# Patient Record
Sex: MALE | Race: BLACK OR AFRICAN AMERICAN | Employment: OTHER | URBAN - METROPOLITAN AREA
[De-identification: names, ages, dates, MRNs, and addresses within clinical notes are randomized per-mention and may not be internally consistent; named-entity substitution may affect disease eponyms.]

---

## 2017-01-03 ENCOUNTER — HOME CARE VISIT (OUTPATIENT)
Dept: HOME HEALTH SERVICES | Facility: HOME HEALTH | Age: 77
End: 2017-01-03
Payer: MEDICARE

## 2017-01-04 ENCOUNTER — HOME CARE VISIT (OUTPATIENT)
Dept: SCHEDULING | Facility: HOME HEALTH | Age: 77
End: 2017-01-04
Payer: MEDICARE

## 2017-01-04 PROCEDURE — G0299 HHS/HOSPICE OF RN EA 15 MIN: HCPCS

## 2017-01-06 ENCOUNTER — HOME CARE VISIT (OUTPATIENT)
Dept: SCHEDULING | Facility: HOME HEALTH | Age: 77
End: 2017-01-06
Payer: MEDICARE

## 2017-01-06 VITALS
SYSTOLIC BLOOD PRESSURE: 119 MMHG | DIASTOLIC BLOOD PRESSURE: 60 MMHG | HEART RATE: 87 BPM | RESPIRATION RATE: 18 BRPM | OXYGEN SATURATION: 98 %

## 2017-01-06 VITALS
TEMPERATURE: 97.6 F | OXYGEN SATURATION: 98 % | HEART RATE: 58 BPM | SYSTOLIC BLOOD PRESSURE: 122 MMHG | DIASTOLIC BLOOD PRESSURE: 80 MMHG | RESPIRATION RATE: 16 BRPM

## 2017-01-06 PROCEDURE — G0157 HHC PT ASSISTANT EA 15: HCPCS

## 2017-01-06 PROCEDURE — G0299 HHS/HOSPICE OF RN EA 15 MIN: HCPCS

## 2017-01-11 ENCOUNTER — HOME CARE VISIT (OUTPATIENT)
Dept: SCHEDULING | Facility: HOME HEALTH | Age: 77
End: 2017-01-11
Payer: MEDICARE

## 2017-01-11 VITALS
DIASTOLIC BLOOD PRESSURE: 74 MMHG | RESPIRATION RATE: 18 BRPM | SYSTOLIC BLOOD PRESSURE: 132 MMHG | TEMPERATURE: 96.5 F | HEART RATE: 74 BPM

## 2017-01-11 PROCEDURE — G0157 HHC PT ASSISTANT EA 15: HCPCS

## 2017-01-11 PROCEDURE — G0299 HHS/HOSPICE OF RN EA 15 MIN: HCPCS

## 2017-01-12 VITALS
HEART RATE: 74 BPM | OXYGEN SATURATION: 98 % | DIASTOLIC BLOOD PRESSURE: 74 MMHG | OXYGEN SATURATION: 98 % | TEMPERATURE: 96.5 F | SYSTOLIC BLOOD PRESSURE: 132 MMHG | TEMPERATURE: 96 F | RESPIRATION RATE: 18 BRPM | HEART RATE: 87 BPM | SYSTOLIC BLOOD PRESSURE: 119 MMHG | DIASTOLIC BLOOD PRESSURE: 60 MMHG

## 2017-01-14 ENCOUNTER — HOME CARE VISIT (OUTPATIENT)
Dept: SCHEDULING | Facility: HOME HEALTH | Age: 77
End: 2017-01-14
Payer: MEDICARE

## 2017-01-14 PROCEDURE — G0157 HHC PT ASSISTANT EA 15: HCPCS

## 2017-01-17 ENCOUNTER — HOME CARE VISIT (OUTPATIENT)
Dept: SCHEDULING | Facility: HOME HEALTH | Age: 77
End: 2017-01-17
Payer: MEDICARE

## 2017-01-17 VITALS
SYSTOLIC BLOOD PRESSURE: 142 MMHG | DIASTOLIC BLOOD PRESSURE: 80 MMHG | RESPIRATION RATE: 18 BRPM | HEART RATE: 70 BPM | TEMPERATURE: 97.6 F

## 2017-01-17 PROCEDURE — G0155 HHCP-SVS OF CSW,EA 15 MIN: HCPCS

## 2017-01-18 ENCOUNTER — HOME CARE VISIT (OUTPATIENT)
Dept: SCHEDULING | Facility: HOME HEALTH | Age: 77
End: 2017-01-18
Payer: MEDICARE

## 2017-01-18 VITALS
DIASTOLIC BLOOD PRESSURE: 70 MMHG | TEMPERATURE: 97.9 F | SYSTOLIC BLOOD PRESSURE: 122 MMHG | RESPIRATION RATE: 18 BRPM | HEART RATE: 70 BPM

## 2017-01-18 PROCEDURE — G0157 HHC PT ASSISTANT EA 15: HCPCS

## 2017-01-20 ENCOUNTER — HOME CARE VISIT (OUTPATIENT)
Dept: SCHEDULING | Facility: HOME HEALTH | Age: 77
End: 2017-01-20
Payer: MEDICARE

## 2017-01-20 VITALS
SYSTOLIC BLOOD PRESSURE: 144 MMHG | RESPIRATION RATE: 17 BRPM | DIASTOLIC BLOOD PRESSURE: 84 MMHG | HEART RATE: 88 BPM | TEMPERATURE: 96.6 F

## 2017-01-20 PROCEDURE — G0151 HHCP-SERV OF PT,EA 15 MIN: HCPCS

## 2017-01-24 ENCOUNTER — HOME CARE VISIT (OUTPATIENT)
Dept: SCHEDULING | Facility: HOME HEALTH | Age: 77
End: 2017-01-24
Payer: MEDICARE

## 2017-01-24 VITALS
TEMPERATURE: 96.6 F | SYSTOLIC BLOOD PRESSURE: 134 MMHG | DIASTOLIC BLOOD PRESSURE: 78 MMHG | RESPIRATION RATE: 16 BRPM | HEART RATE: 72 BPM

## 2017-01-24 PROCEDURE — G0151 HHCP-SERV OF PT,EA 15 MIN: HCPCS

## 2017-02-07 ENCOUNTER — PATIENT OUTREACH (OUTPATIENT)
Dept: CASE MANAGEMENT | Age: 77
End: 2017-02-07

## 2017-02-07 NOTE — PROGRESS NOTES
Attempted to reach pt for CCM services, left vm message with RNCM contact information. Will continue attempts to reach pt. This note will not be viewable in [address]

## 2017-02-08 ENCOUNTER — PATIENT OUTREACH (OUTPATIENT)
Dept: CASE MANAGEMENT | Age: 77
End: 2017-02-08

## 2017-02-08 NOTE — PROGRESS NOTES
Pt returned call to [place]. Pt lives with daughter Mack Enriquez. She works in healthcare [employer] until 6pm.  She assists with managing medications, grocery shopping, and cooking. Pt wishes to have assistance with bathing MWF. He says he is [facility] connected but is unable to provide furhter information on this. Pt reports that he has a \"leakage\" problem. Other daughter Tej Becerra will take pt to PCP appt tomorrow 2/9/17 to discuss problem with leakage. Pt states he has urologist but doesn't recall name. RNCM attempted to review medications with pt however he is unable to review. He says he thinks he's getting alzheimer's. RNCM requested pt have daughter called RNCM tomorrow to review. Pt agrees to do so. Next Steps: [place] will attempt another outreach if I d not hear back from daughter. Will provide her with resources and assess insurance coverage as pt doesn't recall benefits. This note will not be viewable in [address]

## 2017-03-17 ENCOUNTER — PATIENT OUTREACH (OUTPATIENT)
Dept: CASE MANAGEMENT | Age: 77
End: 2017-03-17

## 2017-03-17 NOTE — PROGRESS NOTES
This note will not be viewable in [address] RNCM attempted f/u call for CCM services, left  message with RNCM contact information. RNCM will f/u in 1wk if do not hear back from pt.

## 2017-03-24 ENCOUNTER — PATIENT OUTREACH (OUTPATIENT)
Dept: CASE MANAGEMENT | Age: 77
End: 2017-03-24

## 2017-03-24 NOTE — PROGRESS NOTES
This note will not be viewable in [address] 2nd unsuccessful attempt to reach pt at both numbers listed. Left vm at both numbers with RNCM contact information. Will continue attempts to reach pt.

## 2017-04-03 ENCOUNTER — PATIENT OUTREACH (OUTPATIENT)
Dept: CASE MANAGEMENT | Age: 77
End: 2017-04-03

## 2017-04-03 NOTE — PROGRESS NOTES
This note will not be viewable in [address] Care Needs: RNCM attempted to reach pt for f/u CCM services.  says mailbox is full, unable to leave message at [phone]. RNCM left message for pt at [phone]. RNCM will continue attempts to reach pt.

## 2017-04-11 ENCOUNTER — PATIENT OUTREACH (OUTPATIENT)
Dept: CASE MANAGEMENT | Age: 77
End: 2017-04-11

## 2017-04-11 NOTE — Clinical Note
Good morning Ladies, I've left several messages for Mr. Gallo Moya and his family, but have not received returned calls from him or his daughter since my initial contact. He has an appt on 4/17, if you'd like to give them my number to contact me at their convenience. Thank you,     Helene Gatica RN, [email or web address] c: [phone] / Marcia Villanueva [address] [place] [address]. Juliano  / Judd, [address] [facility] [email or web address]. [facility]

## 2017-04-11 NOTE — PROGRESS NOTES
This note will not be viewable in [address] Care Needs: RNCM attempted to reach pt for f/u CCM services.  says mailbox is full, unable to leave message at [phone]. RNCM left last and final message for pt at [phone]. RNCM will notify PCP unable to reach pt.

## 2017-04-17 PROBLEM — C61 CA OF PROSTATE (HCC): Status: ACTIVE | Noted: 2017-04-17

## 2017-05-16 ENCOUNTER — PATIENT OUTREACH (OUTPATIENT)
Dept: CASE MANAGEMENT | Age: 77
End: 2017-05-16

## 2017-06-03 ENCOUNTER — APPOINTMENT (OUTPATIENT)
Dept: CT IMAGING | Age: 77
End: 2017-06-03
Attending: EMERGENCY MEDICINE
Payer: MEDICARE

## 2017-06-03 ENCOUNTER — HOSPITAL ENCOUNTER (EMERGENCY)
Age: 77
Discharge: HOME | End: 2017-06-03
Attending: EMERGENCY MEDICINE
Payer: MEDICARE

## 2017-06-03 ENCOUNTER — APPOINTMENT (OUTPATIENT)
Dept: GENERAL RADIOLOGY | Age: 77
End: 2017-06-03
Attending: EMERGENCY MEDICINE
Payer: MEDICARE

## 2017-06-03 VITALS
OXYGEN SATURATION: 96 % | WEIGHT: 300 LBS | SYSTOLIC BLOOD PRESSURE: 127 MMHG | RESPIRATION RATE: 18 BRPM | HEART RATE: 72 BPM | DIASTOLIC BLOOD PRESSURE: 61 MMHG | HEIGHT: 74 IN | BODY MASS INDEX: 38.5 KG/M2 | TEMPERATURE: 98.8 F

## 2017-06-03 DIAGNOSIS — R53.1 GENERALIZED WEAKNESS: Primary | ICD-10-CM

## 2017-06-03 LAB
ALBUMIN SERPL BCP-MCNC: 3.5 G/DL (ref 3.2–4.6)
ALBUMIN/GLOB SERPL: 0.9 {RATIO} (ref 1.2–3.5)
ALP SERPL-CCNC: 102 U/L (ref 50–136)
ALT SERPL-CCNC: 20 U/L (ref 12–65)
ANION GAP BLD CALC-SCNC: 10 MMOL/L (ref 7–16)
APPEARANCE UR: ABNORMAL
AST SERPL W P-5'-P-CCNC: 31 U/L (ref 15–37)
BACTERIA URNS QL MICRO: ABNORMAL /HPF
BASOPHILS # BLD AUTO: 0 K/UL (ref 0–0.2)
BASOPHILS # BLD: 0 % (ref 0–2)
BILIRUB SERPL-MCNC: 1.8 MG/DL (ref 0.2–1.1)
BILIRUB UR QL: ABNORMAL
BUN SERPL-MCNC: 22 MG/DL (ref 8–23)
CALCIUM SERPL-MCNC: 10 MG/DL (ref 8.3–10.4)
CHLORIDE SERPL-SCNC: 110 MMOL/L (ref 98–107)
CO2 SERPL-SCNC: 24 MMOL/L (ref 21–32)
COLOR UR: ABNORMAL
CREAT SERPL-MCNC: 0.99 MG/DL (ref 0.8–1.5)
CRYSTALS URNS QL MICRO: ABNORMAL /LPF
DIFFERENTIAL METHOD BLD: ABNORMAL
EOSINOPHIL # BLD: 0 K/UL (ref 0–0.8)
EOSINOPHIL NFR BLD: 0 % (ref 0.5–7.8)
EPI CELLS #/AREA URNS HPF: ABNORMAL /HPF
ERYTHROCYTE [DISTWIDTH] IN BLOOD BY AUTOMATED COUNT: 13.7 % (ref 11.9–14.6)
GLOBULIN SER CALC-MCNC: 3.7 G/DL (ref 2.3–3.5)
GLUCOSE SERPL-MCNC: 91 MG/DL (ref 65–100)
GLUCOSE UR STRIP.AUTO-MCNC: NEGATIVE MG/DL
HCT VFR BLD AUTO: 44.8 % (ref 41.1–50.3)
HGB BLD-MCNC: 16.2 G/DL (ref 13.6–17.2)
HGB UR QL STRIP: ABNORMAL
IMM GRANULOCYTES # BLD: 0 K/UL (ref 0–0.5)
IMM GRANULOCYTES NFR BLD AUTO: 0.3 % (ref 0–5)
KETONES UR QL STRIP.AUTO: NEGATIVE MG/DL
LEUKOCYTE ESTERASE UR QL STRIP.AUTO: ABNORMAL
LYMPHOCYTES # BLD AUTO: 17 % (ref 13–44)
LYMPHOCYTES # BLD: 1 K/UL (ref 0.5–4.6)
MCH RBC QN AUTO: 31.7 PG (ref 26.1–32.9)
MCHC RBC AUTO-ENTMCNC: 36.2 G/DL (ref 31.4–35)
MCV RBC AUTO: 87.7 FL (ref 79.6–97.8)
MONOCYTES # BLD: 0.4 K/UL (ref 0.1–1.3)
MONOCYTES NFR BLD AUTO: 6 % (ref 4–12)
NEUTS SEG # BLD: 4.5 K/UL (ref 1.7–8.2)
NEUTS SEG NFR BLD AUTO: 77 % (ref 43–78)
NITRITE UR QL STRIP.AUTO: NEGATIVE
OTHER OBSERVATIONS,UCOM: ABNORMAL
PH UR STRIP: 5.5 [PH] (ref 5–9)
PLATELET # BLD AUTO: 168 K/UL (ref 150–450)
PMV BLD AUTO: 10.4 FL (ref 10.8–14.1)
POTASSIUM SERPL-SCNC: 4 MMOL/L (ref 3.5–5.1)
PROT SERPL-MCNC: 7.2 G/DL (ref 6.3–8.2)
PROT UR STRIP-MCNC: 30 MG/DL
RBC # BLD AUTO: 5.11 M/UL (ref 4.23–5.67)
RBC #/AREA URNS HPF: ABNORMAL /HPF
SODIUM SERPL-SCNC: 144 MMOL/L (ref 136–145)
SP GR UR REFRACTOMETRY: 1.03 (ref 1–1.02)
UROBILINOGEN UR QL STRIP.AUTO: 1 EU/DL (ref 0.2–1)
WBC # BLD AUTO: 5.9 K/UL (ref 4.3–11.1)
WBC URNS QL MICRO: ABNORMAL /HPF

## 2017-06-03 PROCEDURE — 83605 ASSAY OF LACTIC ACID: CPT

## 2017-06-03 PROCEDURE — 71010 XR CHEST PORT: CPT

## 2017-06-03 PROCEDURE — 99284 EMERGENCY DEPT VISIT MOD MDM: CPT | Performed by: EMERGENCY MEDICINE

## 2017-06-03 PROCEDURE — 74011250636 HC RX REV CODE- 250/636: Performed by: EMERGENCY MEDICINE

## 2017-06-03 PROCEDURE — 70450 CT HEAD/BRAIN W/O DYE: CPT

## 2017-06-03 PROCEDURE — 87040 BLOOD CULTURE FOR BACTERIA: CPT | Performed by: EMERGENCY MEDICINE

## 2017-06-03 PROCEDURE — 93005 ELECTROCARDIOGRAM TRACING: CPT | Performed by: EMERGENCY MEDICINE

## 2017-06-03 PROCEDURE — 96360 HYDRATION IV INFUSION INIT: CPT | Performed by: EMERGENCY MEDICINE

## 2017-06-03 PROCEDURE — 81001 URINALYSIS AUTO W/SCOPE: CPT | Performed by: EMERGENCY MEDICINE

## 2017-06-03 PROCEDURE — 85025 COMPLETE CBC W/AUTO DIFF WBC: CPT | Performed by: EMERGENCY MEDICINE

## 2017-06-03 PROCEDURE — 80053 COMPREHEN METABOLIC PANEL: CPT | Performed by: EMERGENCY MEDICINE

## 2017-06-03 RX ORDER — SODIUM CHLORIDE 9 MG/ML
1000 INJECTION, SOLUTION INTRAVENOUS ONCE
Status: COMPLETED | OUTPATIENT
Start: 2017-06-03 | End: 2017-06-03

## 2017-06-03 RX ADMIN — SODIUM CHLORIDE 1000 ML: 900 INJECTION, SOLUTION INTRAVENOUS at 21:04

## 2017-06-03 NOTE — ED TRIAGE NOTES
Patient arrives to the ER via [facility]. [facility] was called out to private residence for increased weakness over the last week. Patient has been unable to ambulate due to weakness. Patient had dark foul smelling urine. EMS initiated a sepsis protocol. EMS administered 4.5g of Zosyn and 400 mL of NS. Patient was tachycardic and increased breathing.

## 2017-06-03 NOTE — ED NOTES
Patient requesting water. MD approved. Water and ice chips provided. Pt resting high-fowlers in no acute distress at this moment in time. Family member at bedside. Will continue to closely monitor and assess.

## 2017-06-04 ENCOUNTER — PATIENT OUTREACH (OUTPATIENT)
Dept: CASE MANAGEMENT | Age: 77
End: 2017-06-04

## 2017-06-04 LAB
ATRIAL RATE: 80 BPM
CALCULATED P AXIS, ECG09: 0 DEGREES
CALCULATED R AXIS, ECG10: -53 DEGREES
CALCULATED T AXIS, ECG11: 43 DEGREES
DIAGNOSIS, 93000: NORMAL
Q-T INTERVAL, ECG07: 344 MS
QRS DURATION, ECG06: 124 MS
QTC CALCULATION (BEZET), ECG08: 401 MS
VENTRICULAR RATE, ECG03: 82 BPM

## 2017-06-04 NOTE — ED NOTES
I have reviewed discharge instructions with the patient. The patient verbalized understanding. Pt transferred to vehicle via wheelchair w/o incident.

## 2017-06-04 NOTE — DISCHARGE INSTRUCTIONS
Weakness: Care Instructions  Your Care Instructions  Weakness is a lack of physical or muscle strength. You may feel that you need to make extra effort to move your arms, legs, or other muscles. Generalized weakness means that you feel weak in most areas of your body. Another type of weakness may affect just one muscle or group of muscles. You may feel weak and tired after you have done too much activity, such as taking an extra-long hike. This is not a serious problem. It often goes away on its own. Feeling weak can also be caused by medical conditions like thyroid problems, depression, or a virus. Sometimes the cause can be serious. Your doctor may want to do more tests to try to find the cause of the weakness. The doctor has checked you carefully, but problems can develop later. If you notice any problems or new symptoms, get medical treatment right away. Follow-up care is a key part of your treatment and safety. Be sure to make and go to all appointments, and call your doctor if you are having problems. It's also a good idea to know your test results and keep a list of the medicines you take. How can you care for yourself at home? · Rest when you feel tired. · Be safe with medicines. If your doctor prescribed medicine, take it exactly as prescribed. Call your doctor if you think you are having a problem with your medicine. You will get more details on the specific medicines your doctor prescribes. · Do not skip meals. Eating a balanced diet may increase your energy level. · Get some physical activity every day, but do not get too tired. When should you call for help? Call your doctor now or seek immediate medical care if:  · You have new or worse weakness. · You are dizzy or lightheaded, or you feel like you may faint. Watch closely for changes in your health, and be sure to contact your doctor if:  · You do not get better as expected. Where can you learn more?   Go to http://pacheco.info/. Enter [id number] in the search box to learn more about \"Weakness: Care Instructions. \"  Current as of: May 27, 2016  Content Version: 11.2  © 7213-1666 [employer]. Care instructions adapted under license by [employer] (which disclaims liability or warranty for this information). If you have questions about a medical condition or this instruction, always ask your healthcare professional. [address] any warranty or liability for your use of this information.

## 2017-06-04 NOTE — ED PROVIDER NOTES
Patient is a 64 y.o. male presenting with lethargy. The history is provided by the patient and the EMS personnel. Lethargy   This is a chronic problem. The current episode started more than 1 week ago. The problem occurs constantly. The problem has been gradually worsening. Nothing aggravates the symptoms. Nothing relieves the symptoms. He has tried nothing for the symptoms. The treatment provided no relief. Past Medical History:   Diagnosis Date    Arrhythmia     pt takes pradaxa    Arthritis     Atrial fibrillation ([address] 1/27/2012    CAD (coronary artery disease)     Cancer (HCC)     prostate    GERD (gastroesophageal reflux disease)     controlled with medication    Heart failure ([address]     pt takes lasix as needed, reports SOB with exertion    Hypertension     controlled with medication    Morbid obesity ([address]        Past Surgical History:   Procedure Laterality Date    HX ORTHOPAEDIC  2010    left TKA    HX PACEMAKER  8/30/2012    [employer] pacemaker    HX PROSTATECTOMY           Family History:   Problem Relation Age of Onset    Cancer Father        Social History     Social History    Marital status:      Spouse name: N/A    Number of children: N/A    Years of education: N/A     Occupational History    Not on file. Social History Main Topics    Smoking status: Never Smoker    Smokeless tobacco: Never Used    Alcohol use No    Drug use: No    Sexual activity: No     Other Topics Concern    Not on file     Social History Narrative         ALLERGIES: Review of patient's allergies indicates no known allergies. Review of Systems   Constitutional: Negative. HENT: Negative. Respiratory: Negative. Cardiovascular: Negative. Gastrointestinal: Negative. Endocrine: Negative. Genitourinary: Negative. Musculoskeletal: Negative. Skin: Negative. Neurological: Negative. Hematological: Negative.         Vitals:    06/03/17 1805 06/03/17 1830   BP: 127/59 133/60   Pulse: 73 77   Resp: 24 20   Temp: 98.7 °F (37.1 °C)    SpO2: 95% 94%   Weight: 136.1 kg (300 lb)    Height: 6' 2\" (1.88 m)             Physical Exam   Constitutional: Vital signs are normal. He appears well-developed and well-nourished. Non-toxic appearance. He does not have a sickly appearance. He does not appear ill. HENT:   Head: Normocephalic and atraumatic. Cardiovascular: Intact distal pulses. Pulmonary/Chest: Effort normal.   Abdominal: Soft. He exhibits no distension. There is no tenderness. There is no rebound. Neurological: He is alert. Moves all extremities. Standing favors left leg and states hard to walk on right. No pain. Symmetric ROM supine on stretcher. Skin: Skin is warm and dry. Psychiatric: He has a normal mood and affect. His behavior is normal.   Nursing note and vitals reviewed. MDM  Number of Diagnoses or Management Options  Diagnosis management comments: Patient reports having symptoms for up to one week similar to this. His primary care mentions in a note on 519 that he has had general decline and difficulty with activities of daily living. Appears that there may not been any progress made towards assisted living or other options at this point and that he came to a \"breaking point\" and when she ended up in the ER today due to him having difficulty getting around his leg. Discussed with patient that we can start with some studies his hemoglobin identified a source but unclear at this point whether or not this is due to his declining overall health, body habitus, or generalized weakness. Patient does have bilateral lower extremity skin changes consistent with chronic vascular disease. Since symptoms at least 7 days, felt CT would be reasonable screen for central cause. If negative, may need to see PCP Monday for follow up and explore higher level of care options.         Amount and/or Complexity of Data Reviewed  Clinical lab tests: ordered and reviewed  Tests in the radiology section of CPT®: ordered and reviewed      ED Course       Procedures

## 2017-06-04 NOTE — PROGRESS NOTES
Initial outreach attempt to patient was unsuccessful. Second outreach attempt will be made within 24 hours. This note will not be viewable in [address]

## 2017-06-05 LAB — LACTATE BLD-SCNC: 1.7 MMOL/L (ref 0.5–1.9)

## 2017-06-08 LAB
BACTERIA SPEC CULT: NORMAL
BACTERIA SPEC CULT: NORMAL
SERVICE CMNT-IMP: NORMAL
SERVICE CMNT-IMP: NORMAL

## 2017-06-09 ENCOUNTER — PATIENT OUTREACH (OUTPATIENT)
Dept: CASE MANAGEMENT | Age: 77
End: 2017-06-09

## 2017-07-22 ENCOUNTER — HOSPITAL ENCOUNTER (INPATIENT)
Age: 77
LOS: 4 days | Discharge: NURSING, REHAB OR LONG-TERM CARE | DRG: 193 | End: 2017-07-26
Attending: EMERGENCY MEDICINE | Admitting: INTERNAL MEDICINE
Payer: MEDICARE

## 2017-07-22 ENCOUNTER — APPOINTMENT (OUTPATIENT)
Dept: GENERAL RADIOLOGY | Age: 77
DRG: 193 | End: 2017-07-22
Attending: EMERGENCY MEDICINE
Payer: MEDICARE

## 2017-07-22 ENCOUNTER — APPOINTMENT (OUTPATIENT)
Dept: CT IMAGING | Age: 77
DRG: 193 | End: 2017-07-22
Attending: EMERGENCY MEDICINE
Payer: MEDICARE

## 2017-07-22 DIAGNOSIS — M17.11 PRIMARY OSTEOARTHRITIS OF RIGHT KNEE: ICD-10-CM

## 2017-07-22 DIAGNOSIS — R91.8 LUNG INFILTRATE: ICD-10-CM

## 2017-07-22 DIAGNOSIS — F05 DELIRIUM DUE TO ANOTHER MEDICAL CONDITION: ICD-10-CM

## 2017-07-22 DIAGNOSIS — R50.9 FEBRILE ILLNESS: Primary | ICD-10-CM

## 2017-07-22 DIAGNOSIS — F51.01 PRIMARY INSOMNIA: ICD-10-CM
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
## 2017-07-22 PROBLEM — G93.41 ACUTE METABOLIC ENCEPHALOPATHY: Status: ACTIVE | Noted: 2017-07-22

## 2017-07-22 PROBLEM — N39.0 UTI (URINARY TRACT INFECTION): Status: ACTIVE | Noted: 2017-07-22

## 2017-07-22 LAB
ALBUMIN SERPL BCP-MCNC: 3.5 G/DL (ref 3.2–4.6)
ALBUMIN/GLOB SERPL: 1 {RATIO} (ref 1.2–3.5)
ALP SERPL-CCNC: 110 U/L (ref 50–136)
ALT SERPL-CCNC: 21 U/L (ref 12–65)
ANION GAP BLD CALC-SCNC: 9 MMOL/L (ref 7–16)
AST SERPL W P-5'-P-CCNC: 25 U/L (ref 15–37)
BASOPHILS # BLD AUTO: 0 K/UL (ref 0–0.2)
BASOPHILS # BLD: 0 % (ref 0–2)
BILIRUB SERPL-MCNC: 1.5 MG/DL (ref 0.2–1.1)
BNP SERPL-MCNC: 80 PG/ML
BUN SERPL-MCNC: 18 MG/DL (ref 8–23)
CALCIUM SERPL-MCNC: 9.4 MG/DL (ref 8.3–10.4)
CHLORIDE SERPL-SCNC: 111 MMOL/L (ref 98–107)
CO2 SERPL-SCNC: 23 MMOL/L (ref 21–32)
CREAT SERPL-MCNC: 0.99 MG/DL (ref 0.8–1.5)
CRP SERPL-MCNC: 1.2 MG/DL (ref 0–0.9)
DIFFERENTIAL METHOD BLD: ABNORMAL
EOSINOPHIL # BLD: 0 K/UL (ref 0–0.8)
EOSINOPHIL NFR BLD: 0 % (ref 0.5–7.8)
ERYTHROCYTE [DISTWIDTH] IN BLOOD BY AUTOMATED COUNT: 13.2 % (ref 11.9–14.6)
GLOBULIN SER CALC-MCNC: 3.5 G/DL (ref 2.3–3.5)
GLUCOSE SERPL-MCNC: 99 MG/DL (ref 65–100)
HCT VFR BLD AUTO: 42.1 % (ref 41.1–50.3)
HGB BLD-MCNC: 15.1 G/DL (ref 13.6–17.2)
IMM GRANULOCYTES # BLD: 0 K/UL (ref 0–0.5)
IMM GRANULOCYTES NFR BLD AUTO: 0.2 % (ref 0–5)
LACTATE BLD-SCNC: 1.2 MMOL/L (ref 0.5–1.9)
LACTATE SERPL-SCNC: 1.6 MMOL/L (ref 0.4–2)
LYMPHOCYTES # BLD AUTO: 14 % (ref 13–44)
LYMPHOCYTES # BLD: 0.9 K/UL (ref 0.5–4.6)
MAGNESIUM SERPL-MCNC: 2.3 MG/DL (ref 1.8–2.4)
MCH RBC QN AUTO: 32.2 PG (ref 26.1–32.9)
MCHC RBC AUTO-ENTMCNC: 35.9 G/DL (ref 31.4–35)
MCV RBC AUTO: 89.8 FL (ref 79.6–97.8)
MONOCYTES # BLD: 0.5 K/UL (ref 0.1–1.3)
MONOCYTES NFR BLD AUTO: 8 % (ref 4–12)
NEUTS SEG # BLD: 4.8 K/UL (ref 1.7–8.2)
NEUTS SEG NFR BLD AUTO: 78 % (ref 43–78)
PLATELET # BLD AUTO: 163 K/UL (ref 150–450)
PMV BLD AUTO: 10.2 FL (ref 10.8–14.1)
POTASSIUM SERPL-SCNC: 3.6 MMOL/L (ref 3.5–5.1)
PROT SERPL-MCNC: 7 G/DL (ref 6.3–8.2)
RBC # BLD AUTO: 4.69 M/UL (ref 4.23–5.67)
SODIUM SERPL-SCNC: 143 MMOL/L (ref 136–145)
TROPONIN I BLD-MCNC: 0.1 NG/ML (ref 0–0.08)
TROPONIN I SERPL-MCNC: 0.67 NG/ML (ref 0.02–0.05)
TSH SERPL DL<=0.005 MIU/L-ACNC: 0.59 UIU/ML (ref 0.36–3.74)
VIT B12 SERPL-MCNC: 283 PG/ML (ref 193–986)
WBC # BLD AUTO: 6.2 K/UL (ref 4.3–11.1)

## 2017-07-22 PROCEDURE — 93005 ELECTROCARDIOGRAM TRACING: CPT | Performed by: EMERGENCY MEDICINE

## 2017-07-22 PROCEDURE — 84484 ASSAY OF TROPONIN QUANT: CPT

## 2017-07-22 PROCEDURE — 85025 COMPLETE CBC W/AUTO DIFF WBC: CPT | Performed by: EMERGENCY MEDICINE

## 2017-07-22 PROCEDURE — 99285 EMERGENCY DEPT VISIT HI MDM: CPT | Performed by: EMERGENCY MEDICINE

## 2017-07-22 PROCEDURE — 80053 COMPREHEN METABOLIC PANEL: CPT | Performed by: EMERGENCY MEDICINE

## 2017-07-22 PROCEDURE — 36415 COLL VENOUS BLD VENIPUNCTURE: CPT | Performed by: HOSPITALIST

## 2017-07-22 PROCEDURE — 71010 XR CHEST SNGL V: CPT

## 2017-07-22 PROCEDURE — 77030011943

## 2017-07-22 PROCEDURE — 86140 C-REACTIVE PROTEIN: CPT | Performed by: HOSPITALIST

## 2017-07-22 PROCEDURE — 83880 ASSAY OF NATRIURETIC PEPTIDE: CPT | Performed by: HOSPITALIST

## 2017-07-22 PROCEDURE — 82607 VITAMIN B-12: CPT | Performed by: HOSPITALIST

## 2017-07-22 PROCEDURE — 51701 INSERT BLADDER CATHETER: CPT | Performed by: EMERGENCY MEDICINE

## 2017-07-22 PROCEDURE — 83735 ASSAY OF MAGNESIUM: CPT | Performed by: HOSPITALIST

## 2017-07-22 PROCEDURE — 87040 BLOOD CULTURE FOR BACTERIA: CPT | Performed by: EMERGENCY MEDICINE

## 2017-07-22 PROCEDURE — 99218 HC RM OBSERVATION: CPT

## 2017-07-22 PROCEDURE — 70450 CT HEAD/BRAIN W/O DYE: CPT

## 2017-07-22 PROCEDURE — 83605 ASSAY OF LACTIC ACID: CPT

## 2017-07-22 PROCEDURE — 84443 ASSAY THYROID STIM HORMONE: CPT | Performed by: HOSPITALIST

## 2017-07-22 PROCEDURE — 83605 ASSAY OF LACTIC ACID: CPT | Performed by: HOSPITALIST

## 2017-07-22 PROCEDURE — 86592 SYPHILIS TEST NON-TREP QUAL: CPT | Performed by: HOSPITALIST

## 2017-07-22 RX ORDER — SODIUM CHLORIDE 0.9 % (FLUSH) 0.9 %
5-10 SYRINGE (ML) INJECTION AS NEEDED
Status: DISCONTINUED | OUTPATIENT
Start: 2017-07-22 | End: 2017-07-26 | Stop reason: HOSPADM

## 2017-07-22 RX ORDER — TRAMADOL HYDROCHLORIDE 50 MG/1
50 TABLET ORAL
Status: DISCONTINUED | OUTPATIENT
Start: 2017-07-22 | End: 2017-07-26 | Stop reason: HOSPADM

## 2017-07-22 RX ORDER — ACETAMINOPHEN 325 MG/1
650 TABLET ORAL
Status: DISCONTINUED | OUTPATIENT
Start: 2017-07-22 | End: 2017-07-26 | Stop reason: HOSPADM

## 2017-07-22 RX ORDER — ARIPIPRAZOLE 10 MG/1
10 TABLET ORAL DAILY
Status: DISCONTINUED | OUTPATIENT
Start: 2017-07-23 | End: 2017-07-26 | Stop reason: HOSPADM

## 2017-07-22 RX ORDER — FAMOTIDINE 20 MG/1
20 TABLET, FILM COATED ORAL 2 TIMES DAILY
Status: DISCONTINUED | OUTPATIENT
Start: 2017-07-23 | End: 2017-07-26 | Stop reason: HOSPADM

## 2017-07-22 RX ORDER — HEPARIN SODIUM 5000 [USP'U]/ML
5000 INJECTION, SOLUTION INTRAVENOUS; SUBCUTANEOUS EVERY 8 HOURS
Status: DISCONTINUED | OUTPATIENT
Start: 2017-07-23 | End: 2017-07-24

## 2017-07-22 RX ORDER — DONEPEZIL HYDROCHLORIDE 5 MG/1
5 TABLET, FILM COATED ORAL
Status: DISCONTINUED | OUTPATIENT
Start: 2017-07-22 | End: 2017-07-26 | Stop reason: HOSPADM

## 2017-07-22 RX ORDER — DABIGATRAN ETEXILATE 150 MG/1
150 CAPSULE ORAL EVERY 12 HOURS
Status: DISCONTINUED | OUTPATIENT
Start: 2017-07-22 | End: 2017-07-22

## 2017-07-22 RX ORDER — LANOLIN ALCOHOL/MO/W.PET/CERES
1000 CREAM (GRAM) TOPICAL DAILY
Status: DISCONTINUED | OUTPATIENT
Start: 2017-07-23 | End: 2017-07-26 | Stop reason: HOSPADM

## 2017-07-22 RX ORDER — TAMSULOSIN HYDROCHLORIDE 0.4 MG/1
0.4 CAPSULE ORAL DAILY
Status: DISCONTINUED | OUTPATIENT
Start: 2017-07-23 | End: 2017-07-26 | Stop reason: HOSPADM

## 2017-07-22 RX ORDER — SODIUM CHLORIDE 0.9 % (FLUSH) 0.9 %
5-10 SYRINGE (ML) INJECTION EVERY 8 HOURS
Status: DISCONTINUED | OUTPATIENT
Start: 2017-07-22 | End: 2017-07-26 | Stop reason: HOSPADM

## 2017-07-22 NOTE — IP AVS SNAPSHOT
[address] 
[phone] Patient: Rick Guzman MRN: [id number] :1940 Current Discharge Medication List  
  
START taking these medications Dose & Instructions Dispensing Information Comments Morning Noon Evening Bedtime  
 amoxicillin-clavulanate 875-125 mg per tablet Commonly known as:  AUGMENTIN Your last dose was: Your next dose is:    
   
   
 Dose:  1 Tab Take 1 Tab by mouth two (2) times daily (with meals) for 3 days. Indications: BACTERIAL PNEUMONIA Quantity:  6 Tab Refills:  0  
     
   
   
   
  
 aspirin 81 mg chewable tablet Replaces:  aspirin 325 mg tablet Your last dose was: Your next dose is:    
   
   
 Dose:  81 mg Take 1 Tab by mouth daily. Indications: prevention of cerebrovascular accident Quantity:  30 Tab Refills:  0 CONTINUE these medications which have NOT CHANGED Dose & Instructions Dispensing Information Comments Morning Noon Evening Bedtime ARIPiprazole 10 mg tablet Commonly known as:  ABILIFY Your last dose was: Your next dose is:    
   
   
 Dose:  10 mg Take 1 Tab by mouth daily. Quantity:  90 Tab Refills:  3  
     
   
   
   
  
 bicalutamide 50 mg tablet Commonly known as:  CASODEX Your last dose was: Your next dose is:    
   
   
 Dose:  50 mg Take 1 Tab by mouth daily. Quantity:  90 Tab Refills:  6  
     
   
   
   
  
 dabigatran etexilate 150 mg capsule Commonly known as:  PRADAXA Your last dose was: Your next dose is:    
   
   
 Dose:  150 mg Take 1 Cap by mouth every twelve (12) hours. Quantity:  180 Cap Refills:  3  
     
   
   
   
  
 diclofenac 1 % Gel Commonly known as:  VOLTAREN Your last dose was: Your next dose is:    
   
   
 APPLY 4 GRAMS TO AFFECTED AREA FOUR TIMES DAILY Refills:  2  
     
   
   
   
  
 donepezil 5 mg tablet Commonly known as:  ARICEPT Your last dose was: Your next dose is:    
   
   
 Dose:  5 mg Take 1 Tab by mouth nightly. Quantity:  90 Tab Refills:  2  
     
   
   
   
  
 famotidine 20 mg tablet Commonly known as:  PEPCID Your last dose was: Your next dose is:    
   
   
 Dose:  20 mg Take 1 Tab by mouth two (2) times a day. Quantity:  180 Tab Refills:  3 FETZIMA 40 mg ER capsule Generic drug:  levomilnacipran Your last dose was: Your next dose is: TAKE 1 CAP BY MOUTH DAILY. Quantity:  30 Cap Refills:  3  
     
   
   
   
  
 tamsulosin 0.4 mg capsule Commonly known as:  FLOMAX Your last dose was: Your next dose is:    
   
   
 Dose:  0.4 mg Take 1 Cap by mouth daily. Quantity:  90 Cap Refills:  2  
     
   
   
   
  
 traMADol 50 mg tablet Commonly known as:  ULTRAM  
   
Your last dose was: Your next dose is:    
   
   
 Dose:  50 mg Take 1 Tab by mouth two (2) times a day. Max Daily Amount: 100 mg. Indications: Pain Quantity:  12 Tab Refills:  0  
     
   
   
   
  
 TYLENOL ARTHRITIS PAIN 650 mg CR tablet Generic drug:  acetaminophen Your last dose was: Your next dose is:    
   
   
 Dose:  650 mg Take 650 mg by mouth every six (6) hours as needed for Pain. Refills:  0  
     
   
   
   
  
 zolpidem 5 mg tablet Commonly known as:  AMBIEN Your last dose was: Your next dose is:    
   
   
 Dose:  5 mg Take 1 Tab by mouth nightly as needed for Sleep. Max Daily Amount: 5 mg. Indications: SLEEP-ONSET INSOMNIA Quantity:  3 Tab Refills:  0 STOP taking these medications   
 aspirin 325 mg tablet Generic drug:  aspirin Replaced by:  aspirin 81 mg chewable tablet  
   
  
 cefdinir 300 mg capsule Commonly known as:  OMNICEF  
   
 celecoxib 200 mg capsule Commonly known as:  CELEBREX Where to Get Your Medications Information on where to get these meds will be given to you by the nurse or doctor. ! Ask your nurse or doctor about these medications  
  amoxicillin-clavulanate 875-125 mg per tablet  
 aspirin 81 mg chewable tablet  
 traMADol 50 mg tablet  
 zolpidem 5 mg tablet

## 2017-07-22 NOTE — IP AVS SNAPSHOT
[address] 
[phone] Patient: Reggie Mccormick MRN: [id number] :1940 You are allergic to the following No active allergies Immunizations Administered for This Admission Name Date  
 TB Skin Test (PPD) Intradermal 2017 Recent Documentation Height Weight BMI Smoking Status 1.88 m 136.1 kg 38.52 kg/m2 Never Smoker Unresulted Labs Order Current Status CULTURE, BLOOD Preliminary result CULTURE, BLOOD Preliminary result PLEASE READ & DOCUMENT PPD TEST IN 72 HRS Preliminary result Emergency Contacts Name Discharge Info Relation Home Work Mobile Leidy Avila  Daughter [21] [phone] About your hospitalization You were admitted on:  2017 You last received care in the:  [facility] 6 MED SURG You were discharged on:  2017 Unit phone number:  [phone] Why you were hospitalized Your primary diagnosis was:  Cap (Community Acquired Pneumonia) Your diagnoses also included:  Febrile Illness, Acute, Acute Metabolic Encephalopathy, Fall, Htn (Hypertension), Atrial Fibrillation (Hcc), Generalized Weakness, Vitamin B12 Deficiency, Elevated Troponin I Level, Acute Encephalopathy Providers Seen During Your Hospitalizations Provider Role Specialty Primary office phone Viviana Saenz DO Attending Provider Emergency Medicine [phone] Raman Pierce MD Attending Provider [facility] [phone] Anatoliy Bolaños MD Attending Provider Internal Medicine [phone] Your Primary Care Physician (PCP) Primary Care Physician Office Phone Office Fax 611  Dougie Leongjackie 224 [phone] Follow-up Information Follow up With Details Comments Contact Info Josh Tilley MD  after d/c from [facility] [address] Internal Medicine [address] [place] [id number] 
[phone] Your Appointments Wednesday August 16, 2017  1:30 PM EDT  
OFFICE DEVICE CHECKS with [facility] 39 [facility] Card[facility] ([address]) [address]  
[phone] This upcoming device check will take place IN OUR OFFICE. Please arrive 15 minutes early to review any necessary paperwork requirements. If you have any further questions or need to change this appointment, we are happy to help and can be reached at [phone]. Current Discharge Medication List  
  
START taking these medications Dose & Instructions Dispensing Information Comments Morning Noon Evening Bedtime  
 amoxicillin-clavulanate 875-125 mg per tablet Commonly known as:  AUGMENTIN Your last dose was: Your next dose is:    
   
   
 Dose:  1 Tab Take 1 Tab by mouth two (2) times daily (with meals) for 3 days. Indications: BACTERIAL PNEUMONIA Quantity:  6 Tab Refills:  0  
     
   
   
   
  
 aspirin 81 mg chewable tablet Replaces:  aspirin 325 mg tablet Your last dose was: Your next dose is:    
   
   
 Dose:  81 mg Take 1 Tab by mouth daily. Indications: prevention of cerebrovascular accident Quantity:  30 Tab Refills:  0 CONTINUE these medications which have NOT CHANGED Dose & Instructions Dispensing Information Comments Morning Noon Evening Bedtime ARIPiprazole 10 mg tablet Commonly known as:  ABILIFY Your last dose was: Your next dose is:    
   
   
 Dose:  10 mg Take 1 Tab by mouth daily. Quantity:  90 Tab Refills:  3  
     
   
   
   
  
 bicalutamide 50 mg tablet Commonly known as:  CASODEX Your last dose was: Your next dose is:    
   
   
 Dose:  50 mg Take 1 Tab by mouth daily. Quantity:  90 Tab Refills:  6 dabigatran etexilate 150 mg capsule Commonly known as:  PRADAXA Your last dose was: Your next dose is:    
   
   
 Dose:  150 mg Take 1 Cap by mouth every twelve (12) hours. Quantity:  180 Cap Refills:  3  
     
   
   
   
  
 diclofenac 1 % Gel Commonly known as:  VOLTAREN Your last dose was: Your next dose is:    
   
   
 APPLY 4 GRAMS TO AFFECTED AREA FOUR TIMES DAILY Refills:  2  
     
   
   
   
  
 donepezil 5 mg tablet Commonly known as:  ARICEPT Your last dose was: Your next dose is:    
   
   
 Dose:  5 mg Take 1 Tab by mouth nightly. Quantity:  90 Tab Refills:  2  
     
   
   
   
  
 famotidine 20 mg tablet Commonly known as:  PEPCID Your last dose was: Your next dose is:    
   
   
 Dose:  20 mg Take 1 Tab by mouth two (2) times a day. Quantity:  180 Tab Refills:  3 FETZIMA 40 mg ER capsule Generic drug:  levomilnacipran Your last dose was: Your next dose is: TAKE 1 CAP BY MOUTH DAILY. Quantity:  30 Cap Refills:  3  
     
   
   
   
  
 tamsulosin 0.4 mg capsule Commonly known as:  FLOMAX Your last dose was: Your next dose is:    
   
   
 Dose:  0.4 mg Take 1 Cap by mouth daily. Quantity:  90 Cap Refills:  2  
     
   
   
   
  
 traMADol 50 mg tablet Commonly known as:  ULTRAM  
   
Your last dose was: Your next dose is:    
   
   
 Dose:  50 mg Take 1 Tab by mouth two (2) times a day. Max Daily Amount: 100 mg. Indications: Pain Quantity:  12 Tab Refills:  0  
     
   
   
   
  
 TYLENOL ARTHRITIS PAIN 650 mg CR tablet Generic drug:  acetaminophen Your last dose was: Your next dose is:    
   
   
 Dose:  650 mg Take 650 mg by mouth every six (6) hours as needed for Pain. Refills:  0  
     
   
   
   
  
 zolpidem 5 mg tablet Commonly known as:  AMBIEN  
   
 Your last dose was: Your next dose is:    
   
   
 Dose:  5 mg Take 1 Tab by mouth nightly as needed for Sleep. Max Daily Amount: 5 mg. Indications: SLEEP-ONSET INSOMNIA Quantity:  3 Tab Refills:  0 STOP taking these medications   
 aspirin 325 mg tablet Generic drug:  aspirin Replaced by:  aspirin 81 mg chewable tablet  
   
  
 cefdinir 300 mg capsule Commonly known as:  OMNICEF  
   
  
 celecoxib 200 mg capsule Commonly known as:  CELEBREX Where to Get Your Medications Information on where to get these meds will be given to you by the nurse or doctor. ! Ask your nurse or doctor about these medications  
  amoxicillin-clavulanate 875-125 mg per tablet  
 aspirin 81 mg chewable tablet  
 traMADol 50 mg tablet  
 zolpidem 5 mg tablet Discharge Instructions None Discharge Orders None ACO Transitions of Care Introducing [place] [address] [address] Chavez offers a voluntary care coordination program to provide high quality service and care to [facility] fee-for-service beneficiaries. Ann Gregg was designed to help you enhance your health and well-being through the following services: ? Transitions of Care  support for individuals who are transitioning from one care setting to another (example: Hospital to home). ? Chronic and Complex Care Coordination  support for individuals and caregivers of those with serious or chronic illnesses or with more than one chronic (ongoing) condition and those who take a number of different medications. If you meet specific medical criteria, a [address] [place] [address] Rd may call you directly to coordinate your care with your primary care physician and your other care providers. For questions about the [facility] programs, please, contact your physicians office. For general questions or additional information about Accountable Care Organizations: 
Please visit www.medicare.gov/acos. html or call 1-800-MEDICARE (4-604.625.1510) TTY users should call 4-160.788.9983. [employer] Announcement We are excited to announce that we are making your provider's discharge notes available to you in [employer]. You will see these notes when they are completed and signed by the physician that discharged you from your recent hospital stay. If you have any questions or concerns about any information you see in [employer], please call the Health Information Department where you were seen or reach out to your Primary Care Provider for more information about your plan of care. Introducing [facility]! Ashli Maier introduces [employer] patient portal. Now you can access parts of your medical record, email your doctor's office, and request medication refills online. 1. In your internet browser, go to [email or web address] 2. Click on the First Time User? Click Here link in the Sign In box. You will see the New Member Sign Up page. 3. Enter your [employer] Access Code exactly as it appears below. You will not need to use this code after youve completed the sign-up process. If you do not sign up before the expiration date, you must request a new code. · [employer] Access Code: [email or web address] Expires: 8/17/2017 10:56 AM 
 
4. Enter the last four digits of your Social Security Number (xxxx) and Date of Birth (mm/dd/yyyy) as indicated and click Submit. You will be taken to the next sign-up page. 5. Create a [employer] ID. This will be your [employer] login ID and cannot be changed, so think of one that is secure and easy to remember. 6. Create a [employer] password. You can change your password at any time. 7. Enter your Password Reset Question and Answer. This can be used at a later time if you forget your password. 8. Enter your e-mail address. You will receive e-mail notification when new information is available in [address] 9. Click Sign Up. You can now view and download portions of your medical record. 10. Click the Download Summary menu link to download a portable copy of your medical information. If you have questions, please visit the Frequently Asked Questions section of the [employer] website. Remember, [employer] is NOT to be used for urgent needs. For medical emergencies, dial 911. Now available from your iPhone and Android! General Information Please provide this summary of care documentation to your next provider. Patient Signature:  ____________________________________________________________ Date:  ____________________________________________________________  
  
[facility] Provider Signature:  ____________________________________________________________ Date:  ____________________________________________________________

## 2017-07-23 PROBLEM — E53.8 VITAMIN B12 DEFICIENCY: Status: ACTIVE | Noted: 2017-07-23

## 2017-07-23 PROBLEM — G93.40 ACUTE ENCEPHALOPATHY: Status: ACTIVE | Noted: 2017-07-23

## 2017-07-23 LAB
ALBUMIN SERPL BCP-MCNC: 3.2 G/DL (ref 3.2–4.6)
ALBUMIN/GLOB SERPL: 0.9 {RATIO} (ref 1.2–3.5)
ALP SERPL-CCNC: 106 U/L (ref 50–136)
ALT SERPL-CCNC: 20 U/L (ref 12–65)
ANION GAP BLD CALC-SCNC: 9 MMOL/L (ref 7–16)
APPEARANCE UR: CLEAR
AST SERPL W P-5'-P-CCNC: 24 U/L (ref 15–37)
BILIRUB SERPL-MCNC: 2.4 MG/DL (ref 0.2–1.1)
BILIRUB UR QL: NEGATIVE
BUN SERPL-MCNC: 17 MG/DL (ref 8–23)
CALCIUM SERPL-MCNC: 9.3 MG/DL (ref 8.3–10.4)
CHLORIDE SERPL-SCNC: 112 MMOL/L (ref 98–107)
CO2 SERPL-SCNC: 23 MMOL/L (ref 21–32)
COLOR UR: ABNORMAL
CREAT SERPL-MCNC: 0.85 MG/DL (ref 0.8–1.5)
ERYTHROCYTE [DISTWIDTH] IN BLOOD BY AUTOMATED COUNT: 13.1 % (ref 11.9–14.6)
GLOBULIN SER CALC-MCNC: 3.4 G/DL (ref 2.3–3.5)
GLUCOSE SERPL-MCNC: 76 MG/DL (ref 65–100)
GLUCOSE UR STRIP.AUTO-MCNC: NEGATIVE MG/DL
HCT VFR BLD AUTO: 40.7 % (ref 41.1–50.3)
HGB BLD-MCNC: 14.6 G/DL (ref 13.6–17.2)
HGB UR QL STRIP: NEGATIVE
KETONES UR QL STRIP.AUTO: ABNORMAL MG/DL
LEUKOCYTE ESTERASE UR QL STRIP.AUTO: NEGATIVE
MCH RBC QN AUTO: 32.1 PG (ref 26.1–32.9)
MCHC RBC AUTO-ENTMCNC: 35.9 G/DL (ref 31.4–35)
MCV RBC AUTO: 89.5 FL (ref 79.6–97.8)
NITRITE UR QL STRIP.AUTO: NEGATIVE
PH UR STRIP: 6 [PH] (ref 5–9)
PLATELET # BLD AUTO: 147 K/UL (ref 150–450)
PMV BLD AUTO: 10.2 FL (ref 10.8–14.1)
POTASSIUM SERPL-SCNC: 3.5 MMOL/L (ref 3.5–5.1)
PROT SERPL-MCNC: 6.6 G/DL (ref 6.3–8.2)
PROT UR STRIP-MCNC: NEGATIVE MG/DL
RBC # BLD AUTO: 4.55 M/UL (ref 4.23–5.67)
RPR SER QL: NONREACTIVE
SODIUM SERPL-SCNC: 144 MMOL/L (ref 136–145)
SP GR UR REFRACTOMETRY: 1.02 (ref 1–1.02)
TROPONIN I SERPL-MCNC: 0.68 NG/ML (ref 0.02–0.05)
TROPONIN I SERPL-MCNC: 0.68 NG/ML (ref 0.02–0.05)
UROBILINOGEN UR QL STRIP.AUTO: 1 EU/DL (ref 0.2–1)
WBC # BLD AUTO: 5.6 K/UL (ref 4.3–11.1)

## 2017-07-23 PROCEDURE — G8987 SELF CARE CURRENT STATUS: HCPCS

## 2017-07-23 PROCEDURE — 80053 COMPREHEN METABOLIC PANEL: CPT | Performed by: HOSPITALIST

## 2017-07-23 PROCEDURE — 74011000302 HC RX REV CODE- 302: Performed by: HOSPITALIST

## 2017-07-23 PROCEDURE — 84484 ASSAY OF TROPONIN QUANT: CPT | Performed by: HOSPITALIST

## 2017-07-23 PROCEDURE — 85027 COMPLETE CBC AUTOMATED: CPT | Performed by: HOSPITALIST

## 2017-07-23 PROCEDURE — G8997 SWALLOW GOAL STATUS: HCPCS

## 2017-07-23 PROCEDURE — G8979 MOBILITY GOAL STATUS: HCPCS

## 2017-07-23 PROCEDURE — G8978 MOBILITY CURRENT STATUS: HCPCS

## 2017-07-23 PROCEDURE — 74011250637 HC RX REV CODE- 250/637: Performed by: HOSPITALIST

## 2017-07-23 PROCEDURE — 92610 EVALUATE SWALLOWING FUNCTION: CPT

## 2017-07-23 PROCEDURE — 97530 THERAPEUTIC ACTIVITIES: CPT

## 2017-07-23 PROCEDURE — 81003 URINALYSIS AUTO W/O SCOPE: CPT | Performed by: HOSPITALIST

## 2017-07-23 PROCEDURE — 99218 HC RM OBSERVATION: CPT

## 2017-07-23 PROCEDURE — 74011000258 HC RX REV CODE- 258: Performed by: HOSPITALIST

## 2017-07-23 PROCEDURE — 86580 TB INTRADERMAL TEST: CPT | Performed by: HOSPITALIST

## 2017-07-23 PROCEDURE — 97162 PT EVAL MOD COMPLEX 30 MIN: CPT

## 2017-07-23 PROCEDURE — 65660000000 HC RM CCU STEPDOWN

## 2017-07-23 PROCEDURE — 97166 OT EVAL MOD COMPLEX 45 MIN: CPT

## 2017-07-23 PROCEDURE — 87086 URINE CULTURE/COLONY COUNT: CPT | Performed by: HOSPITALIST

## 2017-07-23 PROCEDURE — 74011250636 HC RX REV CODE- 250/636: Performed by: HOSPITALIST

## 2017-07-23 PROCEDURE — G8998 SWALLOW D/C STATUS: HCPCS

## 2017-07-23 PROCEDURE — G8996 SWALLOW CURRENT STATUS: HCPCS

## 2017-07-23 PROCEDURE — G8988 SELF CARE GOAL STATUS: HCPCS

## 2017-07-23 PROCEDURE — 93005 ELECTROCARDIOGRAM TRACING: CPT | Performed by: HOSPITALIST

## 2017-07-23 PROCEDURE — 36415 COLL VENOUS BLD VENIPUNCTURE: CPT | Performed by: HOSPITALIST

## 2017-07-23 RX ORDER — ASPIRIN 325 MG
325 TABLET ORAL DAILY
COMMUNITY
End: 2017-07-26

## 2017-07-23 RX ORDER — ZOLPIDEM TARTRATE 5 MG/1
5 TABLET ORAL
Status: DISCONTINUED | OUTPATIENT
Start: 2017-07-23 | End: 2017-07-26 | Stop reason: HOSPADM

## 2017-07-23 RX ORDER — AMOXICILLIN 250 MG
1 CAPSULE ORAL DAILY
Status: DISCONTINUED | OUTPATIENT
Start: 2017-07-23 | End: 2017-07-26 | Stop reason: HOSPADM

## 2017-07-23 RX ADMIN — PIPERACILLIN SODIUM,TAZOBACTAM SODIUM 3.38 G: 3; .375 INJECTION, POWDER, FOR SOLUTION INTRAVENOUS at 23:45

## 2017-07-23 RX ADMIN — HEPARIN SODIUM 5000 UNITS: 5000 INJECTION, SOLUTION INTRAVENOUS; SUBCUTANEOUS at 08:32

## 2017-07-23 RX ADMIN — HEPARIN SODIUM 5000 UNITS: 5000 INJECTION, SOLUTION INTRAVENOUS; SUBCUTANEOUS at 16:36

## 2017-07-23 RX ADMIN — Medication 10 ML: at 21:00

## 2017-07-23 RX ADMIN — FAMOTIDINE 20 MG: 20 TABLET ORAL at 16:37

## 2017-07-23 RX ADMIN — PIPERACILLIN SODIUM,TAZOBACTAM SODIUM 3.38 G: 3; .375 INJECTION, POWDER, FOR SOLUTION INTRAVENOUS at 00:53

## 2017-07-23 RX ADMIN — PIPERACILLIN SODIUM,TAZOBACTAM SODIUM 3.38 G: 3; .375 INJECTION, POWDER, FOR SOLUTION INTRAVENOUS at 16:36

## 2017-07-23 RX ADMIN — TAMSULOSIN HYDROCHLORIDE 0.4 MG: 0.4 CAPSULE ORAL at 08:31

## 2017-07-23 RX ADMIN — FAMOTIDINE 20 MG: 20 TABLET ORAL at 08:31

## 2017-07-23 RX ADMIN — PIPERACILLIN SODIUM,TAZOBACTAM SODIUM 3.38 G: 3; .375 INJECTION, POWDER, FOR SOLUTION INTRAVENOUS at 08:32

## 2017-07-23 RX ADMIN — CYANOCOBALAMIN TAB 1000 MCG 1000 MCG: 1000 TAB at 00:53

## 2017-07-23 RX ADMIN — Medication 5 ML: at 06:00

## 2017-07-23 RX ADMIN — TUBERCULIN PURIFIED PROTEIN DERIVATIVE 5 UNITS: 5 INJECTION, SOLUTION INTRADERMAL at 00:54

## 2017-07-23 RX ADMIN — DONEPEZIL HYDROCHLORIDE 5 MG: 5 TABLET, FILM COATED ORAL at 21:00

## 2017-07-23 RX ADMIN — DONEPEZIL HYDROCHLORIDE 5 MG: 5 TABLET, FILM COATED ORAL at 00:53

## 2017-07-23 RX ADMIN — HEPARIN SODIUM 5000 UNITS: 5000 INJECTION, SOLUTION INTRAVENOUS; SUBCUTANEOUS at 21:00

## 2017-07-23 RX ADMIN — ARIPIPRAZOLE 10 MG: 10 TABLET ORAL at 08:31

## 2017-07-23 RX ADMIN — Medication 10 ML: at 16:36

## 2017-07-23 RX ADMIN — ZOLPIDEM TARTRATE 5 MG: 5 TABLET ORAL at 23:44

## 2017-07-23 RX ADMIN — STANDARDIZED SENNA CONCENTRATE AND DOCUSATE SODIUM 1 TABLET: 8.6; 5 TABLET, FILM COATED ORAL at 20:58

## 2017-07-23 NOTE — ED TRIAGE NOTES
Pt arrives from home via EMS, called by family. EMS states pt fell this morning and altered mental status followed and became unable to ambulate later this afternoon. EMS states new onset of dark, foul smelling urine. Pt's temp en route was 101 axillary. EMS states sepsis alert was called. 4.5 mg zosyn hung and NS bolus IVF. Pt very lethargic on arrival but able to answer questions. HR 120s en route, BP 130s/70s. EKG sent en route for poss STEMI.

## 2017-07-23 NOTE — PROGRESS NOTES
Dr. Jayde Brooke inquiring about pt's MRI order, no one answering in MRI department. Looked up previous notes, per cardiology notes from previous admissions, pt has a \"St. Peng VVI pacemaker\". Called CT dept, per CT, the MRI department only does stat orders today. Will notify MD.  Dr. Jayde Brooke aware, okay to do MRI tomorrow and will pass off in report the type of pacer pt has so that MRI can decide if it is compatible.

## 2017-07-23 NOTE — ED PROVIDER NOTES
Patient is a 64 y.o. male presenting with altered mental status. The history is provided by the patient, the EMS personnel and medical records. Altered mental status    This is a new problem. The current episode started 12 to 24 hours ago. The problem has been gradually improving. Associated symptoms include confusion, unresponsiveness and weakness. Mental status baseline is moderate dementia. Risk factors include dementia and trauma. His past medical history is significant for dementia and head trauma. Past Medical History:   Diagnosis Date    Arrhythmia     pt takes pradaxa    Arthritis     Atrial fibrillation ([address] 1/27/2012    CAD (coronary artery disease)     Cancer (HCC)     prostate    GERD (gastroesophageal reflux disease)     controlled with medication    Heart failure ([address]     pt takes lasix as needed, reports SOB with exertion    Hypertension     controlled with medication    Morbid obesity ([address]        Past Surgical History:   Procedure Laterality Date    HX ORTHOPAEDIC  2010    left TKA    HX PACEMAKER  8/30/2012    St. Peng pacemaker    HX PROSTATECTOMY           Family History:   Problem Relation Age of Onset    Cancer Father        Social History     Social History    Marital status:      Spouse name: N/A    Number of children: N/A    Years of education: N/A     Occupational History    Not on file. Social History Main Topics    Smoking status: Never Smoker    Smokeless tobacco: Never Used    Alcohol use No    Drug use: No    Sexual activity: No     Other Topics Concern    Not on file     Social History Narrative         ALLERGIES: Review of patient's allergies indicates no known allergies. Review of Systems   Unable to perform ROS: Dementia   Neurological: Positive for weakness. Psychiatric/Behavioral: Positive for confusion.        Vitals:    07/22/17 2035 07/22/17 2054   BP: 152/63    Pulse: 92    Resp: 16    Temp:  99.7 °F (37.6 °C)   SpO2: 97% Weight:  136.1 kg (300 lb)   Height:  6' 2\" (1.88 m)            Physical Exam   Constitutional: He is oriented to person, place, and time. He appears well-developed and well-nourished. Non-toxic appearance. He does not have a sickly appearance. He does not appear ill. No distress. HENT:   Head: Normocephalic and atraumatic. Cardiovascular: Intact distal pulses. Pulmonary/Chest: Effort normal.   Abdominal: Soft. Neurological: He is alert and oriented to person, place, and time. Skin: Skin is warm and dry. Psychiatric: He has a normal mood and affect. His behavior is normal.   Nursing note and vitals reviewed. MDM  Number of Diagnoses or Management Options  Delirium due to another medical condition: new and requires workup  Febrile illness: new and requires workup  Diagnosis management comments: Patient has some mild confusion consistent with his history of dementia. He is otherwise pleasant and cooperative. Does not remember exactly how he ended up in the ER today but by report by EMS patient had fallen earlier today and was having difficulty ambulating. Patient was allegedly having fevers today and EMS started sepsis protocol giving antibiotics prehospital.  Patient's medications were reviewed and appears he has an antibiotic Madalynelson Duljuve) disease that he is in the middle of therapy according to the pills remaining but unclear why he is taking this and he is not sure either. It appears was prescribed by primary care.        Amount and/or Complexity of Data Reviewed  Clinical lab tests: ordered  Tests in the radiology section of CPT®: ordered      ED Course       Procedures

## 2017-07-23 NOTE — PROGRESS NOTES
Problem: Dysphagia (Adult)  Goal: Interventions  SPEECH LANGUAGE PATHOLOGY: BEDSIDE SWALLOW NOTE: INITIAL ASSESSMENT AND DISCHARGE   OBSERVATION     NAME/AGE/GENDER: Lauri Stephens is a 64 y.o. male  DATE: 7/23/2017  PRIMARY DIAGNOSIS: UTI (urinary tract infection)  Acute metabolic encephalopathy       ICD-10: Treatment Diagnosis: R13.10 dysphagia unspecified  INTERDISCIPLINARY COLLABORATION: Registered Nurse  PRECAUTIONS/ALLERGIES: Review of patient's allergies indicates no known allergies. ASSESSMENT:   Based on the objective data described below, Mr. Ramses Heller presents with an essentially normal swallow function characterized by no overt signs/symptoms of laryngeal penetration/aspiration with thin liquids, pureed solids, or soft solids as evidenced by timely swallows, no cough response, and no change in clear/dry vocal quality. Pt stated his bottom dentures were at home and that he would take longer to chew regular solids. Pt was in agreement to begin mech soft solids at this time due to no bottom dentures. No further skilled SLP intervention warranted at this time. Recommend mech soft diet with thin liquids, small bites/sips, slow rate. Thank you for this referral.  ?????? ? ? This section established at most recent assessment??????????  PROBLEM LIST (Impairments causing functional limitations): 1. none  REHABILITATION POTENTIAL FOR STATED GOALS: EXCELLENT      PLAN OF CARE:   Patient will benefit from skilled intervention to address the following impairments. RECOMMENDATIONS AND PLANNED INTERVENTIONS (Benefits and precautions of therapy have been discussed with the patient.):  · PO:  Mechanical soft  · Liquids:  regular thin  MEDICATIONS:  · With liquid  COMPENSATORY STRATEGIES/MODIFICATIONS INCLUDING:  · Small sips and bites  · Slow rate   RECOMMENDED REHABILITATION/EQUIPMENT: (at time of discharge pending progress):   None. SUBJECTIVE:   Pt calm and cooperative, in good spirits.   History of Present Injury/Illness: Mr. Cezar Cohen  has a past medical history of Arrhythmia; Arthritis; Atrial fibrillation ([facility] (1/27/2012); CAD (coronary artery disease); Cancer [facility]); GERD (gastroesophageal reflux disease); Heart failure ([facility] Hypertension; and Morbid obesity ([facility] He also  has a past surgical history that includes orthopaedic (2010); prostatectomy; and pacemaker (8/30/2012). Present Symptoms:    Pain Intensity 1: 0  Current Medications:   No current facility-administered medications on file prior to encounter. Current Outpatient Prescriptions on File Prior to Encounter   Medication Sig Dispense Refill    zolpidem (AMBIEN) 5 mg tablet Take 1 Tab by mouth nightly as needed for Sleep. Max Daily Amount: 5 mg. 30 Tab 2    cefdinir (OMNICEF) 300 mg capsule Take 1 Cap by mouth two (2) times a day. 14 Cap 0    ARIPiprazole (ABILIFY) 10 mg tablet Take 1 Tab by mouth daily. 90 Tab 3    celecoxib (CELEBREX) 200 mg capsule Take 1 Cap by mouth two (2) times a day. 180 Cap 3    famotidine (PEPCID) 20 mg tablet Take 1 Tab by mouth two (2) times a day. 180 Tab 3    bicalutamide (CASODEX) 50 mg tablet Take 1 Tab by mouth daily. 90 Tab 6    dabigatran etexilate (PRADAXA) 150 mg capsule Take 1 Cap by mouth every twelve (12) hours. 180 Cap 3    tamsulosin (FLOMAX) 0.4 mg capsule Take 1 Cap by mouth daily. 90 Cap 2    FETZIMA 40 mg ER capsule TAKE 1 CAP BY MOUTH DAILY. 30 Cap 3    donepezil (ARICEPT) 5 mg tablet Take 1 Tab by mouth nightly. 90 Tab 2    traMADol (ULTRAM) 50 mg tablet Take 1 Tab by mouth two (2) times a day. Max Daily Amount: 100 mg. 120 Tab 2    diclofenac (VOLTAREN) 1 % gel APPLY 4 GRAMS TO AFFECTED AREA FOUR TIMES DAILY   2    acetaminophen (TYLENOL ARTHRITIS PAIN) 650 mg CR tablet Take 650 mg by mouth every six (6) hours as needed for Pain.          Current Dietary Status:  NPO x meds           History of reflux:  YES   · Reflux medication:pepcid  Social History/Home Situation:    Home Environment: Private residence  # Steps to Enter: 6  One/Two Story Residence: One story  Living Alone: No  Support Systems: Family member(s), Child(billy)  Patient Expects to be Discharged to[de-identified] Unknown  Current DME Used/Available at Home: Grab bars, Walker      OBJECTIVE:   Respiratory Status:        CXR Results:increased patchy bibasilar atelectasis and/or consolidation  MRI/CT Results:CT negative, MRI pending  Oral Motor Structure/Speech:  Oral-Motor Structure/Motor Speech  Labial: No impairment  Dentition: Upper dentures (pt stated lower dentures are at home)  Oral Hygiene: adequate  Lingual: No impairment     Cognitive and Communication Status:  Neurologic State: Alert  Orientation Level: Oriented X4  Cognition: Follows commands; Appropriate for age attention/concentration  Perception: Appears intact  Perseveration: No perseveration noted        BEDSIDE SWALLOW EVALUATION  Oral Assessment:  Oral Assessment  Labial: No impairment  Dentition: Upper dentures (pt stated lower dentures are at home)  Oral Hygiene: adequate  Lingual: No impairment  P.O. Trials:  Patient Position: upright in bed     The patient was given bite/sip amounts of the following:   Consistency Presented: Thin liquid;Mechanical soft;Mixed consistency;Puree; Solid  How Presented: Self-fed/presented;Cup/sip;Cup/gulp;Straw;Successive swallows     ORAL PHASE:  Bolus Acceptance: No impairment  Bolus Formation/Control: Impaired  Propulsion: No impairment  Type of Impairment:  (slow mastication of cracker due to no bottom dentures)  Oral Residue: None     PHARYNGEAL PHASE:  Initiation of Swallow: No impairment  Laryngeal Elevation: Functional  Aspiration Signs/Symptoms: None  Vocal Quality: No impairment           Pharyngeal Phase Characteristics: No impairment, issues, or problems      OTHER OBSERVATIONS:  Rate/bite size: WNL         Endurance:   WNL               Tool Used: Dysphagia Outcome and Severity Scale (DOMINGO)     Score Comments   Normal Diet  [X] 7 With no strategies or extra time needed   Functional Swallow  [ ] 6 May have mild oral or pharyngeal delay         Mild Dysphagia     [ ] 5 Which may require one diet consistency restricted (those who demonstrate penetration which is entirely cleared on MBS would be included)   Mild-Moderate Dysphagia  [ ] 4 With 1-2 diet consistencies restricted         Moderate Dysphagia  [ ] 3 With 2 or more diet consistencies restricted         Moderately Severe Dysphagia  [ ] 2 With partial PO strategies (trials with ST only)         Severe Dysphagia  [ ] 1 With inability to tolerate any PO safely            Score:  Initial: 7 Most Recent: X (Date: -- )   Interpretation of Tool: The Dysphagia Outcome and Severity Scale (DOMINGO) is a simple, easy-to-use, 7-point scale developed to systematically rate the functional severity of dysphagia based on objective assessment and make recommendations for diet level, independence level, and type of nutrition.        Score 7 6 5 4 3 2 1   Modifier CH CI CJ CK CL CM CN   · Swallowing:               - CURRENT STATUS:           CH - 0% impaired, limited or restricted               - GOAL STATUS:                   CH - 0% impaired, limited or restricted               - D/C STATUS:                       CH - 0% impaired, limited or restricted  [place] / [place] / Product Type: Medicare /       TREATMENT:         (In addition to Assessment/Re-Assessment sessions the following treatments were rendered)  Assessment/Reassessment only, no treatment provided today  MODALITIES:                                                                     ORAL MOTOR  EXERCISES:                                                                                                                                                                       LARYNGEAL / PHARYNGEAL EXERCISES: __________________________________________________________________________________________________  Safety:   After treatment position/precautions:  · RN notified  · Upright in Bed  Progression/Medical Necessity:   · Skilled SLP intervention not warranted at this time. Pt tolerating soft solids and thin liquids without difficulty. SLP will sign-off, however, remains available should needs arise.   Total Treatment Duration:  Time In: 1125  Time Out: [address] Lake[address]chariah Wilson, [facility]

## 2017-07-23 NOTE — PROGRESS NOTES
Problem: Mobility Impaired (Adult and Pediatric)  Goal: *Acute Goals and Plan of Care (Insert Text)  STG:  (1.)Mr. Smita Pierce will move from supine to sit and sit to supine and roll side to side with CONTACT GUARD ASSIST within 3 day(s). (2.)Mr. Smita Pierce will transfer from bed to chair and chair to bed with CONTACT GUARD ASSIST using the least restrictive device within 3 day(s). (3.)Mr. Smita Pierce will ambulate with CONTACT GUARD ASSIST for 15+ feet with the least restrictive device within 3 day(s). LTG:  (1.)Mr. Smita Pierce will move from supine to sit and sit to supine and roll side to side in bed with SUPERVISION within 7 day(s). (2.)Mr. Smita Pierce will transfer from bed to chair and chair to bed with SUPERVISION using the least restrictive device within 7 day(s). (3.)Mr. Smita Pierce will ambulate with SUPERVISION for 75+ feet with the least restrictive device within 7 day(s). ________________________________________________________________________________________________      PHYSICAL THERAPY: INITIAL ASSESSMENT, TREATMENT DAY: DAY OF ASSESSMENT, AM 7/23/2017  OBSERVATION: Hospital Day: 2  [place] / Product Type: Medicare /      NAME/AGE/GENDER: Citlaly Redmond is a 64 y.o. male            PRIMARY DIAGNOSIS: UTI (urinary tract infection)  Acute metabolic encephalopathy Acute metabolic encephalopathy Acute metabolic encephalopathy        ICD-10: Treatment Diagnosis:       · Generalized Muscle Weakness (M62.81)  · Difficulty in walking, Not elsewhere classified (R26.2)   Precaution/Allergies:  Review of patient's allergies indicates no known allergies. ASSESSMENT:      Mr. Smita Pierce presents supine in the bed. Pt is pleasant and agreeable to PT assessment. He reports he has been doing so-so at home with his mobility. He does remember falling out of bed. He thinks he fell because his sheets were slick.  He was unable to get back up, so she called EMS and he was transported here. Pt performed supine to sitting with min A and increased time. He was able to stand with min A and RW. He reports he only uses a cane at home. He sidestepped a few feet with min A and the RW. He then sat down and was assisted into supine with min A. Pt requires max A to scoot in the bed. Pt is debilitated and he will benefit from skilled PT to increase independence with mobility and decrease risk of falls. This section established at most recent assessment   PROBLEM LIST (Impairments causing functional limitations):  1. Decreased Strength  2. Decreased ADL/Functional Activities  3. Decreased Transfer Abilities  4. Decreased Ambulation Ability/Technique  5. Decreased Balance  6. Decreased Activity Tolerance    INTERVENTIONS PLANNED: (Benefits and precautions of physical therapy have been discussed with the patient.)  1. Balance Exercise  2. Bed Mobility  3. Gait Training  4. Therapeutic Activites  5. Therapeutic Exercise/Strengthening  6. Transfer Training      TREATMENT PLAN: Frequency/Duration: 3 times a week for 2 weeks  Rehabilitation Potential For Stated Goals: GOOD      RECOMMENDED REHABILITATION/EQUIPMENT: (at time of discharge pending progress): Continue Skilled Therapy. HISTORY:   History of Present Injury/Illness (Reason for Referral):  Pt has been admitted for acute metabolic encephalopathy. He also had a fall at home  Past Medical History/Comorbidities:   Mr. Kam Wynn  has a past medical history of Arrhythmia; Arthritis; Atrial fibrillation ([facility] [address] (1/27/2012); CAD (coronary artery disease); Cancer [facility]); GERD (gastroesophageal reflux disease); Heart failure ([address] Hypertension; and Morbid obesity ([address] Mr. Kam Wynn  has a past surgical history that includes orthopaedic (2010); prostatectomy; and pacemaker (8/30/2012).   Social History/Living Environment:   Home Environment: Private residence  # Steps to Enter: 6  One/Two Story Residence: One story  Living Alone: No  Support Systems: Child(billy)  Patient Expects to be Discharged to[de-identified] Unknown  Current DME Used/Available at Home: Cane, straight  Prior Level of Function/Work/Activity:  Pt lives at home with his daughter. She assists him with bathing and dressing. He does not drive. He uses a cane for ambulation      Number of Personal Factors/Comorbidities that affect the Plan of Care  Cancer  Metabolic encephalopathy: 1-2: MODERATE COMPLEXITY   EXAMINATION:   Most Recent Physical Functioning:   Gross Assessment:  AROM: Generally decreased, functional  Strength: Generally decreased, functional               Posture:  Posture (WDL): Exceptions to WDL  Posture Assessment: Rounded shoulders  Balance:  Sitting: Intact  Standing: Impaired  Standing - Static: Fair  Standing - Dynamic : Fair (-) Bed Mobility:  Supine to Sit: Minimum assistance (increased time)  Sit to Supine: Minimum assistance  Scooting: Minimum assistance  Wheelchair Mobility:     Transfers:  Sit to Stand: Minimum assistance  Stand to Sit: Minimum assistance  Gait:     Base of Support: Widened  Speed/Ángela: Pace decreased (<100 feet/min); Shuffled  Step Length: Right shortened;Left shortened  Gait Abnormalities: Decreased step clearance;Shuffling gait; Step to gait  Distance (ft): 3 Feet (ft)  Assistive Device: Walker, rolling  Ambulation - Level of Assistance: Contact guard assistance  Interventions: Verbal cues; Safety awareness training       Body Structures Involved:  1. Metabolic  2. Muscles Body Functions Affected:  1. Immune  2. Movement Related Activities and Participation Affected:  1. Mobility   Number of elements that affect the Plan of Care: 4+: HIGH COMPLEXITY   CLINICAL PRESENTATION:   Presentation: Evolving clinical presentation with changing clinical characteristics: MODERATE COMPLEXITY   CLINICAL DECISION MAKIN [facility] Mobility Inpatient Short Form  How much difficulty does the patient currently have. ..  Unable A Lot A Little None   1. Turning over in bed (including adjusting bedclothes, sheets and blankets)? [ ] 1   [ ] 2   [X] 3   [ ] 4   2. Sitting down on and standing up from a chair with arms ( e.g., wheelchair, bedside commode, etc.)   [ ] 1   [ ] 2   [X] 3   [ ] 4   3. Moving from lying on back to sitting on the side of the bed? [ ] 1   [ ] 2   [X] 3   [ ] 4   How much help from another person does the patient currently need. .. Total A Lot A Little None   4. Moving to and from a bed to a chair (including a wheelchair)? [ ] 1   [ ] 2   [X] 3   [ ] 4   5. Need to walk in hospital room? [ ] 1   [ ] 2   [X] 3   [ ] 4   6. Climbing 3-5 steps with a railing? [ ] 1   [X] 2   [ ] 3   [ ] 4   © 2007, Trustees of [address] [place] [id number], under license to [employer]. All rights reserved    Score:  Initial: 17 Most Recent: 7/23/17     Interpretation of Tool:  Represents activities that are increasingly more difficult (i.e. Bed mobility, Transfers, Gait). Score 24 23 22-20 19-15 14-10 9-7 6       Modifier CH CI CJ CK CL CM CN         · Mobility - Walking and Moving Around:               - CURRENT STATUS:    CK - 40%-59% impaired, limited or restricted               - GOAL STATUS:           CJ - 20%-39% impaired, limited or restricted               - D/C STATUS:                       ---------------To be determined---------------  [place] / [place] / Product Type: Medicare /       Medical Necessity:     · Patient demonstrates good rehab potential due to higher previous functional level. Reason for Services/Other Comments:  · Patient continues to require skilled intervention due to patient unable to attend/participate in therapy as expected.    Use of outcome tool(s) and clinical judgement create a POC that gives a: Questionable prediction of patient's progress: MODERATE COMPLEXITY                 TREATMENT:   (In addition to Assessment/Re-Assessment sessions the following treatments were rendered)   Pre-treatment Symptoms/Complaints:  none  Pain: Initial:   Pain Intensity 1: 0  Post Session:  0      Therapeutic Activity: (    10): Therapeutic activities including Bed transfers and Ambulation on level ground to improve mobility, strength, balance and coordination. Required minimal Verbal cues; Safety awareness training to promote dynamic balance in standing. Braces/Orthotics/Lines/Etc:   · IV  · O2 Device: Room air  Treatment/Session Assessment:    · Response to Treatment:  good  · Interdisciplinary Collaboration:  · Registered Nurse  · After treatment position/precautions:  · Supine in bed  · Bed/Chair-wheels locked  · Call light within reach  · RN notified  · Compliance with Program/Exercises: compliant most of the time. · Recommendations/Intent for next treatment session: \"Next visit will focus on advancements to more challenging activities and reduction in assistance provided\".   Total Treatment Duration:  PT Patient Time In/Time Out  Time In: 1020  Time Out: [address], DPT

## 2017-07-23 NOTE — PROGRESS NOTES
Visit with patient and family  to build rapport with . Patient is calm and was engaging in conversation. 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 Encouraged and assured our prayers and support. Prayer. Patient thankful.   Signed by chaplain Thomas

## 2017-07-23 NOTE — PROGRESS NOTES
TRANSFER - IN REPORT:    Verbal report received from MIREYA Morales on [place] Manual  being received from ED for routine progression of care      Report consisted of patients Situation, Background, Assessment and   Recommendations(SBAR). Information from the following report(s) SBAR was reviewed with the receiving nurse. Opportunity for questions and clarification was provided. Assessment completed upon patients arrival to unit and care assumed.

## 2017-07-23 NOTE — H&P
HOSPITALIST H&P  NAME:  Ines Blair   Age:  64 y.o.  :   1940   DOS:               17  MRN:   [id number]  PCP: Zaria Mensah MD  Consulting MD:  Treatment Team: Attending Provider: Carlita Roberts MD    HPI:   Mr. Maco Asif is a 63 yo M with PMHx of chronic AFib with slow rate s/p PPM 2011 on chronic pradaxa, CAD, GERD, HTN, hx of dementia on Aricept, Hx of prostate neoplasm s/p cryo , urinary incontinence from from home by EMS after he had a fall this AM from his bed and he became more confused after that. Per EMS he hd a a fever of 101F and received IV Zosyn prior to arrival in ED. His was was unwitnessed, although he states that he didn't had LOC. [place] acid:1.2; Tbil:1.5. CT head w/o acute pathology. CXR with increased pachy bibasilar atelectasis and/or consolidation. Trop I: 0.67, BNP:80. Vitamin [id number]. Daughter at bedside states that his weakness  And confusion got worse during the day and he was unable to get out of bed. He is not very ambulatory and in the last couple of months she noticed a significant slow decline. Denies any chest pain, SOB or abdominal pain. 10+ point ROS done and is negative except as noted in HPI. Past Medical History:   Diagnosis Date    Arrhythmia     pt takes pradaxa    Arthritis     Atrial fibrillation ([facility] [address] 2012    CAD (coronary artery disease)     Cancer (HCC)     prostate    GERD (gastroesophageal reflux disease)     controlled with medication    Heart failure ([address]     pt takes lasix as needed, reports SOB with exertion    Hypertension     controlled with medication    Morbid obesity ([address]       Past Surgical History:   Procedure Laterality Date    HX ORTHOPAEDIC      left TKA    HX PACEMAKER  2012    [employer] pacemaker    HX PROSTATECTOMY        Prior to Admission Medications   Prescriptions Last Dose Informant Patient Reported? Taking?    ARIPiprazole (ABILIFY) 10 mg tablet   No No   Sig: Take 1 Tab by mouth daily. FETZIMA 40 mg ER capsule   No No   Sig: TAKE 1 CAP BY MOUTH DAILY. acetaminophen (TYLENOL ARTHRITIS PAIN) 650 mg CR tablet   Yes No   Sig: Take 650 mg by mouth every six (6) hours as needed for Pain. bicalutamide (CASODEX) 50 mg tablet   No No   Sig: Take 1 Tab by mouth daily. cefdinir (OMNICEF) 300 mg capsule   No No   Sig: Take 1 Cap by mouth two (2) times a day. celecoxib (CELEBREX) 200 mg capsule   No No   Sig: Take 1 Cap by mouth two (2) times a day. dabigatran etexilate (PRADAXA) 150 mg capsule   No No   Sig: Take 1 Cap by mouth every twelve (12) hours. diclofenac (VOLTAREN) 1 % gel   Yes No   Sig: APPLY 4 GRAMS TO AFFECTED AREA FOUR TIMES DAILY   donepezil (ARICEPT) 5 mg tablet   No No   Sig: Take 1 Tab by mouth nightly. famotidine (PEPCID) 20 mg tablet   No No   Sig: Take 1 Tab by mouth two (2) times a day. tamsulosin (FLOMAX) 0.4 mg capsule   No No   Sig: Take 1 Cap by mouth daily. traMADol (ULTRAM) 50 mg tablet   No No   Sig: Take 1 Tab by mouth two (2) times a day. Max Daily Amount: 100 mg.   zolpidem (AMBIEN) 5 mg tablet   No No   Sig: Take 1 Tab by mouth nightly as needed for Sleep. Max Daily Amount: 5 mg. Facility-Administered Medications: None     Home meds reconciled. No Known Allergies   Social History   Substance Use Topics    Smoking status: Never Smoker    Smokeless tobacco: Never Used    Alcohol use No      Family History   Problem Relation Age of Onset    Cancer Father       I personally reviewed home medications, social and family history.      Immunization History   Administered Date(s) Administered    Influenza Vaccine 09/26/2012    Influenza Vaccine (Quad) PF 10/24/2016    Pneumococcal Vaccine (Unspecified Type) 06/25/2013    Tdap 05/19/2016     Objective:     Patient Vitals for the past 24 hrs:   Temp Pulse Resp BP SpO2   07/22/17 2142 99.7 °F (37.6 °C) 84 16 148/65 98 %   07/22/17 2134 - 85 16 150/68 98 %   07/22/17 2054 99.7 °F (37.6 °C) - - - -   17 - 92 16 152/63 97 %     Temp (24hrs), Av.7 °F (37.6 °C), Min:99.7 °F (37.6 °C), Max:99.7 °F (37.6 °C)    Oxygen Therapy  O2 Sat (%): 98 % (17)  Pulse via Oximetry: 87 beats per minute (17)  O2 Device: Room air (17)  Oxygen Therapy  O2 Sat (%): 98 % (17)  Pulse via Oximetry: 87 beats per minute (17)  O2 Device: Room air (17)    Physical Exam:  General:         Alert, cooperative, no distress. Generalized weakness. HEENT:               NCAT. No obvious deformity. Nares normal. No drainage  Lungs:                      CTABL. No wheezing/rhonchi/rales  Cardiovascular:   RRR. No m/r/g. No pedal edema b/l. +2 PT/DT pulses b/l. Abdomen:       S/nt/nd. Bowel sounds normal. .   Skin:         No rashes or lesions. Not Jaundiced  Neurologic:    AAOx2. CN II- XII grossly WNL. No gross focal deficit. Moves all extremities. Psychiatric:         On /off confusiob      Data Review:   Recent Results (from the past 24 hour(s))   CBC WITH AUTOMATED DIFF    Collection Time: 17  8:52 PM   Result Value Ref Range    WBC 6.2 4.3 - 11.1 K/uL    RBC 4.69 4.23 - 5.67 M/uL    HGB 15.1 13.6 - 17.2 g/dL    HCT 42.1 41.1 - 50.3 %    MCV 89.8 79.6 - 97.8 FL    MCH 32.2 26.1 - 32.9 PG    MCHC 35.9 (H) 31.4 - 35.0 g/dL    RDW 13.2 11.9 - 14.6 %    PLATELET 622 623 - 869 K/uL    MPV 10.2 (L) 10.8 - 14.1 FL    DF AUTOMATED      NEUTROPHILS 78 43 - 78 %    LYMPHOCYTES 14 13 - 44 %    MONOCYTES 8 4.0 - 12.0 %    EOSINOPHILS 0 (L) 0.5 - 7.8 %    BASOPHILS 0 0.0 - 2.0 %    IMMATURE GRANULOCYTES 0.2 0.0 - 5.0 %    ABS. NEUTROPHILS 4.8 1.7 - 8.2 K/UL    ABS. LYMPHOCYTES 0.9 0.5 - 4.6 K/UL    ABS. MONOCYTES 0.5 0.1 - 1.3 K/UL    ABS. EOSINOPHILS 0.0 0.0 - 0.8 K/UL    ABS. BASOPHILS 0.0 0.0 - 0.2 K/UL    ABS. IMM.  GRANS. 0.0 0.0 - 0.5 K/UL   METABOLIC PANEL, COMPREHENSIVE    Collection Time: 17  8:52 PM   Result Value Ref Range Sodium 143 136 - 145 mmol/L    Potassium 3.6 3.5 - 5.1 mmol/L    Chloride 111 (H) 98 - 107 mmol/L    CO2 23 21 - 32 mmol/L    Anion gap 9 7 - 16 mmol/L    Glucose 99 65 - 100 mg/dL    BUN 18 8 - 23 MG/DL    Creatinine 0.99 0.8 - 1.5 MG/DL    GFR est AA >60 >60 ml/min/1.73m2    GFR est non-AA >60 >60 ml/min/1.73m2    Calcium 9.4 8.3 - 10.4 MG/DL    Bilirubin, total 1.5 (H) 0.2 - 1.1 MG/DL    ALT (SGPT) 21 12 - 65 U/L    AST (SGOT) 25 15 - 37 U/L    Alk. phosphatase 110 50 - 136 U/L    Protein, total 7.0 6.3 - 8.2 g/dL    Albumin 3.5 3.2 - 4.6 g/dL    Globulin 3.5 2.3 - 3.5 g/dL    A-G Ratio 1.0 (L) 1.2 - 3.5     POC TROPONIN-I    Collection Time: 07/22/17  8:53 PM   Result Value Ref Range    Troponin-I (POC) 0.1 (H) 0.0 - 0.08 ng/ml   POC LACTIC ACID    Collection Time: 07/22/17  9:01 PM   Result Value Ref Range    Lactic Acid (POC) 1.2 0.5 - 1.9 mmol/L   BNP    Collection Time: 07/22/17 10:35 PM   Result Value Ref Range    BNP 80 pg/mL   LACTIC ACID    Collection Time: 07/22/17 10:35 PM   Result Value Ref Range    Lactic acid 1.6 0.4 - 2.0 MMOL/L     All Micro Results     Procedure Component Value Units Date/Time    CULTURE, URINE [[id number]]     Order Status:  Sent Specimen:  Urine from Cath Urine     CULTURE, BLOOD [[id number]] Collected:  07/22/17 1955    Order Status:  Completed Specimen:  Blood from Blood Updated:  07/22/17 2113    CULTURE, BLOOD [[id number]] Collected:  07/22/17 2052    Order Status:  Completed Specimen:  Blood from Blood Updated:  07/22/17 2113          Imaging Tomasz Ball /Studies:  I personally reviewed all labs, imaging, and other studies this admission:  CXR Results  (Last 48 hours)               07/22/17 2116  XR CHEST SNGL V Final result    Impression:  Impression:  Increased patchy bibasilar atelectasis and or consolidation.            Narrative:  AP chest radiograph       History: fever, 64 years Male fall this morning, confusion       Comparison: Chest radiograph June 03, 2017 Findings:  Cardiac pacing device obscures part of the left hemithorax, single   lead appears to remain in anatomic position. Persistent mild apparent   cardiomegaly. Increased patchy bibasilar airspace opacities, which may   represent atelectasis and or consolidation. Mild diffuse interstitial   prominence otherwise unchanged and nonspecific. No evidence of pneumothorax,   pleural effusion. Visualized soft tissue and osseous structures otherwise   unremarkable. CT Results  (Last 48 hours)               07/22/17 2108  CT HEAD WO CONT Final result    Impression:  IMPRESSION:  No acute intracranial abnormality. Narrative:  Examination: CT scan of the brain without contrast.       History: AMS, 64 years Male pt fell this morning and altered mental status   followed and became unable to ambulate later this afternoon. EMS states new   onset of dark, foul smelling urine       Technique: 5 mm axial imaging of the brain from the posterior fossa to the   vertex. Radiation dose reduction techniques were used for this study:  Our CT   scanners use one or all of the following: Automated exposure control, adjustment   of the mA and/or kVp according to patient's size, iterative reconstruction. Comparison:  CT brain June 03, 2017       Findings: Probable mild microvascular disease unchanged. The ventricles, sulci   are age-appropriate. No intracranial hemorrhage or extra-axial collection is   identified. No evidence of acute infarct. No mass effect or midline shift is   present. Basal cisterns are intact. The visualized paranasal sinuses and   mastoid air cells are clear. The orbits, bones, and soft tissues are normal in   appearance. Assessment and Plan:      Active Hospital Problems    Diagnosis Date Noted    Febrile illness, acute 07/22/2017    Acute metabolic encephalopathy 15/89/5723    Fall 07/22/2017    Generalized weakness 12/23/2016    HTN (hypertension) 01/27/2012    Atrial fibrillation ([facility] [address] 01/27/2012       PLAN  - continue IV Zosyn - follow blood/urine cultures. Acute febrile illness due to PNA? - will consult SP  - hx of fall while on Pradaxa with increased lethargy - CT head w/o acute pathology. Will get MRIA Brain - if unable to deu PPM will repeat CT head in 24h. Hold pradaxa  Until than. - serial Trop I Q6h. () Remote telemetry. Check EKG and Echocardiogram . Consider Cardiology input. - start Vitamin B12 supplementation  - check TSH/RPR  -PPD/PT/OT/CM - will need long term placement?  - resume home meds  - fall/seizure/aspirationprecautions        DVT ppx: Heparin SQ while off Pradaxa.  SCDs  Code status:  Full CODD per his daughter  Estimated LOS:  1-2 days  Risk assessment:  high  Plan of care discussed with: patient, daughter Care team.        Carlita Roberts MD  07/22/17

## 2017-07-23 NOTE — PROGRESS NOTES
Primary Nurse Maida Roblero and MIREYA Randolph performed a dual skin assessment on this patient. Pt has scars on bilateral lower legs from \"previous surgeries\". Small blister on RLE. Feet are dry and flaky. No other redness or breakdown noted. Pt oriented to room and call light, instructed to call with any further needs.

## 2017-07-23 NOTE — PROGRESS NOTES
Pt given pills this AM with applesauce, pt tolerated well. Pt also drank a cup of water with a straw and had no difficulties.

## 2017-07-23 NOTE — PROGRESS NOTES
Problem: Self Care Deficits Care Plan (Adult)  Goal: *Acute Goals and Plan of Care (Insert Text)  1. Cadimus Othella Link will complete toileting with supervision. 2. Cadimus Othella Link will complete toilet transfers with supervision. 3. Cadimus Othella Link will complete total body bathing and dressing with adaptive equipment as needed with minimal assistance. 4. Cadimus Othella Link will complete 25 minutes ADL/therapeutic activities/therapeutic exercises with 3 rest breaks or less. Time frame: 4 - 7 visits      OCCUPATIONAL THERAPY: Initial Assessment 7/23/2017  OBSERVATION: Hospital Day: 2  [place] / Product Type: Medicare /      NAME/AGE/GENDER: Kiah Díaz is a 64 y.o. male            PRIMARY DIAGNOSIS:  UTI (urinary tract infection)  Acute metabolic encephalopathy Acute metabolic encephalopathy Acute metabolic encephalopathy        ICD-10: Treatment Diagnosis:        · Generalized Muscle Weakness (M62.81)  · History of falling (Z91.81)   Precautions/Allergies:         Review of patient's allergies indicates no known allergies. ASSESSMENT:      Mr. Sherri Jama presents s/p fall with UTI and history of falling. Kiah Díaz presents with the following problems and would benefit from occupational therapy to maximize independence with self-care,instrumental activities of daily living, and functional transfers/mobility for activities of daily living/instrumental activities of daily living via the stated goals. This section established at most recent assessment   PROBLEM LIST (Impairments causing functional limitations):  1. Decreased Strength  2. Decreased ADL/Functional Activities  3. Decreased Transfer Abilities  4. Decreased Balance  5. Decreased Activity Tolerance  6. Decreased Work Simplification/Energy Conservation Techniques  7. Decreased Flexibility/Joint Mobility  8. Edema/Girth  9.  Decreased Cognition INTERVENTIONS PLANNED: (Benefits and precautions of occupational therapy have been discussed with the patient.)  1. Activities of daily living training  2. Cognitive training  3. Neuromuscular re-eduation  4. Therapeutic activity  5. Therapeutic exercise      TREATMENT PLAN: Frequency/Duration: Follow patient 3 times/week to address above goals. Rehabilitation Potential For Stated Goals: GOOD      RECOMMENDED REHABILITATION/EQUIPMENT: (at time of discharge pending progress): Continue Skilled Therapy. OCCUPATIONAL PROFILE AND HISTORY:   History of Present Injury/Illness (Reason for Referral):  Per MD H & P: Mr. Reji Rodriguez is a 65 yo M with PMHx of chronic AFib with slow rate s/p PPM 8/30/2011 on chronic pradaxa, CAD, GERD, HTN, hx of dementia on Aricept, Hx of prostate neoplasm s/p cryo 2006, urinary incontinence from from home by EMS after he had a fall this AM from his bed and he became more confused after that. Per EMS he hd a a fever of 101F and received IV Zosyn prior to arrival in ED. His was was unwitnessed, although he states that he didn't had LOC. Tammy Carota acid:1.2; Tbil:1.5. CT head w/o acute pathology. CXR with increased pachy bibasilar atelectasis and/or consolidation. Trop I: 0.67, BNP:80. Vitamin [id number]. Daughter at bedside states that his weakness  And confusion got worse during the day and he was unable to get out of bed. He is not very ambulatory and in the last couple of months she noticed a significant slow decline. Denies any chest pain, SOB or abdominal pain. Past Medical History/Comorbidities:   Mr. Reji Rodriguez  has a past medical history of Arrhythmia; Arthritis; Atrial fibrillation ([address] (1/27/2012); CAD (coronary artery disease); Cancer [facility]); GERD (gastroesophageal reflux disease); Heart failure ([address] Hypertension; and Morbid obesity ([facility] [address] Mr. Reji Rodriguez  has a past surgical history that includes orthopaedic (2010); prostatectomy; and pacemaker (8/30/2012).   Social History/Living Environment: Lives with daughter. Home Environment: Private residence  # Steps to Enter: 6  One/Two Story Residence: One story  Living Alone: No  Support Systems: Child(billy)  Patient Expects to be Discharged to[de-identified] Unknown  Current DME Used/Available at Home: Cane, straight  Prior Level of Function/Work/Activity:  Toilets self using commode. Typically sponge bathes with assistance from daughter. Some assist for dressing? Out of bed about 5 hours/day per patient. Cognitive, Physical, and/or Psychosocial History:        History of dementia per chart. Number of Personal Factors/Comorbidities that affect the Plan of Care: Expanded review of therapy/medical records (1-2):  MODERATE COMPLEXITY   ASSESSMENT OF OCCUPATIONAL PERFORMANCE[de-identified]   Activities of Daily Living:           Basic ADLs (From Assessment) Complex ADLs (From Assessment)   Basic ADL  Feeding: Setup  Oral Facial Hygiene/Grooming: Setup  Bathing: Moderate assistance  Upper Body Dressing: Stand-by assistance  Lower Body Dressing: Moderate assistance  Toileting: Moderate assistance Instrumental ADL  Meal Preparation: Total assistance  Homemaking: Total assistance   Grooming/Bathing/Dressing Activities of Daily Living     Cognitive Retraining  Safety/Judgement: Fall prevention           Toileting  Bladder Hygiene: Minimum assistance (standing with urinal)  Clothing Management: Maximum assistance (hospital gown and depends)  Cues: Verbal cues provided         Lower Body Dressing Assistance  Socks: Moderate assistance; Compensatory technique training (RLE; standby assistance LLE)  Leg Crossed Method Used: Yes  Position Performed: Seated edge of bed  Cues: Don;Doff  Adaptive Equipment Used: Stool (trash can) Bed/Mat Mobility  Supine to Sit: Stand-by asssistance  Sit to Supine: Moderate assistance  Sit to Stand: Minimum assistance  Scooting: Stand-by asssistance  Cues: Verbal cues provided; Tactile cues provided          Most Recent Physical Functioning:   Gross Assessment:  AROM: Generally decreased, functional  Strength: Generally decreased, functional                    Balance:  Sitting: Intact  Standing: Impaired  Standing - Static: Fair  Standing - Dynamic : Fair  Bed Mobility:  Supine to Sit: Stand-by asssistance  Sit to Supine: Moderate assistance  Scooting: Stand-by asssistance  Transfers:  Sit to Stand: Minimum assistance  Stand to Sit: Contact guard assistance                 Patient Vitals for the past 6 hrs:       BP BP Patient Position SpO2 Pulse   17 1118 121/58 At rest 97 % 69   17 1457 138/67 At rest 95 % 85        Mental Status  Neurologic State: Alert  Orientation Level: Oriented to person, Oriented to time  Cognition: Follows commands  Perception: Appears intact  Perseveration: No perseveration noted  Safety/Judgement: Fall prevention                    Occupations Impacted (addressed by Plan of Care):  1. Bathing/Showering  2. Toileting/Toilet Hygiene  3. Dressing  4. Functional Mobility       Physical Skills Involved:  1. Range of Motion  2. Balance  3. Strength  4. Activity Tolerance  5. Gross Motor Control  6. Edema  7. Skin Integrity Cognitive Skills Affected (resulting in the inability to perform in a timely and safe manner):  1. Executive Function Psychosocial Skills Affected:  1. Habits/Routines  2. Environmental Adaptation  3. Social Interaction   Number of elements that affect the Plan of Care: 3-5:  MODERATE COMPLEXITY   CLINICAL DECISION MAKIN [facility] [address] [address] AM-PAC 6 Clicks   Basic Mobility Inpatient Short Form  How much help from another person does the patient currently need. .. Total A Lot A Little None   1. Putting on and taking off regular lower body clothing?   [ ] 1   [X] 2   [ ] 3   [ ] 4   2. Bathing (including washing, rinsing, drying)? [ ] 1   [X] 2   [ ] 3   [ ] 4   3. Toileting, which includes using toilet, bedpan or urinal?   [ ] 1   [X] 2   [ ] 3   [ ] 4   4.   Putting on and taking off regular upper body clothing?   [ ] 1   [ ] 2   [X] 3   [ ] 4   5. Taking care of personal grooming such as brushing teeth? [ ] 1   [ ] 2   [X] 3   [ ] 4   6. Eating meals? [ ] 1   [ ] 2   [X] 3   [ ] 4   © 2007, Trustees of [address] [place] [address] [address] [address], under license to [employer]. All rights reserved    Score:  Initial: 15 Most Recent: X (Date: -- )     Interpretation of Tool:  Represents activities that are increasingly more difficult (i.e. Bed mobility, Transfers, Gait). Score 24 23 22-20 19-15 14-10 9-7 6       Modifier CH CI CJ CK CL CM CN         · Self Care:               - CURRENT STATUS:    CK - 40%-59% impaired, limited or restricted               - GOAL STATUS:           CI - 1%-19% impaired, limited or restricted               - D/C STATUS:                       ---------------To be determined---------------  [place] / [place] / Product Type: Medicare /       Medical Necessity:     · Patient demonstrates fair rehab potential due to higher previous functional level. Reason for Services/Other Comments:  · Patient continues to require skilled intervention due to decreased independence with ADL and functional mobility for ADL. Assessment Modification and Need for Assistance:  1. Minimal to moderate modification or assistance (Moderate)     Assessment Modifications Needed:  · Cueing (Verbal, Tactile. Niranjan Saunas Niranjan Saunas Niranjan Saunas )  · Physical Assistance  · Environment/task modification      Assessment process, impact of co-morbidities, assessment modification\need for assistance, and selection of interventions: MODERATE COMPLEXITY          TREATMENT:   (In addition to Assessment/Re-Assessment sessions the following treatments were rendered)      Pre-treatment Symptoms/Complaints:    Pain: Initial:   Pain Intensity 1:  (no complaints of pain)  Post Session:  same      Assessment/Reassessment only, no treatment provided today     Braces/Orthotics/Lines/Etc:   · IV  · O2 Device: Room air  Treatment/Session Assessment:    · Response to Treatment:  No treatment rendered. Assessment only. · Interdisciplinary Collaboration:  · Occupational Therapist  · Registered Nurse  · After treatment position/precautions:  · Supine in bed  · Bed/Chair-wheels locked  · Bed in low position  · Call light within reach  · RN notified  · Side rails x 2  · Compliance with Program/Exercises: Will assess as treatment progresses. · Recommendations/Intent for next treatment session: \"Next visit will focus on advancements to more challenging activities\".   Total Treatment Duration:  OT Patient Time In/Time Out  Time In: 9724  Time Out: HARJEET Cramer, OTR/L

## 2017-07-23 NOTE — PROGRESS NOTES
Hospitalist Progress Note    2017  Admit Date: 2017  8:32 PM   NAME: La Nena Gramajo   :  1940   MRN:  [id number]   Attending: Gunjan Rodriguez MD  PCP:  Heron Mckeon MD    SUBJECTIVE:   Patient is a 63 y/o admitted with fall and possible LOC. Found to have a fever of 101 F and CXR with possible pneumonia. Now on Zosyn. : Pt says he is doing well. Speech eval just recommended mechanical soft diet. MRI is pending due to pacemaker. Fever has not recurred. Review of Systems negative with exception of pertinent positives noted above  PHYSICAL EXAM     Visit Vitals    /58 (BP 1 Location: Right arm, BP Patient Position: At rest)    Pulse 69    Temp 98.5 °F (36.9 °C)    Resp 22    Ht 6' 2\" (1.88 m)    Wt 136.1 kg (300 lb)    SpO2 97%    BMI 38.52 kg/m2      Temp (24hrs), Av.8 °F (37.1 °C), Min:98 °F (36.7 °C), Max:99.7 °F (37.6 °C)    Oxygen Therapy  O2 Sat (%): 97 % (17 1118)  Pulse via Oximetry: 87 beats per minute (17 2142)  O2 Device: Room air (17)    Intake/Output Summary (Last 24 hours) at 17 1223  Last data filed at 17 0915   Gross per 24 hour   Intake                0 ml   Output              150 ml   Net             -150 ml      General: No acute distress    Lungs:  CTA Bilaterally.    Heart:  Regular rate and rhythm,  No murmur, rub, or gallop  Abdomen: Soft, Non distended, Non tender, Positive bowel sounds  Extremities: No cyanosis, clubbing or edema  Neurologic:  No focal deficits    ASSESSMENT      Active Hospital Problems    Diagnosis Date Noted    Vitamin B12 deficiency 2017    Febrile illness, acute 2017    Acute metabolic encephalopathy     Fall 2017    Generalized weakness 2016    Elevated troponin I level 2016    HTN (hypertension) 2012    Atrial fibrillation [address] [address] 2012     Plan:  · Recent Fall / ? LOC: Awaiting MRI of brain  · Fever: Ct Zosyn  · MELITA. Fib: Hold Pradaxa until MRI rules out intracranial bleed. Ct other meds.  Possible discharge in 24 hrs if MRI negative    DVT Prophylaxis:     Signed By: Gloria Johnston MD     July 23, 2017

## 2017-07-24 ENCOUNTER — APPOINTMENT (OUTPATIENT)
Dept: CT IMAGING | Age: 77
DRG: 193 | End: 2017-07-24
Attending: INTERNAL MEDICINE
Payer: MEDICARE

## 2017-07-24 LAB
ATRIAL RATE: 202 BPM
ATRIAL RATE: 340 BPM
CALCULATED R AXIS, ECG10: -38 DEGREES
CALCULATED R AXIS, ECG10: -45 DEGREES
CALCULATED T AXIS, ECG11: -27 DEGREES
CALCULATED T AXIS, ECG11: 26 DEGREES
DIAGNOSIS, 93000: NORMAL
DIAGNOSIS, 93000: NORMAL
MM INDURATION POC: 0 MM (ref 0–5)
PPD POC: NORMAL NEGATIVE
Q-T INTERVAL, ECG07: 386 MS
Q-T INTERVAL, ECG07: 420 MS
QRS DURATION, ECG06: 126 MS
QRS DURATION, ECG06: 138 MS
QTC CALCULATION (BEZET), ECG08: 446 MS
QTC CALCULATION (BEZET), ECG08: 450 MS
VENTRICULAR RATE, ECG03: 68 BPM
VENTRICULAR RATE, ECG03: 82 BPM

## 2017-07-24 PROCEDURE — 74011250637 HC RX REV CODE- 250/637: Performed by: HOSPITALIST

## 2017-07-24 PROCEDURE — 74011000258 HC RX REV CODE- 258: Performed by: HOSPITALIST

## 2017-07-24 PROCEDURE — 97530 THERAPEUTIC ACTIVITIES: CPT

## 2017-07-24 PROCEDURE — 74011250637 HC RX REV CODE- 250/637: Performed by: INTERNAL MEDICINE

## 2017-07-24 PROCEDURE — 94760 N-INVAS EAR/PLS OXIMETRY 1: CPT

## 2017-07-24 PROCEDURE — 97535 SELF CARE MNGMENT TRAINING: CPT

## 2017-07-24 PROCEDURE — 74011250636 HC RX REV CODE- 250/636: Performed by: HOSPITALIST

## 2017-07-24 PROCEDURE — 70450 CT HEAD/BRAIN W/O DYE: CPT

## 2017-07-24 PROCEDURE — 65660000000 HC RM CCU STEPDOWN

## 2017-07-24 RX ORDER — DABIGATRAN ETEXILATE 150 MG/1
150 CAPSULE ORAL EVERY 12 HOURS
Status: DISCONTINUED | OUTPATIENT
Start: 2017-07-24 | End: 2017-07-26 | Stop reason: HOSPADM

## 2017-07-24 RX ADMIN — PIPERACILLIN SODIUM,TAZOBACTAM SODIUM 3.38 G: 3; .375 INJECTION, POWDER, FOR SOLUTION INTRAVENOUS at 15:37

## 2017-07-24 RX ADMIN — Medication 10 ML: at 05:37

## 2017-07-24 RX ADMIN — FAMOTIDINE 20 MG: 20 TABLET ORAL at 08:19

## 2017-07-24 RX ADMIN — Medication 10 ML: at 14:00

## 2017-07-24 RX ADMIN — CYANOCOBALAMIN TAB 1000 MCG 1000 MCG: 1000 TAB at 08:19

## 2017-07-24 RX ADMIN — DONEPEZIL HYDROCHLORIDE 5 MG: 5 TABLET, FILM COATED ORAL at 21:06

## 2017-07-24 RX ADMIN — ARIPIPRAZOLE 10 MG: 10 TABLET ORAL at 08:19

## 2017-07-24 RX ADMIN — PIPERACILLIN SODIUM,TAZOBACTAM SODIUM 3.38 G: 3; .375 INJECTION, POWDER, FOR SOLUTION INTRAVENOUS at 08:19

## 2017-07-24 RX ADMIN — TAMSULOSIN HYDROCHLORIDE 0.4 MG: 0.4 CAPSULE ORAL at 08:19

## 2017-07-24 RX ADMIN — DABIGATRAN ETEXILATE MESYLATE 150 MG: 150 CAPSULE ORAL at 21:59

## 2017-07-24 RX ADMIN — HEPARIN SODIUM 5000 UNITS: 5000 INJECTION, SOLUTION INTRAVENOUS; SUBCUTANEOUS at 05:37

## 2017-07-24 RX ADMIN — HEPARIN SODIUM 5000 UNITS: 5000 INJECTION, SOLUTION INTRAVENOUS; SUBCUTANEOUS at 13:31

## 2017-07-24 RX ADMIN — Medication 10 ML: at 22:00

## 2017-07-24 RX ADMIN — FAMOTIDINE 20 MG: 20 TABLET ORAL at 18:37

## 2017-07-24 NOTE — PROGRESS NOTES
Hospitalist Progress Note    2017  Admit Date: 2017  8:32 PM   NAME: Franco Ram   :  1940   MRN:  [id number]   Attending: Renee Perera MD  PCP:  Kayce Pierre MD    SUBJECTIVE:   Patient is a 65 yo M admitted 17 with confusion, fever at home, and CXR consistent with possible pneumonia. He reports he is doing okay now. He reports he is weaker than usual and according to PT required minimal assist to ambulate very short distance and maximal assist to scoot in bed. Blood cx negative to date. Review of Systems negative with exception of pertinent positives noted above  PHYSICAL EXAM     Visit Vitals    /64 (BP 1 Location: Right arm, BP Patient Position: At rest)    Pulse 88    Temp 97.8 °F (36.6 °C)    Resp 18    Ht 6' 2\" (1.88 m)    Wt 136.1 kg (300 lb)    SpO2 100%    BMI 38.52 kg/m2      Temp (24hrs), Av.4 °F (36.9 °C), Min:97.8 °F (36.6 °C), Max:98.6 °F (37 °C)    Oxygen Therapy  O2 Sat (%): 100 % (17 0719)  Pulse via Oximetry: 87 beats per minute (17 2142)  O2 Device: Room air (17)    Intake/Output Summary (Last 24 hours) at 17 0924  Last data filed at 17 0400   Gross per 24 hour   Intake              106 ml   Output              200 ml   Net              -94 ml      General: No acute distress, elderly appearing, obese  Lungs:  CTA Bilaterally.    Heart:  Regular rate and rhythm,  No murmur, rub, or gallop  Abdomen: Soft, Non distended, Non tender, Positive bowel sounds  Extremities: No cyanosis, clubbing or edema  Neurologic:  No focal deficits    ASSESSMENT      Active Hospital Problems    Diagnosis Date Noted    Vitamin B12 deficiency 2017    Acute encephalopathy 2017    Febrile illness, acute 2017    Acute metabolic encephalopathy     Fall 2017    Generalized weakness 2016    Elevated troponin I level 2016    HTN (hypertension) 2012    Atrial fibrillation ([facility] [address] 01/27/2012     Plan:  · CAP- Continue zosyn for now. Likely transition to oral abx tomorrow. · Fall- repeat head CT to ensure no intracranial bleed evident before restarting pradaxa. · Generalized weakness- continue PT. Likely needs STR vs HH/PT. · A fib- pradaxa on hold. Restart pending head CT. Dispo- pending. DVT Prophylaxis: Heparin    Signed By: Thomas Odell.  Zuleyma Dooley MD     July 24, 2017

## 2017-07-24 NOTE — PROGRESS NOTES
Rounded on pt hourly throughout shift. Needs met at this time. Pt sleeping. Will continue to monitor and report to day shift RN.

## 2017-07-24 NOTE — INTERDISCIPLINARY ROUNDS
Interdisciplinary team rounds were held 7/24/2017 with the following team members:Care Management, Nursing, Pharmacy and Physician. Plan of care discussed. See clinical pathway and/or care plan for interventions and desired outcomes.

## 2017-07-24 NOTE — PROGRESS NOTES
Reports increased pain when inhaling r/t fall pta- reports inhaling makes pain worse to 'about a 7' on a pain scale of 0-10. He believes it is related to his fall pta. No apparent bruising/edema. Will f/u with provider.

## 2017-07-24 NOTE — PROGRESS NOTES
Problem: Self Care Deficits Care Plan (Adult)  Goal: *Acute Goals and Plan of Care (Insert Text)  1. Tylor Burnette will complete toileting with supervision. 2. Tylor Burnette will complete toilet transfers with supervision. 3. Tylor Burnette will complete total body bathing and dressing with adaptive equipment as needed with minimal assistance. 4. Tylor Burnette will complete 25 minutes ADL/therapeutic activities/therapeutic exercises with 3 rest breaks or less. Time frame: 4 - 7 visits      OCCUPATIONAL THERAPY: Daily Note and Treatment Day: 2nd 7/24/2017  INPATIENT: Hospital Day: 3  [place] / [place] / Product Type: Medicare /      NAME/AGE/GENDER: Cyn Hinson is a 64 y.o. male            PRIMARY DIAGNOSIS:  UTI (urinary tract infection)  Acute metabolic encephalopathy  Acute encephalopathy Acute metabolic encephalopathy Acute metabolic encephalopathy        ICD-10: Treatment Diagnosis:        · Generalized Muscle Weakness (M62.81)  · History of falling (Z91.81)   Precautions/Allergies:         Review of patient's allergies indicates no known allergies. ASSESSMENT:      Mr. Armenta Bound presents supine in bed agreeable to therapy. Pt requires moderate assistance for brief change, dressing, and bed mobility though once standing, pt only required minimal assistance for bed to chair transfer with verbal cueing for technique and postioning. Pt appears to be slowly progressing and will continue to benefit from occupational therapy to maximize independence with self-care,instrumental activities of daily living, and functional transfers/mobility for activities of daily living/instrumental activities of daily living via the stated goals. This section established at most recent assessment   PROBLEM LIST (Impairments causing functional limitations):  1. Decreased Strength  2. Decreased ADL/Functional Activities  3.  Decreased Transfer Abilities  4. Decreased Balance  5. Decreased Activity Tolerance  6. Decreased Work Simplification/Energy Conservation Techniques  7. Decreased Flexibility/Joint Mobility  8. Edema/Girth  9. Decreased Cognition    INTERVENTIONS PLANNED: (Benefits and precautions of occupational therapy have been discussed with the patient.)  1. Activities of daily living training  2. Cognitive training  3. Neuromuscular re-eduation  4. Therapeutic activity  5. Therapeutic exercise      TREATMENT PLAN: Frequency/Duration: Follow patient 3 times/week to address above goals. Rehabilitation Potential For Stated Goals: GOOD      RECOMMENDED REHABILITATION/EQUIPMENT: (at time of discharge pending progress): Continue Skilled Therapy. OCCUPATIONAL PROFILE AND HISTORY:   History of Present Injury/Illness (Reason for Referral):  Per MD H & P: Mr. Devin Vargas is a 65 yo M with PMHx of chronic AFib with slow rate s/p PPM 8/30/2011 on chronic pradaxa, CAD, GERD, HTN, hx of dementia on Aricept, Hx of prostate neoplasm s/p cryo 2006, urinary incontinence from from home by EMS after he had a fall this AM from his bed and he became more confused after that. Per EMS he hd a a fever of 101F and received IV Zosyn prior to arrival in ED. His was was unwitnessed, although he states that he didn't had LOC. Palmer Hesselbach acid:1.2; Tbil:1.5. CT head w/o acute pathology. CXR with increased pachy bibasilar atelectasis and/or consolidation. Trop I: 0.67, BNP:80. Vitamin [id number]. Daughter at bedside states that his weakness  And confusion got worse during the day and he was unable to get out of bed. He is not very ambulatory and in the last couple of months she noticed a significant slow decline. Denies any chest pain, SOB or abdominal pain. Past Medical History/Comorbidities:   Mr. Devin Vargas  has a past medical history of Arrhythmia; Arthritis; Atrial fibrillation ([address] (1/27/2012); CAD (coronary artery disease);  Cancer [facility]); GERD (gastroesophageal reflux disease); Heart failure ([facility] [address] Hypertension; and Morbid obesity ([facility] [address] Mr. Nikhil Gutiérrez  has a past surgical history that includes orthopaedic (2010); prostatectomy; and pacemaker (8/30/2012). Social History/Living Environment: Lives with daughter. Home Environment: Private residence  # Steps to Enter: 6  One/Two Story Residence: One story  Living Alone: No  Support Systems: Child(billy)  Patient Expects to be Discharged to[de-identified] Unknown  Current DME Used/Available at Home: Cane, straight  Prior Level of Function/Work/Activity:  Toilets self using commode. Typically sponge bathes with assistance from daughter. Some assist for dressing? Out of bed about 5 hours/day per patient. Cognitive, Physical, and/or Psychosocial History:        History of dementia per chart. Number of Personal Factors/Comorbidities that affect the Plan of Care: Expanded review of therapy/medical records (1-2):  MODERATE COMPLEXITY   ASSESSMENT OF OCCUPATIONAL PERFORMANCE[de-identified]   Activities of Daily Living:           Basic ADLs (From Assessment) Complex ADLs (From Assessment)   Basic ADL  Feeding: Setup  Oral Facial Hygiene/Grooming: Setup  Bathing: Moderate assistance  Upper Body Dressing: Stand-by assistance  Lower Body Dressing: Moderate assistance  Toileting: Moderate assistance Instrumental ADL  Meal Preparation: Total assistance  Homemaking: Total assistance   Grooming/Bathing/Dressing Activities of Daily Living     Cognitive Retraining  Orientation Retraining: Situation  Problem Solving: Identifying the task; Identifying the problem;General alternative solution; Awareness of environment  Executive Functions: Regulating behavior; Executing cognitive plans  Organizing/Sequencing: Breaking task down  Attention to Task: Single task  Cues: Tactile cues provided;Verbal cues provided;Visual cues provided           Toileting  Toileting Assistance:  Moderate assistance;Maximum assistance  Bladder Hygiene: Minimum assistance  Bowel Hygiene: Maximum assistance  Clothing Management: Moderate assistance  Cues: Verbal cues provided; Tactile cues provided           Bed/Mat Mobility  Rolling: Minimum assistance  Supine to Sit: Contact guard assistance  Sit to Stand: Maximum assistance  Bed to Chair: Minimum assistance;Contact guard assistance  Scooting: Maximum assistance  Cues: Visual cues provided;Verbal cues provided; Tactile cues provided          Most Recent Physical Functioning:   Gross Assessment:                       Balance:  Sitting: Impaired  Sitting - Static: Good (unsupported)  Sitting - Dynamic: Fair (occasional)  Standing: Impaired  Standing - Static: Poor  Standing - Dynamic : Poor  Bed Mobility:  Rolling: Minimum assistance  Supine to Sit: Contact guard assistance  Scooting: Maximum assistance  Transfers:  Sit to Stand: Maximum assistance  Bed to Chair: Minimum assistance;Contact guard assistance                 Patient Vitals for the past 6 hrs:   BP BP Patient Position SpO2 Pulse   07/24/17 1114 129/70 At rest 99 % 80   07/24/17 1356 140/76 At rest 99 % 80   07/24/17 1443 - - 98 % -        Mental Status  Neurologic State: Alert, Appropriate for age, Eyes open spontaneously  Orientation Level: Oriented to person, Oriented to place, Oriented to situation  Cognition: Appropriate decision making, Appropriate for age attention/concentration, Appropriate safety awareness, Follows commands  Perception: Appears intact  Perseveration: No perseveration noted  Safety/Judgement: Fall prevention                    Occupations Impacted (addressed by Plan of Care):  1. Bathing/Showering  2. Toileting/Toilet Hygiene  3. Dressing  4. Functional Mobility       Physical Skills Involved:  1. Range of Motion  2. Balance  3. Strength  4. Activity Tolerance  5. Gross Motor Control  6. Edema  7. Skin Integrity Cognitive Skills Affected (resulting in the inability to perform in a timely and safe manner):  1.  Executive Function Psychosocial Skills Affected:  1. Habits/Routines  2. Environmental Adaptation  3. Social Interaction   Number of elements that affect the Plan of Care: 3-5:  MODERATE COMPLEXITY   CLINICAL DECISION MAKIN[address] [place] [address] AM-PAC 6 Clicks   Basic Mobility Inpatient Short Form  How much help from another person does the patient currently need. .. Total A Lot A Little None   1. Putting on and taking off regular lower body clothing?   [ ] 1   [X] 2   [ ] 3   [ ] 4   2. Bathing (including washing, rinsing, drying)? [ ] 1   [X] 2   [ ] 3   [ ] 4   3. Toileting, which includes using toilet, bedpan or urinal?   [ ] 1   [X] 2   [ ] 3   [ ] 4   4. Putting on and taking off regular upper body clothing?   [ ] 1   [ ] 2   [X] 3   [ ] 4   5. Taking care of personal grooming such as brushing teeth? [ ] 1   [ ] 2   [X] 3   [ ] 4   6. Eating meals? [ ] 1   [ ] 2   [X] 3   [ ] 4   © , Trustees of [address] [place] [address], under license to [employer]. All rights reserved    Score:  Initial: 15 Most Recent: X (Date: -- )     Interpretation of Tool:  Represents activities that are increasingly more difficult (i.e. Bed mobility, Transfers, Gait). Score 24 23 22-20 19-15 14-10 9-7 6       Modifier CH CI CJ CK CL CM CN         · Self Care:               - CURRENT STATUS:    CK - 40%-59% impaired, limited or restricted               - GOAL STATUS:           CI - 1%-19% impaired, limited or restricted               - D/C STATUS:                       ---------------To be determined---------------  [place] / [place] / Product Type: Medicare /       Medical Necessity:     · Patient demonstrates fair rehab potential due to higher previous functional level. Reason for Services/Other Comments:  · Patient continues to require skilled intervention due to decreased independence with ADL and functional mobility for ADL. Assessment Modification and Need for Assistance:  1.  Minimal to moderate modification or assistance (Moderate)     Assessment Modifications Needed:  · Cueing (Verbal, Tactile. [place]itaslado Valeriy Macesmichelle Crooks )  · Physical Assistance  · Environment/task modification      Assessment process, impact of co-morbidities, assessment modification\need for assistance, and selection of interventions: MODERATE COMPLEXITY          TREATMENT:   (In addition to Assessment/Re-Assessment sessions the following treatments were rendered)      Pre-treatment Symptoms/Complaints:    Pain: Initial:   Pain Intensity 1: 0  Post Session:  same      Self Care: (10 minutes): Procedure(s) utilized to improve and/or restore self-care/home management as related to dressing and toileting. Required moderate visual, verbal and tactile cueing to facilitate activities of daily living skills and compensatory activities. Therapeutic Activity: (    17 minutes): Therapeutic activities including Bed transfers and Chair transfers to improve mobility, strength, balance and coordination. Required moderate   to promote dynamic balance in standing. Braces/Orthotics/Lines/Etc:   · IV  · O2 Device: Room air  Treatment/Session Assessment:    · Response to Treatment:  No treatment rendered. Assessment only. · Interdisciplinary Collaboration:  · Occupational Therapist  · Registered Nurse  · After treatment position/precautions:Up in chair, Bed/Chair-wheels locked, Call light within reach and RN notified  · Compliance with Program/Exercises: Will assess as treatment progresses. · Recommendations/Intent for next treatment session: \"Next visit will focus on advancements to more challenging activities\".   Total Treatment Duration: 27 minutes  OT Patient Time In/Time Out  Time In: 1038  Time Out: [address] JACQUELINE Lara/L

## 2017-07-24 NOTE — PROGRESS NOTES
Care Management Interventions  PCP Verified by CM: Yes  Transition of Care Consult (CM Consult): SNF  Partner SNF: Yes  Physical Therapy Consult: Yes  Occupational Therapy Consult: Yes  Current Support Network: Relative's Home (Lives with working daughter Dennis Dan )  Confirm Follow Up Transport: Family  Plan discussed with Pt/Family/Caregiver: Yes  Freedom of Choice Offered: Yes  Discharge Location  Discharge Placement: Home     Met with patient at the bedside. He is alert and oriented times four. His speech is slow and thoughtful. Lives with his working daughter and was at the [facility] a few years ago post knee. Medical team recommending rehab once again and patient and daughter agreeable. They would like [facility] again if possible. Plan: PT and OT and PPD  Referred to [facility]. Three night stay required   Traditional Medicare with a secondary insurance. Ishan Joseph

## 2017-07-24 NOTE — PROGRESS NOTES
Tomas Lot [phone]. She is the pts adult daughter and HCPOA per her reports. If she is not in the room tomorrow she please asks for provider to call with update/plan. Thank you very much. She reports she has spoken with Eugene Padgett (EPI).

## 2017-07-25 LAB
BACTERIA SPEC CULT: NORMAL
MM INDURATION POC: NORMAL 0 (ref 0–5)
PPD POC: NORMAL NEGATIVE
SERVICE CMNT-IMP: NORMAL

## 2017-07-25 PROCEDURE — 74011250637 HC RX REV CODE- 250/637: Performed by: INTERNAL MEDICINE

## 2017-07-25 PROCEDURE — 74011250636 HC RX REV CODE- 250/636: Performed by: HOSPITALIST

## 2017-07-25 PROCEDURE — 74011250637 HC RX REV CODE- 250/637: Performed by: HOSPITALIST

## 2017-07-25 PROCEDURE — 97530 THERAPEUTIC ACTIVITIES: CPT

## 2017-07-25 PROCEDURE — 97110 THERAPEUTIC EXERCISES: CPT

## 2017-07-25 PROCEDURE — 65660000000 HC RM CCU STEPDOWN

## 2017-07-25 PROCEDURE — 74011000258 HC RX REV CODE- 258: Performed by: HOSPITALIST

## 2017-07-25 RX ORDER — AMOXICILLIN AND CLAVULANATE POTASSIUM 875; 125 MG/1; MG/1
1 TABLET, FILM COATED ORAL 2 TIMES DAILY WITH MEALS
Status: DISCONTINUED | OUTPATIENT
Start: 2017-07-25 | End: 2017-07-26 | Stop reason: HOSPADM

## 2017-07-25 RX ADMIN — FAMOTIDINE 20 MG: 20 TABLET ORAL at 18:16

## 2017-07-25 RX ADMIN — FAMOTIDINE 20 MG: 20 TABLET ORAL at 09:54

## 2017-07-25 RX ADMIN — TAMSULOSIN HYDROCHLORIDE 0.4 MG: 0.4 CAPSULE ORAL at 09:54

## 2017-07-25 RX ADMIN — Medication 10 ML: at 06:11

## 2017-07-25 RX ADMIN — PIPERACILLIN SODIUM,TAZOBACTAM SODIUM 3.38 G: 3; .375 INJECTION, POWDER, FOR SOLUTION INTRAVENOUS at 00:37

## 2017-07-25 RX ADMIN — AMOXICILLIN AND CLAVULANATE POTASSIUM 1 TABLET: 875; 125 TABLET, FILM COATED ORAL at 18:16

## 2017-07-25 RX ADMIN — DABIGATRAN ETEXILATE MESYLATE 150 MG: 150 CAPSULE ORAL at 22:33

## 2017-07-25 RX ADMIN — PIPERACILLIN SODIUM,TAZOBACTAM SODIUM 3.38 G: 3; .375 INJECTION, POWDER, FOR SOLUTION INTRAVENOUS at 09:54

## 2017-07-25 RX ADMIN — CYANOCOBALAMIN TAB 1000 MCG 1000 MCG: 1000 TAB at 09:54

## 2017-07-25 RX ADMIN — ARIPIPRAZOLE 10 MG: 10 TABLET ORAL at 09:54

## 2017-07-25 RX ADMIN — Medication 10 ML: at 21:40

## 2017-07-25 RX ADMIN — STANDARDIZED SENNA CONCENTRATE AND DOCUSATE SODIUM 1 TABLET: 8.6; 5 TABLET, FILM COATED ORAL at 09:54

## 2017-07-25 RX ADMIN — ZOLPIDEM TARTRATE 5 MG: 5 TABLET ORAL at 21:40

## 2017-07-25 RX ADMIN — DONEPEZIL HYDROCHLORIDE 5 MG: 5 TABLET, FILM COATED ORAL at 21:37

## 2017-07-25 RX ADMIN — DABIGATRAN ETEXILATE MESYLATE 150 MG: 150 CAPSULE ORAL at 13:00

## 2017-07-25 NOTE — PROGRESS NOTES
Hospitalist Progress Note    2017  Admit Date: 2017  8:32 PM   NAME: Blair Current   :  1940   MRN:  [id number]   Attending: Earnestine Molina. Moni Rebollar MD  PCP:  Koko Oliva MD    SUBJECTIVE:   Patient is a 65 yo M admitted 17 with confusion, fever at home, and CXR consistent with possible pneumonia. He reports he is doing okay now. He reports he is weaker than usual and according to PT required minimal assist to ambulate very short distance and maximal assist to scoot in bed. Blood cx negative to date. 17- he reports he is doing well. He denies cough or shortness of breath. Mentation has normalized. He is awaiting transfer to [facility] tomorrow. Review of Systems negative with exception of pertinent positives noted above  PHYSICAL EXAM     Visit Vitals    /73 (BP 1 Location: Right arm, BP Patient Position: At rest)    Pulse 67    Temp 97.8 °F (36.6 °C)    Resp 18    Ht 6' 2\" (1.88 m)    Wt 136.1 kg (300 lb)    SpO2 99%    BMI 38.52 kg/m2      Temp (24hrs), Av.9 °F (36.6 °C), Min:97.5 °F (36.4 °C), Max:98.4 °F (36.9 °C)    Oxygen Therapy  O2 Sat (%): 99 % (17 1042)  Pulse via Oximetry: 74 beats per minute (17 1443)  O2 Device: Room air (17 1443)    Intake/Output Summary (Last 24 hours) at 17 1401  Last data filed at 17 0602   Gross per 24 hour   Intake              204 ml   Output              200 ml   Net                4 ml      General: No acute distress, elderly appearing, obese  Lungs:  CTA Bilaterally.    Heart:  Regular rate and rhythm,  No murmur, rub, or gallop  Abdomen: Soft, Non distended, Non tender, Positive bowel sounds  Extremities: No cyanosis, clubbing or edema  Neurologic:  No focal deficits    Recent Results (from the past 24 hour(s))   PLEASE READ & DOCUMENT PPD TEST IN 48 HRS    Collection Time: 17  1:00 AM   Result Value Ref Range    PPD  Negative    mm Induration  0     Imaging:    CT HEAD WO CONT   Final Result   IMPRESSION:     1. No acute intracranial process evident by noncontrast CT study of the head. Only stable chronic appearing changes are seen as described above. XR CHEST SNGL V   Final Result   Impression:  Increased patchy bibasilar atelectasis and or consolidation. CT HEAD WO CONT   Final Result   IMPRESSION:  No acute intracranial abnormality. ASSESSMENT      Active Hospital Problems    Diagnosis Date Noted    Vitamin B12 deficiency 07/23/2017    Acute encephalopathy 07/23/2017    Febrile illness, acute 07/22/2017    Acute metabolic encephalopathy 10/20/9900    Fall 07/22/2017    Generalized weakness 12/23/2016    Elevated troponin I level 12/23/2016    HTN (hypertension) 01/27/2012    Atrial fibrillation ([facility] [address] 01/27/2012     Plan:  · CAP- Discontinue zosyn and transition to augmentin. · Encephalopathy- has resolved and mentation is normalized. · Fall- repeat head CT to ensure no intracranial bleed evident before restarting pradaxa. · Generalized weakness- continue PT. Needs STR. · A fib- pradaxa restarted. Dispo- STR 7/26/17. DVT Prophylaxis: Pradaxa    Signed By: Aime Shook.  Kaykay Felix MD     July 25, 2017

## 2017-07-25 NOTE — PROGRESS NOTES
Problem: Mobility Impaired (Adult and Pediatric)  Goal: *Acute Goals and Plan of Care (Insert Text)  STG:  (1.)Mr. Alyssia Kessler will move from supine to sit and sit to supine and roll side to side with CONTACT GUARD ASSIST within 3 day(s). (2.)Mr. Alyssia Kessler will transfer from bed to chair and chair to bed with CONTACT GUARD ASSIST using the least restrictive device within 3 day(s). (3.)Mr. Alyssia Kessler will ambulate with CONTACT GUARD ASSIST for 15+ feet with the least restrictive device within 3 day(s). LTG:  (1.)Mr. Alyssia Kessler will move from supine to sit and sit to supine and roll side to side in bed with SUPERVISION within 7 day(s). (2.)Mr. Alyssia Kessler will transfer from bed to chair and chair to bed with SUPERVISION using the least restrictive device within 7 day(s). (3.)Mr. Alyssia Kessler will ambulate with SUPERVISION for 75+ feet with the least restrictive device within 7 day(s). ________________________________________________________________________________________________      PHYSICAL THERAPY: Daily Note, Treatment Day: 1st and PM 7/25/2017  INPATIENT: Hospital Day: 4  [place] / Product Type: Medicare /      NAME/AGE/GENDER: Katie Cabezas is a 64 y.o. male            PRIMARY DIAGNOSIS: UTI (urinary tract infection)  Acute metabolic encephalopathy  Acute encephalopathy Acute metabolic encephalopathy Acute metabolic encephalopathy        ICD-10: Treatment Diagnosis:       · Generalized Muscle Weakness (M62.81)  · Difficulty in walking, Not elsewhere classified (R26.2)   Precaution/Allergies:  Review of patient's allergies indicates no known allergies. ASSESSMENT:      Mr. Alyssia Kessler presents supine in the bed and is agreeable to therapy. Pt performed supine to sit with CGA and scooting with min assist.  Pt stood twice and then ambulated about 5' with rolling walker and CGA to chair. Pt then performed below LE exercises. Pt reports mild fatigue after therapy. Pt is moving better today than last treatment. Pt is making progress with overall mobility. Will continue with POC. This section established at most recent assessment   PROBLEM LIST (Impairments causing functional limitations):  1. Decreased Strength  2. Decreased ADL/Functional Activities  3. Decreased Transfer Abilities  4. Decreased Ambulation Ability/Technique  5. Decreased Balance  6. Decreased Activity Tolerance    INTERVENTIONS PLANNED: (Benefits and precautions of physical therapy have been discussed with the patient.)  1. Balance Exercise  2. Bed Mobility  3. Gait Training  4. Therapeutic Activites  5. Therapeutic Exercise/Strengthening  6. Transfer Training      TREATMENT PLAN: Frequency/Duration: 3 times a week for 2 weeks  Rehabilitation Potential For Stated Goals: GOOD      RECOMMENDED REHABILITATION/EQUIPMENT: (at time of discharge pending progress): Continue Skilled Therapy. HISTORY:   History of Present Injury/Illness (Reason for Referral):  Pt has been admitted for acute metabolic encephalopathy. He also had a fall at home  Past Medical History/Comorbidities:   Mr. Reji Rodriguez  has a past medical history of Arrhythmia; Arthritis; Atrial fibrillation ([facility] [address] (1/27/2012); CAD (coronary artery disease); Cancer [facility]); GERD (gastroesophageal reflux disease); Heart failure ([facility] [address] Hypertension; and Morbid obesity ([facility] [address] Mr. Reji Rodriguez  has a past surgical history that includes orthopaedic (2010); prostatectomy; and pacemaker (8/30/2012). Social History/Living Environment:   Home Environment: Private residence  # Steps to Enter: 6  One/Two Story Residence: One story  Living Alone: No  Support Systems: Child(billy)  Patient Expects to be Discharged to[de-identified] Unknown  Current DME Used/Available at Home: Cane, straight  Prior Level of Function/Work/Activity:  Pt lives at home with his daughter. She assists him with bathing and dressing. He does not drive.  He uses a cane for ambulation      Number of Personal Factors/Comorbidities that affect the Plan of Care  Cancer  Metabolic encephalopathy: 1-2: MODERATE COMPLEXITY   EXAMINATION:   Most Recent Physical Functioning:   Gross Assessment:                  Posture:     Balance:  Sitting: Impaired  Sitting - Static: Good (unsupported)  Sitting - Dynamic: Fair (occasional)  Standing - Static: Fair  Standing - Dynamic : Fair Bed Mobility:  Supine to Sit: Contact guard assistance  Scooting: Minimum assistance  Wheelchair Mobility:     Transfers:  Sit to Stand: Minimum assistance  Gait:     Base of Support: Widened  Speed/Ángela: Pace decreased (<100 feet/min)  Step Length: Left shortened;Right shortened  Gait Abnormalities: Decreased step clearance;Shuffling gait  Distance (ft): 5 Feet (ft)  Assistive Device: Walker, rolling  Ambulation - Level of Assistance: Contact guard assistance  Interventions: Safety awareness training;Verbal cues       Body Structures Involved:  1. Metabolic  2. Muscles Body Functions Affected:  1. Immune  2. Movement Related Activities and Participation Affected:  1. Mobility   Number of elements that affect the Plan of Care: 4+: HIGH COMPLEXITY   CLINICAL PRESENTATION:   Presentation: Evolving clinical presentation with changing clinical characteristics: MODERATE COMPLEXITY   CLINICAL DECISION MAKIN [facility] Inpatient Short Form  How much difficulty does the patient currently have. .. Unable A Lot A Little None   1. Turning over in bed (including adjusting bedclothes, sheets and blankets)? [ ] 1   [ ] 2   [X] 3   [ ] 4   2. Sitting down on and standing up from a chair with arms ( e.g., wheelchair, bedside commode, etc.)   [ ] 1   [ ] 2   [X] 3   [ ] 4   3. Moving from lying on back to sitting on the side of the bed? [ ] 1   [ ] 2   [X] 3   [ ] 4   How much help from another person does the patient currently need. .. Total A Lot A Little None   4.   Moving to and from a bed to a chair (including a wheelchair)? [ ] 1   [ ] 2   [X] 3   [ ] 4   5. Need to walk in hospital room? [ ] 1   [ ] 2   [X] 3   [ ] 4   6. Climbing 3-5 steps with a railing? [ ] 1   [X] 2   [ ] 3   [ ] 4   © 2007, Trustees of [address] [place] [address] [address] [address], under license to [employer]. All rights reserved    Score:  Initial: 17 Most Recent: 7/23/17     Interpretation of Tool:  Represents activities that are increasingly more difficult (i.e. Bed mobility, Transfers, Gait). Score 24 23 22-20 19-15 14-10 9-7 6       Modifier CH CI CJ CK CL CM CN         · Mobility - Walking and Moving Around:               - CURRENT STATUS:    CK - 40%-59% impaired, limited or restricted               - GOAL STATUS:           CJ - 20%-39% impaired, limited or restricted               - D/C STATUS:                       ---------------To be determined---------------  [place] / [place] / Product Type: Medicare /       Medical Necessity:     · Patient demonstrates good rehab potential due to higher previous functional level. Reason for Services/Other Comments:  · Patient continues to require skilled intervention due to patient unable to attend/participate in therapy as expected. Use of outcome tool(s) and clinical judgement create a POC that gives a: Questionable prediction of patient's progress: MODERATE COMPLEXITY                 TREATMENT:      Pre-treatment Symptoms/Complaints:  none  Pain: Initial:      Post Session:  0      Therapeutic Activity: (    13): Therapeutic activities including Bed transfers and Ambulation on level ground to improve mobility, strength, balance and coordination. Required minimal Safety awareness training;Verbal cues to promote dynamic balance in standing. Therapeutic Exercise: (  10 minutes):  Exercises per grid below to improve mobility and strength.   Required minimal visual and verbal cues to promote proper body alignment, promote proper body posture and promote proper body mechanics. Progressed range and repetitions as indicated. Date:  7/25/17 Date:   Date:     ACTIVITY/EXERCISE AM PM AM PM AM PM   Ambulation:           Distance  Device  Duration         Seated Heel Raises  X 20 B       Seated Toe Raises  X 20 B       Seated Long Arc Quads  X 20 B       Seated Marching  X 20 B       Seated Hip Abduction  X 20 B                B = bilateral; AA = active assistive; A = active; P = passive           Braces/Orthotics/Lines/Etc:   · IV  · O2 Device: Room air  Treatment/Session Assessment:    · Response to Treatment:  good  · Interdisciplinary Collaboration:  · Physical Therapy Assistant and Registered Nurse  · After treatment position/precautions:  · Up in chair, Bed/Chair-wheels locked, Call light within reach and RN notified  · Compliance with Program/Exercises: compliant most of the time. · Recommendations/Intent for next treatment session: \"Next visit will focus on advancements to more challenging activities and reduction in assistance provided\".   Total Treatment Duration:PT Patient Time In/Time Out  Time In: 8638  Time Out: [address], [facility]

## 2017-07-26 VITALS
HEIGHT: 74 IN | HEART RATE: 90 BPM | SYSTOLIC BLOOD PRESSURE: 148 MMHG | RESPIRATION RATE: 18 BRPM | BODY MASS INDEX: 38.5 KG/M2 | TEMPERATURE: 97.8 F | DIASTOLIC BLOOD PRESSURE: 71 MMHG | OXYGEN SATURATION: 97 % | WEIGHT: 300 LBS

## 2017-07-26 PROBLEM — J18.9 CAP (COMMUNITY ACQUIRED PNEUMONIA): Status: ACTIVE | Noted: 2017-07-26

## 2017-07-26 LAB
MM INDURATION POC: NORMAL MM (ref 0–5)
PPD POC: NORMAL NEGATIVE

## 2017-07-26 PROCEDURE — 74011250637 HC RX REV CODE- 250/637: Performed by: HOSPITALIST

## 2017-07-26 PROCEDURE — 74011250637 HC RX REV CODE- 250/637: Performed by: INTERNAL MEDICINE

## 2017-07-26 RX ORDER — GUAIFENESIN 100 MG/5ML
81 LIQUID (ML) ORAL DAILY
Qty: 30 TAB | Refills: 0 | Status: SHIPPED
Start: 2017-07-26

## 2017-07-26 RX ORDER — ZOLPIDEM TARTRATE 5 MG/1
5 TABLET ORAL
Qty: 3 TAB | Refills: 0 | Status: SHIPPED | OUTPATIENT
Start: 2017-07-26 | End: 2018-07-26 | Stop reason: SDUPTHER

## 2017-07-26 RX ORDER — AMOXICILLIN AND CLAVULANATE POTASSIUM 875; 125 MG/1; MG/1
1 TABLET, FILM COATED ORAL 2 TIMES DAILY WITH MEALS
Qty: 6 TAB | Refills: 0 | Status: SHIPPED
Start: 2017-07-26 | End: 2017-07-29

## 2017-07-26 RX ORDER — TRAMADOL HYDROCHLORIDE 50 MG/1
50 TABLET ORAL 2 TIMES DAILY
Qty: 12 TAB | Refills: 0 | Status: SHIPPED | OUTPATIENT
Start: 2017-07-26 | End: 2018-07-26

## 2017-07-26 RX ADMIN — ARIPIPRAZOLE 10 MG: 10 TABLET ORAL at 09:31

## 2017-07-26 RX ADMIN — TAMSULOSIN HYDROCHLORIDE 0.4 MG: 0.4 CAPSULE ORAL at 09:32

## 2017-07-26 RX ADMIN — STANDARDIZED SENNA CONCENTRATE AND DOCUSATE SODIUM 1 TABLET: 8.6; 5 TABLET, FILM COATED ORAL at 09:32

## 2017-07-26 RX ADMIN — Medication 10 ML: at 05:42

## 2017-07-26 RX ADMIN — FAMOTIDINE 20 MG: 20 TABLET ORAL at 09:32

## 2017-07-26 RX ADMIN — CYANOCOBALAMIN TAB 1000 MCG 1000 MCG: 1000 TAB at 09:32

## 2017-07-26 RX ADMIN — DABIGATRAN ETEXILATE MESYLATE 150 MG: 150 CAPSULE ORAL at 09:31

## 2017-07-26 RX ADMIN — AMOXICILLIN AND CLAVULANATE POTASSIUM 1 TABLET: 875; 125 TABLET, FILM COATED ORAL at 09:31

## 2017-07-26 NOTE — DISCHARGE SUMMARY
Hospitalist Discharge Summary     Patient ID:  Vianca Kunz  [id number]  1 y.o.  1940  Admit date: 7/22/2017  8:32 PM  Discharge date and time: 7/26/2017  Attending: Aime Shook. Kaykay Felix MD  PCP:  Murtaza Carpenter MD  Treatment Team: Attending Provider: Aime Shook. Kaykay Felix MD; Utilization Review: Thaddeus Stanley; Care Manager: NGOC [place]    Principal Diagnosis Acute metabolic encephalopathy   Principal Problem:    Acute metabolic encephalopathy (5/85/2994)    Active Problems:    Atrial fibrillation ([facility] [address] (1/27/2012)      HTN (hypertension) (1/27/2012)      Generalized weakness (12/23/2016)      Elevated troponin I level (12/23/2016)      Febrile illness, acute (7/22/2017)      Fall (7/22/2017)      Vitamin B12 deficiency (7/23/2017)      Acute encephalopathy (7/23/2017)           HPI:  'Mr. Kam Wynn is a 65 yo M with PMHx of chronic AFib with slow rate s/p PPM 8/30/2011 on chronic pradaxa, CAD, GERD, HTN, hx of dementia on Aricept, Hx of prostate neoplasm s/p cryo 2006, urinary incontinence from from home by EMS after he had a fall this AM from his bed and he became more confused after that. Per EMS he hd a a fever of 101F and received IV Zosyn prior to arrival in ED. His was was unwitnessed, although he states that he didn't had LOC. Jeane Enriqueta acid:1.2; Tbil:1.5. CT head w/o acute pathology. CXR with increased pachy bibasilar atelectasis and/or consolidation. Trop I: 0.67, BNP:80. Vitamin [id number]. Daughter at bedside states that his weakness  And confusion got worse during the day and he was unable to get out of bed. He is not very ambulatory and in the last couple of months she noticed a significant slow decline. Denies any chest pain, SOB or abdominal pain.'    Hospital Course:  He was admitted and started on zosyn for CAP based on infiltrates on CXR and fever at home.   Zosyn was changed to augmentin on the day prior to discharge and is to complete a total of 7 days of antibiotic therapy. His mentation improved quickly. Repeat CT head performed to ensure no intracranial bleeding due to him being on pradaxa and CT was negative for ICH. His pradaxa was restarted for atrial fibrillation. He worked with PT and found to be weak and debilitated and was recommended for STR. Of note, his celebrex was discontinued due to bleed risk of pradaxa. Recommend he only take acetaminophen and tramadol for pain. On the day of discharge, he is deemed stable to be discharged to [address] [place] [address]. Significant Diagnostic Studies:       Labs: Results:       Chemistry No results for input(s): GLU, NA, K, CL, CO2, BUN, CREA, CA, AGAP, BUCR, TBIL, GPT, AP, TP, ALB, GLOB, AGRAT in the last 72 hours. CBC w/Diff No results for input(s): WBC, RBC, HGB, HCT, PLT, GRANS, LYMPH, EOS, HGBEXT, HCTEXT, PLTEXT in the last 72 hours. Cardiac Enzymes No results for input(s): CPK, CKND1, ALEXANDRA in the last 72 hours. No lab exists for component: CKRMB, TROIP   Coagulation No results for input(s): PTP, INR, APTT in the last 72 hours. No lab exists for component: INREXT    Lipid Panel Lab Results   Component Value Date/Time    Cholesterol, total 142 04/17/2017 11:59 AM    HDL Cholesterol 76 04/17/2017 11:59 AM    LDL, calculated 52 04/17/2017 11:59 AM    VLDL, calculated 14 04/17/2017 11:59 AM    Triglyceride 69 04/17/2017 11:59 AM    CHOL/HDL Ratio 1.9 08/29/2012 01:43 AM      BNP No results for input(s): BNPP in the last 72 hours. Liver Enzymes No results for input(s): TP, ALB, TBIL, AP, SGOT, GPT in the last 72 hours. No lab exists for component: DBIL   Thyroid Studies Lab Results   Component Value Date/Time    T4, Total 7.6 05/12/2016 10:24 AM    TSH 0.588 07/22/2017 10:35 PM          Imaging:    CT HEAD WO CONT   Final Result   IMPRESSION:     1. No acute intracranial process evident by noncontrast CT study of the head. Only stable chronic appearing changes are seen as described above.                XR CHEST SNGL V   Final Result   Impression:  Increased patchy bibasilar atelectasis and or consolidation. CT HEAD WO CONT   Final Result   IMPRESSION:  No acute intracranial abnormality. Discharge Exam:  Visit Vitals    /71 (BP 1 Location: Right arm, BP Patient Position: At rest)    Pulse 90    Temp 97.8 °F (36.6 °C)    Resp 18    Ht 6' 2\" (1.88 m)    Wt 136.1 kg (300 lb)    SpO2 97%    BMI 38.52 kg/m2     General appearance: alert, cooperative, no distress, appears stated age, obese  Lungs: clear to auscultation bilaterally  Heart: irregular rhythm, rate controlled, S1, S2 normal, no murmur, click, rub or gallop  Abdomen: soft, non-tender. Bowel sounds normal. No masses,  no organomegaly  Extremities: chronic lower extremity discoloration, trace pedal edema  Neurologic: Grossly normal    Disposition: STR  Discharge Condition: stable  Patient Instructions:   Current Discharge Medication List      START taking these medications    Details   amoxicillin-clavulanate (AUGMENTIN) 875-125 mg per tablet Take 1 Tab by mouth two (2) times daily (with meals) for 3 days. Indications: BACTERIAL PNEUMONIA  Qty: 6 Tab, Refills: 0      aspirin 81 mg chewable tablet Take 1 Tab by mouth daily. Indications: prevention of cerebrovascular accident  Qty: 30 Tab, Refills: 0         CONTINUE these medications which have CHANGED    Details   traMADol (ULTRAM) 50 mg tablet Take 1 Tab by mouth two (2) times a day. Max Daily Amount: 100 mg. Indications: Pain  Qty: 12 Tab, Refills: 0    Associated Diagnoses: Primary osteoarthritis of right knee      zolpidem (AMBIEN) 5 mg tablet Take 1 Tab by mouth nightly as needed for Sleep. Max Daily Amount: 5 mg. Indications: SLEEP-ONSET INSOMNIA  Qty: 3 Tab, Refills: 0    Associated Diagnoses: Primary insomnia         CONTINUE these medications which have NOT CHANGED    Details   ARIPiprazole (ABILIFY) 10 mg tablet Take 1 Tab by mouth daily.   Qty: 90 Tab, Refills: 3 Associated Diagnoses: Major depressive disorder with single episode, in remission ([facility])      famotidine (PEPCID) 20 mg tablet Take 1 Tab by mouth two (2) times a day. Qty: 180 Tab, Refills: 3    Associated Diagnoses: Gastroesophageal reflux disease without esophagitis      bicalutamide (CASODEX) 50 mg tablet Take 1 Tab by mouth daily. Qty: 90 Tab, Refills: 6    Associated Diagnoses: History of prostate cancer; CA of prostate ([facility])      dabigatran etexilate (PRADAXA) 150 mg capsule Take 1 Cap by mouth every twelve (12) hours. Qty: 180 Cap, Refills: 3    Associated Diagnoses: Chronic a-fib ([facility])      tamsulosin (FLOMAX) 0.4 mg capsule Take 1 Cap by mouth daily. Qty: 90 Cap, Refills: 2      FETZIMA 40 mg ER capsule TAKE 1 CAP BY MOUTH DAILY. Qty: 30 Cap, Refills: 3    Associated Diagnoses: Other depression      donepezil (ARICEPT) 5 mg tablet Take 1 Tab by mouth nightly. Qty: 90 Tab, Refills: 2    Associated Diagnoses: Mild dementia      diclofenac (VOLTAREN) 1 % gel APPLY 4 GRAMS TO AFFECTED AREA FOUR TIMES DAILY  Refills: 2      acetaminophen (TYLENOL ARTHRITIS PAIN) 650 mg CR tablet Take 650 mg by mouth every six (6) hours as needed for Pain. STOP taking these medications       aspirin (ASPIRIN) 325 mg tablet Comments:   Reason for Stopping:         cefdinir (OMNICEF) 300 mg capsule Comments:   Reason for Stopping:         celecoxib (CELEBREX) 200 mg capsule Comments:   Reason for Stopping:               Activity: PT/OT Eval and Treat  Diet: Mechanical soft diet. May take regular diet if he gets his dentures. Follow-up  ·   PCP 1-2 weeks after discharge from [facility]  Time spent to discharge patient 28 minutes  Signed:  Aime Shook.  Kaykay Felix MD  7/26/2017  8:13 AM

## 2017-07-26 NOTE — PROGRESS NOTES
Care Management Interventions  Transition of Care Consult (CM Consult): Discharge Planning, SNF  Current Support Network: [address] [place] [address] Ave  Confirm Follow Up Transport: Other (see comment) Chelsey White)  Plan discussed with Pt/Family/Caregiver: Yes  Freedom of Choice Offered: Yes  Discharge Location  Discharge Placement: Skilled nursing facility      Patient will dc at 11am with Julio Peeling to [facility]. Left a message with Hema Lowry. Updated daughter at cell [phone]. She wanted to make sure everything was packed up for her dad as she cannot be here when he leaves. Passed this on the RN. Ishan Joseph

## 2017-07-26 NOTE — PROGRESS NOTES
Pt was OOB to chair with adult daughters at bedside most of afternoon. Tech reported pt used urinal but also had saturated the bed. While OOB to chair pt participated in conversation and appeared pleasant. Ate from meal trays and took PO meds without difficulty.

## 2017-07-26 NOTE — PROGRESS NOTES
Hourly rounds done on patient, all needs met. Patient requested Ambien at bedtime, it was effective and he slept well on and off. Patient complains of no pain throughout the night.

## 2017-08-28 ENCOUNTER — HOME HEALTH ADMISSION (OUTPATIENT)
Dept: HOME HEALTH SERVICES | Facility: HOME HEALTH | Age: 77
End: 2017-08-28
Payer: MEDICARE

## 2017-08-31 ENCOUNTER — HOME CARE VISIT (OUTPATIENT)
Dept: SCHEDULING | Facility: HOME HEALTH | Age: 77
End: 2017-08-31
Payer: MEDICARE

## 2017-08-31 VITALS
RESPIRATION RATE: 20 BRPM | HEART RATE: 89 BPM | DIASTOLIC BLOOD PRESSURE: 75 MMHG | TEMPERATURE: 97.6 F | SYSTOLIC BLOOD PRESSURE: 131 MMHG | OXYGEN SATURATION: 97 %

## 2017-08-31 PROCEDURE — 3331090002 HH PPS REVENUE DEBIT

## 2017-08-31 PROCEDURE — G0299 HHS/HOSPICE OF RN EA 15 MIN: HCPCS

## 2017-08-31 PROCEDURE — 3331090001 HH PPS REVENUE CREDIT

## 2017-08-31 PROCEDURE — 400013 HH SOC

## 2017-09-01 ENCOUNTER — HOME CARE VISIT (OUTPATIENT)
Dept: SCHEDULING | Facility: HOME HEALTH | Age: 77
End: 2017-09-01
Payer: MEDICARE

## 2017-09-01 ENCOUNTER — PATIENT OUTREACH (OUTPATIENT)
Dept: CASE MANAGEMENT | Age: 77
End: 2017-09-01

## 2017-09-01 PROCEDURE — G0151 HHCP-SERV OF PT,EA 15 MIN: HCPCS

## 2017-09-01 PROCEDURE — 3331090002 HH PPS REVENUE DEBIT

## 2017-09-01 PROCEDURE — 3331090001 HH PPS REVENUE CREDIT

## 2017-09-01 NOTE — PROGRESS NOTES
SNF F/U:    Unsuccessful SNF F/U to patient. CC will attempt second outreach. This note will not be viewable in [address]

## 2017-09-02 ENCOUNTER — PATIENT OUTREACH (OUTPATIENT)
Dept: CASE MANAGEMENT | Age: 77
End: 2017-09-02

## 2017-09-02 VITALS
DIASTOLIC BLOOD PRESSURE: 70 MMHG | OXYGEN SATURATION: 93 % | SYSTOLIC BLOOD PRESSURE: 118 MMHG | HEART RATE: 86 BPM | RESPIRATION RATE: 20 BRPM | TEMPERATURE: 97 F

## 2017-09-02 PROCEDURE — 3331090002 HH PPS REVENUE DEBIT

## 2017-09-02 PROCEDURE — 3331090001 HH PPS REVENUE CREDIT

## 2017-09-02 NOTE — PROGRESS NOTES
SNF F/U #2:    Second outreach attempt to patient was unsuccessful. No answer. Will close case. This note will not be viewable in [address]

## 2017-09-03 PROCEDURE — 3331090002 HH PPS REVENUE DEBIT

## 2017-09-03 PROCEDURE — 3331090001 HH PPS REVENUE CREDIT

## 2017-09-04 ENCOUNTER — HOME CARE VISIT (OUTPATIENT)
Dept: SCHEDULING | Facility: HOME HEALTH | Age: 77
End: 2017-09-04
Payer: MEDICARE

## 2017-09-04 VITALS
DIASTOLIC BLOOD PRESSURE: 82 MMHG | OXYGEN SATURATION: 98 % | TEMPERATURE: 97.5 F | SYSTOLIC BLOOD PRESSURE: 130 MMHG | HEART RATE: 81 BPM

## 2017-09-04 PROCEDURE — 3331090001 HH PPS REVENUE CREDIT

## 2017-09-04 PROCEDURE — 3331090002 HH PPS REVENUE DEBIT

## 2017-09-04 PROCEDURE — G0152 HHCP-SERV OF OT,EA 15 MIN: HCPCS

## 2017-09-05 PROCEDURE — 3331090002 HH PPS REVENUE DEBIT

## 2017-09-05 PROCEDURE — 3331090001 HH PPS REVENUE CREDIT

## 2017-09-06 ENCOUNTER — HOME CARE VISIT (OUTPATIENT)
Dept: SCHEDULING | Facility: HOME HEALTH | Age: 77
End: 2017-09-06
Payer: MEDICARE

## 2017-09-06 VITALS
OXYGEN SATURATION: 98 % | DIASTOLIC BLOOD PRESSURE: 84 MMHG | RESPIRATION RATE: 18 BRPM | HEART RATE: 90 BPM | SYSTOLIC BLOOD PRESSURE: 132 MMHG | TEMPERATURE: 97.3 F

## 2017-09-06 VITALS
DIASTOLIC BLOOD PRESSURE: 84 MMHG | OXYGEN SATURATION: 98 % | HEART RATE: 90 BPM | RESPIRATION RATE: 18 BRPM | SYSTOLIC BLOOD PRESSURE: 132 MMHG | TEMPERATURE: 97.3 F

## 2017-09-06 VITALS
SYSTOLIC BLOOD PRESSURE: 132 MMHG | OXYGEN SATURATION: 98 % | HEART RATE: 90 BPM | DIASTOLIC BLOOD PRESSURE: 84 MMHG | TEMPERATURE: 97.3 F | RESPIRATION RATE: 18 BRPM

## 2017-09-06 PROCEDURE — G0299 HHS/HOSPICE OF RN EA 15 MIN: HCPCS

## 2017-09-06 PROCEDURE — G0157 HHC PT ASSISTANT EA 15: HCPCS

## 2017-09-06 PROCEDURE — 3331090001 HH PPS REVENUE CREDIT

## 2017-09-06 PROCEDURE — 3331090002 HH PPS REVENUE DEBIT

## 2017-09-06 PROCEDURE — G0158 HHC OT ASSISTANT EA 15: HCPCS

## 2017-09-07 ENCOUNTER — HOME CARE VISIT (OUTPATIENT)
Dept: SCHEDULING | Facility: HOME HEALTH | Age: 77
End: 2017-09-07
Payer: MEDICARE

## 2017-09-07 ENCOUNTER — HOME CARE VISIT (OUTPATIENT)
Dept: HOME HEALTH SERVICES | Facility: HOME HEALTH | Age: 77
End: 2017-09-07
Payer: MEDICARE

## 2017-09-07 PROCEDURE — 3331090002 HH PPS REVENUE DEBIT

## 2017-09-07 PROCEDURE — G0156 HHCP-SVS OF AIDE,EA 15 MIN: HCPCS

## 2017-09-07 PROCEDURE — 3331090001 HH PPS REVENUE CREDIT

## 2017-09-08 ENCOUNTER — HOME CARE VISIT (OUTPATIENT)
Dept: HOME HEALTH SERVICES | Facility: HOME HEALTH | Age: 77
End: 2017-09-08
Payer: MEDICARE

## 2017-09-08 ENCOUNTER — HOME CARE VISIT (OUTPATIENT)
Dept: SCHEDULING | Facility: HOME HEALTH | Age: 77
End: 2017-09-08
Payer: MEDICARE

## 2017-09-08 VITALS
SYSTOLIC BLOOD PRESSURE: 110 MMHG | HEART RATE: 66 BPM | DIASTOLIC BLOOD PRESSURE: 60 MMHG | RESPIRATION RATE: 18 BRPM | OXYGEN SATURATION: 98 % | TEMPERATURE: 96 F

## 2017-09-08 PROCEDURE — 3331090002 HH PPS REVENUE DEBIT

## 2017-09-08 PROCEDURE — 3331090001 HH PPS REVENUE CREDIT

## 2017-09-08 PROCEDURE — G0157 HHC PT ASSISTANT EA 15: HCPCS

## 2017-09-09 PROCEDURE — 3331090002 HH PPS REVENUE DEBIT

## 2017-09-09 PROCEDURE — 3331090001 HH PPS REVENUE CREDIT

## 2017-09-10 PROCEDURE — 3331090002 HH PPS REVENUE DEBIT

## 2017-09-10 PROCEDURE — 3331090001 HH PPS REVENUE CREDIT

## 2017-09-11 ENCOUNTER — HOME CARE VISIT (OUTPATIENT)
Dept: SCHEDULING | Facility: HOME HEALTH | Age: 77
End: 2017-09-11
Payer: MEDICARE

## 2017-09-11 VITALS
SYSTOLIC BLOOD PRESSURE: 142 MMHG | DIASTOLIC BLOOD PRESSURE: 94 MMHG | RESPIRATION RATE: 18 BRPM | HEART RATE: 83 BPM | OXYGEN SATURATION: 98 % | TEMPERATURE: 97.4 F

## 2017-09-11 VITALS
RESPIRATION RATE: 20 BRPM | HEART RATE: 84 BPM | TEMPERATURE: 97.9 F | SYSTOLIC BLOOD PRESSURE: 128 MMHG | DIASTOLIC BLOOD PRESSURE: 80 MMHG

## 2017-09-11 PROCEDURE — 3331090001 HH PPS REVENUE CREDIT

## 2017-09-11 PROCEDURE — G0158 HHC OT ASSISTANT EA 15: HCPCS

## 2017-09-11 PROCEDURE — G0156 HHCP-SVS OF AIDE,EA 15 MIN: HCPCS

## 2017-09-11 PROCEDURE — 3331090002 HH PPS REVENUE DEBIT

## 2017-09-12 ENCOUNTER — HOME CARE VISIT (OUTPATIENT)
Dept: SCHEDULING | Facility: HOME HEALTH | Age: 77
End: 2017-09-12
Payer: MEDICARE

## 2017-09-12 VITALS
TEMPERATURE: 96.3 F | SYSTOLIC BLOOD PRESSURE: 130 MMHG | DIASTOLIC BLOOD PRESSURE: 86 MMHG | HEART RATE: 83 BPM | RESPIRATION RATE: 18 BRPM

## 2017-09-12 VITALS
OXYGEN SATURATION: 98 % | TEMPERATURE: 95.7 F | SYSTOLIC BLOOD PRESSURE: 124 MMHG | DIASTOLIC BLOOD PRESSURE: 60 MMHG | HEART RATE: 72 BPM | RESPIRATION RATE: 16 BRPM

## 2017-09-12 VITALS
SYSTOLIC BLOOD PRESSURE: 112 MMHG | TEMPERATURE: 97.4 F | RESPIRATION RATE: 20 BRPM | DIASTOLIC BLOOD PRESSURE: 60 MMHG | HEART RATE: 72 BPM

## 2017-09-12 PROCEDURE — G0299 HHS/HOSPICE OF RN EA 15 MIN: HCPCS

## 2017-09-12 PROCEDURE — G0157 HHC PT ASSISTANT EA 15: HCPCS

## 2017-09-12 PROCEDURE — G0158 HHC OT ASSISTANT EA 15: HCPCS

## 2017-09-12 PROCEDURE — 3331090002 HH PPS REVENUE DEBIT

## 2017-09-12 PROCEDURE — 3331090001 HH PPS REVENUE CREDIT

## 2017-09-13 ENCOUNTER — HOME CARE VISIT (OUTPATIENT)
Dept: SCHEDULING | Facility: HOME HEALTH | Age: 77
End: 2017-09-13
Payer: MEDICARE

## 2017-09-13 PROCEDURE — 3331090001 HH PPS REVENUE CREDIT

## 2017-09-13 PROCEDURE — G0156 HHCP-SVS OF AIDE,EA 15 MIN: HCPCS

## 2017-09-13 PROCEDURE — 3331090002 HH PPS REVENUE DEBIT

## 2017-09-14 ENCOUNTER — HOME CARE VISIT (OUTPATIENT)
Dept: SCHEDULING | Facility: HOME HEALTH | Age: 77
End: 2017-09-14
Payer: MEDICARE

## 2017-09-14 VITALS
SYSTOLIC BLOOD PRESSURE: 126 MMHG | HEART RATE: 82 BPM | RESPIRATION RATE: 20 BRPM | DIASTOLIC BLOOD PRESSURE: 76 MMHG | TEMPERATURE: 97.9 F

## 2017-09-14 PROCEDURE — G0299 HHS/HOSPICE OF RN EA 15 MIN: HCPCS

## 2017-09-14 PROCEDURE — 3331090001 HH PPS REVENUE CREDIT

## 2017-09-14 PROCEDURE — 3331090002 HH PPS REVENUE DEBIT

## 2017-09-15 ENCOUNTER — HOME CARE VISIT (OUTPATIENT)
Dept: SCHEDULING | Facility: HOME HEALTH | Age: 77
End: 2017-09-15
Payer: MEDICARE

## 2017-09-15 VITALS
OXYGEN SATURATION: 98 % | SYSTOLIC BLOOD PRESSURE: 150 MMHG | TEMPERATURE: 96.6 F | RESPIRATION RATE: 20 BRPM | DIASTOLIC BLOOD PRESSURE: 82 MMHG | HEART RATE: 95 BPM

## 2017-09-15 PROCEDURE — 3331090001 HH PPS REVENUE CREDIT

## 2017-09-15 PROCEDURE — 3331090002 HH PPS REVENUE DEBIT

## 2017-09-15 PROCEDURE — G0157 HHC PT ASSISTANT EA 15: HCPCS

## 2017-09-16 PROCEDURE — 3331090001 HH PPS REVENUE CREDIT

## 2017-09-16 PROCEDURE — 3331090002 HH PPS REVENUE DEBIT

## 2017-09-17 PROCEDURE — 3331090001 HH PPS REVENUE CREDIT

## 2017-09-17 PROCEDURE — 3331090002 HH PPS REVENUE DEBIT

## 2017-09-18 ENCOUNTER — HOME CARE VISIT (OUTPATIENT)
Dept: SCHEDULING | Facility: HOME HEALTH | Age: 77
End: 2017-09-18
Payer: MEDICARE

## 2017-09-18 VITALS
DIASTOLIC BLOOD PRESSURE: 74 MMHG | TEMPERATURE: 96.1 F | SYSTOLIC BLOOD PRESSURE: 138 MMHG | RESPIRATION RATE: 19 BRPM | HEART RATE: 95 BPM | OXYGEN SATURATION: 99 %

## 2017-09-18 PROCEDURE — G0156 HHCP-SVS OF AIDE,EA 15 MIN: HCPCS

## 2017-09-18 PROCEDURE — 3331090001 HH PPS REVENUE CREDIT

## 2017-09-18 PROCEDURE — 3331090002 HH PPS REVENUE DEBIT

## 2017-09-18 PROCEDURE — G0157 HHC PT ASSISTANT EA 15: HCPCS

## 2017-09-19 ENCOUNTER — HOME CARE VISIT (OUTPATIENT)
Dept: SCHEDULING | Facility: HOME HEALTH | Age: 77
End: 2017-09-19
Payer: MEDICARE

## 2017-09-19 VITALS
SYSTOLIC BLOOD PRESSURE: 130 MMHG | RESPIRATION RATE: 18 BRPM | DIASTOLIC BLOOD PRESSURE: 86 MMHG | TEMPERATURE: 96.3 F | HEART RATE: 83 BPM | OXYGEN SATURATION: 97 %

## 2017-09-19 VITALS
RESPIRATION RATE: 18 BRPM | HEART RATE: 61 BPM | SYSTOLIC BLOOD PRESSURE: 142 MMHG | TEMPERATURE: 96.4 F | OXYGEN SATURATION: 92 % | DIASTOLIC BLOOD PRESSURE: 88 MMHG

## 2017-09-19 VITALS
TEMPERATURE: 97.7 F | HEART RATE: 78 BPM | DIASTOLIC BLOOD PRESSURE: 78 MMHG | SYSTOLIC BLOOD PRESSURE: 130 MMHG | RESPIRATION RATE: 20 BRPM

## 2017-09-19 VITALS
HEART RATE: 63 BPM | DIASTOLIC BLOOD PRESSURE: 60 MMHG | TEMPERATURE: 96.2 F | RESPIRATION RATE: 20 BRPM | OXYGEN SATURATION: 98 % | SYSTOLIC BLOOD PRESSURE: 118 MMHG

## 2017-09-19 PROCEDURE — G0158 HHC OT ASSISTANT EA 15: HCPCS

## 2017-09-19 PROCEDURE — 3331090001 HH PPS REVENUE CREDIT

## 2017-09-19 PROCEDURE — G0299 HHS/HOSPICE OF RN EA 15 MIN: HCPCS

## 2017-09-19 PROCEDURE — 3331090002 HH PPS REVENUE DEBIT

## 2017-09-20 ENCOUNTER — HOME CARE VISIT (OUTPATIENT)
Dept: SCHEDULING | Facility: HOME HEALTH | Age: 77
End: 2017-09-20
Payer: MEDICARE

## 2017-09-20 VITALS
HEART RATE: 83 BPM | RESPIRATION RATE: 16 BRPM | TEMPERATURE: 98 F | OXYGEN SATURATION: 98 % | SYSTOLIC BLOOD PRESSURE: 104 MMHG | DIASTOLIC BLOOD PRESSURE: 72 MMHG

## 2017-09-20 PROCEDURE — 3331090002 HH PPS REVENUE DEBIT

## 2017-09-20 PROCEDURE — 3331090001 HH PPS REVENUE CREDIT

## 2017-09-20 PROCEDURE — G0156 HHCP-SVS OF AIDE,EA 15 MIN: HCPCS

## 2017-09-21 ENCOUNTER — HOME CARE VISIT (OUTPATIENT)
Dept: SCHEDULING | Facility: HOME HEALTH | Age: 77
End: 2017-09-21
Payer: MEDICARE

## 2017-09-21 VITALS
HEART RATE: 63 BPM | SYSTOLIC BLOOD PRESSURE: 135 MMHG | TEMPERATURE: 95 F | DIASTOLIC BLOOD PRESSURE: 82 MMHG | OXYGEN SATURATION: 99 %

## 2017-09-21 VITALS
SYSTOLIC BLOOD PRESSURE: 140 MMHG | DIASTOLIC BLOOD PRESSURE: 78 MMHG | HEART RATE: 69 BPM | TEMPERATURE: 95 F | OXYGEN SATURATION: 99 % | RESPIRATION RATE: 18 BRPM

## 2017-09-21 PROCEDURE — G0157 HHC PT ASSISTANT EA 15: HCPCS

## 2017-09-21 PROCEDURE — 3331090001 HH PPS REVENUE CREDIT

## 2017-09-21 PROCEDURE — 3331090002 HH PPS REVENUE DEBIT

## 2017-09-21 PROCEDURE — G0152 HHCP-SERV OF OT,EA 15 MIN: HCPCS

## 2017-09-22 PROCEDURE — 3331090002 HH PPS REVENUE DEBIT

## 2017-09-22 PROCEDURE — 3331090001 HH PPS REVENUE CREDIT

## 2017-09-23 PROCEDURE — 3331090002 HH PPS REVENUE DEBIT

## 2017-09-23 PROCEDURE — 3331090001 HH PPS REVENUE CREDIT

## 2017-09-24 PROCEDURE — 3331090002 HH PPS REVENUE DEBIT

## 2017-09-24 PROCEDURE — 3331090001 HH PPS REVENUE CREDIT

## 2017-09-25 VITALS
RESPIRATION RATE: 19 BRPM | HEART RATE: 84 BPM | SYSTOLIC BLOOD PRESSURE: 130 MMHG | TEMPERATURE: 98 F | DIASTOLIC BLOOD PRESSURE: 78 MMHG

## 2017-09-25 PROCEDURE — 3331090002 HH PPS REVENUE DEBIT

## 2017-09-25 PROCEDURE — 3331090001 HH PPS REVENUE CREDIT

## 2017-09-26 ENCOUNTER — HOME CARE VISIT (OUTPATIENT)
Dept: SCHEDULING | Facility: HOME HEALTH | Age: 77
End: 2017-09-26
Payer: MEDICARE

## 2017-09-26 VITALS
DIASTOLIC BLOOD PRESSURE: 82 MMHG | OXYGEN SATURATION: 98 % | TEMPERATURE: 97.4 F | HEART RATE: 82 BPM | RESPIRATION RATE: 17 BRPM | SYSTOLIC BLOOD PRESSURE: 134 MMHG

## 2017-09-26 PROCEDURE — 3331090001 HH PPS REVENUE CREDIT

## 2017-09-26 PROCEDURE — 3331090002 HH PPS REVENUE DEBIT

## 2017-09-26 PROCEDURE — 3331090003 HH PPS REVENUE ADJ

## 2017-09-26 PROCEDURE — G0151 HHCP-SERV OF PT,EA 15 MIN: HCPCS

## 2017-09-27 PROCEDURE — 3331090001 HH PPS REVENUE CREDIT

## 2017-09-27 PROCEDURE — 3331090002 HH PPS REVENUE DEBIT

## 2017-09-28 PROCEDURE — 3331090002 HH PPS REVENUE DEBIT

## 2017-09-28 PROCEDURE — 3331090001 HH PPS REVENUE CREDIT

## 2017-11-30 PROBLEM — F33.42 RECURRENT MAJOR DEPRESSIVE DISORDER, IN FULL REMISSION (HCC): Status: ACTIVE | Noted: 2017-11-30

## 2017-12-31 ENCOUNTER — APPOINTMENT (OUTPATIENT)
Dept: GENERAL RADIOLOGY | Age: 77
End: 2017-12-31
Attending: EMERGENCY MEDICINE
Payer: MEDICARE

## 2017-12-31 ENCOUNTER — HOSPITAL ENCOUNTER (EMERGENCY)
Age: 77
Discharge: HOME | End: 2017-12-31
Attending: EMERGENCY MEDICINE
Payer: MEDICARE

## 2017-12-31 VITALS
HEIGHT: 74 IN | RESPIRATION RATE: 18 BRPM | DIASTOLIC BLOOD PRESSURE: 60 MMHG | WEIGHT: 248 LBS | SYSTOLIC BLOOD PRESSURE: 128 MMHG | HEART RATE: 69 BPM | TEMPERATURE: 98.3 F | OXYGEN SATURATION: 96 % | BODY MASS INDEX: 31.83 KG/M2

## 2017-12-31 DIAGNOSIS — R53.83 FATIGUE, UNSPECIFIED TYPE: Primary | ICD-10-CM

## 2017-12-31 LAB
ALBUMIN SERPL-MCNC: 3 G/DL (ref 3.2–4.6)
ALBUMIN/GLOB SERPL: 0.7 {RATIO} (ref 1.2–3.5)
ALP SERPL-CCNC: 110 U/L (ref 50–136)
ALT SERPL-CCNC: 36 U/L (ref 12–65)
ANION GAP SERPL CALC-SCNC: 9 MMOL/L (ref 7–16)
AST SERPL-CCNC: 102 U/L (ref 15–37)
BACTERIA URNS QL MICRO: 0 /HPF
BASOPHILS # BLD: 0 K/UL (ref 0–0.2)
BASOPHILS NFR BLD: 0 % (ref 0–2)
BILIRUB SERPL-MCNC: 1.5 MG/DL (ref 0.2–1.1)
BNP SERPL-MCNC: 57 PG/ML
BUN SERPL-MCNC: 14 MG/DL (ref 8–23)
CALCIUM SERPL-MCNC: 9.3 MG/DL (ref 8.3–10.4)
CASTS URNS QL MICRO: NORMAL /LPF
CHLORIDE SERPL-SCNC: 106 MMOL/L (ref 98–107)
CO2 SERPL-SCNC: 25 MMOL/L (ref 21–32)
CREAT SERPL-MCNC: 1.03 MG/DL (ref 0.8–1.5)
DIFFERENTIAL METHOD BLD: ABNORMAL
EOSINOPHIL # BLD: 0 K/UL (ref 0–0.8)
EOSINOPHIL NFR BLD: 1 % (ref 0.5–7.8)
EPI CELLS #/AREA URNS HPF: 0 /HPF
ERYTHROCYTE [DISTWIDTH] IN BLOOD BY AUTOMATED COUNT: 13.6 % (ref 11.9–14.6)
GLOBULIN SER CALC-MCNC: 4.1 G/DL (ref 2.3–3.5)
GLUCOSE SERPL-MCNC: 90 MG/DL (ref 65–100)
HCT VFR BLD AUTO: 47.2 % (ref 41.1–50.3)
HGB BLD-MCNC: 16.4 G/DL (ref 13.6–17.2)
IMM GRANULOCYTES # BLD: 0 K/UL (ref 0–0.5)
IMM GRANULOCYTES NFR BLD AUTO: 0 % (ref 0–5)
LYMPHOCYTES # BLD: 1.1 K/UL (ref 0.5–4.6)
LYMPHOCYTES NFR BLD: 23 % (ref 13–44)
MCH RBC QN AUTO: 31.7 PG (ref 26.1–32.9)
MCHC RBC AUTO-ENTMCNC: 34.7 G/DL (ref 31.4–35)
MCV RBC AUTO: 91.1 FL (ref 79.6–97.8)
MONOCYTES # BLD: 0.5 K/UL (ref 0.1–1.3)
MONOCYTES NFR BLD: 10 % (ref 4–12)
NEUTS SEG # BLD: 3.3 K/UL (ref 1.7–8.2)
NEUTS SEG NFR BLD: 66 % (ref 43–78)
PLATELET # BLD AUTO: 144 K/UL (ref 150–450)
PMV BLD AUTO: 11.2 FL (ref 10.8–14.1)
POTASSIUM SERPL-SCNC: 4.2 MMOL/L (ref 3.5–5.1)
PROT SERPL-MCNC: 7.1 G/DL (ref 6.3–8.2)
RBC # BLD AUTO: 5.18 M/UL (ref 4.23–5.67)
RBC #/AREA URNS HPF: NORMAL /HPF
SODIUM SERPL-SCNC: 140 MMOL/L (ref 136–145)
WBC # BLD AUTO: 5 K/UL (ref 4.3–11.1)
WBC URNS QL MICRO: NORMAL /HPF

## 2017-12-31 PROCEDURE — 71020 XR CHEST PA LAT: CPT

## 2017-12-31 PROCEDURE — 81015 MICROSCOPIC EXAM OF URINE: CPT | Performed by: EMERGENCY MEDICINE

## 2017-12-31 PROCEDURE — 83880 ASSAY OF NATRIURETIC PEPTIDE: CPT | Performed by: EMERGENCY MEDICINE

## 2017-12-31 PROCEDURE — 81003 URINALYSIS AUTO W/O SCOPE: CPT | Performed by: EMERGENCY MEDICINE

## 2017-12-31 PROCEDURE — 85025 COMPLETE CBC W/AUTO DIFF WBC: CPT | Performed by: STUDENT IN AN ORGANIZED HEALTH CARE EDUCATION/TRAINING PROGRAM

## 2017-12-31 PROCEDURE — 80053 COMPREHEN METABOLIC PANEL: CPT | Performed by: STUDENT IN AN ORGANIZED HEALTH CARE EDUCATION/TRAINING PROGRAM

## 2017-12-31 PROCEDURE — 99285 EMERGENCY DEPT VISIT HI MDM: CPT | Performed by: EMERGENCY MEDICINE

## 2017-12-31 RX ORDER — AMOXICILLIN AND CLAVULANATE POTASSIUM 875; 125 MG/1; MG/1
1 TABLET, FILM COATED ORAL 2 TIMES DAILY
Qty: 20 TAB | Refills: 0 | Status: SHIPPED | OUTPATIENT
Start: 2017-12-31 | End: 2018-01-10

## 2017-12-31 NOTE — DISCHARGE INSTRUCTIONS
Fatigue: Care Instructions  Your Care Instructions    Fatigue is a feeling of tiredness, exhaustion, or lack of energy. You may feel fatigue because of too much or not enough activity. It can also come from stress, lack of sleep, boredom, and poor diet. Many medical problems, such as viral infections, can cause fatigue. Emotional problems, especially depression, are often the cause of fatigue. Fatigue is most often a symptom of another problem. Treatment for fatigue depends on the cause. For example, if you have fatigue because you have a certain health problem, treating this problem also treats your fatigue. If depression or anxiety is the cause, treatment may help. Follow-up care is a key part of your treatment and safety. Be sure to make and go to all appointments, and call your doctor if you are having problems. It's also a good idea to know your test results and keep a list of the medicines you take. How can you care for yourself at home? · Get regular exercise. But don't overdo it. Go back and forth between rest and exercise. · Get plenty of rest.  · Eat a healthy diet. Do not skip meals, especially breakfast.  · Reduce your use of caffeine, tobacco, and alcohol. Caffeine is most often found in coffee, tea, cola drinks, and chocolate. · Limit medicines that can cause fatigue. This includes tranquilizers and cold and allergy medicines. When should you call for help? Watch closely for changes in your health, and be sure to contact your doctor if:  ? · You have new symptoms such as fever or a rash. ? · Your fatigue gets worse. ? · You have been feeling down, depressed, or hopeless. Or you may have lost interest in things that you usually enjoy. ? · You are not getting better as expected. Where can you learn more? Go to http://virgil-nancy.info/. Enter F087 in the search box to learn more about \"Fatigue: Care Instructions. \"  Current as of: March 20, 2017  Content Version: 11.4  © 9012-0122 [employer]. Care instructions adapted under license by [employer] (which disclaims liability or warranty for this information). If you have questions about a medical condition or this instruction, always ask your healthcare professional. [address] any warranty or liability for your use of this information. Urinary Tract Infections in Men: Care Instructions  Your Care Instructions    A urinary tract infection, or UTI, is a general term for an infection anywhere between the kidneys and the tip of the penis. UTIs can also be a result of a prostate problem. Most cause pain or burning when you urinate. Most UTIs are caused by bacteria and can be cured with antibiotics. It is important to complete your treatment so that the infection does not get worse. Follow-up care is a key part of your treatment and safety. Be sure to make and go to all appointments, and call your doctor if you are having problems. It's also a good idea to know your test results and keep a list of the medicines you take. How can you care for yourself at home? · Take your antibiotics as prescribed. Do not stop taking them just because you feel better. You need to take the full course of antibiotics. · Take your medicines exactly as prescribed. Your doctor may have prescribed a medicine, such as phenazopyridine (Pyridium), to help relieve pain when you urinate. This turns your urine orange. You may stop taking it when your symptoms get better. But be sure to take all of your antibiotics, which treat the infection. · Drink extra water for the next day or two. This will help make the urine less concentrated and help wash out the bacteria causing the infection. (If you have kidney, heart, or liver disease and have to limit your fluids, talk with your doctor before you increase your fluid intake.)  · Avoid drinks that are carbonated or have caffeine.  They can irritate the bladder. · Urinate often. Try to empty your bladder each time. · To relieve pain, take a hot bath or lay a heating pad (set on low) over your lower belly or genital area. Never go to sleep with a heating pad in place. To help prevent UTIs  · Drink plenty of fluids, enough so that your urine is light yellow or clear like water. If you have kidney, heart, or liver disease and have to limit fluids, talk with your doctor before you increase the amount of fluids you drink. · Urinate when you have the urge. Do not hold your urine for a long time. Urinate before you go to sleep. · Keep your penis clean. Catheter care  If you have a drainage tube (catheter) in place, the following steps will help you care for it. · Always wash your hands before and after touching your catheter. · Check the area around the urethra for inflammation or signs of infection. Signs of infection include irritated, swollen, red, or tender skin, or pus around the catheter. · Clean the area around the catheter with soap and water two times a day. Dry with a clean towel afterward. · Do not apply powder or lotion to the skin around the catheter. To empty the urine collection bag  · Wash your hands with soap and water. · Without touching the drain spout, remove the spout from its sleeve at the bottom of the collection bag. Open the valve on the spout. · Let the urine flow out of the bag and into the toilet or a container. Do not let the tubing or drain spout touch anything. · After you empty the bag, clean the end of the drain spout with tissue and water. Close the valve and put the drain spout back into its sleeve at the bottom of the collection bag. · Wash your hands with soap and water. When should you call for help? Call your doctor now or seek immediate medical care if:  ? · Symptoms such as a fever, chills, nausea, or vomiting get worse or happen for the first time. ? · You have new pain in your back just below your rib cage. This is called flank pain. ? · There is new blood or pus in your urine. ? · You are not able to take or keep down your antibiotics. ? Watch closely for changes in your health, and be sure to contact your doctor if:  ? · You are not getting better after taking an antibiotic for 2 days. ? · Your symptoms go away but then come back. Where can you learn more? Go to http://virgil-nancy.info/. Enter A199 in the search box to learn more about \"Urinary Tract Infections in Men: Care Instructions. \"  Current as of: May 12, 2017  Content Version: 11.4  © 6135-2602 [employer]. Care instructions adapted under license by [employer] (which disclaims liability or warranty for this information). If you have questions about a medical condition or this instruction, always ask your healthcare professional. [address] any warranty or liability for your use of this information. Amoxicillin/Clavulanate Potassium (By mouth)   Amoxicillin (d-srb-x-ROBEL-in), Clavulanate Potassium (JZAD-kn-bj-nasir nsp-KKQ-ht-um)  Treats infections. This medicine is a penicillin antibiotic. Brand Name(s): Augmentin, Augmentin ES-600, Augmentin XR   There may be other brand names for this medicine. When This Medicine Should Not Be Used: This medicine is not right for everyone. Do not use it if you had an allergic reaction to amoxicillin, clavulanate, or a similar antibiotic (penicillin or cephalosporin), or if you had liver problems caused by Augmentin®. How to Use This Medicine:   Liquid, Tablet, Chewable Tablet, Long Acting Tablet  · Your doctor will tell you how much medicine to use. Do not use more than directed. · Take this medicine with a snack or at the beginning of a meal to help prevent nausea. · Chewable tablets: Chew the tablet completely before you swallow it. · Measure the oral liquid medicine with a marked measuring spoon, oral syringe, or medicine cup.  Shake the medicine well just before you measure each dose. Rinse the spoon or dropper after each use. · Swallow the extended-release tablet whole. Do not crush, break, or chew it. · Take all of the medicine in your prescription to clear up your infection, even if you feel better after the first few doses. · Missed dose: Take a dose as soon as you remember. If it is almost time for your next dose, wait until then and take a regular dose. Do not take extra medicine to make up for a missed dose. · Tablet, extended-release tablet, chewable tablet: Store at room temperature, away from heat, moisture, and direct light. · Oral liquid: Store in the refrigerator. Do not freeze. · Throw away any unused oral liquid after 10 days. Drugs and Foods to Avoid:   Ask your doctor or pharmacist before using any other medicine, including over-the-counter medicines, vitamins, and herbal products. · Some medicines can affect how this medicine works. Tell your doctor if you are taking a blood thinner (such as warfarin), allopurinol, or probenecid. Warnings While Using This Medicine:   · Tell your doctor if you are pregnant or breastfeeding, or if you have kidney disease, liver disease, or mononucleosis (mono). · Birth control pills may not work as well while you are taking this medicine. Use another form of birth control to prevent pregnancy. · This medicine can cause diarrhea. Call your doctor if the diarrhea becomes severe, does not stop, or is bloody. Do not take any medicine to stop diarrhea until you have talked to your doctor. Diarrhea can occur 2 months or more after you stop taking this medicine. · Tell any doctor or dentist who treats you that you are using this medicine. This medicine may affect certain medical test results. · Call your doctor if your symptoms do not improve or if they get worse. · The chewable tablet and oral liquid contain phenylalanine.  Talk to your doctor before you use this medicine if you have phenylketonuria (PKU). · Keep all medicine out of the reach of children. Never share your medicine with anyone. Possible Side Effects While Using This Medicine:   Call your doctor right away if you notice any of these side effects:  · Allergic reaction: Itching or hives, swelling in your face or hands, swelling or tingling in your mouth or throat, chest tightness, trouble breathing  · Blistering, peeling, red skin rash  · Change in how much or how often you urinate  · Dark urine or pale stools, nausea, vomiting, loss of appetite, stomach pain, yellow eyes or skin  · Diarrhea that may contain blood, stomach cramps  If you notice these less serious side effects, talk with your doctor:   · Diaper rash  · Mild diarrhea, nausea, vomiting  · Tooth discoloration (in children)  If you notice other side effects that you think are caused by this medicine, tell your doctor. Call your doctor for medical advice about side effects. You may report side effects to FDA at 4-789-FDA-6323  © 2017 [address]  Information is for End User's use only and may not be sold, redistributed or otherwise used for commercial purposes. The above information is an  only. It is not intended as medical advice for individual conditions or treatments. Talk to your doctor, nurse or pharmacist before following any medical regimen to see if it is safe and effective for you. Sinusitis: Care Instructions  Your Care Instructions    Sinusitis is an infection of the lining of the sinus cavities in your head. Sinusitis often follows a cold. It causes pain and pressure in your head and face. In most cases, sinusitis gets better on its own in 1 to 2 weeks. But some mild symptoms may last for several weeks. Sometimes antibiotics are needed. Follow-up care is a key part of your treatment and safety. Be sure to make and go to all appointments, and call your doctor if you are having problems.  It's also a good idea to know your test results and keep a list of the medicines you take. How can you care for yourself at home? · Take an over-the-counter pain medicine, such as acetaminophen (Tylenol), ibuprofen (Advil, Motrin), or naproxen (Aleve). Read and follow all instructions on the label. · If the doctor prescribed antibiotics, take them as directed. Do not stop taking them just because you feel better. You need to take the full course of antibiotics. · Be careful when taking over-the-counter cold or flu medicines and Tylenol at the same time. Many of these medicines have acetaminophen, which is Tylenol. Read the labels to make sure that you are not taking more than the recommended dose. Too much acetaminophen (Tylenol) can be harmful. · Breathe warm, moist air from a steamy shower, a hot bath, or a sink filled with hot water. Avoid cold, dry air. Using a humidifier in your home may help. Follow the directions for cleaning the machine. · Use saline (saltwater) nasal washes to help keep your nasal passages open and wash out mucus and bacteria. You can buy saline nose drops at a grocery store or drugstore. Or you can make your own at home by adding 1 teaspoon of salt and 1 teaspoon of baking soda to 2 cups of distilled water. If you make your own, fill a bulb syringe with the solution, insert the tip into your nostril, and squeeze gently. [employer] your nose. · Put a hot, wet towel or a warm gel pack on your face 3 or 4 times a day for 5 to 10 minutes each time. · Try a decongestant nasal spray like oxymetazoline (Afrin). Do not use it for more than 3 days in a row. Using it for more than 3 days can make your congestion worse. When should you call for help? Call your doctor now or seek immediate medical care if:  ? · You have new or worse swelling or redness in your face or around your eyes. ? · You have a new or higher fever. ? Watch closely for changes in your health, and be sure to contact your doctor if:  ? · You have new or worse facial pain. ? · The mucus from your nose becomes thicker (like pus) or has new blood in it. ? · You are not getting better as expected. Where can you learn more? Go to http://virgil-nancy.info/. Enter T567 in the search box to learn more about \"Sinusitis: Care Instructions. \"  Current as of: May 12, 2017  Content Version: 11.4  © 0359-0555 [employer]. Care instructions adapted under license by [employer] (which disclaims liability or warranty for this information). If you have questions about a medical condition or this instruction, always ask your healthcare professional. [address] any warranty or liability for your use of this information.

## 2017-12-31 NOTE — ED PROVIDER NOTES
HPI Comments: Patient is a 75 yo male with multiple medical problems who presents with fatigue and nasal congestion. He states \"my get up and go done got up and went\". He denies any further complaints, including no chest pain, no SOB, no cough, no nausea or vomiting, no fevers, no abdominal pain. Patient is a 76 y.o. male presenting with nasal congestion. The history is provided by the patient. No  was used. Nasal Congestion   Pertinent negatives include no chest pain, no abdominal pain, no headaches and no shortness of breath. Past Medical History:   Diagnosis Date    Arrhythmia     pt takes pradaxa    Arthritis     Atrial fibrillation ([address] 1/27/2012    CAD (coronary artery disease)     Cancer (HCC)     prostate    GERD (gastroesophageal reflux disease)     controlled with medication    Heart failure ([address]     pt takes lasix as needed, reports SOB with exertion    Hypertension     controlled with medication    Morbid obesity ([address]        Past Surgical History:   Procedure Laterality Date    HX ORTHOPAEDIC  2010    left TKA    HX PACEMAKER  8/30/2012    [employer] pacemaker    HX PROSTATECTOMY           Family History:   Problem Relation Age of Onset    Cancer Father        Social History     Social History    Marital status:      Spouse name: N/A    Number of children: N/A    Years of education: N/A     Occupational History    Not on file. Social History Main Topics    Smoking status: Never Smoker    Smokeless tobacco: Never Used    Alcohol use No    Drug use: No    Sexual activity: No     Other Topics Concern    Not on file     Social History Narrative         ALLERGIES: Review of patient's allergies indicates no known allergies. Review of Systems   Constitutional: Positive for fatigue. Negative for chills and fever. HENT: Positive for congestion, postnasal drip and rhinorrhea. Negative for sore throat.     Eyes: Negative for visual disturbance. Respiratory: Negative for cough and shortness of breath. Cardiovascular: Negative for chest pain and leg swelling. Gastrointestinal: Negative for abdominal pain, diarrhea, nausea and vomiting. Genitourinary: Negative for dysuria. Musculoskeletal: Negative for back pain and neck pain. Skin: Negative for rash. Neurological: Negative for weakness and headaches. Psychiatric/Behavioral: The patient is not nervous/anxious. Vitals:    12/31/17 0756 12/31/17 0801   BP: 112/56    Pulse: 60    Resp: 18    Temp: 98.3 °F (36.8 °C)    SpO2: 93% 93%   Weight: 112.5 kg (248 lb)    Height: 6' 2\" (1.88 m)             Physical Exam   Constitutional: He is oriented to person, place, and time. He appears well-developed and well-nourished. HENT:   Head: Normocephalic. Right Ear: External ear normal.   Left Ear: External ear normal.   Eyes: Conjunctivae and EOM are normal. Pupils are equal, round, and reactive to light. Neck: Normal range of motion. Neck supple. No tracheal deviation present. Cardiovascular: Normal rate, regular rhythm, normal heart sounds and intact distal pulses. No murmur heard. Pulmonary/Chest: Effort normal and breath sounds normal. No respiratory distress. Abdominal: Soft. He exhibits no distension. There is no tenderness. There is no rebound. Non-tender to deep palpation through-out   Musculoskeletal: Normal range of motion. Neurological: He is alert and oriented to person, place, and time. No cranial nerve deficit. Skin: No rash noted. Nursing note and vitals reviewed.        MDM  Number of Diagnoses or Management Options  Fatigue, unspecified type: new and requires workup     Amount and/or Complexity of Data Reviewed  Clinical lab tests: ordered and reviewed  Tests in the radiology section of CPT®: ordered and reviewed  Tests in the medicine section of CPT®: ordered and reviewed  Review and summarize past medical records: yes    Risk of Complications, Morbidity, and/or Mortality  Presenting problems: high  Diagnostic procedures: high  Management options: high    Patient Progress  Patient progress: stable    ED Course       Procedures    Recent Results (from the past 12 hour(s))   CBC WITH AUTOMATED DIFF    Collection Time: 12/31/17  8:07 AM   Result Value Ref Range    WBC 5.0 4.3 - 11.1 K/uL    RBC 5.18 4.23 - 5.67 M/uL    HGB 16.4 13.6 - 17.2 g/dL    HCT 47.2 41.1 - 50.3 %    MCV 91.1 79.6 - 97.8 FL    MCH 31.7 26.1 - 32.9 PG    MCHC 34.7 31.4 - 35.0 g/dL    RDW 13.6 11.9 - 14.6 %    PLATELET 049 (L) 401 - 450 K/uL    MPV 11.2 10.8 - 14.1 FL    DF AUTOMATED      NEUTROPHILS 66 43 - 78 %    LYMPHOCYTES 23 13 - 44 %    MONOCYTES 10 4.0 - 12.0 %    EOSINOPHILS 1 0.5 - 7.8 %    BASOPHILS 0 0.0 - 2.0 %    IMMATURE GRANULOCYTES 0 0.0 - 5.0 %    ABS. NEUTROPHILS 3.3 1.7 - 8.2 K/UL    ABS. LYMPHOCYTES 1.1 0.5 - 4.6 K/UL    ABS. MONOCYTES 0.5 0.1 - 1.3 K/UL    ABS. EOSINOPHILS 0.0 0.0 - 0.8 K/UL    ABS. BASOPHILS 0.0 0.0 - 0.2 K/UL    ABS. IMM. GRANS. 0.0 0.0 - 0.5 K/UL   METABOLIC PANEL, COMPREHENSIVE    Collection Time: 12/31/17  8:07 AM   Result Value Ref Range    Sodium 140 136 - 145 mmol/L    Potassium 4.2 3.5 - 5.1 mmol/L    Chloride 106 98 - 107 mmol/L    CO2 25 21 - 32 mmol/L    Anion gap 9 7 - 16 mmol/L    Glucose 90 65 - 100 mg/dL    BUN 14 8 - 23 MG/DL    Creatinine 1.03 0.8 - 1.5 MG/DL    GFR est AA >60 >60 ml/min/1.73m2    GFR est non-AA >60 >60 ml/min/1.73m2    Calcium 9.3 8.3 - 10.4 MG/DL    Bilirubin, total 1.5 (H) 0.2 - 1.1 MG/DL    ALT (SGPT) 36 12 - 65 U/L    AST (SGOT) 102 (H) 15 - 37 U/L    Alk.  phosphatase 110 50 - 136 U/L    Protein, total 7.1 6.3 - 8.2 g/dL    Albumin 3.0 (L) 3.2 - 4.6 g/dL    Globulin 4.1 (H) 2.3 - 3.5 g/dL    A-G Ratio 0.7 (L) 1.2 - 3.5     BNP    Collection Time: 12/31/17  8:07 AM   Result Value Ref Range    BNP 57 pg/mL   URINE MICROSCOPIC    Collection Time: 12/31/17  9:48 AM   Result Value Ref Range    WBC 20-50 0 /hpf RBC 0-3 0 /hpf    Epithelial cells 0 0 /hpf    Bacteria 0 0 /hpf    Casts 0-3 0 /lpf     Xr Chest Pa Lat    Result Date: 12/31/2017  Portable chest x-ray 12/31/2017 INDICATION: Fatigue COMPARISON: 7/20/2017 Single lead pacemaker is noted. Heart is enlarged. Lung fields are clear and the soft tissues and bony structures are unremarkable. IMPRESSION: Cardiomegaly, pacemaker, clear lung fields      77 yo male with fatigue and congestion:       Will treat with augmentin for UTI as well as sinusitis as patient with significant nasal congestion. Ambulates at baseline per patient and grandson in the ED, eating bojangles, sitting up, resting comfortably and no complaints on final exam.  Patient to follow up with PCP in 2-3 days or return with any worsening congestion, any fevers or chills, nausea or vomiting or any further concerns.

## 2017-12-31 NOTE — ED NOTES
Pt states he \"just can't pull himself \" because he doesn't have the energy. EMS states house had strong urine smell. Family states urine is dark and has foul odor.

## 2017-12-31 NOTE — ED NOTES
I have reviewed discharge instructions with the patient. The patient verbalized understanding. Patient left ED via Discharge Method: ambulatory to Home with family. Opportunity for questions and clarification provided. Patient given 1 scripts. To continue your aftercare when you leave the hospital, you may receive an automated call from our care team to check in on how you are doing. This is a free service and part of our promise to provide the best care and service to meet your aftercare needs.  If you have questions, or wish to unsubscribe from this service please call [phone]. Thank you for Choosing our [facility].

## 2017-12-31 NOTE — ED NOTES
Pt resting in bed, eating Bojangles. Pt respirations even and unlabored. Pt denies any additional needs at this time.

## 2017-12-31 NOTE — ED TRIAGE NOTES
Per EMS pt has had decreased mobility for several. Family states past hx of UTIs with same complaints.

## 2018-05-12 ENCOUNTER — HOSPITAL ENCOUNTER (INPATIENT)
Age: 78
LOS: 3 days | Discharge: HOME HEALTH | DRG: 689 | End: 2018-05-15
Attending: EMERGENCY MEDICINE | Admitting: HOSPITALIST
Payer: MEDICARE

## 2018-05-12 ENCOUNTER — APPOINTMENT (OUTPATIENT)
Dept: GENERAL RADIOLOGY | Age: 78
DRG: 689 | End: 2018-05-12
Attending: EMERGENCY MEDICINE
Payer: MEDICARE

## 2018-05-12 DIAGNOSIS — A41.9 SEPSIS, DUE TO UNSPECIFIED ORGANISM: Primary | ICD-10-CM

## 2018-05-12 DIAGNOSIS — G93.40 ACUTE ENCEPHALOPATHY: ICD-10-CM

## 2018-05-12 DIAGNOSIS — N30.00 ACUTE CYSTITIS WITHOUT HEMATURIA: ICD-10-CM

## 2018-05-12 PROBLEM — N39.0 ACUTE UTI: Status: ACTIVE | Noted: 2018-05-12

## 2018-05-12 PROBLEM — R41.82 ALTERED MENTAL STATUS: Status: ACTIVE | Noted: 2018-05-12

## 2018-05-12 LAB
ALBUMIN SERPL-MCNC: 2.5 G/DL (ref 3.2–4.6)
ALBUMIN/GLOB SERPL: 0.6 {RATIO} (ref 1.2–3.5)
ALP SERPL-CCNC: 102 U/L (ref 50–136)
ALT SERPL-CCNC: 22 U/L (ref 12–65)
ANION GAP SERPL CALC-SCNC: 10 MMOL/L (ref 7–16)
AST SERPL-CCNC: 28 U/L (ref 15–37)
BACTERIA URNS QL MICRO: ABNORMAL /HPF
BASOPHILS # BLD: 0 K/UL (ref 0–0.2)
BASOPHILS NFR BLD: 0 % (ref 0–2)
BILIRUB SERPL-MCNC: 2 MG/DL (ref 0.2–1.1)
BUN SERPL-MCNC: 17 MG/DL (ref 8–23)
CALCIUM SERPL-MCNC: 9 MG/DL (ref 8.3–10.4)
CASTS URNS QL MICRO: 0 /LPF
CHLORIDE SERPL-SCNC: 110 MMOL/L (ref 98–107)
CO2 SERPL-SCNC: 24 MMOL/L (ref 21–32)
CREAT SERPL-MCNC: 0.91 MG/DL (ref 0.8–1.5)
CRYSTALS URNS QL MICRO: 0 /LPF
DIFFERENTIAL METHOD BLD: ABNORMAL
EOSINOPHIL # BLD: 0 K/UL (ref 0–0.8)
EOSINOPHIL NFR BLD: 0 % (ref 0.5–7.8)
EPI CELLS #/AREA URNS HPF: ABNORMAL /HPF
ERYTHROCYTE [DISTWIDTH] IN BLOOD BY AUTOMATED COUNT: 14.1 % (ref 11.9–14.6)
GLOBULIN SER CALC-MCNC: 4.1 G/DL (ref 2.3–3.5)
GLUCOSE SERPL-MCNC: 93 MG/DL (ref 65–100)
HCT VFR BLD AUTO: 40.8 % (ref 41.1–50.3)
HGB BLD-MCNC: 14.2 G/DL (ref 13.6–17.2)
IMM GRANULOCYTES # BLD: 0 K/UL (ref 0–0.5)
IMM GRANULOCYTES NFR BLD AUTO: 0 % (ref 0–5)
LACTATE BLD-SCNC: 2.9 MMOL/L (ref 0.5–1.9)
LYMPHOCYTES # BLD: 0.9 K/UL (ref 0.5–4.6)
LYMPHOCYTES NFR BLD: 9 % (ref 13–44)
MCH RBC QN AUTO: 30.7 PG (ref 26.1–32.9)
MCHC RBC AUTO-ENTMCNC: 34.8 G/DL (ref 31.4–35)
MCV RBC AUTO: 88.1 FL (ref 79.6–97.8)
MONOCYTES # BLD: 1 K/UL (ref 0.1–1.3)
MONOCYTES NFR BLD: 9 % (ref 4–12)
MUCOUS THREADS URNS QL MICRO: 0 /LPF
NEUTS SEG # BLD: 8.5 K/UL (ref 1.7–8.2)
NEUTS SEG NFR BLD: 82 % (ref 43–78)
PLATELET # BLD AUTO: 131 K/UL (ref 150–450)
PMV BLD AUTO: 10.9 FL (ref 10.8–14.1)
POTASSIUM SERPL-SCNC: 3.7 MMOL/L (ref 3.5–5.1)
PROCALCITONIN SERPL-MCNC: 0.2 NG/ML
PROT SERPL-MCNC: 6.6 G/DL (ref 6.3–8.2)
RBC # BLD AUTO: 4.63 M/UL (ref 4.23–5.67)
RBC #/AREA URNS HPF: ABNORMAL /HPF
SODIUM SERPL-SCNC: 144 MMOL/L (ref 136–145)
WBC # BLD AUTO: 10.5 K/UL (ref 4.3–11.1)
WBC URNS QL MICRO: >100 /HPF

## 2018-05-12 PROCEDURE — 84484 ASSAY OF TROPONIN QUANT: CPT

## 2018-05-12 PROCEDURE — 87040 BLOOD CULTURE FOR BACTERIA: CPT | Performed by: EMERGENCY MEDICINE

## 2018-05-12 PROCEDURE — 81015 MICROSCOPIC EXAM OF URINE: CPT | Performed by: EMERGENCY MEDICINE

## 2018-05-12 PROCEDURE — 87088 URINE BACTERIA CULTURE: CPT | Performed by: EMERGENCY MEDICINE

## 2018-05-12 PROCEDURE — 87086 URINE CULTURE/COLONY COUNT: CPT | Performed by: EMERGENCY MEDICINE

## 2018-05-12 PROCEDURE — 87186 SC STD MICRODIL/AGAR DIL: CPT | Performed by: EMERGENCY MEDICINE

## 2018-05-12 PROCEDURE — 99285 EMERGENCY DEPT VISIT HI MDM: CPT | Performed by: EMERGENCY MEDICINE

## 2018-05-12 PROCEDURE — 51701 INSERT BLADDER CATHETER: CPT | Performed by: EMERGENCY MEDICINE

## 2018-05-12 PROCEDURE — 84145 PROCALCITONIN (PCT): CPT | Performed by: EMERGENCY MEDICINE

## 2018-05-12 PROCEDURE — 74011250636 HC RX REV CODE- 250/636: Performed by: EMERGENCY MEDICINE

## 2018-05-12 PROCEDURE — 93005 ELECTROCARDIOGRAM TRACING: CPT | Performed by: EMERGENCY MEDICINE

## 2018-05-12 PROCEDURE — 71045 X-RAY EXAM CHEST 1 VIEW: CPT

## 2018-05-12 PROCEDURE — 77030011943

## 2018-05-12 PROCEDURE — 80053 COMPREHEN METABOLIC PANEL: CPT | Performed by: EMERGENCY MEDICINE

## 2018-05-12 PROCEDURE — 87077 CULTURE AEROBIC IDENTIFY: CPT | Performed by: EMERGENCY MEDICINE

## 2018-05-12 PROCEDURE — 81003 URINALYSIS AUTO W/O SCOPE: CPT | Performed by: EMERGENCY MEDICINE

## 2018-05-12 PROCEDURE — 87205 SMEAR GRAM STAIN: CPT | Performed by: EMERGENCY MEDICINE

## 2018-05-12 PROCEDURE — 85025 COMPLETE CBC W/AUTO DIFF WBC: CPT | Performed by: EMERGENCY MEDICINE

## 2018-05-12 PROCEDURE — 83605 ASSAY OF LACTIC ACID: CPT

## 2018-05-12 PROCEDURE — 65270000029 HC RM PRIVATE

## 2018-05-12 RX ORDER — VANCOMYCIN 2 GRAM/500 ML IN 0.9 % SODIUM CHLORIDE INTRAVENOUS
2000 ONCE
Status: DISCONTINUED | OUTPATIENT
Start: 2018-05-12 | End: 2018-05-13

## 2018-05-12 RX ADMIN — SODIUM CHLORIDE 2466 ML: 900 INJECTION, SOLUTION INTRAVENOUS at 23:31

## 2018-05-12 RX ADMIN — VANCOMYCIN HYDROCHLORIDE 2000 MG: 10 INJECTION, POWDER, LYOPHILIZED, FOR SOLUTION INTRAVENOUS at 23:38

## 2018-05-12 NOTE — IP AVS SNAPSHOT
[address] 
[phone] Patient: Katie Cabezas MRN: [id number] :[id number] About your hospitalization You were admitted on:  May 12, 2018 You last received care in the:  [facility] 6 MED SURG You were discharged on:  May 15, 2018 Why you were hospitalized Your primary diagnosis was:  Acute Uti Your diagnoses also included: Altered Mental Status, Atrial Fibrillation (Hcc), Htn (Hypertension), Depression, Morbid Obesity (Hcc), History Of Prostate Cancer, Age-Related Physical Debility, Lactic Acid Acidosis, Acute Metabolic Encephalopathy Follow-up Information Follow up With Details Comments Contact Info Brien Li MD On 2018 at 1:00 [address] [address] 
[phone] Your Scheduled Appointments Monday May 21, 2018  1:00 PM EDT Office Visit with Brien Li MD  
[place] [facility] ([facility]) [address] [place] [address]  
[phone] Discharge Orders None A check katie indicates which time of day the medication should be taken. My Medications START taking these medications Instructions Each Dose to Equal  
 Morning Noon Evening Bedtime  
 cefpodoxime 200 mg tablet Commonly known as:  Jeanie Parekh Your next dose is: This evening Take 1 Tab by mouth two (2) times a day for 4 days. 200 mg CONTINUE taking these medications Instructions Each Dose to Equal  
 Morning Noon Evening Bedtime ARIPiprazole 10 mg tablet Commonly known as:  ABILIFY Your next dose is:  Tomorrow Morning Take 1 Tab by mouth daily. 10 mg  
    
  
   
   
   
  
 aspirin 81 mg chewable tablet Your next dose is:  Tomorrow Morning Take 1 Tab by mouth daily.  Indications: prevention of cerebrovascular accident 81 mg  
    
  
   
   
   
  
 bicalutamide 50 mg tablet Commonly known as:  CASODEX Your next dose is:  Tomorrow Morning Take 1 Tab by mouth daily. 50 mg  
    
  
   
   
   
  
 CeleBREX 200 mg capsule Generic drug:  celecoxib Your next dose is: This evening Take  by mouth two (2) times a day. dabigatran etexilate 150 mg capsule Commonly known as:  PRADAXA Your next dose is: This evening Take 1 Cap by mouth every twelve (12) hours. 150 mg  
    
  
   
   
  
   
  
 diclofenac 1 % Gel Commonly known as:  VOLTAREN Your next dose is: Take on as needed schedule APPLY 4 GRAMS TO AFFECTED AREA FOUR TIMES DAILY  
     
   
   
   
  
 donepezil 5 mg tablet Commonly known as:  ARICEPT Your next dose is: This evening Take 1 Tab by mouth nightly. 5 mg  
    
   
   
  
   
  
 famotidine 20 mg tablet Commonly known as:  PEPCID Your next dose is: This evening Take 1 Tab by mouth two (2) times a day. 20 mg  
    
  
   
   
  
   
  
 levomilnacipran 40 mg ER capsule Commonly known as:  Lysbeth Saupe Your next dose is: Take on as needed schedule TAKE 1 CAP BY MOUTH DAILY. SENNA LAXATIVE-STOOL SOFTENER 8.6-50 mg per tablet Generic drug:  senna-docusate Your next dose is:  Tomorrow Morning Take 1 Tab by mouth daily. 1 Tab  
    
  
   
   
   
  
 tamsulosin 0.4 mg capsule Commonly known as:  FLOMAX Your next dose is:  Tomorrow Morning Take 1 Cap by mouth daily. 0.4 mg  
    
  
   
   
   
  
 traMADol 50 mg tablet Commonly known as:  ULTRAM  
Your next dose is: This evening Take 1 Tab by mouth two (2) times a day. Max Daily Amount: 100 mg. Indications: Pain 50 mg  
    
  
   
   
  
   
  
 TYLENOL ARTHRITIS PAIN 650 mg Tber Generic drug:  acetaminophen Your next dose is: Take on as needed schedule Take 650 mg by mouth every six (6) hours as needed for Pain. 650 mg  
    
   
   
   
  
 zolpidem 5 mg tablet Commonly known as:  AMBIEN Your next dose is: Take on as needed schedule Take 1 Tab by mouth nightly as needed for Sleep. Max Daily Amount: 5 mg. Indications: SLEEP-ONSET INSOMNIA  
 5 mg STOP taking these medications   
 cephALEXin 500 mg capsule Commonly known as:  KEFLEX  
   
  
 trimethoprim-sulfamethoxazole 160-800 mg per tablet Commonly known as:  BACTRIM DS Where to Get Your Medications Information on where to get these meds will be given to you by the nurse or doctor. ! Ask your nurse or doctor about these medications  
  cefpodoxime 200 mg tablet Opioid Education Prescription Opioids: What You Need to Know: 
 
 
 
F-face looks uneven A-arms unable to move or move unevenly S-speech slurred or non-existent T-time-call 911 as soon as signs and symptoms begin-DO NOT go Back to bed or wait to see if you get better-TIME IS BRAIN. Warning Signs of HEART ATTACK Call 911 if you have these symptoms: 
? Chest discomfort.  Most heart attacks involve discomfort in the center of the chest that lasts more than a few minutes, or that goes away and comes back. It can feel like uncomfortable pressure, squeezing, fullness, or pain. ? Discomfort in other areas of the upper body. Symptoms can include pain or discomfort in one or both arms, the back, neck, jaw, or stomach. ? Shortness of breath with or without chest discomfort. ? Other signs may include breaking out in a cold sweat, nausea, or lightheadedness. Don't wait more than five minutes to call [address]! Fast action can save your life. Calling 911 is almost always the fastest way to get lifesaving treatment. Emergency Medical Services staff can begin treatment when they arrive  up to an hour sooner than if someone gets to the hospital by car. The discharge information has been reviewed with the patient. The patient verbalized understanding. Discharge medications reviewed with the patient  and appropriate educational materials and side effects teaching were provided. ___________________________________________________________________________________________________________________________________ Urinary Tract Infections in Men: Care Instructions Your Care Instructions A urinary tract infection, or UTI, is a general term for an infection anywhere between the kidneys and the tip of the penis. UTIs can also be a result of a prostate problem. Most cause pain or burning when you urinate. Most UTIs are caused by bacteria and can be cured with antibiotics. It is important to complete your treatment so that the infection does not get worse. Follow-up care is a key part of your treatment and safety. Be sure to make and go to all appointments, and call your doctor if you are having problems. It's also a good idea to know your test results and keep a list of the medicines you take. How can you care for yourself at home? · Take your antibiotics as prescribed. Do not stop taking them just because you feel better. You need to take the full course of antibiotics. · Take your medicines exactly as prescribed. Your doctor may have prescribed a medicine, such as phenazopyridine (Pyridium), to help relieve pain when you urinate. This turns your urine orange. You may stop taking it when your symptoms get better. But be sure to take all of your antibiotics, which treat the infection. · Drink extra water for the next day or two. This will help make the urine less concentrated and help wash out the bacteria causing the infection. (If you have kidney, heart, or liver disease and have to limit your fluids, talk with your doctor before you increase your fluid intake.) · Avoid drinks that are carbonated or have caffeine. They can irritate the bladder. · Urinate often. Try to empty your bladder each time. · To relieve pain, take a hot bath or lay a heating pad (set on low) over your lower belly or genital area. Never go to sleep with a heating pad in place. To help prevent UTIs · Drink plenty of fluids, enough so that your urine is light yellow or clear like water. If you have kidney, heart, or liver disease and have to limit fluids, talk with your doctor before you increase the amount of fluids you drink. · Urinate when you have the urge. Do not hold your urine for a long time. Urinate before you go to sleep. · Keep your penis clean. Catheter care If you have a drainage tube (catheter) in place, the following steps will help you care for it. · Always wash your hands before and after touching your catheter. · Check the area around the urethra for inflammation or signs of infection. Signs of infection include irritated, swollen, red, or tender skin, or pus around the catheter. · Clean the area around the catheter with soap and water two times a day. Dry with a clean towel afterward. · Do not apply powder or lotion to the skin around the catheter. To empty the urine collection bag · Wash your hands with soap and water. · Without touching the drain spout, remove the spout from its sleeve at the bottom of the collection bag. Open the valve on the spout. · Let the urine flow out of the bag and into the toilet or a container. Do not let the tubing or drain spout touch anything. · After you empty the bag, clean the end of the drain spout with tissue and water. Close the valve and put the drain spout back into its sleeve at the bottom of the collection bag. · Wash your hands with soap and water. When should you call for help? Call your doctor now or seek immediate medical care if: 
? · Symptoms such as a fever, chills, nausea, or vomiting get worse or happen for the first time. ? · You have new pain in your back just below your rib cage. This is called flank pain. ? · There is new blood or pus in your urine. ? · You are not able to take or keep down your antibiotics. ? Watch closely for changes in your health, and be sure to contact your doctor if: 
? · You are not getting better after taking an antibiotic for 2 days. ? · Your symptoms go away but then come back. Where can you learn more? Go to http://virgil-nancy.info/. Enter V245 in the search box to learn more about \"Urinary Tract Infections in Men: Care Instructions. \" Current as of: May 12, 2017 Content Version: 11.4 © 0821-2855 [employer]. Care instructions adapted under license by [employer] (which disclaims liability or warranty for this information). If you have questions about a medical condition or this instruction, always ask your healthcare professional. [place] [address] any warranty or liability for your use of this information. ACO Transitions of Care Introducing [employer] [address] Chavez offers a voluntary care coordination program to provide high quality service and care to [facility] fee-for-service beneficiaries. Maegan Shaver was designed to help you enhance your health and well-being through the following services: ? Transitions of Care  support for individuals who are transitioning from one care setting to another (example: Hospital to home). ? Chronic and Complex Care Coordination  support for individuals and caregivers of those with serious or chronic illnesses or with more than one chronic (ongoing) condition and those who take a number of different medications. If you meet specific medical criteria, a [address] [place] [address] Rd may call you directly to coordinate your care with your primary care physician and your other care providers. For questions about the [facility] programs, please, contact your physicians office. For general questions or additional information about Accountable Care Organizations: 
Please visit www.medicare.gov/acos. html or call 1-800-MEDICARE (6-967.575.8943) TTY users should call 2-810.621.4702. Related Content Database (RCDb) Announcement We are excited to announce that we are making your provider's discharge notes available to you in Related Content Database (RCDb). You will see these notes when they are completed and signed by the physician that discharged you from your recent hospital stay. If you have any questions or concerns about any information you see in Related Content Database (RCDb), please call the Health Information Department where you were seen or reach out to your Primary Care Provider for more information about your plan of care. Introducing Kendall Razo As a [employer] patient, I wanted to make you aware of our electronic visit tool called Kendall Bernytanya. [employer] 24/7 allows you to connect within minutes with a medical provider 24 hours a day, seven days a week via a mobile device or tablet or logging into a secure website from your computer. You can access Kendall Arrietatanya from anywhere in the [place]. A virtual visit might be right for you when you have a simple condition and feel like you just dont want to get out of bed, or cant get away from work for an appointment, when [facility] is not available (evenings, weekends or holidays), or when youre out of town and need minor care. Electronic visits cost only $49 and if the [employer] 24/7 provider determines a prescription is needed to treat your condition, one can be electronically transmitted to a nearby pharmacy*. Please take a moment to enroll today if you have not already done so. The enrollment process is free and takes just a few minutes. To enroll, please download the [employer] 24/[employer] sonya to your tablet or phone, or visit [email or web address] to enroll on your computer. And, as an [address] [place] [address] patient with a [employer] account, the results of your visits will be scanned into your electronic medical record and your primary care provider will be able to view the scanned results. We urge you to continue to see your regular [facility] provider for your ongoing medical care. And while your primary care provider may not be the one available when you seek a [employer] virtual visit, the peace of mind you get from getting a real diagnosis real time can be priceless. For more information on [employer], view our Frequently Asked Questions (FAQs) at [email or web address]. Sincerely, 
 
Tereza Wilson MD 
Chief Medical Officer Dary Chavez *:  certain medications cannot be prescribed via [employer] Unresulted Labs-Please follow up with your PCP about these lab tests Order Current Status CULTURE, BLOOD Preliminary result CULTURE, URINE Preliminary result Providers Seen During Your Hospitalization Provider Specialty Primary office phone Antonio Carvajal MD Emergency Medicine [phone] Maged Garcia MD Internal Medicine [phone] Immunizations Administered for This Admission Name Date  
 TB Skin Test (PPD) Intradermal  Deferred (), 5/13/2018 Your Primary Care Physician (PCP) Primary Care Physician Office Phone Office Fax [address] [phone] You are allergic to the following No active allergies Recent Documentation Height Weight BMI Smoking Status 1.88 m 112.5 kg 31.84 kg/m2 Never Smoker Emergency Contacts Name Discharge Info Relation Home Work Mobile Aria Fernandez  Daughter [21] [phone] Milton Cheema  Daughter [21] [phone] Patient Belongings The following personal items are in your possession at time of discharge: 
  Dental Appliances: None         Home Medications: None   Jewelry: None  Clothing: None    Other Valuables: None Please provide this summary of care documentation to your next provider. Signatures-by signing, you are acknowledging that this After Visit Summary has been reviewed with you and you have received a copy. Patient Signature:  ____________________________________________________________ Date:  ____________________________________________________________  
  
Larri Hazard Provider Signature:  ____________________________________________________________ Date:  ____________________________________________________________

## 2018-05-13 LAB
ALBUMIN SERPL-MCNC: 2.5 G/DL (ref 3.2–4.6)
ALBUMIN/GLOB SERPL: 0.6 {RATIO} (ref 1.2–3.5)
ALP SERPL-CCNC: 113 U/L (ref 50–136)
ALT SERPL-CCNC: 24 U/L (ref 12–65)
ANION GAP SERPL CALC-SCNC: 10 MMOL/L (ref 7–16)
AST SERPL-CCNC: 33 U/L (ref 15–37)
ATRIAL RATE: 208 BPM
BASOPHILS # BLD: 0 K/UL (ref 0–0.2)
BASOPHILS NFR BLD: 0 % (ref 0–2)
BILIRUB SERPL-MCNC: 2.8 MG/DL (ref 0.2–1.1)
BUN SERPL-MCNC: 14 MG/DL (ref 8–23)
CALCIUM SERPL-MCNC: 9.2 MG/DL (ref 8.3–10.4)
CALCULATED R AXIS, ECG10: -56 DEGREES
CALCULATED T AXIS, ECG11: 45 DEGREES
CHLORIDE SERPL-SCNC: 112 MMOL/L (ref 98–107)
CO2 SERPL-SCNC: 23 MMOL/L (ref 21–32)
CREAT SERPL-MCNC: 0.75 MG/DL (ref 0.8–1.5)
CRP SERPL-MCNC: 17.5 MG/DL (ref 0–0.9)
DIAGNOSIS, 93000: NORMAL
DIFFERENTIAL METHOD BLD: ABNORMAL
EOSINOPHIL # BLD: 0 K/UL (ref 0–0.8)
EOSINOPHIL NFR BLD: 0 % (ref 0.5–7.8)
ERYTHROCYTE [DISTWIDTH] IN BLOOD BY AUTOMATED COUNT: 14 % (ref 11.9–14.6)
FOLATE SERPL-MCNC: 14.6 NG/ML (ref 3.1–17.5)
GLOBULIN SER CALC-MCNC: 4.1 G/DL (ref 2.3–3.5)
GLUCOSE SERPL-MCNC: 85 MG/DL (ref 65–100)
HCT VFR BLD AUTO: 41.7 % (ref 41.1–50.3)
HGB BLD-MCNC: 14.5 G/DL (ref 13.6–17.2)
IMM GRANULOCYTES # BLD: 0 K/UL (ref 0–0.5)
IMM GRANULOCYTES NFR BLD AUTO: 0 % (ref 0–5)
LACTATE SERPL-SCNC: 2 MMOL/L (ref 0.4–2)
LYMPHOCYTES # BLD: 1.2 K/UL (ref 0.5–4.6)
LYMPHOCYTES NFR BLD: 13 % (ref 13–44)
MAGNESIUM SERPL-MCNC: 1.9 MG/DL (ref 1.8–2.4)
MCH RBC QN AUTO: 30.8 PG (ref 26.1–32.9)
MCHC RBC AUTO-ENTMCNC: 34.8 G/DL (ref 31.4–35)
MCV RBC AUTO: 88.5 FL (ref 79.6–97.8)
MONOCYTES # BLD: 0.8 K/UL (ref 0.1–1.3)
MONOCYTES NFR BLD: 9 % (ref 4–12)
NEUTS SEG # BLD: 6.7 K/UL (ref 1.7–8.2)
NEUTS SEG NFR BLD: 78 % (ref 43–78)
PHOSPHATE SERPL-MCNC: 1.8 MG/DL (ref 2.3–3.7)
PLATELET # BLD AUTO: 131 K/UL (ref 150–450)
PMV BLD AUTO: 11.2 FL (ref 10.8–14.1)
POTASSIUM SERPL-SCNC: 3.7 MMOL/L (ref 3.5–5.1)
PROT SERPL-MCNC: 6.6 G/DL (ref 6.3–8.2)
PSA SERPL-MCNC: 14.4 NG/ML
Q-T INTERVAL, ECG07: 378 MS
QRS DURATION, ECG06: 112 MS
QTC CALCULATION (BEZET), ECG08: 439 MS
RBC # BLD AUTO: 4.71 M/UL (ref 4.23–5.67)
SODIUM SERPL-SCNC: 145 MMOL/L (ref 136–145)
T4 FREE SERPL-MCNC: 1.4 NG/DL (ref 0.78–1.46)
TROPONIN I BLD-MCNC: 0.07 NG/ML (ref 0.02–0.05)
TSH SERPL DL<=0.005 MIU/L-ACNC: 0.59 UIU/ML (ref 0.36–3.74)
VENTRICULAR RATE, ECG03: 81 BPM
VIT B12 SERPL-MCNC: 368 PG/ML (ref 193–986)
WBC # BLD AUTO: 8.7 K/UL (ref 4.3–11.1)

## 2018-05-13 PROCEDURE — 85025 COMPLETE CBC W/AUTO DIFF WBC: CPT | Performed by: HOSPITALIST

## 2018-05-13 PROCEDURE — 82607 VITAMIN B-12: CPT | Performed by: HOSPITALIST

## 2018-05-13 PROCEDURE — 86140 C-REACTIVE PROTEIN: CPT | Performed by: HOSPITALIST

## 2018-05-13 PROCEDURE — 83735 ASSAY OF MAGNESIUM: CPT | Performed by: HOSPITALIST

## 2018-05-13 PROCEDURE — 74011250637 HC RX REV CODE- 250/637: Performed by: HOSPITALIST

## 2018-05-13 PROCEDURE — 82746 ASSAY OF FOLIC ACID SERUM: CPT | Performed by: HOSPITALIST

## 2018-05-13 PROCEDURE — 84439 ASSAY OF FREE THYROXINE: CPT | Performed by: HOSPITALIST

## 2018-05-13 PROCEDURE — 84100 ASSAY OF PHOSPHORUS: CPT | Performed by: HOSPITALIST

## 2018-05-13 PROCEDURE — 74011250636 HC RX REV CODE- 250/636: Performed by: HOSPITALIST

## 2018-05-13 PROCEDURE — 80053 COMPREHEN METABOLIC PANEL: CPT | Performed by: HOSPITALIST

## 2018-05-13 PROCEDURE — 83605 ASSAY OF LACTIC ACID: CPT | Performed by: HOSPITALIST

## 2018-05-13 PROCEDURE — 74011000302 HC RX REV CODE- 302: Performed by: INTERNAL MEDICINE

## 2018-05-13 PROCEDURE — 84443 ASSAY THYROID STIM HORMONE: CPT | Performed by: HOSPITALIST

## 2018-05-13 PROCEDURE — 84153 ASSAY OF PSA TOTAL: CPT | Performed by: HOSPITALIST

## 2018-05-13 PROCEDURE — 86580 TB INTRADERMAL TEST: CPT | Performed by: INTERNAL MEDICINE

## 2018-05-13 PROCEDURE — 36415 COLL VENOUS BLD VENIPUNCTURE: CPT | Performed by: HOSPITALIST

## 2018-05-13 PROCEDURE — 65270000029 HC RM PRIVATE

## 2018-05-13 PROCEDURE — 74011000258 HC RX REV CODE- 258: Performed by: HOSPITALIST

## 2018-05-13 RX ORDER — GUAIFENESIN 100 MG/5ML
81 LIQUID (ML) ORAL DAILY
Status: DISCONTINUED | OUTPATIENT
Start: 2018-05-13 | End: 2018-05-15 | Stop reason: HOSPADM

## 2018-05-13 RX ORDER — TRAMADOL HYDROCHLORIDE 50 MG/1
50 TABLET ORAL 2 TIMES DAILY
Status: DISCONTINUED | OUTPATIENT
Start: 2018-05-13 | End: 2018-05-15 | Stop reason: HOSPADM

## 2018-05-13 RX ORDER — ARIPIPRAZOLE 10 MG/1
10 TABLET ORAL DAILY
Status: DISCONTINUED | OUTPATIENT
Start: 2018-05-13 | End: 2018-05-15 | Stop reason: HOSPADM

## 2018-05-13 RX ORDER — AMOXICILLIN 250 MG
1 CAPSULE ORAL DAILY
Status: DISCONTINUED | OUTPATIENT
Start: 2018-05-13 | End: 2018-05-15 | Stop reason: HOSPADM

## 2018-05-13 RX ORDER — SODIUM CHLORIDE 0.9 % (FLUSH) 0.9 %
5-10 SYRINGE (ML) INJECTION EVERY 8 HOURS
Status: DISCONTINUED | OUTPATIENT
Start: 2018-05-13 | End: 2018-05-15 | Stop reason: HOSPADM

## 2018-05-13 RX ORDER — ZOLPIDEM TARTRATE 5 MG/1
5 TABLET ORAL
Status: DISCONTINUED | OUTPATIENT
Start: 2018-05-13 | End: 2018-05-15 | Stop reason: HOSPADM

## 2018-05-13 RX ORDER — DONEPEZIL HYDROCHLORIDE 5 MG/1
5 TABLET, FILM COATED ORAL
Status: DISCONTINUED | OUTPATIENT
Start: 2018-05-13 | End: 2018-05-15 | Stop reason: HOSPADM

## 2018-05-13 RX ORDER — SODIUM CHLORIDE 0.9 % (FLUSH) 0.9 %
5-10 SYRINGE (ML) INJECTION AS NEEDED
Status: DISCONTINUED | OUTPATIENT
Start: 2018-05-13 | End: 2018-05-15 | Stop reason: HOSPADM

## 2018-05-13 RX ORDER — FAMOTIDINE 20 MG/1
20 TABLET, FILM COATED ORAL 2 TIMES DAILY
Status: DISCONTINUED | OUTPATIENT
Start: 2018-05-13 | End: 2018-05-15 | Stop reason: HOSPADM

## 2018-05-13 RX ORDER — TAMSULOSIN HYDROCHLORIDE 0.4 MG/1
0.4 CAPSULE ORAL DAILY
Status: DISCONTINUED | OUTPATIENT
Start: 2018-05-13 | End: 2018-05-15 | Stop reason: HOSPADM

## 2018-05-13 RX ORDER — CELECOXIB 200 MG/1
CAPSULE ORAL 2 TIMES DAILY
COMMUNITY
End: 2019-08-01

## 2018-05-13 RX ORDER — DABIGATRAN ETEXILATE 150 MG/1
150 CAPSULE ORAL EVERY 12 HOURS
Status: DISCONTINUED | OUTPATIENT
Start: 2018-05-13 | End: 2018-05-15 | Stop reason: HOSPADM

## 2018-05-13 RX ORDER — ONDANSETRON 2 MG/ML
4 INJECTION INTRAMUSCULAR; INTRAVENOUS
Status: DISCONTINUED | OUTPATIENT
Start: 2018-05-13 | End: 2018-05-15 | Stop reason: HOSPADM

## 2018-05-13 RX ORDER — BICALUTAMIDE 50 MG/1
50 TABLET, FILM COATED ORAL DAILY
Status: DISCONTINUED | OUTPATIENT
Start: 2018-05-13 | End: 2018-05-15 | Stop reason: HOSPADM

## 2018-05-13 RX ORDER — SODIUM CHLORIDE 9 MG/ML
100 INJECTION, SOLUTION INTRAVENOUS CONTINUOUS
Status: DISCONTINUED | OUTPATIENT
Start: 2018-05-13 | End: 2018-05-13

## 2018-05-13 RX ORDER — BISACODYL 5 MG
5 TABLET, DELAYED RELEASE (ENTERIC COATED) ORAL DAILY PRN
Status: DISCONTINUED | OUTPATIENT
Start: 2018-05-13 | End: 2018-05-15 | Stop reason: HOSPADM

## 2018-05-13 RX ORDER — ACETAMINOPHEN 325 MG/1
650 TABLET ORAL
Status: DISCONTINUED | OUTPATIENT
Start: 2018-05-13 | End: 2018-05-15 | Stop reason: HOSPADM

## 2018-05-13 RX ADMIN — Medication 1 AMPULE: at 01:34

## 2018-05-13 RX ADMIN — DABIGATRAN ETEXILATE MESYLATE 150 MG: 150 CAPSULE ORAL at 22:10

## 2018-05-13 RX ADMIN — Medication 1 AMPULE: at 22:10

## 2018-05-13 RX ADMIN — ASPIRIN 81 MG 81 MG: 81 TABLET ORAL at 08:24

## 2018-05-13 RX ADMIN — TAMSULOSIN HYDROCHLORIDE 0.4 MG: 0.4 CAPSULE ORAL at 08:40

## 2018-05-13 RX ADMIN — TRAMADOL HYDROCHLORIDE 50 MG: 50 TABLET, FILM COATED ORAL at 17:03

## 2018-05-13 RX ADMIN — CEFTRIAXONE SODIUM 1 G: 1 INJECTION, POWDER, FOR SOLUTION INTRAMUSCULAR; INTRAVENOUS at 03:54

## 2018-05-13 RX ADMIN — TUBERCULIN PURIFIED PROTEIN DERIVATIVE 5 UNITS: 5 INJECTION, SOLUTION INTRADERMAL at 14:48

## 2018-05-13 RX ADMIN — Medication 10 ML: at 13:10

## 2018-05-13 RX ADMIN — DONEPEZIL HYDROCHLORIDE 5 MG: 5 TABLET, FILM COATED ORAL at 22:10

## 2018-05-13 RX ADMIN — DABIGATRAN ETEXILATE MESYLATE 150 MG: 150 CAPSULE ORAL at 08:24

## 2018-05-13 RX ADMIN — FAMOTIDINE 20 MG: 20 TABLET, FILM COATED ORAL at 08:24

## 2018-05-13 RX ADMIN — DABIGATRAN ETEXILATE MESYLATE 150 MG: 150 CAPSULE ORAL at 01:30

## 2018-05-13 RX ADMIN — TRAMADOL HYDROCHLORIDE 50 MG: 50 TABLET, FILM COATED ORAL at 08:24

## 2018-05-13 RX ADMIN — Medication 10 ML: at 22:11

## 2018-05-13 RX ADMIN — SODIUM CHLORIDE 100 ML/HR: 900 INJECTION, SOLUTION INTRAVENOUS at 01:30

## 2018-05-13 RX ADMIN — Medication 10 ML: at 05:38

## 2018-05-13 RX ADMIN — Medication 10 ML: at 01:36

## 2018-05-13 RX ADMIN — Medication 1 AMPULE: at 08:24

## 2018-05-13 RX ADMIN — FAMOTIDINE 20 MG: 20 TABLET, FILM COATED ORAL at 17:03

## 2018-05-13 RX ADMIN — BICALUTAMIDE 50 MG: 50 TABLET, FILM COATED ORAL at 08:34

## 2018-05-13 RX ADMIN — ARIPIPRAZOLE 10 MG: 10 TABLET ORAL at 08:24

## 2018-05-13 RX ADMIN — DONEPEZIL HYDROCHLORIDE 5 MG: 5 TABLET, FILM COATED ORAL at 01:31

## 2018-05-13 RX ADMIN — STANDARDIZED SENNA CONCENTRATE AND DOCUSATE SODIUM 1 TABLET: 8.6; 5 TABLET, FILM COATED ORAL at 08:24

## 2018-05-13 NOTE — PROGRESS NOTES
Primary Nurse Funmilayo Hill RN and Marian Batista RN performed a dual skin assessment on this patient Impairment noted- patient has bilateral lower extremities edema, discoloration and dry,flaky skin. 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 Sacrum is intact. Patient oriented to room, Call light within reach and bed low and locked.   Maxwell score is 19

## 2018-05-13 NOTE — PROGRESS NOTES
TRANSFER - IN REPORT:    Verbal report received from [address] [place] [address] RN(name) on Tylor Juárez  being received from ED for routine progression of care      Report consisted of patients Situation, Background, Assessment and   Recommendations(SBAR). Information from the following report(s) SBAR, Kardex and ED Summary was reviewed with the receiving nurse. Opportunity for questions and clarification was provided. Assessment completed upon patients arrival to unit and care assumed.

## 2018-05-13 NOTE — ED NOTES
TRANSFER - OUT REPORT:    Verbal report given to MIREYA Gay(name) on Tylor Rees  being transferred to [facility](unit) for routine progression of care       Report consisted of patients Situation, Background, Assessment and   Recommendations(SBAR). Information from the following report(s) SBAR, ED Summary, Intake/Output, MAR and Recent Results was reviewed with the receiving nurse. Lines:   Peripheral IV 05/12/18 Left Wrist (Active)   Site Assessment Clean, dry, & intact 5/12/2018  9:03 PM   Phlebitis Assessment 0 5/12/2018  9:03 PM   Infiltration Assessment 0 5/12/2018  9:03 PM   Dressing Status Clean, dry, & intact 5/12/2018  9:03 PM   Dressing Type 4 X 4 5/12/2018  9:03 PM   Alcohol Cap Used Yes 5/12/2018  9:03 PM       Peripheral IV 05/12/18 Right Hand (Active)   Site Assessment Clean, dry, & intact 5/12/2018 11:41 PM   Phlebitis Assessment 0 5/12/2018 11:41 PM   Infiltration Assessment 0 5/12/2018 11:41 PM   Dressing Status Clean, dry, & intact 5/12/2018 11:41 PM   Hub Color/Line Status Pink 5/12/2018 11:41 PM   Alcohol Cap Used Yes 5/12/2018 11:41 PM        Opportunity for questions and clarification was provided.       Patient transported with:  transport

## 2018-05-13 NOTE — ED TRIAGE NOTES
Family states over the past 6-7 hours that patient had coordination issues. Patient has had slow movement and confusion. Urine has been dark with foul odor. Temp 102.7. Heart rate 94-96. RR 28. O2 sat 99% on 3L. 200ml of fluids and 3.375g zosyn. BGL 99. Last /60.

## 2018-05-13 NOTE — H&P
Hospitalist H&P/Consult Note     Admit Date:  2018  8:50 PM   Name:  Reggie Mean   Age:  76 y.o.  :  1940   MRN:  [id number]   PCP:  Josh Tilley MD  Treatment Team: Attending Provider: Glenroy Bah MD    HPI:   77 y/o male with hx prostate cancer, hx recurrent UTIs, HTN, atrial fibrillation presents to ED accompanied by family for reported fever to 102.7, lethargy, confusion and foul odor to urine. His initial lactic acid was 2.9 and he was given IVF bolus, zosyn and vancomycin. Reportedly his prostate cancer is in remission. He denies any abdominal or suprapubic pain. Is confused so a poor historian but does admit difficulties getting his urine out. UA is positive for UTI. Will admit for further treatment with IV antibiotics. 10 systems reviewed and negative except as noted in HPI.no chest pain, shortness of breath or bone pain. Past Medical History:   Diagnosis Date    Arrhythmia     pt takes pradaxa    Arthritis     Atrial fibrillation ([address] 2012    CAD (coronary artery disease)     Cancer (HCC)     prostate    GERD (gastroesophageal reflux disease)     controlled with medication    Heart failure ([address]     pt takes lasix as needed, reports SOB with exertion    Hypertension     controlled with medication    Morbid obesity ([address]       Past Surgical History:   Procedure Laterality Date    HX ORTHOPAEDIC      left TKA    HX PACEMAKER  2012    [employer] pacemaker    HX PROSTATECTOMY        Prior to Admission Medications   Prescriptions Last Dose Informant Patient Reported? Taking? ARIPiprazole (ABILIFY) 10 mg tablet   No No   Sig: Take 1 Tab by mouth daily. acetaminophen (TYLENOL ARTHRITIS PAIN) 650 mg CR tablet   Yes No   Sig: Take 650 mg by mouth every six (6) hours as needed for Pain. aspirin 81 mg chewable tablet   No No   Sig: Take 1 Tab by mouth daily.  Indications: prevention of cerebrovascular accident   bicalutamide (CASODEX) 50 mg tablet   No No   Sig: Take 1 Tab by mouth daily. cephALEXin (KEFLEX) 500 mg capsule   No No   Sig: Take 1 Cap by mouth three (3) times daily. dabigatran etexilate (PRADAXA) 150 mg capsule   No No   Sig: Take 1 Cap by mouth every twelve (12) hours. diclofenac (VOLTAREN) 1 % gel   Yes No   Sig: APPLY 4 GRAMS TO AFFECTED AREA FOUR TIMES DAILY   donepezil (ARICEPT) 5 mg tablet   No No   Sig: Take 1 Tab by mouth nightly. famotidine (PEPCID) 20 mg tablet   No No   Sig: Take 1 Tab by mouth two (2) times a day. levomilnacipran (FETZIMA) 40 mg ER capsule   No No   Sig: TAKE 1 CAP BY MOUTH DAILY. senna-docusate (SENNA LAXATIVE-STOOL SOFTENER) 8.6-50 mg per tablet   Yes No   Sig: Take 1 Tab by mouth daily. tamsulosin (FLOMAX) 0.4 mg capsule   No No   Sig: Take 1 Cap by mouth daily. traMADol (ULTRAM) 50 mg tablet   No No   Sig: Take 1 Tab by mouth two (2) times a day. Max Daily Amount: 100 mg. Indications: Pain   trimethoprim-sulfamethoxazole (BACTRIM DS) 160-800 mg per tablet   No No   Sig: Take 1 Tab by mouth two (2) times a day. zolpidem (AMBIEN) 5 mg tablet   No No   Sig: Take 1 Tab by mouth nightly as needed for Sleep. Max Daily Amount: 5 mg.  Indications: SLEEP-ONSET INSOMNIA      Facility-Administered Medications: None     No Known Allergies   Social History   Substance Use Topics    Smoking status: Never Smoker    Smokeless tobacco: Never Used    Alcohol use No      Family History   Problem Relation Age of Onset    Cancer Father       Immunization History   Administered Date(s) Administered    Influenza Vaccine 09/26/2012    Influenza Vaccine (Quad) Mdck Pf 11/30/2017    Influenza Vaccine (Quad) PF 10/24/2016    Pneumococcal Vaccine (Unspecified Type) 06/25/2013    TB Skin Test (PPD) Intradermal 07/23/2017    Tdap 05/19/2016       Objective:   Patient Vitals for the past 24 hrs:   Temp Pulse BP SpO2   05/12/18 2100 99.6 °F (37.6 °C) 73 138/62 98 %     Oxygen Therapy  O2 Sat (%): 98 % (05/12/18 2100)  O2 Device: Room air (05/12/18 2100)  No intake or output data in the 24 hours ending 05/12/18 8506    Physical Exam:  General:    Well nourished. Confused, disoriented   Eyes:   Normal sclera. Extraocular movements intact. ENT:  Normocephalic, atraumatic. Moist mucous membranes  CV:   irregular. No murmur, rub, or gallop. Lungs:  CTAB. No wheezing, rhonchi, or rales. Abdomen: Soft, nontender, nondistended. Bowel sounds normal.   Extremities: Warm and dry. No cyanosis or edema. Neurologic: CN II-XII grossly intact. Sensation intact. Skin:     No rashes or jaundice. No wounds. Psych:  Normal mood flat affect    I reviewed the labs, imaging, EKGs, telemetry, and other studies done this admission. Data Review:   Recent Results (from the past 24 hour(s))   POC LACTIC ACID    Collection Time: 05/12/18  8:56 PM   Result Value Ref Range    Lactic Acid (POC) 2.9 (H) 0.5 - 1.9 mmol/L   METABOLIC PANEL, COMPREHENSIVE    Collection Time: 05/12/18  9:11 PM   Result Value Ref Range    Sodium 144 136 - 145 mmol/L    Potassium 3.7 3.5 - 5.1 mmol/L    Chloride 110 (H) 98 - 107 mmol/L    CO2 24 21 - 32 mmol/L    Anion gap 10 7 - 16 mmol/L    Glucose 93 65 - 100 mg/dL    BUN 17 8 - 23 MG/DL    Creatinine 0.91 0.8 - 1.5 MG/DL    GFR est AA >60 >60 ml/min/1.73m2    GFR est non-AA >60 >60 ml/min/1.73m2    Calcium 9.0 8.3 - 10.4 MG/DL    Bilirubin, total 2.0 (H) 0.2 - 1.1 MG/DL    ALT (SGPT) 22 12 - 65 U/L    AST (SGOT) 28 15 - 37 U/L    Alk.  phosphatase 102 50 - 136 U/L    Protein, total 6.6 6.3 - 8.2 g/dL    Albumin 2.5 (L) 3.2 - 4.6 g/dL    Globulin 4.1 (H) 2.3 - 3.5 g/dL    A-G Ratio 0.6 (L) 1.2 - 3.5     PROCALCITONIN    Collection Time: 05/12/18  9:11 PM   Result Value Ref Range    Procalcitonin 0.2 ng/mL   CBC WITH AUTOMATED DIFF    Collection Time: 05/12/18  9:11 PM   Result Value Ref Range    WBC 10.5 4.3 - 11.1 K/uL    RBC 4.63 4.23 - 5.67 M/uL    HGB 14.2 13.6 - 17.2 g/dL    HCT 40.8 (L) 41.1 - 50.3 %    MCV 88.1 79.6 - 97.8 FL    MCH 30.7 26.1 - 32.9 PG    MCHC 34.8 31.4 - 35.0 g/dL    RDW 14.1 11.9 - 14.6 %    PLATELET 036 (L) 985 - 450 K/uL    MPV 10.9 10.8 - 14.1 FL    DF AUTOMATED      NEUTROPHILS 82 (H) 43 - 78 %    LYMPHOCYTES 9 (L) 13 - 44 %    MONOCYTES 9 4.0 - 12.0 %    EOSINOPHILS 0 (L) 0.5 - 7.8 %    BASOPHILS 0 0.0 - 2.0 %    IMMATURE GRANULOCYTES 0 0.0 - 5.0 %    ABS. NEUTROPHILS 8.5 (H) 1.7 - 8.2 K/UL    ABS. LYMPHOCYTES 0.9 0.5 - 4.6 K/UL    ABS. MONOCYTES 1.0 0.1 - 1.3 K/UL    ABS. EOSINOPHILS 0.0 0.0 - 0.8 K/UL    ABS. BASOPHILS 0.0 0.0 - 0.2 K/UL    ABS. IMM. GRANS. 0.0 0.0 - 0.5 K/UL   URINE MICROSCOPIC    Collection Time: 05/12/18 10:45 PM   Result Value Ref Range    WBC >100 0 /hpf    RBC 5-10 0 /hpf    Epithelial cells 0-3 0 /hpf    Bacteria 2+ (H) 0 /hpf    Casts 0 0 /lpf    Crystals, urine 0 0 /LPF    Mucus 0 0 /lpf       Imaging Studies:  CXR Results  (Last 48 hours)               05/12/18 2140  XR CHEST PORT Final result    Impression:  IMPRESSION:       Underinflated lungs. No evidence of acute pulmonary disease. Narrative:  Portable chest xray         COMPARISON: December 31, 2017       INDICATION: Possible sepsis       FINDINGS:        There is a stable left-sided cardiac pacer. Lungs are underinflated. No definite   pulmonary consolidation. No pneumothorax or pulmonary edema. No large pleural   effusion. Cardiac silhouette is mildly prominent. Mediastinal contour is within   normal limits. Surrounding bones are stable.                CT Results  (Last 48 hours)    None          Assessment and Plan:     Hospital Problems as of 5/12/2018  Date Reviewed: 5/19/2017          Codes Class Noted - Resolved POA    * (Principal)Acute UTI ICD-10-CM: N39.0  ICD-9-CM: 599.0  5/12/2018 - Present Yes        Altered mental status ICD-10-CM: R41.82  ICD-9-CM: 780.97  5/12/2018 - Present Yes        Lactic acid acidosis ICD-10-CM: E87.2  ICD-9-CM: 276.2  12/23/2016 - Present Yes        History of prostate cancer ICD-10-CM: Z85.46  ICD-9-CM: V10.46  8/4/2014 - Present Yes        Age-related physical debility ICD-10-CM: R54  ICD-9-CM: 724  2/6/2015 - Present Yes        Depression (Chronic) ICD-10-CM: F32.9  ICD-9-CM: 984  8/28/2012 - Present Yes        Morbid obesity ([facility] (Chronic) ICD-10-CM: E66.01  ICD-9-CM: 278.01  8/28/2012 - Present Yes        Atrial fibrillation ([facility] (Chronic) ICD-10-CM: I48.91  ICD-9-CM: 427.31  1/27/2012 - Present Yes        HTN (hypertension) (Chronic) ICD-10-CM: I10  ICD-9-CM: 401.9  1/27/2012 - Present Yes              PLAN:  · Admit inpatient to medical bed  · Continue IV fluids, trend lactic acid  · IV rocephin.  F/u blood and urine cultures  · Hopefully AMS will resolve when UTI clears  · Check PSA to assess for recurrence of prostate cancer  · Continue flomax  · Anticoagulation with Pradaxa      Estimated LOS:  2 midnights    Signed:  Nicolette Castrejon MD

## 2018-05-13 NOTE — PROGRESS NOTES
Hospitalist Progress Note    2018  Admit Date: 2018  8:50 PM   NAME: Nataliia Mccormick   :  1940   MRN:  [id number]   Attending: Ej Qureshi MD  PCP:  Jo Ann Mackey MD    SUBJECTIVE:   Patient 74D with hx of prostate cancer, hx of recurrent UTIs, HTN, afib presents to ED with report of 102.7 fever, lethargy, confusion and foul smelling urine. Lactic acid initially 2.9 prior to IVF bolus. Hx of prostate CA which he reports is in remission. UA suggestive of UTI and hospitalist asked to admit.  - sitting up in bedside chair. Still smells strongly of foul urine. Denies nausea/vomiting. Is oriented x 2. Review of Systems negative with exception of pertinent positives noted above  PHYSICAL EXAM     Visit Vitals    /67 (BP 1 Location: Left arm, BP Patient Position: At rest)    Pulse 70    Temp 98.5 °F (36.9 °C)    Resp 20    Ht 6' 2\" (1.88 m)    Wt 112.5 kg (248 lb)    SpO2 99%    BMI 31.84 kg/m2      Temp (24hrs), Av °F (37.2 °C), Min:98.2 °F (36.8 °C), Max:99.9 °F (37.7 °C)    Oxygen Therapy  O2 Sat (%): 99 % (18 1135)  Pulse via Oximetry: 74 beats per minute (18 0002)  O2 Device: Room air (18 2100)    Intake/Output Summary (Last 24 hours) at 18 1343  Last data filed at 18 7743   Gross per 24 hour   Intake              120 ml   Output                0 ml   Net              120 ml      General: No acute distress    Lungs:  CTA Bilaterally.    Heart:  Regular rate and rhythm,  No murmur, rub, or gallop  Abdomen: Soft, Non distended, Non tender, Positive bowel sounds  Extremities: No cyanosis, clubbing or edema  Neurologic:  No focal deficits    ASSESSMENT      Active Hospital Problems    Diagnosis Date Noted    Altered mental status 2018    Acute UTI 2018    Lactic acid acidosis 2016    Age-related physical debility 2015    History of prostate cancer 2014    Depression 2012    Morbid obesity ([facility] 08/28/2012    Atrial fibrillation ([facility] 01/27/2012    HTN (hypertension) 01/27/2012     A/p  - AMS - secondary to UTI. Improving.  - UTI/ GNR bacteremia 1of2 blood cx's - final culture pending. Cont rocephin.  - Hx of prostate CA - PSA now elevated at 14 (previously 4.3 6/28/17). Needs to follow with outpatient urology. Cont casodex, flomax  - Afib - rate controlled. Cont pradaxa    DVT Prophylaxis: pradaxa  dispo - likely to home at discharge.  Will order pt/ot/ppd in case    Signed By: Sarah De La Vega,      May 13, 2018

## 2018-05-13 NOTE — PROGRESS NOTES
attempted to visit with patient  Resting in chair  Will follow when available    Evangelist Ramirez, staff [address], [address]  /   [email or web address]

## 2018-05-14 LAB
ANION GAP SERPL CALC-SCNC: 8 MMOL/L (ref 7–16)
BUN SERPL-MCNC: 12 MG/DL (ref 8–23)
CALCIUM SERPL-MCNC: 9 MG/DL (ref 8.3–10.4)
CHLORIDE SERPL-SCNC: 111 MMOL/L (ref 98–107)
CO2 SERPL-SCNC: 25 MMOL/L (ref 21–32)
CREAT SERPL-MCNC: 0.74 MG/DL (ref 0.8–1.5)
ERYTHROCYTE [DISTWIDTH] IN BLOOD BY AUTOMATED COUNT: 14 % (ref 11.9–14.6)
GLUCOSE SERPL-MCNC: 94 MG/DL (ref 65–100)
HCT VFR BLD AUTO: 40.9 % (ref 41.1–50.3)
HGB BLD-MCNC: 14.1 G/DL (ref 13.6–17.2)
MCH RBC QN AUTO: 30.3 PG (ref 26.1–32.9)
MCHC RBC AUTO-ENTMCNC: 34.5 G/DL (ref 31.4–35)
MCV RBC AUTO: 88 FL (ref 79.6–97.8)
MM INDURATION POC: NORMAL MM (ref 0–5)
PLATELET # BLD AUTO: 133 K/UL (ref 150–450)
PMV BLD AUTO: 10.8 FL (ref 10.8–14.1)
POTASSIUM SERPL-SCNC: 3.7 MMOL/L (ref 3.5–5.1)
PPD POC: NORMAL NEGATIVE
RBC # BLD AUTO: 4.65 M/UL (ref 4.23–5.67)
SODIUM SERPL-SCNC: 144 MMOL/L (ref 136–145)
WBC # BLD AUTO: 5.8 K/UL (ref 4.3–11.1)

## 2018-05-14 PROCEDURE — 85027 COMPLETE CBC AUTOMATED: CPT | Performed by: INTERNAL MEDICINE

## 2018-05-14 PROCEDURE — 74011000258 HC RX REV CODE- 258: Performed by: HOSPITALIST

## 2018-05-14 PROCEDURE — 97165 OT EVAL LOW COMPLEX 30 MIN: CPT

## 2018-05-14 PROCEDURE — 74011250636 HC RX REV CODE- 250/636: Performed by: HOSPITALIST

## 2018-05-14 PROCEDURE — 36415 COLL VENOUS BLD VENIPUNCTURE: CPT | Performed by: INTERNAL MEDICINE

## 2018-05-14 PROCEDURE — 97161 PT EVAL LOW COMPLEX 20 MIN: CPT

## 2018-05-14 PROCEDURE — 74011250637 HC RX REV CODE- 250/637: Performed by: HOSPITALIST

## 2018-05-14 PROCEDURE — 80048 BASIC METABOLIC PNL TOTAL CA: CPT | Performed by: INTERNAL MEDICINE

## 2018-05-14 PROCEDURE — 65270000029 HC RM PRIVATE

## 2018-05-14 RX ADMIN — FAMOTIDINE 20 MG: 20 TABLET, FILM COATED ORAL at 09:38

## 2018-05-14 RX ADMIN — TRAMADOL HYDROCHLORIDE 50 MG: 50 TABLET, FILM COATED ORAL at 09:39

## 2018-05-14 RX ADMIN — DONEPEZIL HYDROCHLORIDE 5 MG: 5 TABLET, FILM COATED ORAL at 22:02

## 2018-05-14 RX ADMIN — FAMOTIDINE 20 MG: 20 TABLET, FILM COATED ORAL at 17:22

## 2018-05-14 RX ADMIN — Medication 10 ML: at 13:34

## 2018-05-14 RX ADMIN — DABIGATRAN ETEXILATE MESYLATE 150 MG: 150 CAPSULE ORAL at 09:39

## 2018-05-14 RX ADMIN — STANDARDIZED SENNA CONCENTRATE AND DOCUSATE SODIUM 1 TABLET: 8.6; 5 TABLET, FILM COATED ORAL at 09:38

## 2018-05-14 RX ADMIN — TRAMADOL HYDROCHLORIDE 50 MG: 50 TABLET, FILM COATED ORAL at 17:22

## 2018-05-14 RX ADMIN — Medication 1 AMPULE: at 22:02

## 2018-05-14 RX ADMIN — Medication 1 AMPULE: at 09:42

## 2018-05-14 RX ADMIN — ASPIRIN 81 MG 81 MG: 81 TABLET ORAL at 09:40

## 2018-05-14 RX ADMIN — CEFTRIAXONE SODIUM 1 G: 1 INJECTION, POWDER, FOR SOLUTION INTRAMUSCULAR; INTRAVENOUS at 03:55

## 2018-05-14 RX ADMIN — ARIPIPRAZOLE 10 MG: 10 TABLET ORAL at 09:38

## 2018-05-14 RX ADMIN — TAMSULOSIN HYDROCHLORIDE 0.4 MG: 0.4 CAPSULE ORAL at 09:38

## 2018-05-14 RX ADMIN — BICALUTAMIDE 50 MG: 50 TABLET, FILM COATED ORAL at 09:43

## 2018-05-14 RX ADMIN — Medication 10 ML: at 05:34

## 2018-05-14 RX ADMIN — Medication 10 ML: at 22:02

## 2018-05-14 RX ADMIN — DABIGATRAN ETEXILATE MESYLATE 150 MG: 150 CAPSULE ORAL at 22:01

## 2018-05-14 NOTE — CDMP QUERY
Please clarify if this patient is being treated/managed for:    Acute metabolic encephalopathy in elderly patient with confusion, lethargy, UTI, treated with lab monitoring, and  antibiotics for UTI. =>Other Explanation of clinical findings  =>Unable to Determine (no explanation of clinical findings)    The medical record reflects the following:    Risk Factors: 75 yo with prostate cancer in remission, recurrent UTIs, Afib    Clinical Indicators: lethargy , confusion    Treatment: lab monitoring, fall precautions, antibiotics for UTI. UTI documented as cause of confusion. Please clarify and document your clinical opinion in the progress notes and discharge summary including the definitive and/or presumptive diagnosis, (suspected or probable), related to the above clinical findings. Please include clinical findings supporting your diagnosis.     Thanks,  Srikanth Shirley RN, CDS  Compliant Documentation Management Program  [phone]

## 2018-05-14 NOTE — PROGRESS NOTES
Problem: Mobility Impaired (Adult and Pediatric)  Goal: *Therapy Goal (Edit Goal, Insert Text)  LTG:  (1.)Mr. Merry Ng will move from supine to sit and sit to supine , scoot up and down and roll side to side in bed with INDEPENDENT within 5 treatment day(s). (2.)Mr. Merry Ng will transfer from bed to chair and chair to bed with MODIFIED INDEPENDENCE using the least restrictive device within 5 treatment day(s). (3.)Mr. Merry Ng will ambulate with MODIFIED INDEPENDENCE for 150 feet with the least restrictive device within 5 treatment day(s). ________________________________________________________________________________________________       PHYSICAL THERAPY: Initial Assessment, PM 5/14/2018  INPATIENT: Hospital Day: 3  [place] / Product Type: Medicare /      NAME/AGE/GENDER: Mery Ramirez is a 76 y.o. male   PRIMARY DIAGNOSIS: Acute UTI  Altered mental status Acute UTI Acute UTI        ICD-10: Treatment Diagnosis:    · Other abnormalities of gait and mobility (R26.89)   Precaution/Allergies:  Review of patient's allergies indicates no known allergies. ASSESSMENT:     Mr. Merry Ng presents sitting in recliner and agreeable for PT assessment. Per patient he is modified independent at home. Patient stood with CGA and additional time. He ambulated 28' with RW and CGA with decreased step length Ramsay. Patient has declined slightly in functional mobility. He would benefit from 1 or 2 more skilled physical therapy treatments (medically necessary) to address his deficits and maximize his function. This section established at most recent assessment   PROBLEM LIST (Impairments causing functional limitations):  1. Decreased ADL/Functional Activities  2. Decreased Transfer Abilities  3. Decreased Ambulation Ability/Technique  4.  Decreased Activity Tolerance   INTERVENTIONS PLANNED: (Benefits and precautions of physical therapy have been discussed with the patient.)  1. Balance Exercise  2. Bed Mobility  3. Gait Training  4. Therapeutic Activites  5. Transfer Training  6. education  7. Group Therapy     TREATMENT PLAN: Frequency/Duration: 3 times a week for 1 week  Rehabilitation Potential For Stated Goals: Excellent     RECOMMENDED REHABILITATION/EQUIPMENT: (at time of discharge pending progress): Due to the probability of continued deficits (see above) this patient will likely need continued skilled physical therapy after discharge. Equipment:    None at this time              HISTORY:   History of Present Injury/Illness (Reason for Referral):  Per MD note, \"66 y/o male with hx prostate cancer, hx recurrent UTIs, HTN, atrial fibrillation presents to ED accompanied by family for reported fever to 102.7, lethargy, confusion and foul odor to urine. His initial lactic acid was 2.9 and he was given IVF bolus, zosyn and vancomycin. Reportedly his prostate cancer is in remission. He denies any abdominal or suprapubic pain. Is confused so a poor historian but does admit difficulties getting his urine out. UA is positive for UTI. Will admit for further treatment with IV antibiotics.    10 systems reviewed and negative except as noted in HPI.no chest pain, shortness of breath or bone pain. \"  Past Medical History/Comorbidities:   Mr. Vahe Che  has a past medical history of Arrhythmia; Arthritis; Atrial fibrillation ([address] (1/27/2012); CAD (coronary artery disease); Cancer [facility]); GERD (gastroesophageal reflux disease); Heart failure ([address] Hypertension; and Morbid obesity ([address] Mr. Vahe Che  has a past surgical history that includes hx orthopaedic (2010); hx prostatectomy; and hx pacemaker (8/30/2012).   Social History/Living Environment:   Home Environment: Private residence  One/Two Story Residence: Two story  Living Alone: No  Support Systems: Child(billy)  Patient Expects to be Discharged to[de-identified] Private residence  Current DME Used/Available at Home: Walker, rolling  Tub or Shower Type: Shower  Prior Level of Function/Work/Activity:  Lives at home with daughter who is at work during the day. Ambulates with RW independently in home. Number of Personal Factors/Comorbidities that affect the Plan of Care: 1-2: MODERATE COMPLEXITY   EXAMINATION:   Most Recent Physical Functioning:   Gross Assessment:  AROM: Generally decreased, functional  Strength: Generally decreased, functional  Sensation: Intact               Posture:     Balance:  Sitting: Intact  Standing: Impaired  Standing - Static: Fair  Standing - Dynamic : Fair Bed Mobility:     Wheelchair Mobility:     Transfers:  Sit to Stand: Contact guard assistance; Additional time  Stand to Sit: Contact guard assistance  Gait:     Base of Support: Widened  Speed/Ángela: Slow  Step Length: Right shortened;Left shortened  Gait Abnormalities: Decreased step clearance; Step to gait  Distance (ft): 35 Feet (ft)  Assistive Device: Gait belt;Walker, rolling  Ambulation - Level of Assistance: Contact guard assistance  Interventions: Safety awareness training;Verbal cues      Body Structures Involved:  1. Metabolic Body Functions Affected:  1. Genitourinary  2. Movement Related Activities and Participation Affected:  1. Mobility  2. Self Care   Number of elements that affect the Plan of Care: 3: MODERATE COMPLEXITY   CLINICAL PRESENTATION:   Presentation: Stable and uncomplicated: LOW COMPLEXITY   CLINICAL DECISION MAKIN [facility] [address] [address] AM-PAC 6 Clicks   Basic Mobility Inpatient Short Form  How much difficulty does the patient currently have. .. Unable A Lot A Little None   1. Turning over in bed (including adjusting bedclothes, sheets and blankets)? [] 1   [] 2   [x] 3   [] 4   2. Sitting down on and standing up from a chair with arms ( e.g., wheelchair, bedside commode, etc.)   [] 1   [] 2   [x] 3   [] 4   3. Moving from lying on back to sitting on the side of the bed?    [] 1   [] 2   [x] 3   [] 4   How much help from another person does the patient currently need. .. Total A Lot A Little None   4. Moving to and from a bed to a chair (including a wheelchair)? [] 1   [] 2   [x] 3   [] 4   5. Need to walk in hospital room? [] 1   [] 2   [x] 3   [] 4   6. Climbing 3-5 steps with a railing? [] 1   [] 2   [x] 3   [] 4   © 2007, Trustees of [address] [place] [address] [address] [address], under license to [employer]. All rights reserved      Score:  Initial: 18 Most Recent: X (Date: -- )    Interpretation of Tool:  Represents activities that are increasingly more difficult (i.e. Bed mobility, Transfers, Gait). Score 24 23 22-20 19-15 14-10 9-7 6     Modifier CH CI CJ CK CL CM CN      ? Mobility - Walking and Moving Around:     - CURRENT STATUS: CK - 40%-59% impaired, limited or restricted    - GOAL STATUS: CJ - 20%-39% impaired, limited or restricted    - D/C STATUS:  ---------------To be determined---------------  [place] / [place] / Product Type: Medicare /      Medical Necessity:     · Patient is expected to demonstrate progress in strength, range of motion, balance, coordination and functional technique to increase independence with   and improve safety during all functional mobility. Reason for Services/Other Comments:  · Patient continues to require skilled intervention due to slight decline in functional mobility. Use of outcome tool(s) and clinical judgement create a POC that gives a: Clear prediction of patient's progress: LOW COMPLEXITY            TREATMENT:   (In addition to Assessment/Re-Assessment sessions the following treatments were rendered)   Pre-treatment Symptoms/Complaints:  none  Pain: Initial:   Pain Intensity 1: 0  Post Session:  0     Assessment/Reassessment only, no treatment provided today    Braces/Orthotics/Lines/Etc:   · O2 Device: Room air  Treatment/Session Assessment:    · Response to Treatment:  Pleasant and cooperative.   · Interdisciplinary Collaboration:   o Physical Therapist  o Registered Nurse  · After treatment position/precautions:   o Up in chair  o Bed/Chair-wheels locked  o Call light within reach  o RN notified   · Compliance with Program/Exercises: compliant all of the time. · Recommendations/Intent for next treatment session: \"Next visit will focus on advancements to more challenging activities and reduction in assistance provided\".   Total Treatment Duration:  PT Patient Time In/Time Out  Time In: 1350  Time Out: Cedrick Boston PT, DPT

## 2018-05-14 NOTE — PROGRESS NOTES
Patient is a long time [facility] . Was in good spirits. Spent time sharing our lety and fellowship. Prayer.     Yuni Navarro, staff [address], [address] [facility]  /   [email or web address]

## 2018-05-14 NOTE — PROGRESS NOTES
Care Management Interventions  PCP Verified by CM: Yes  Transition of Care Consult (CM Consult): Discharge Planning  Physical Therapy Consult: Yes  Current Support Network: [place] (lives with his daughter)  Confirm Follow Up Transport: Family  Plan discussed with Pt/Family/Caregiver: Yes  Freedom of Choice Offered: Yes  Discharge Location  Discharge Placement: Home with home health     Patient is here for a TI and IV antibx. He is doing better today. We await final culture to ensure the antibiotic ais correct/effective. .  Patient did well with PT and it appears he is at his baseline. Tried to call daughter Francie Perez listed on the face sheet. Her phone was not accepting messages. Tried number on the white board in room. [phone]. Same thing. No answer . Wanted to warn daughter patient would most likely be ready for dc tomorrow and offer [facility] services. Ishan Joseph

## 2018-05-14 NOTE — PROGRESS NOTES
Interdisciplinary Rounds completed 05/14/18. Nursing, Case Management, Physician and PT present. Plan of care reviewed and updated.

## 2018-05-14 NOTE — PROGRESS NOTES
OOB to chair most of day. Ate from meal trays. Denied n/v. Appeared alert and oriented. Ate from meal trays, no apparent n/v. Adult daughter in room during the later afternoon. She was updated on MD note and tentative discharge. Denied pain.

## 2018-05-14 NOTE — PROGRESS NOTES
Hospitalist Progress Note    2018  Admit Date: 2018  8:50 PM   NAME: Lauri Stephens   :  1940   MRN:  [id number]   Attending: Cj Kinsey MD  PCP:  Lex Johnston MD    SUBJECTIVE:   Patient 20A with hx of prostate cancer, hx of recurrent UTIs, HTN, afib presents to ED with report of 102.7 fever, lethargy, confusion and foul smelling urine. Lactic acid initially 2.9 prior to IVF bolus. Hx of prostate CA which he reports is in remission. UA suggestive of UTI and hospitalist asked to admit.  - much more alert today. Says he \"feels well for an old man\". Review of Systems negative with exception of pertinent positives noted above  PHYSICAL EXAM     Visit Vitals    /64 (BP 1 Location: Left arm, BP Patient Position: At rest)    Pulse 73    Temp 98.4 °F (36.9 °C)    Resp 18    Ht 6' 2\" (1.88 m)    Wt 112.5 kg (248 lb)    SpO2 98%    BMI 31.84 kg/m2      Temp (24hrs), Av.3 °F (36.8 °C), Min:98 °F (36.7 °C), Max:98.6 °F (37 °C)    Oxygen Therapy  O2 Sat (%): 98 % (18 1456)  Pulse via Oximetry: 74 beats per minute (18 0002)  O2 Device: Room air (18 0715)    Intake/Output Summary (Last 24 hours) at 18 1538  Last data filed at 18 1311   Gross per 24 hour   Intake              720 ml   Output                0 ml   Net              720 ml      General: No acute distress    Lungs:  CTA Bilaterally.    Heart:  Regular rate and rhythm,  No murmur, rub, or gallop  Abdomen: Soft, Non distended, Non tender, Positive bowel sounds  Extremities: No cyanosis, clubbing or edema  Neurologic:  No focal deficits    ASSESSMENT      Active Hospital Problems    Diagnosis Date Noted    Altered mental status 2018    Acute UTI 2018    Lactic acid acidosis 2016    Age-related physical debility 2015    History of prostate cancer 2014    Depression 2012    Morbid obesity ([address] 2012    Atrial fibrillation ([facility] 01/27/2012    HTN (hypertension) 01/27/2012     A/p  - AMS - secondary to UTI. resolving  - UTI/ GNR bacteremia 1of2 blood cx's - final culture pending. Cont rocephin.  - Hx of prostate CA - PSA now elevated at 14 (previously 4.3 6/28/17). Needs to follow with outpatient urology. Cont casodex, flomax  - Afib - rate controlled. Cont pradaxa    DVT Prophylaxis: pradaxa  dispo - likely to home at discharge.  ?in AM    Signed By: Trevor Mccormick, DO     May 14, 2018

## 2018-05-15 ENCOUNTER — HOME HEALTH ADMISSION (OUTPATIENT)
Dept: HOME HEALTH SERVICES | Facility: HOME HEALTH | Age: 78
End: 2018-05-15
Payer: MEDICARE

## 2018-05-15 VITALS
WEIGHT: 248 LBS | BODY MASS INDEX: 31.83 KG/M2 | HEIGHT: 74 IN | HEART RATE: 67 BPM | TEMPERATURE: 98.2 F | OXYGEN SATURATION: 99 % | RESPIRATION RATE: 18 BRPM | DIASTOLIC BLOOD PRESSURE: 67 MMHG | SYSTOLIC BLOOD PRESSURE: 123 MMHG

## 2018-05-15 LAB
ANION GAP SERPL CALC-SCNC: 8 MMOL/L (ref 7–16)
BACTERIA SPEC CULT: ABNORMAL
BUN SERPL-MCNC: 9 MG/DL (ref 8–23)
CALCIUM SERPL-MCNC: 9 MG/DL (ref 8.3–10.4)
CHLORIDE SERPL-SCNC: 111 MMOL/L (ref 98–107)
CO2 SERPL-SCNC: 25 MMOL/L (ref 21–32)
CREAT SERPL-MCNC: 0.67 MG/DL (ref 0.8–1.5)
ERYTHROCYTE [DISTWIDTH] IN BLOOD BY AUTOMATED COUNT: 13.8 % (ref 11.9–14.6)
GLUCOSE SERPL-MCNC: 101 MG/DL (ref 65–100)
GRAM STN SPEC: ABNORMAL
HCT VFR BLD AUTO: 38.8 % (ref 41.1–50.3)
HGB BLD-MCNC: 13.5 G/DL (ref 13.6–17.2)
MCH RBC QN AUTO: 30.5 PG (ref 26.1–32.9)
MCHC RBC AUTO-ENTMCNC: 34.8 G/DL (ref 31.4–35)
MCV RBC AUTO: 87.8 FL (ref 79.6–97.8)
PLATELET # BLD AUTO: 147 K/UL (ref 150–450)
PMV BLD AUTO: 11 FL (ref 10.8–14.1)
POTASSIUM SERPL-SCNC: 3.5 MMOL/L (ref 3.5–5.1)
RBC # BLD AUTO: 4.42 M/UL (ref 4.23–5.67)
SERVICE CMNT-IMP: ABNORMAL
SODIUM SERPL-SCNC: 144 MMOL/L (ref 136–145)
WBC # BLD AUTO: 5.1 K/UL (ref 4.3–11.1)

## 2018-05-15 PROCEDURE — 80048 BASIC METABOLIC PNL TOTAL CA: CPT | Performed by: INTERNAL MEDICINE

## 2018-05-15 PROCEDURE — 74011250637 HC RX REV CODE- 250/637: Performed by: HOSPITALIST

## 2018-05-15 PROCEDURE — 74011000258 HC RX REV CODE- 258: Performed by: HOSPITALIST

## 2018-05-15 PROCEDURE — 74011250636 HC RX REV CODE- 250/636: Performed by: HOSPITALIST

## 2018-05-15 PROCEDURE — 85027 COMPLETE CBC AUTOMATED: CPT | Performed by: INTERNAL MEDICINE

## 2018-05-15 PROCEDURE — 36415 COLL VENOUS BLD VENIPUNCTURE: CPT | Performed by: INTERNAL MEDICINE

## 2018-05-15 RX ORDER — CEFPODOXIME PROXETIL 200 MG/1
200 TABLET, FILM COATED ORAL 2 TIMES DAILY
Qty: 22 TAB | Refills: 0 | Status: SHIPPED | OUTPATIENT
Start: 2018-05-15 | End: 2018-05-26

## 2018-05-15 RX ORDER — CEFPODOXIME PROXETIL 200 MG/1
200 TABLET, FILM COATED ORAL 2 TIMES DAILY
Qty: 8 TAB | Refills: 0 | Status: SHIPPED | OUTPATIENT
Start: 2018-05-15 | End: 2018-05-15

## 2018-05-15 RX ADMIN — TAMSULOSIN HYDROCHLORIDE 0.4 MG: 0.4 CAPSULE ORAL at 09:01

## 2018-05-15 RX ADMIN — ARIPIPRAZOLE 10 MG: 10 TABLET ORAL at 09:01

## 2018-05-15 RX ADMIN — TRAMADOL HYDROCHLORIDE 50 MG: 50 TABLET, FILM COATED ORAL at 09:01

## 2018-05-15 RX ADMIN — DABIGATRAN ETEXILATE MESYLATE 150 MG: 150 CAPSULE ORAL at 09:00

## 2018-05-15 RX ADMIN — CEFTRIAXONE SODIUM 1 G: 1 INJECTION, POWDER, FOR SOLUTION INTRAMUSCULAR; INTRAVENOUS at 04:19

## 2018-05-15 RX ADMIN — Medication 1 AMPULE: at 09:01

## 2018-05-15 RX ADMIN — FAMOTIDINE 20 MG: 20 TABLET, FILM COATED ORAL at 09:01

## 2018-05-15 RX ADMIN — Medication 10 ML: at 05:12

## 2018-05-15 RX ADMIN — BICALUTAMIDE 50 MG: 50 TABLET, FILM COATED ORAL at 09:07

## 2018-05-15 RX ADMIN — ASPIRIN 81 MG 81 MG: 81 TABLET ORAL at 09:01

## 2018-05-15 RX ADMIN — STANDARDIZED SENNA CONCENTRATE AND DOCUSATE SODIUM 1 TABLET: 8.6; 5 TABLET, FILM COATED ORAL at 09:01

## 2018-05-15 NOTE — PROGRESS NOTES
Care Management Interventions  PCP Verified by CM: Yes  Transition of Care Consult (CM Consult): Discharge Planning  Physical Therapy Consult: Yes  Current Support Network: Relative's Home (lives with his daughter)  Confirm Follow Up Transport: Family  Plan discussed with Pt/Family/Caregiver: Yes  Freedom of Choice Offered: Yes  Discharge Location  Discharge Placement: Home with home health     Spoke with daughter yesterday evening. They are aware patient will dc to home. They are agreeable to [facility]. Patient doing well. Nelia Mensah

## 2018-05-15 NOTE — PROGRESS NOTES
Discharge instructions and prescriptions provided and explained to patient, patient voiced understanding. Medication side effect sheet reviewed with pt. No home meds or valuables to return. Opportunity for questions provided. Pt to be discharged when ride arrives around lunch time. Instructed to call once ready to leave the floor.

## 2018-05-15 NOTE — PROGRESS NOTES
[address]  Face to Face Encounter    Patients Name: Vida Greer    YOB: 1940    Ordering Physician: Ayaka Franklin    Primary Diagnosis: Acute UTI  Altered mental status    Date of Face to Face:   5/15/2018                                  Face to Face Encounter findings are related to primary reason for home care:   yes. 1. I certify that the patient needs intermittent care as follows: skilled nursing care:  skilled observation/assessment, patient education  physical therapy: strengthening, gait/stair training and balance training    2. I certify that this patient is homebound, that is: 1) patient requires the use of a walker device, special transportation, or assistance of another to leave the home; or 2) patient's condition makes leaving the home medically contraindicated; and 3) patient has a normal inability to leave the home and leaving the home requires considerable and taxing effort. Patient may leave the home for infrequent and short duration for medical reasons, and occasional absences for non-medical reasons. Homebound status is due to the following functional limitations: Patient with strength deficits limiting the performance of all ADL's without caregiver assistance or the use of an assistive device. Patient with poor safety awareness and is at risk for falls without assistance of another person and the use of an assistive device. Patient with poor ambulation endurance limiting their safe ability to ascend/descend the required number of steps to leave the home. 3. I certify that this patient is under my care and that I, or a nurse practitioner or [facility], or clinical nurse specialist, or certified nurse midwife, working with me, had a Face-to-Face Encounter that meets the physician Face-to-Face Encounter requirements.   The following are the clinical findings from the [address] [place] [address] encounter that support the need for skilled services and is a summary of the encounter:     See   Hospital chart      [facility] Hillman  5/15/2018      THE FOLLOWING TO BE COMPLETED BY THE COMMUNITY PHYSICIAN:    I concur with the findings described above from the F2F encounter that this patient is homebound and in need of a skilled service.     Certifying Physician: _____________________________________      Printed Certifying Physician Name: _____________________________________    Date: _________________

## 2018-05-15 NOTE — DISCHARGE SUMMARY
Hospitalist Discharge Summary     Patient ID:  Ena Alfaro  [id number]  53 y.o.  1940  Admit date: 5/12/2018  8:50 PM  Discharge date and time: 5/15/2018  Attending: Cornelio Barbosa MD  PCP:  Hayden Oliveira MD  Treatment Team: Attending Provider: Cornelio Barbosa MD; Utilization Review: Mita Addison RN; Care Manager: [facility]OC Pollard    Principal Diagnosis Acute UTI   Principal Problem:    Acute UTI (5/12/2018)    Active Problems:    Atrial fibrillation ([facility] [address] (1/27/2012)      HTN (hypertension) (1/27/2012)      Depression (8/28/2012)      Morbid obesity ([address] (8/28/2012)      History of prostate cancer (8/4/2014)      Age-related physical debility (2/6/2015)      Lactic acid acidosis (12/23/2016)      Acute metabolic encephalopathy (6/04/5374)      Altered mental status (5/12/2018)             Hospital Course:  Please refer to the admission H&P for details of presentation. In summary, the patient w/  hx of prostate cancer, hx of recurrent UTIs, HTN, afib presents to ED with report of 102.7 fever, lethargy, confusion and foul smelling urine. Lactic acid initially 2.9 prior to IVF bolus. Hx of prostate CA which he reports is in remission. UA suggestive of UTI and hospitalist asked to admit. Patient started on IV rocephin. Blood cx w/ 1of 2 Ecoli with sensitivity to 3rd gen Cephalosporin. Urine cx pending but most likely same organism. Pt has clinically improved. Will continue vantin at discharge for total of 14 day course atbx.        Significant Diagnostic Studies:   Final result (Exam End: 5/12/2018  9:40 PM) Open        Study Result      Portable chest xray       COMPARISON: December 31, 2017     INDICATION: Possible sepsis     FINDINGS:      There is a stable left-sided cardiac pacer. Lungs are underinflated. No definite  pulmonary consolidation. No pneumothorax or pulmonary edema. No large pleural  effusion. Cardiac silhouette is mildly prominent.  Mediastinal contour is within  normal limits. Surrounding bones are stable.     IMPRESSION  IMPRESSION:     Underinflated lungs. No evidence of acute pulmonary disease. Labs: Results:       Chemistry Recent Labs      05/15/18   0530 05/14/18   0540  05/13/18 0526 05/12/18   2111   GLU  101*  94  85  93   NA  144  144  145  144   K  3.5  3.7  3.7  3.7   CL  111*  111*  112*  110*   CO2  25  25  23  24   BUN  9  12  14  17   CREA  0.67*  0.74*  0.75*  0.91   CA  9.0  9.0  9.2  9.0   AGAP  8  8  10  10   AP   --    --   113  102   TP   --    --   6.6  6.6   ALB   --    --   2.5*  2.5*   GLOB   --    --   4.1*  4.1*   AGRAT   --    --   0.6*  0.6*      CBC w/Diff Recent Labs      05/15/18   0530  05/14/18   0540  05/13/18   0526  05/12/18   2111   WBC  5.1  5.8  8.7  10.5   RBC  4.42  4.65  4.71  4.63   HGB  13.5*  14.1  14.5  14.2   HCT  38.8*  40.9*  41.7  40.8*   PLT  147*  133*  131*  131*   GRANS   --    --   78  82*   LYMPH   --    --   13  9*   EOS   --    --   0*  0*      Cardiac Enzymes No results for input(s): CPK, CKND1, ALEXANDRA in the last 72 hours. No lab exists for component: CKRMB, TROIP   Coagulation No results for input(s): PTP, INR, APTT in the last 72 hours. No lab exists for component: INREXT    Lipid Panel Lab Results   Component Value Date/Time    Cholesterol, total 142 04/17/2017 11:59 AM    HDL Cholesterol 76 04/17/2017 11:59 AM    LDL, calculated 52 04/17/2017 11:59 AM    VLDL, calculated 14 04/17/2017 11:59 AM    Triglyceride 69 04/17/2017 11:59 AM    CHOL/HDL Ratio 1.9 08/29/2012 01:43 AM      BNP No results for input(s): BNPP in the last 72 hours.    Liver Enzymes Recent Labs      05/13/18   0526   TP  6.6   ALB  2.5*   AP  113   SGOT  33      Thyroid Studies Lab Results   Component Value Date/Time    T4, Total 7.6 05/12/2016 10:24 AM    TSH 0.587 05/13/2018 01:23 AM            Discharge Exam:  Visit Vitals    /67    Pulse 67    Temp 98.2 °F (36.8 °C)    Resp 18    Ht 6' 2\" (1.88 m)    Wt 112.5 kg (248 lb)    SpO2 99%    BMI 31.84 kg/m2     General appearance: alert, cooperative, no distress, appears stated age  Lungs: clear to auscultation bilaterally  Heart: regular rate and rhythm, S1, S2 normal, no murmur, click, rub or gallop  Abdomen: soft, non-tender. Bowel sounds normal. No masses,  no organomegaly  Extremities: no cyanosis or edema  Neurologic: Grossly normal    Disposition: home  Discharge Condition: stable  Patient Instructions:   Current Discharge Medication List      START taking these medications    Details   cefpodoxime (VANTIN) 200 mg tablet Take 1 Tab by mouth two (2) times a day for 4 days. Qty: 8 Tab, Refills: 0         CONTINUE these medications which have NOT CHANGED    Details   celecoxib (CELEBREX) 200 mg capsule Take  by mouth two (2) times a day. donepezil (ARICEPT) 5 mg tablet Take 1 Tab by mouth nightly. Qty: 90 Tab, Refills: 3    Associated Diagnoses: Mild dementia      levomilnacipran (FETZIMA) 40 mg ER capsule TAKE 1 CAP BY MOUTH DAILY. Qty: 90 Cap, Refills: 3    Associated Diagnoses: Other depression      tamsulosin (FLOMAX) 0.4 mg capsule Take 1 Cap by mouth daily. Qty: 90 Cap, Refills: 3      bicalutamide (CASODEX) 50 mg tablet Take 1 Tab by mouth daily. Qty: 90 Tab, Refills: 3    Associated Diagnoses: CA of prostate ([facility])      famotidine (PEPCID) 20 mg tablet Take 1 Tab by mouth two (2) times a day. Qty: 180 Tab, Refills: 3    Associated Diagnoses: Gastroesophageal reflux disease without esophagitis      ARIPiprazole (ABILIFY) 10 mg tablet Take 1 Tab by mouth daily. Qty: 90 Tab, Refills: 3    Associated Diagnoses: Major depressive disorder with single episode, in remission [facility]      dabigatran etexilate (PRADAXA) 150 mg capsule Take 1 Cap by mouth every twelve (12) hours. Qty: 180 Cap, Refills: 3    Associated Diagnoses: Chronic a-fib (HCC)      senna-docusate (SENNA LAXATIVE-STOOL SOFTENER) 8.6-50 mg per tablet Take 1 Tab by mouth daily. traMADol (ULTRAM) 50 mg tablet Take 1 Tab by mouth two (2) times a day. Max Daily Amount: 100 mg. Indications: Pain  Qty: 12 Tab, Refills: 0    Associated Diagnoses: Primary osteoarthritis of right knee      zolpidem (AMBIEN) 5 mg tablet Take 1 Tab by mouth nightly as needed for Sleep. Max Daily Amount: 5 mg. Indications: SLEEP-ONSET INSOMNIA  Qty: 3 Tab, Refills: 0    Associated Diagnoses: Primary insomnia      aspirin 81 mg chewable tablet Take 1 Tab by mouth daily. Indications: prevention of cerebrovascular accident  Qty: 30 Tab, Refills: 0      diclofenac (VOLTAREN) 1 % gel APPLY 4 GRAMS TO AFFECTED AREA FOUR TIMES DAILY  Refills: 2      acetaminophen (TYLENOL ARTHRITIS PAIN) 650 mg CR tablet Take 650 mg by mouth every six (6) hours as needed for Pain.          STOP taking these medications       cephALEXin (KEFLEX) 500 mg capsule Comments:   Reason for Stopping:         trimethoprim-sulfamethoxazole (BACTRIM DS) 160-800 mg per tablet Comments:   Reason for Stopping:               Activity: as tolerated  Diet:cardiac      Follow-up  · PCp in 1-2 weeks    Time spent to discharge patient 35 minutes  Signed:  Ervin Nino DO  5/15/2018  9:19 AM

## 2018-05-15 NOTE — DISCHARGE INSTRUCTIONS
DISCHARGE SUMMARY from Nurse    PATIENT INSTRUCTIONS:    After general anesthesia or intravenous sedation, for 24 hours or while taking prescription Narcotics:  · Limit your activities  · Do not drive and operate hazardous machinery  · Do not make important personal or business decisions  · Do  not drink alcoholic beverages  · If you have not urinated within 8 hours after discharge, please contact your surgeon on call. Report the following to your surgeon:  · Excessive pain, swelling, redness or odor of or around the surgical area  · Temperature over 100.5  · Nausea and vomiting lasting longer than 4 hours or if unable to take medications  · Any signs of decreased circulation or nerve impairment to extremity: change in color, persistent  numbness, tingling, coldness or increase pain  · Any questions    What to do at Home:  Recommended activity: Activity as tolerated, complete all of your antibiotics,. If you experience any of the following symptoms fever, chills, nausea or vomiting, new or unrelieved pain, or any other worrisome symptoms, please follow up with PCP. *  Please give a list of your current medications to your Primary Care Provider. *  Please update this list whenever your medications are discontinued, doses are      changed, or new medications (including over-the-counter products) are added. *  Please carry medication information at all times in case of emergency situations. These are general instructions for a healthy lifestyle:    No smoking/ No tobacco products/ Avoid exposure to second hand smoke  Surgeon General's Warning:  Quitting smoking now greatly reduces serious risk to your health.     Obesity, smoking, and sedentary lifestyle greatly increases your risk for illness    A healthy diet, regular physical exercise & weight monitoring are important for maintaining a healthy lifestyle    You may be retaining fluid if you have a history of heart failure or if you experience any of the following symptoms:  Weight gain of 3 pounds or more overnight or 5 pounds in a week, increased swelling in our hands or feet or shortness of breath while lying flat in bed. Please call your doctor as soon as you notice any of these symptoms; do not wait until your next office visit. Recognize signs and symptoms of STROKE:    F-face looks uneven    A-arms unable to move or move unevenly    S-speech slurred or non-existent    T-time-call 911 as soon as signs and symptoms begin-DO NOT go       Back to bed or wait to see if you get better-TIME IS BRAIN. Warning Signs of HEART ATTACK     Call 911 if you have these symptoms:   Chest discomfort. Most heart attacks involve discomfort in the center of the chest that lasts more than a few minutes, or that goes away and comes back. It can feel like uncomfortable pressure, squeezing, fullness, or pain.  Discomfort in other areas of the upper body. Symptoms can include pain or discomfort in one or both arms, the back, neck, jaw, or stomach.  Shortness of breath with or without chest discomfort.  Other signs may include breaking out in a cold sweat, nausea, or lightheadedness. Don't wait more than five minutes to call 911 - MINUTES MATTER! Fast action can save your life. Calling 911 is almost always the fastest way to get lifesaving treatment. Emergency Medical Services staff can begin treatment when they arrive -- up to an hour sooner than if someone gets to the hospital by car. The discharge information has been reviewed with the patient. The patient verbalized understanding. Discharge medications reviewed with the patient  and appropriate educational materials and side effects teaching were provided.   ___________________________________________________________________________________________________________________________________     Urinary Tract Infections in Men: Care Instructions  Your Care Instructions    A urinary tract infection, or UTI, is a general term for an infection anywhere between the kidneys and the tip of the penis. UTIs can also be a result of a prostate problem. Most cause pain or burning when you urinate. Most UTIs are caused by bacteria and can be cured with antibiotics. It is important to complete your treatment so that the infection does not get worse. Follow-up care is a key part of your treatment and safety. Be sure to make and go to all appointments, and call your doctor if you are having problems. It's also a good idea to know your test results and keep a list of the medicines you take. How can you care for yourself at home? · Take your antibiotics as prescribed. Do not stop taking them just because you feel better. You need to take the full course of antibiotics. · Take your medicines exactly as prescribed. Your doctor may have prescribed a medicine, such as phenazopyridine (Pyridium), to help relieve pain when you urinate. This turns your urine orange. You may stop taking it when your symptoms get better. But be sure to take all of your antibiotics, which treat the infection. · Drink extra water for the next day or two. This will help make the urine less concentrated and help wash out the bacteria causing the infection. (If you have kidney, heart, or liver disease and have to limit your fluids, talk with your doctor before you increase your fluid intake.)  · Avoid drinks that are carbonated or have caffeine. They can irritate the bladder. · Urinate often. Try to empty your bladder each time. · To relieve pain, take a hot bath or lay a heating pad (set on low) over your lower belly or genital area. Never go to sleep with a heating pad in place. To help prevent UTIs  · Drink plenty of fluids, enough so that your urine is light yellow or clear like water. If you have kidney, heart, or liver disease and have to limit fluids, talk with your doctor before you increase the amount of fluids you drink.   · Urinate when you have the urge. Do not hold your urine for a long time. Urinate before you go to sleep. · Keep your penis clean. Catheter care  If you have a drainage tube (catheter) in place, the following steps will help you care for it. · Always wash your hands before and after touching your catheter. · Check the area around the urethra for inflammation or signs of infection. Signs of infection include irritated, swollen, red, or tender skin, or pus around the catheter. · Clean the area around the catheter with soap and water two times a day. Dry with a clean towel afterward. · Do not apply powder or lotion to the skin around the catheter. To empty the urine collection bag  · Wash your hands with soap and water. · Without touching the drain spout, remove the spout from its sleeve at the bottom of the collection bag. Open the valve on the spout. · Let the urine flow out of the bag and into the toilet or a container. Do not let the tubing or drain spout touch anything. · After you empty the bag, clean the end of the drain spout with tissue and water. Close the valve and put the drain spout back into its sleeve at the bottom of the collection bag. · Wash your hands with soap and water. When should you call for help? Call your doctor now or seek immediate medical care if:  ? · Symptoms such as a fever, chills, nausea, or vomiting get worse or happen for the first time. ? · You have new pain in your back just below your rib cage. This is called flank pain. ? · There is new blood or pus in your urine. ? · You are not able to take or keep down your antibiotics. ? Watch closely for changes in your health, and be sure to contact your doctor if:  ? · You are not getting better after taking an antibiotic for 2 days. ? · Your symptoms go away but then come back. Where can you learn more? Go to http://virgil-nancy.info/.   Enter N004 in the search box to learn more about \"Urinary Tract Infections in Men: Care Instructions. \"  Current as of: May 12, 2017  Content Version: 11.4  © 1373-6479 [employer], [employer]. Care instructions adapted under license by [employer] (which disclaims liability or warranty for this information). If you have questions about a medical condition or this instruction, always ask your healthcare professional. [place] [address] any warranty or liability for your use of this information.

## 2018-05-15 NOTE — PROGRESS NOTES
Pt was calm and slept all night. Hourly rounds were done and pt needs were met. Report will be given to day shift and will continue to monitor.

## 2018-05-16 ENCOUNTER — HOME CARE VISIT (OUTPATIENT)
Dept: SCHEDULING | Facility: HOME HEALTH | Age: 78
End: 2018-05-16
Payer: MEDICARE

## 2018-05-16 ENCOUNTER — PATIENT OUTREACH (OUTPATIENT)
Dept: CASE MANAGEMENT | Age: 78
End: 2018-05-16

## 2018-05-16 VITALS
WEIGHT: 261 LBS | RESPIRATION RATE: 18 BRPM | BODY MASS INDEX: 33.5 KG/M2 | OXYGEN SATURATION: 98 % | DIASTOLIC BLOOD PRESSURE: 68 MMHG | HEART RATE: 61 BPM | SYSTOLIC BLOOD PRESSURE: 130 MMHG | HEIGHT: 74 IN | TEMPERATURE: 98.1 F

## 2018-05-16 LAB
BACTERIA SPEC CULT: ABNORMAL
SERVICE CMNT-IMP: ABNORMAL

## 2018-05-16 PROCEDURE — 400013 HH SOC

## 2018-05-16 PROCEDURE — 3331090002 HH PPS REVENUE DEBIT

## 2018-05-16 PROCEDURE — G0299 HHS/HOSPICE OF RN EA 15 MIN: HCPCS

## 2018-05-16 PROCEDURE — 3331090001 HH PPS REVENUE CREDIT

## 2018-05-16 NOTE — PROGRESS NOTES
This note will not be viewable in [address] Information received via [address] [place] [address] for [facility] program S/P discharge 5/15/18. Outreach call #1 made to home number and cell number. No answer, left message on  requesting return call. Will plan follow up call within 24 hours if no return call received.

## 2018-05-17 ENCOUNTER — PATIENT OUTREACH (OUTPATIENT)
Dept: CASE MANAGEMENT | Age: 78
End: 2018-05-17

## 2018-05-17 PROCEDURE — 3331090002 HH PPS REVENUE DEBIT

## 2018-05-17 PROCEDURE — 3331090001 HH PPS REVENUE CREDIT

## 2018-05-17 NOTE — PROGRESS NOTES
This note will not be viewable in [address] Date/Time of Call:   05/17/2018 12:32pm   What was the patient hospitalized for? UTI/Sepsis    Hx of prostate Cancer/HTN/AFIb   Does the patient understand his/her diagnosis and/or treatment and what happened during the hospitalization? Spoke with patient, who verbalized understanding of Dx and Tx plan. Reports he is doing well, eating and resting well. No new complaints. Did the patient receive discharge instructions? yes   CM Assessed Risk for Readmission:       Patient stated Risk for Readmission:       Low risk for readmission due to complications from UTI    Per patient, another infection. Review any discharge instructions (see discharge instructions/AVS in [facility]). Ask patient if they understand these. Do they have any questions? Discharge instructions reviewed with patient, who verbalized understanding. Focused on education, follow up appointments and med compliance. Were home services ordered (nursing, PT, OT, ST, etc.)? Yes   If so, has the first visit occurred? If not, why? (Assist with coordination of services if necessary.)   Yes   Was any DME ordered? No   If so, has it been received? If not, why?  (Assist patient in obtaining DME orders &/or equipment if necessary.) N/A   Complete a review of all medications (new, continued and discontinued meds per the D/C instructions and medication tab in [facility]). Medications reviewed with patient, who verbalized understanding, but states his daughter usually is in charge of his medicines       Were all new prescriptions filled? If not, why?  (Assist patient in obtaining medications if necessary  escalate for CCM &/or SW if ongoing issues are verbalized by pt or anticipated)   Yes, new prescriptions were filled and in the home. Patient denies any problems with obtaining medications. Does the patient understand the purpose and dosing instructions for all medications?   (If patient has questions, provide explanation and education.)   Patient was able to verbalize purpose for some of his medications and states his daughter put meds in pill planner and he takes them from that. Does the patient have any problems in performing ADLs? (If patient is unable to perform ADLs  what is the limiting factor(s)? Do they have a support system that can assist? If no support system is present, discuss possible assistance that they may be able to obtain. Escalate for CCM/SW if ongoing issues are verbalized by pt or anticipated)   Patient is normally independent with ADLs. Requires some assistance with dressing. Lives with his daughter who is assists as needed. Does the patient have all follow-up appointments scheduled? 7 day f/up with PCP?   (f/up with PCP may be w/in 14 days if patient has a f/up with their specialist w/in 7 days)    7-14 day f/up with specialist?   (or per discharge instructions)    If f/up has not been made  what actions has the care coordinator made to accomplish this? Has transportation been arranged? Patient has follow up appointment with Dr. Angeles Thornton, PCP on 5/21/18. Patient denies any problems with transportation to visits. Any other questions or concerns expressed by the patient? No other questions or concerns verbalized. Schedule next appointment with TOBI GUEVARA Coordinator or refer to RN Case Manager/ per the workflow guidelines. When is care coordinators next follow-up call scheduled? If referred for CCM  what RN care manager was the referral assigned? Patient will be followed by Care Coordinator. Patient has Care Coordinator contact information and was advised to call for any new concerns or needs.    PRASANNA Call Completed By: Kathi Riggins LPN, [facility] Coordinator

## 2018-05-18 LAB
BACTERIA SPEC CULT: NORMAL
SERVICE CMNT-IMP: NORMAL

## 2018-05-18 PROCEDURE — 3331090001 HH PPS REVENUE CREDIT

## 2018-05-18 PROCEDURE — 3331090002 HH PPS REVENUE DEBIT

## 2018-05-19 PROCEDURE — 3331090002 HH PPS REVENUE DEBIT

## 2018-05-19 PROCEDURE — 3331090001 HH PPS REVENUE CREDIT

## 2018-05-20 PROCEDURE — 3331090001 HH PPS REVENUE CREDIT

## 2018-05-20 PROCEDURE — 3331090002 HH PPS REVENUE DEBIT

## 2018-05-21 ENCOUNTER — HOME CARE VISIT (OUTPATIENT)
Dept: SCHEDULING | Facility: HOME HEALTH | Age: 78
End: 2018-05-21
Payer: MEDICARE

## 2018-05-21 VITALS
DIASTOLIC BLOOD PRESSURE: 64 MMHG | TEMPERATURE: 98.2 F | RESPIRATION RATE: 18 BRPM | SYSTOLIC BLOOD PRESSURE: 122 MMHG | HEART RATE: 78 BPM

## 2018-05-21 PROCEDURE — 3331090001 HH PPS REVENUE CREDIT

## 2018-05-21 PROCEDURE — 3331090002 HH PPS REVENUE DEBIT

## 2018-05-21 PROCEDURE — G0151 HHCP-SERV OF PT,EA 15 MIN: HCPCS

## 2018-05-22 ENCOUNTER — HOME CARE VISIT (OUTPATIENT)
Dept: SCHEDULING | Facility: HOME HEALTH | Age: 78
End: 2018-05-22
Payer: MEDICARE

## 2018-05-22 VITALS
TEMPERATURE: 97.8 F | OXYGEN SATURATION: 98 % | DIASTOLIC BLOOD PRESSURE: 62 MMHG | SYSTOLIC BLOOD PRESSURE: 131 MMHG | HEART RATE: 60 BPM | RESPIRATION RATE: 16 BRPM

## 2018-05-22 PROCEDURE — G0299 HHS/HOSPICE OF RN EA 15 MIN: HCPCS

## 2018-05-22 PROCEDURE — 3331090002 HH PPS REVENUE DEBIT

## 2018-05-22 PROCEDURE — 3331090001 HH PPS REVENUE CREDIT

## 2018-05-23 ENCOUNTER — HOME CARE VISIT (OUTPATIENT)
Dept: SCHEDULING | Facility: HOME HEALTH | Age: 78
End: 2018-05-23
Payer: MEDICARE

## 2018-05-23 VITALS
RESPIRATION RATE: 18 BRPM | HEART RATE: 78 BPM | TEMPERATURE: 97.7 F | SYSTOLIC BLOOD PRESSURE: 134 MMHG | DIASTOLIC BLOOD PRESSURE: 74 MMHG

## 2018-05-23 PROCEDURE — G0157 HHC PT ASSISTANT EA 15: HCPCS

## 2018-05-23 PROCEDURE — 3331090001 HH PPS REVENUE CREDIT

## 2018-05-23 PROCEDURE — 3331090002 HH PPS REVENUE DEBIT

## 2018-05-24 ENCOUNTER — HOME CARE VISIT (OUTPATIENT)
Dept: SCHEDULING | Facility: HOME HEALTH | Age: 78
End: 2018-05-24
Payer: MEDICARE

## 2018-05-24 VITALS
RESPIRATION RATE: 20 BRPM | OXYGEN SATURATION: 99 % | TEMPERATURE: 98.2 F | SYSTOLIC BLOOD PRESSURE: 140 MMHG | HEART RATE: 83 BPM | DIASTOLIC BLOOD PRESSURE: 60 MMHG

## 2018-05-24 PROCEDURE — 3331090001 HH PPS REVENUE CREDIT

## 2018-05-24 PROCEDURE — 3331090002 HH PPS REVENUE DEBIT

## 2018-05-24 PROCEDURE — G0299 HHS/HOSPICE OF RN EA 15 MIN: HCPCS

## 2018-05-25 PROCEDURE — 3331090001 HH PPS REVENUE CREDIT

## 2018-05-25 PROCEDURE — 3331090002 HH PPS REVENUE DEBIT

## 2018-05-26 PROCEDURE — 3331090002 HH PPS REVENUE DEBIT

## 2018-05-26 PROCEDURE — 3331090001 HH PPS REVENUE CREDIT

## 2018-05-27 PROCEDURE — 3331090001 HH PPS REVENUE CREDIT

## 2018-05-27 PROCEDURE — 3331090002 HH PPS REVENUE DEBIT

## 2018-05-28 PROCEDURE — 3331090001 HH PPS REVENUE CREDIT

## 2018-05-28 PROCEDURE — 3331090002 HH PPS REVENUE DEBIT

## 2018-05-29 ENCOUNTER — HOME CARE VISIT (OUTPATIENT)
Dept: SCHEDULING | Facility: HOME HEALTH | Age: 78
End: 2018-05-29
Payer: MEDICARE

## 2018-05-29 PROCEDURE — 3331090001 HH PPS REVENUE CREDIT

## 2018-05-29 PROCEDURE — 3331090002 HH PPS REVENUE DEBIT

## 2018-05-30 ENCOUNTER — PATIENT OUTREACH (OUTPATIENT)
Dept: CASE MANAGEMENT | Age: 78
End: 2018-05-30

## 2018-05-30 ENCOUNTER — HOME CARE VISIT (OUTPATIENT)
Dept: SCHEDULING | Facility: HOME HEALTH | Age: 78
End: 2018-05-30
Payer: MEDICARE

## 2018-05-30 VITALS
HEART RATE: 68 BPM | TEMPERATURE: 97.5 F | DIASTOLIC BLOOD PRESSURE: 82 MMHG | SYSTOLIC BLOOD PRESSURE: 122 MMHG | RESPIRATION RATE: 17 BRPM

## 2018-05-30 PROCEDURE — G0157 HHC PT ASSISTANT EA 15: HCPCS

## 2018-05-30 PROCEDURE — 3331090001 HH PPS REVENUE CREDIT

## 2018-05-30 PROCEDURE — 3331090002 HH PPS REVENUE DEBIT

## 2018-05-30 NOTE — PROGRESS NOTES
This note will not be viewable in [address] PRASANNA follow up call. No answer and no VM on home number, No answer and left VM on Cell number. Per CC notes patient missed PCP appointment on 5/29/18 and [facility] unable to reach for last visit on 5/29/18. Will plan next follow up call in 1 to 2 weeks.

## 2018-05-31 PROCEDURE — 3331090001 HH PPS REVENUE CREDIT

## 2018-05-31 PROCEDURE — 3331090002 HH PPS REVENUE DEBIT

## 2018-06-01 ENCOUNTER — HOME CARE VISIT (OUTPATIENT)
Dept: SCHEDULING | Facility: HOME HEALTH | Age: 78
End: 2018-06-01
Payer: MEDICARE

## 2018-06-01 ENCOUNTER — HOME CARE VISIT (OUTPATIENT)
Dept: HOME HEALTH SERVICES | Facility: HOME HEALTH | Age: 78
End: 2018-06-01

## 2018-06-01 VITALS
SYSTOLIC BLOOD PRESSURE: 150 MMHG | DIASTOLIC BLOOD PRESSURE: 84 MMHG | RESPIRATION RATE: 18 BRPM | TEMPERATURE: 97.1 F | OXYGEN SATURATION: 97 % | HEART RATE: 68 BPM

## 2018-06-01 PROCEDURE — 3331090002 HH PPS REVENUE DEBIT

## 2018-06-01 PROCEDURE — G0157 HHC PT ASSISTANT EA 15: HCPCS

## 2018-06-01 PROCEDURE — 3331090001 HH PPS REVENUE CREDIT

## 2018-06-02 PROCEDURE — 3331090002 HH PPS REVENUE DEBIT

## 2018-06-02 PROCEDURE — 3331090001 HH PPS REVENUE CREDIT

## 2018-06-03 PROCEDURE — 3331090001 HH PPS REVENUE CREDIT

## 2018-06-03 PROCEDURE — 3331090002 HH PPS REVENUE DEBIT

## 2018-06-04 PROCEDURE — 3331090001 HH PPS REVENUE CREDIT

## 2018-06-04 PROCEDURE — 3331090002 HH PPS REVENUE DEBIT

## 2018-06-05 ENCOUNTER — HOME CARE VISIT (OUTPATIENT)
Dept: HOME HEALTH SERVICES | Facility: HOME HEALTH | Age: 78
End: 2018-06-05
Payer: MEDICARE

## 2018-06-05 ENCOUNTER — HOME CARE VISIT (OUTPATIENT)
Dept: SCHEDULING | Facility: HOME HEALTH | Age: 78
End: 2018-06-05
Payer: MEDICARE

## 2018-06-05 VITALS
SYSTOLIC BLOOD PRESSURE: 130 MMHG | DIASTOLIC BLOOD PRESSURE: 70 MMHG | RESPIRATION RATE: 17 BRPM | HEART RATE: 72 BPM | TEMPERATURE: 97.3 F

## 2018-06-05 PROCEDURE — 3331090001 HH PPS REVENUE CREDIT

## 2018-06-05 PROCEDURE — 3331090002 HH PPS REVENUE DEBIT

## 2018-06-05 PROCEDURE — G0157 HHC PT ASSISTANT EA 15: HCPCS

## 2018-06-06 ENCOUNTER — HOME CARE VISIT (OUTPATIENT)
Dept: SCHEDULING | Facility: HOME HEALTH | Age: 78
End: 2018-06-06
Payer: MEDICARE

## 2018-06-06 VITALS
HEART RATE: 61 BPM | TEMPERATURE: 96.1 F | OXYGEN SATURATION: 98 % | DIASTOLIC BLOOD PRESSURE: 70 MMHG | RESPIRATION RATE: 18 BRPM | SYSTOLIC BLOOD PRESSURE: 129 MMHG

## 2018-06-06 PROCEDURE — 3331090001 HH PPS REVENUE CREDIT

## 2018-06-06 PROCEDURE — 3331090002 HH PPS REVENUE DEBIT

## 2018-06-06 PROCEDURE — G0299 HHS/HOSPICE OF RN EA 15 MIN: HCPCS

## 2018-06-07 PROCEDURE — 3331090002 HH PPS REVENUE DEBIT

## 2018-06-07 PROCEDURE — 3331090001 HH PPS REVENUE CREDIT

## 2018-06-08 ENCOUNTER — HOME CARE VISIT (OUTPATIENT)
Dept: SCHEDULING | Facility: HOME HEALTH | Age: 78
End: 2018-06-08
Payer: MEDICARE

## 2018-06-08 VITALS
SYSTOLIC BLOOD PRESSURE: 130 MMHG | DIASTOLIC BLOOD PRESSURE: 80 MMHG | HEART RATE: 72 BPM | TEMPERATURE: 98.1 F | RESPIRATION RATE: 17 BRPM

## 2018-06-08 PROCEDURE — 3331090002 HH PPS REVENUE DEBIT

## 2018-06-08 PROCEDURE — G0157 HHC PT ASSISTANT EA 15: HCPCS

## 2018-06-08 PROCEDURE — 3331090001 HH PPS REVENUE CREDIT

## 2018-06-09 PROCEDURE — 3331090001 HH PPS REVENUE CREDIT

## 2018-06-09 PROCEDURE — 3331090002 HH PPS REVENUE DEBIT

## 2018-06-10 PROCEDURE — 3331090001 HH PPS REVENUE CREDIT

## 2018-06-10 PROCEDURE — 3331090002 HH PPS REVENUE DEBIT

## 2018-06-11 PROCEDURE — 3331090001 HH PPS REVENUE CREDIT

## 2018-06-11 PROCEDURE — 3331090002 HH PPS REVENUE DEBIT

## 2018-06-12 ENCOUNTER — HOME CARE VISIT (OUTPATIENT)
Dept: SCHEDULING | Facility: HOME HEALTH | Age: 78
End: 2018-06-12
Payer: MEDICARE

## 2018-06-12 VITALS
HEART RATE: 76 BPM | TEMPERATURE: 98.1 F | RESPIRATION RATE: 17 BRPM | DIASTOLIC BLOOD PRESSURE: 78 MMHG | SYSTOLIC BLOOD PRESSURE: 132 MMHG

## 2018-06-12 PROCEDURE — 3331090002 HH PPS REVENUE DEBIT

## 2018-06-12 PROCEDURE — 3331090001 HH PPS REVENUE CREDIT

## 2018-06-12 PROCEDURE — G0157 HHC PT ASSISTANT EA 15: HCPCS

## 2018-06-13 ENCOUNTER — PATIENT OUTREACH (OUTPATIENT)
Dept: CASE MANAGEMENT | Age: 78
End: 2018-06-13

## 2018-06-13 PROCEDURE — 3331090001 HH PPS REVENUE CREDIT

## 2018-06-13 PROCEDURE — 3331090002 HH PPS REVENUE DEBIT

## 2018-06-13 NOTE — PROGRESS NOTES
This note will not be viewable in [address] PRASANNA follow up note. Call placed to home number, no answer and no VM setup. Call placed to cell number, No answer, left VM requesting a return call. Per CC notes patient continues to receive [facility] therapy and has been discharged by [facility] nursing. No PCP follow up notes in chart. Will close case and reopen if return call received.

## 2018-06-14 PROCEDURE — 3331090002 HH PPS REVENUE DEBIT

## 2018-06-14 PROCEDURE — 3331090001 HH PPS REVENUE CREDIT

## 2018-06-15 ENCOUNTER — HOME CARE VISIT (OUTPATIENT)
Dept: SCHEDULING | Facility: HOME HEALTH | Age: 78
End: 2018-06-15
Payer: MEDICARE

## 2018-06-15 VITALS
SYSTOLIC BLOOD PRESSURE: 120 MMHG | HEART RATE: 64 BPM | RESPIRATION RATE: 16 BRPM | OXYGEN SATURATION: 94 % | DIASTOLIC BLOOD PRESSURE: 64 MMHG | TEMPERATURE: 97.4 F

## 2018-06-15 PROCEDURE — 3331090001 HH PPS REVENUE CREDIT

## 2018-06-15 PROCEDURE — G0151 HHCP-SERV OF PT,EA 15 MIN: HCPCS

## 2018-06-15 PROCEDURE — 3331090002 HH PPS REVENUE DEBIT

## 2018-06-16 PROCEDURE — 3331090001 HH PPS REVENUE CREDIT

## 2018-06-16 PROCEDURE — 3331090002 HH PPS REVENUE DEBIT

## 2018-06-17 PROCEDURE — 3331090002 HH PPS REVENUE DEBIT

## 2018-06-17 PROCEDURE — 3331090001 HH PPS REVENUE CREDIT

## 2018-06-18 PROCEDURE — 3331090001 HH PPS REVENUE CREDIT

## 2018-06-18 PROCEDURE — 3331090002 HH PPS REVENUE DEBIT

## 2018-07-26 PROBLEM — F03.B0 MODERATE DEMENTIA WITHOUT BEHAVIORAL DISTURBANCE: Status: ACTIVE | Noted: 2018-07-26

## 2018-09-25 ENCOUNTER — APPOINTMENT (OUTPATIENT)
Dept: GENERAL RADIOLOGY | Age: 78
DRG: 689 | End: 2018-09-25
Attending: EMERGENCY MEDICINE
Payer: MEDICARE

## 2018-09-25 ENCOUNTER — APPOINTMENT (OUTPATIENT)
Dept: CT IMAGING | Age: 78
DRG: 689 | End: 2018-09-25
Attending: EMERGENCY MEDICINE
Payer: MEDICARE

## 2018-09-25 ENCOUNTER — HOSPITAL ENCOUNTER (INPATIENT)
Age: 78
LOS: 3 days | Discharge: HOME HEALTH | DRG: 689 | End: 2018-09-28
Attending: EMERGENCY MEDICINE | Admitting: FAMILY MEDICINE
Payer: MEDICARE

## 2018-09-25 DIAGNOSIS — N39.0 ACUTE UTI: ICD-10-CM

## 2018-09-25 DIAGNOSIS — R41.82 ALTERED MENTAL STATUS, UNSPECIFIED ALTERED MENTAL STATUS TYPE: Primary | ICD-10-CM

## 2018-09-25 PROBLEM — J18.9 CAP (COMMUNITY ACQUIRED PNEUMONIA): Status: RESOLVED | Noted: 2017-07-26 | Resolved: 2018-09-25
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
## 2018-09-25 PROBLEM — E87.5 HYPERKALEMIA: Status: ACTIVE | Noted: 2018-09-25

## 2018-09-25 PROBLEM — G93.40 ACUTE ENCEPHALOPATHY: Status: RESOLVED | Noted: 2017-07-23 | Resolved: 2018-09-25

## 2018-09-25 PROBLEM — R50.9 FEBRILE ILLNESS, ACUTE: Status: RESOLVED | Noted: 2017-07-22 | Resolved: 2018-09-25

## 2018-09-25 PROBLEM — C61 CA OF PROSTATE (HCC): Status: RESOLVED | Noted: 2017-04-17 | Resolved: 2018-09-25

## 2018-09-25 LAB
ALBUMIN SERPL-MCNC: 2.6 G/DL (ref 3.2–4.6)
ALBUMIN/GLOB SERPL: 0.6 {RATIO} (ref 1.2–3.5)
ALP SERPL-CCNC: 101 U/L (ref 50–136)
ALT SERPL-CCNC: 33 U/L (ref 12–65)
ANION GAP SERPL CALC-SCNC: 6 MMOL/L (ref 7–16)
AST SERPL-CCNC: 125 U/L (ref 15–37)
BACTERIA URNS QL MICRO: ABNORMAL /HPF
BASOPHILS # BLD: 0 K/UL (ref 0–0.2)
BASOPHILS NFR BLD: 0 % (ref 0–2)
BILIRUB SERPL-MCNC: 1 MG/DL (ref 0.2–1.1)
BNP SERPL-MCNC: 44 PG/ML
BUN SERPL-MCNC: 12 MG/DL (ref 8–23)
CALCIUM SERPL-MCNC: 9 MG/DL (ref 8.3–10.4)
CASTS URNS QL MICRO: ABNORMAL /LPF
CHLORIDE SERPL-SCNC: 114 MMOL/L (ref 98–107)
CO2 SERPL-SCNC: 23 MMOL/L (ref 21–32)
CREAT SERPL-MCNC: 0.91 MG/DL (ref 0.8–1.5)
DIFFERENTIAL METHOD BLD: NORMAL
EOSINOPHIL # BLD: 0.1 K/UL (ref 0–0.8)
EOSINOPHIL NFR BLD: 2 % (ref 0.5–7.8)
EPI CELLS #/AREA URNS HPF: 0 /HPF
ERYTHROCYTE [DISTWIDTH] IN BLOOD BY AUTOMATED COUNT: 14.2 %
GLOBULIN SER CALC-MCNC: 4.7 G/DL (ref 2.3–3.5)
GLUCOSE SERPL-MCNC: 53 MG/DL (ref 65–100)
HCT VFR BLD AUTO: 42.8 % (ref 41.1–50.3)
HGB BLD-MCNC: 14.3 G/DL (ref 13.6–17.2)
IMM GRANULOCYTES # BLD: 0 K/UL (ref 0–0.5)
IMM GRANULOCYTES NFR BLD AUTO: 0 % (ref 0–5)
LYMPHOCYTES # BLD: 1.6 K/UL (ref 0.5–4.6)
LYMPHOCYTES NFR BLD: 30 % (ref 13–44)
MCH RBC QN AUTO: 30.8 PG (ref 26.1–32.9)
MCHC RBC AUTO-ENTMCNC: 33.4 G/DL (ref 31.4–35)
MCV RBC AUTO: 92.2 FL (ref 79.6–97.8)
MONOCYTES # BLD: 0.4 K/UL (ref 0.1–1.3)
MONOCYTES NFR BLD: 7 % (ref 4–12)
NEUTS SEG # BLD: 3.3 K/UL (ref 1.7–8.2)
NEUTS SEG NFR BLD: 61 % (ref 43–78)
NRBC # BLD: 0 K/UL (ref 0–0.2)
PLATELET # BLD AUTO: 208 K/UL (ref 150–450)
PMV BLD AUTO: 12.2 FL (ref 9.4–12.3)
POTASSIUM SERPL-SCNC: 3.5 MMOL/L (ref 3.5–5.1)
POTASSIUM SERPL-SCNC: 6.8 MMOL/L (ref 3.5–5.1)
PROT SERPL-MCNC: 7.3 G/DL (ref 6.3–8.2)
RBC # BLD AUTO: 4.64 M/UL (ref 4.23–5.6)
RBC #/AREA URNS HPF: ABNORMAL /HPF
SODIUM SERPL-SCNC: 143 MMOL/L (ref 136–145)
TROPONIN I BLD-MCNC: 0.06 NG/ML (ref 0.02–0.05)
WBC # BLD AUTO: 5.5 K/UL (ref 4.3–11.1)
WBC URNS QL MICRO: >100 /HPF

## 2018-09-25 PROCEDURE — 84132 ASSAY OF SERUM POTASSIUM: CPT

## 2018-09-25 PROCEDURE — 87086 URINE CULTURE/COLONY COUNT: CPT

## 2018-09-25 PROCEDURE — 80053 COMPREHEN METABOLIC PANEL: CPT

## 2018-09-25 PROCEDURE — 99285 EMERGENCY DEPT VISIT HI MDM: CPT | Performed by: EMERGENCY MEDICINE

## 2018-09-25 PROCEDURE — 81015 MICROSCOPIC EXAM OF URINE: CPT

## 2018-09-25 PROCEDURE — 74011000302 HC RX REV CODE- 302: Performed by: FAMILY MEDICINE

## 2018-09-25 PROCEDURE — 74011250636 HC RX REV CODE- 250/636: Performed by: FAMILY MEDICINE

## 2018-09-25 PROCEDURE — 87088 URINE BACTERIA CULTURE: CPT

## 2018-09-25 PROCEDURE — 86580 TB INTRADERMAL TEST: CPT | Performed by: FAMILY MEDICINE

## 2018-09-25 PROCEDURE — 74011000258 HC RX REV CODE- 258: Performed by: FAMILY MEDICINE

## 2018-09-25 PROCEDURE — 83880 ASSAY OF NATRIURETIC PEPTIDE: CPT

## 2018-09-25 PROCEDURE — 87186 SC STD MICRODIL/AGAR DIL: CPT

## 2018-09-25 PROCEDURE — 70450 CT HEAD/BRAIN W/O DYE: CPT

## 2018-09-25 PROCEDURE — 81003 URINALYSIS AUTO W/O SCOPE: CPT | Performed by: EMERGENCY MEDICINE

## 2018-09-25 PROCEDURE — 71045 X-RAY EXAM CHEST 1 VIEW: CPT

## 2018-09-25 PROCEDURE — 84484 ASSAY OF TROPONIN QUANT: CPT

## 2018-09-25 PROCEDURE — 65660000000 HC RM CCU STEPDOWN

## 2018-09-25 PROCEDURE — 85025 COMPLETE CBC W/AUTO DIFF WBC: CPT

## 2018-09-25 PROCEDURE — 93005 ELECTROCARDIOGRAM TRACING: CPT | Performed by: EMERGENCY MEDICINE

## 2018-09-25 RX ORDER — ADHESIVE BANDAGE
30 BANDAGE TOPICAL DAILY PRN
Status: DISCONTINUED | OUTPATIENT
Start: 2018-09-25 | End: 2018-09-28 | Stop reason: HOSPADM

## 2018-09-25 RX ORDER — SODIUM CHLORIDE 9 MG/ML
125 INJECTION, SOLUTION INTRAVENOUS CONTINUOUS
Status: DISCONTINUED | OUTPATIENT
Start: 2018-09-25 | End: 2018-09-28

## 2018-09-25 RX ORDER — GUAIFENESIN 100 MG/5ML
81 LIQUID (ML) ORAL DAILY
Status: DISCONTINUED | OUTPATIENT
Start: 2018-09-26 | End: 2018-09-28 | Stop reason: HOSPADM

## 2018-09-25 RX ORDER — ZOLPIDEM TARTRATE 5 MG/1
5 TABLET ORAL
Status: DISCONTINUED | OUTPATIENT
Start: 2018-09-25 | End: 2018-09-28 | Stop reason: HOSPADM

## 2018-09-25 RX ORDER — ACETAMINOPHEN 325 MG/1
650 TABLET ORAL
Status: DISCONTINUED | OUTPATIENT
Start: 2018-09-25 | End: 2018-09-28 | Stop reason: HOSPADM

## 2018-09-25 RX ORDER — BICALUTAMIDE 50 MG/1
50 TABLET, FILM COATED ORAL DAILY
Status: DISCONTINUED | OUTPATIENT
Start: 2018-09-26 | End: 2018-09-28 | Stop reason: HOSPADM

## 2018-09-25 RX ORDER — DONEPEZIL HYDROCHLORIDE 5 MG/1
5 TABLET, FILM COATED ORAL
Status: DISCONTINUED | OUTPATIENT
Start: 2018-09-25 | End: 2018-09-28 | Stop reason: HOSPADM

## 2018-09-25 RX ORDER — ARIPIPRAZOLE 10 MG/1
10 TABLET ORAL DAILY
Status: DISCONTINUED | OUTPATIENT
Start: 2018-09-26 | End: 2018-09-28 | Stop reason: HOSPADM

## 2018-09-25 RX ORDER — DABIGATRAN ETEXILATE 150 MG/1
150 CAPSULE ORAL EVERY 12 HOURS
Status: DISCONTINUED | OUTPATIENT
Start: 2018-09-25 | End: 2018-09-28 | Stop reason: HOSPADM

## 2018-09-25 RX ORDER — ONDANSETRON 2 MG/ML
4 INJECTION INTRAMUSCULAR; INTRAVENOUS
Status: DISCONTINUED | OUTPATIENT
Start: 2018-09-25 | End: 2018-09-28 | Stop reason: HOSPADM

## 2018-09-25 RX ORDER — FAMOTIDINE 20 MG/1
20 TABLET, FILM COATED ORAL 2 TIMES DAILY
Status: DISCONTINUED | OUTPATIENT
Start: 2018-09-26 | End: 2018-09-28 | Stop reason: HOSPADM

## 2018-09-25 RX ORDER — CELECOXIB 200 MG/1
200 CAPSULE ORAL 2 TIMES DAILY
Status: DISCONTINUED | OUTPATIENT
Start: 2018-09-26 | End: 2018-09-28 | Stop reason: HOSPADM

## 2018-09-25 RX ORDER — SODIUM CHLORIDE 0.9 % (FLUSH) 0.9 %
5-10 SYRINGE (ML) INJECTION EVERY 8 HOURS
Status: DISCONTINUED | OUTPATIENT
Start: 2018-09-25 | End: 2018-09-28 | Stop reason: HOSPADM

## 2018-09-25 RX ORDER — TAMSULOSIN HYDROCHLORIDE 0.4 MG/1
0.4 CAPSULE ORAL DAILY
Status: DISCONTINUED | OUTPATIENT
Start: 2018-09-26 | End: 2018-09-28 | Stop reason: HOSPADM

## 2018-09-25 RX ORDER — SODIUM CHLORIDE 0.9 % (FLUSH) 0.9 %
5-10 SYRINGE (ML) INJECTION AS NEEDED
Status: DISCONTINUED | OUTPATIENT
Start: 2018-09-25 | End: 2018-09-28 | Stop reason: HOSPADM

## 2018-09-25 RX ADMIN — Medication 10 ML: at 23:54

## 2018-09-25 RX ADMIN — SODIUM CHLORIDE 125 ML/HR: 900 INJECTION, SOLUTION INTRAVENOUS at 23:32

## 2018-09-25 RX ADMIN — TUBERCULIN PURIFIED PROTEIN DERIVATIVE 5 UNITS: 5 INJECTION, SOLUTION INTRADERMAL at 22:41

## 2018-09-25 RX ADMIN — CEFTRIAXONE SODIUM 1 G: 1 INJECTION, POWDER, FOR SOLUTION INTRAMUSCULAR; INTRAVENOUS at 21:11

## 2018-09-25 NOTE — ED TRIAGE NOTES
Pt states weakness since yesterday. Hx of UTI. Family is suspect for recurring infection. SIRS criteria not met with EMS. /86, HR paced . States he has been urinating without difficulty. Ems states strong urine smell. Pt oriented to person and president and that is his normal. BGL 96.

## 2018-09-25 NOTE — ED PROVIDER NOTES
HPI Comments: 17-year-old male with history of Dementia, CAD, CHF, HTN, recurrent UTI ppresents with complaint of generalized weakness and fatigue that began yesterday. State the patient has slightly more confused than normal. Patient was admitted with sepsis and UTI on 5/12/2018. EMS state the patient has strong/malodorous urine. Patient denies any recent trauma or injury. Patient denies focal weakness, numbness, tingling, chest pain, shortness of breath, fever, chills, abdominal pain, nausea, vomiting, productive cough. Patient is a 76 y.o. male presenting with fatigue. The history is provided by the patient. No  was used. Fatigue This is a new problem. The current episode started 12 to 24 hours ago. The problem has not changed since onset. There was no focality noted. Pertinent negatives include no focal weakness, no loss of balance, no slurred speech, no speech difficulty, no memory loss, no movement disorder, no agitation, no auditory change, no mental status change, no unresponsiveness and no disorientation. There has been no fever. Pertinent negatives include no shortness of breath, no chest pain, no vomiting, no confusion, no headaches and no nausea. Associated medical issues do not include trauma. Past Medical History:  
Diagnosis Date  Arrhythmia   
 pt takes pradaxa  Arthritis  Atrial fibrillation ([address] 1/27/2012  CAD (coronary artery disease)  Cancer [facility])   
 prostate  GERD (gastroesophageal reflux disease)   
 controlled with medication  Heart failure ([address]   
 pt takes lasix as needed, reports SOB with exertion  Hypertension   
 controlled with medication  Morbid obesity ([address] Past Surgical History:  
Procedure Laterality Date  HX ORTHOPAEDIC  2010  
 left TKA  HX PACEMAKER  8/30/2012 [facility] pacemaker  HX PROSTATECTOMY Family History:  
Problem Relation Age of Onset  Cancer Father Social History Social History  Marital status:  Spouse name: N/A  
 Number of children: N/A  
 Years of education: N/A Occupational History  Not on file. Social History Main Topics  Smoking status: Never Smoker  Smokeless tobacco: Never Used  Alcohol use No  
 Drug use: No  
 Sexual activity: No  
 
Other Topics Concern  Not on file Social History Narrative ALLERGIES: Review of patient's allergies indicates no known allergies. Review of Systems Constitutional: Positive for fatigue. Negative for chills and fever. HENT: Negative for congestion, facial swelling and sore throat. Respiratory: Negative for cough and shortness of breath. Cardiovascular: Negative for chest pain and palpitations. Gastrointestinal: Negative for abdominal pain, constipation, nausea and vomiting. Genitourinary: Negative for dysuria and hematuria. Musculoskeletal: Negative for back pain, joint swelling, myalgias and neck pain. Skin: Negative for pallor and wound. Neurological: Positive for weakness. Negative for dizziness, focal weakness, speech difficulty, numbness, headaches and loss of balance. Psychiatric/Behavioral: Negative for agitation, confusion and memory loss. Vitals:  
 09/25/18 1626 09/25/18 1635 BP:  130/60 Pulse: 63 Resp: 18 Temp: 98.3 °F (36.8 °C) SpO2: 95% Weight: 115.2 kg (254 lb) Height: 6' 2\" (1.88 m) Physical Exam  
Constitutional: He is oriented to person, place, and time. Patient well-appearing in no acute distress. HENT:  
Head: Normocephalic. MMM. Eyes: Conjunctivae are normal. Pupils are equal, round, and reactive to light. Neck: Normal range of motion. No JVD present. No tracheal deviation present. Cardiovascular: Normal rate, regular rhythm, normal heart sounds and intact distal pulses. Radial pulses 2+ and equal bilaterally.   
Pulmonary/Chest: Effort normal and breath sounds normal.  
 CTAB. No chest wall TTP. Abdominal: Soft. There is no tenderness. There is no rebound and no guarding. Soft, NTND. No rebound or guarding. No CVAT. Musculoskeletal: Normal range of motion. He exhibits no tenderness. No LE edema. Neurological: He is alert and oriented to person, place, and time. No cranial nerve deficit. Coordination normal.  
Patient alert and oriented to person and place. Patient believes it is 18. Strength 5 out of 5 throughout. No facial droop. No dysarthria. Normal sensory exam.  
Skin: Skin is warm and dry. No rash. Nursing note and vitals reviewed. MDM Number of Diagnoses or Management Options Acute UTI: new and requires workup Altered mental status, unspecified altered mental status type: new and requires workup Diagnosis management comments: CT head with no acute findings. Chest x-ray negative for infiltrate or consolidation. UA w/ evidence of UTI. Urine culture sent. Pt given Rocephin 1 g IV. Potassium hemolyzed (6.8); repeat pending. Hospitalist consulted for admission. Amount and/or Complexity of Data Reviewed Clinical lab tests: ordered and reviewed Tests in the radiology section of CPT®: reviewed and ordered Tests in the medicine section of CPT®: ordered and reviewed Review and summarize past medical records: yes Independent visualization of images, tracings, or specimens: yes Risk of Complications, Morbidity, and/or Mortality Presenting problems: moderate Diagnostic procedures: moderate Management options: moderate Patient Progress Patient progress: stable ED Course Comment By Time CXR Impression: Stable portable chest. Anthony Peter MD 09/25 7180 CT head Impression: 1. Findings most compatible with mild chronic small vessel ischemic changes. 2. Mild volume loss. Candi William MD 09/25 1976 Procedures Candi William MD; 9/25/2018 @5:06 PM Voice dictation software was used during the making of this note. This software is not perfect and grammatical and other typographical errors may be present.   This note has not been proofread for errors. 
===================================================================

## 2018-09-26 LAB
ANION GAP SERPL CALC-SCNC: 8 MMOL/L (ref 7–16)
ANION GAP SERPL CALC-SCNC: 8 MMOL/L (ref 7–16)
BASOPHILS # BLD: 0 K/UL (ref 0–0.2)
BASOPHILS # BLD: 0 K/UL (ref 0–0.2)
BASOPHILS NFR BLD: 0 % (ref 0–2)
BASOPHILS NFR BLD: 1 % (ref 0–2)
BUN SERPL-MCNC: 11 MG/DL (ref 8–23)
BUN SERPL-MCNC: 12 MG/DL (ref 8–23)
CALCIUM SERPL-MCNC: 8.9 MG/DL (ref 8.3–10.4)
CALCIUM SERPL-MCNC: 8.9 MG/DL (ref 8.3–10.4)
CHLORIDE SERPL-SCNC: 114 MMOL/L (ref 98–107)
CHLORIDE SERPL-SCNC: 115 MMOL/L (ref 98–107)
CO2 SERPL-SCNC: 25 MMOL/L (ref 21–32)
CO2 SERPL-SCNC: 25 MMOL/L (ref 21–32)
CREAT SERPL-MCNC: 0.92 MG/DL (ref 0.8–1.5)
CREAT SERPL-MCNC: 0.92 MG/DL (ref 0.8–1.5)
DIFFERENTIAL METHOD BLD: ABNORMAL
DIFFERENTIAL METHOD BLD: ABNORMAL
EOSINOPHIL # BLD: 0.1 K/UL (ref 0–0.8)
EOSINOPHIL # BLD: 0.2 K/UL (ref 0–0.8)
EOSINOPHIL NFR BLD: 2 % (ref 0.5–7.8)
EOSINOPHIL NFR BLD: 3 % (ref 0.5–7.8)
ERYTHROCYTE [DISTWIDTH] IN BLOOD BY AUTOMATED COUNT: 13.6 %
ERYTHROCYTE [DISTWIDTH] IN BLOOD BY AUTOMATED COUNT: 13.8 %
GLUCOSE SERPL-MCNC: 75 MG/DL (ref 65–100)
GLUCOSE SERPL-MCNC: 88 MG/DL (ref 65–100)
HCT VFR BLD AUTO: 41.5 % (ref 41.1–50.3)
HCT VFR BLD AUTO: 42.9 % (ref 41.1–50.3)
HGB BLD-MCNC: 14 G/DL (ref 13.6–17.2)
HGB BLD-MCNC: 14 G/DL (ref 13.6–17.2)
IMM GRANULOCYTES # BLD: 0 K/UL (ref 0–0.5)
IMM GRANULOCYTES # BLD: 0 K/UL (ref 0–0.5)
IMM GRANULOCYTES NFR BLD AUTO: 0 % (ref 0–5)
IMM GRANULOCYTES NFR BLD AUTO: 0 % (ref 0–5)
LYMPHOCYTES # BLD: 1.6 K/UL (ref 0.5–4.6)
LYMPHOCYTES # BLD: 1.9 K/UL (ref 0.5–4.6)
LYMPHOCYTES NFR BLD: 28 % (ref 13–44)
LYMPHOCYTES NFR BLD: 34 % (ref 13–44)
MCH RBC QN AUTO: 29.9 PG (ref 26.1–32.9)
MCH RBC QN AUTO: 30.3 PG (ref 26.1–32.9)
MCHC RBC AUTO-ENTMCNC: 32.6 G/DL (ref 31.4–35)
MCHC RBC AUTO-ENTMCNC: 33.7 G/DL (ref 31.4–35)
MCV RBC AUTO: 89.8 FL (ref 79.6–97.8)
MCV RBC AUTO: 91.5 FL (ref 79.6–97.8)
MM INDURATION POC: 0 MM (ref 0–5)
MONOCYTES # BLD: 0.4 K/UL (ref 0.1–1.3)
MONOCYTES # BLD: 0.5 K/UL (ref 0.1–1.3)
MONOCYTES NFR BLD: 8 % (ref 4–12)
MONOCYTES NFR BLD: 9 % (ref 4–12)
NEUTS SEG # BLD: 3.1 K/UL (ref 1.7–8.2)
NEUTS SEG # BLD: 3.3 K/UL (ref 1.7–8.2)
NEUTS SEG NFR BLD: 54 % (ref 43–78)
NEUTS SEG NFR BLD: 61 % (ref 43–78)
NRBC # BLD: 0 K/UL (ref 0–0.2)
NRBC # BLD: 0 K/UL (ref 0–0.2)
PLATELET # BLD AUTO: 145 K/UL (ref 150–450)
PLATELET # BLD AUTO: 149 K/UL (ref 150–450)
PMV BLD AUTO: 10.2 FL (ref 9.4–12.3)
PMV BLD AUTO: 10.6 FL (ref 9.4–12.3)
POTASSIUM SERPL-SCNC: 3.1 MMOL/L (ref 3.5–5.1)
POTASSIUM SERPL-SCNC: 3.6 MMOL/L (ref 3.5–5.1)
PPD POC: NEGATIVE NEGATIVE
RBC # BLD AUTO: 4.62 M/UL (ref 4.23–5.6)
RBC # BLD AUTO: 4.69 M/UL (ref 4.23–5.6)
SODIUM SERPL-SCNC: 147 MMOL/L (ref 136–145)
SODIUM SERPL-SCNC: 148 MMOL/L (ref 136–145)
WBC # BLD AUTO: 5.5 K/UL (ref 4.3–11.1)
WBC # BLD AUTO: 5.6 K/UL (ref 4.3–11.1)

## 2018-09-26 PROCEDURE — 80048 BASIC METABOLIC PNL TOTAL CA: CPT

## 2018-09-26 PROCEDURE — 97530 THERAPEUTIC ACTIVITIES: CPT

## 2018-09-26 PROCEDURE — 74011250636 HC RX REV CODE- 250/636: Performed by: FAMILY MEDICINE

## 2018-09-26 PROCEDURE — 65660000000 HC RM CCU STEPDOWN

## 2018-09-26 PROCEDURE — 74011000258 HC RX REV CODE- 258: Performed by: FAMILY MEDICINE

## 2018-09-26 PROCEDURE — 74011250637 HC RX REV CODE- 250/637: Performed by: FAMILY MEDICINE

## 2018-09-26 PROCEDURE — 97165 OT EVAL LOW COMPLEX 30 MIN: CPT

## 2018-09-26 PROCEDURE — 77030027138 HC INCENT SPIROMETER -A

## 2018-09-26 PROCEDURE — 85025 COMPLETE CBC W/AUTO DIFF WBC: CPT

## 2018-09-26 PROCEDURE — 77030020263 HC SOL INJ SOD CL0.9% LFCR 1000ML

## 2018-09-26 PROCEDURE — 97161 PT EVAL LOW COMPLEX 20 MIN: CPT

## 2018-09-26 RX ADMIN — BICALUTAMIDE 50 MG: 50 TABLET, FILM COATED ORAL at 09:00

## 2018-09-26 RX ADMIN — FAMOTIDINE 20 MG: 20 TABLET, FILM COATED ORAL at 19:55

## 2018-09-26 RX ADMIN — CELECOXIB 200 MG: 200 CAPSULE ORAL at 19:55

## 2018-09-26 RX ADMIN — CELECOXIB 200 MG: 200 CAPSULE ORAL at 10:23

## 2018-09-26 RX ADMIN — DABIGATRAN ETEXILATE MESYLATE 150 MG: 150 CAPSULE ORAL at 00:03

## 2018-09-26 RX ADMIN — FAMOTIDINE 20 MG: 20 TABLET, FILM COATED ORAL at 10:23

## 2018-09-26 RX ADMIN — DONEPEZIL HYDROCHLORIDE 5 MG: 5 TABLET, FILM COATED ORAL at 00:03

## 2018-09-26 RX ADMIN — ARIPIPRAZOLE 10 MG: 10 TABLET ORAL at 10:31

## 2018-09-26 RX ADMIN — DONEPEZIL HYDROCHLORIDE 5 MG: 5 TABLET, FILM COATED ORAL at 21:40

## 2018-09-26 RX ADMIN — TAMSULOSIN HYDROCHLORIDE 0.4 MG: 0.4 CAPSULE ORAL at 10:23

## 2018-09-26 RX ADMIN — ASPIRIN 81 MG: 81 TABLET, CHEWABLE ORAL at 10:23

## 2018-09-26 RX ADMIN — CEFTRIAXONE SODIUM 1 G: 1 INJECTION, POWDER, FOR SOLUTION INTRAMUSCULAR; INTRAVENOUS at 21:40

## 2018-09-26 RX ADMIN — DABIGATRAN ETEXILATE MESYLATE 150 MG: 150 CAPSULE ORAL at 10:23

## 2018-09-26 RX ADMIN — DABIGATRAN ETEXILATE MESYLATE 150 MG: 150 CAPSULE ORAL at 21:40

## 2018-09-26 NOTE — PROGRESS NOTES
Met with pt at bedside, pt is alert and oriented x3, states lives with daughter Bradford Pereyra [phone], states is independent at home uses a cane to ambulate around house, states house is 1 level with approx 7 steps to enter. Insurance confirmed with pt, [facility] is primary and then [facility]. Plan is for pt to return home at DC, asked about STR states NO will be going home. CM will continue to follow for DC needs. Care Management Interventions PCP Verified by CM: Yes Transition of Care Consult (CM Consult): Discharge Planning Current Support Network:  (lives with daughter) Confirm Follow Up Transport: Family Plan discussed with Pt/Family/Caregiver: Yes Freedom of Choice Offered: Yes Discharge Location Discharge Placement: Unable to determine at this time

## 2018-09-26 NOTE — PROGRESS NOTES
09/26/18 2350 Dual Skin Pressure Injury Assessment Dual Skin Pressure Injury Assessment WDL Second Care Provider (Based on [address] [place] [address]) Priscila Harris RN  
Primary Nurse Abdifatah Shepard and Priscila Harris, RN performed a dual skin assessment on this patient No impairment noted Maxwell score is 18. Patient has some excorition around groin area.

## 2018-09-26 NOTE — ED NOTES
Patient's daughter Alphonse Bennett requests a call at [phone] when patient receives his room. Patient's other daughter Leslie Pabon is also available at [phone].

## 2018-09-26 NOTE — PROGRESS NOTES
Problem: Self Care Deficits Care Plan (Adult) Goal: *Acute Goals and Plan of Care (Insert Text) 1. Patient will complete lower body bathing and dressing with SBA and adaptive equipment as needed. 2. Patient will complete toileting with CGA. 3. Patient will tolerate 30 minutes of OT treatment with 2-3 rest breaks to increase activity tolerance for ADLs. 4. Patient will complete functional transfers with supervision and adaptive equipment as needed. 5. Patient will complete functional mobility for household distances with supervision and appropriate safety awareness. Timeframe: 7 visits OCCUPATIONAL THERAPY: Initial Assessment, Treatment Day: 1st and AM 9/26/2018 INPATIENT: Hospital Day: 2 Payor: [employer] [facility] / Plan: [facility] BLUE CROSS STATE / Product Type: PPO /  
  
NAME/AGE/GENDER: Willy Hernandez is a 76 y.o. male PRIMARY DIAGNOSIS:  Acute metabolic encephalopathy Acute metabolic encephalopathy Acute metabolic encephalopathy ICD-10: Treatment Diagnosis:  
 · Generalized Muscle Weakness (M62.81) · Other lack of cordination (R27.8) Precautions/Allergies: 
   Review of patient's allergies indicates no known allergies. ASSESSMENT:  
 
Mr. Terry Chambers presents to the hospital with acute metabolic encephalopathy. Pt has had increased weakness and difficulty with functional mobility at home. Pt reports he lives with his daughter but is home alone while she is at work. Pt is typically modified independent with ADL/functional mobility but does get assistance with LE dressing (socks/shoes). Pt denies any recent falls. Pt is alert this am and agreeable to OT evaluation and treatment. Pt is oriented to person/place but disoriented to time. Pt completed bed mobility with some additional time and CGA. Pt demonstrates functional but decreased upper body strength. Pt limited with sit to stand due to LE weakness with pt needing moderate assistance to stand to walker. Pt  walker and is somewhat flexed forward. Pt worked on functional mobility in the room with additional time to maneuver around furniture and some cueing for walker management. Pt reports he used to have a walker but it was lost. Pt would likely benefit from getting a new walker. Pt returned to chair at end of session and needed some assistance for safety with transfer needing cues for hand placement and tactile cues for controlled    stand to sit. Pt was left up in the chair with alarm pad in place. Pt is currently functioning below baseline and will benefit from OT services to address stated goals and plan of care. This section established at most recent assessment PROBLEM LIST (Impairments causing functional limitations): 1. Decreased Strength 2. Decreased ADL/Functional Activities 3. Decreased Transfer Abilities 4. Decreased Ambulation Ability/Technique 5. Decreased Balance 6. Decreased Activity Tolerance 7. Decreased Flexibility/Joint Mobility 8. Decreased [place] with Home Exercise Program 
 INTERVENTIONS PLANNED: (Benefits and precautions of occupational therapy have been discussed with the patient.) 1. Activities of daily living training 2. Adaptive equipment training 3. Balance training 4. Clothing management 5. Donning&doffing training 6. Neuromuscular re-eduation 7. Therapeutic activity 8. Therapeutic exercise TREATMENT PLAN: Frequency/Duration: Follow patient 3 times per week to address above goals. Rehabilitation Potential For Stated Goals: Good RECOMMENDED REHABILITATION/EQUIPMENT: (at time of discharge pending progress): Due to the probability of continued deficits (see above) this patient will likely need continued skilled occupational therapy after discharge. Equipment:  
? None at this time OCCUPATIONAL PROFILE AND HISTORY:  
History of Present Injury/Illness (Reason for Referral): 
See H&P Past Medical History/Comorbidities: Mr. Ajay Nunez  has a past medical history of Arrhythmia; Arthritis; Atrial fibrillation ([facility] (1/27/2012); CAD (coronary artery disease); Cancer ([facility] Dementia; GERD (gastroesophageal reflux disease); Heart failure ([facility] Hypertension; and Morbid obesity ([facility] Mr. Ajay Nunez  has a past surgical history that includes hx orthopaedic (2010); hx prostatectomy; and hx pacemaker (8/30/2012). Social History/Living Environment:  
Home Environment: Private residence # Steps to Enter: 7 Rails to Enter: Yes Hand Rails : Bilateral 
Wheelchair Ramp: No 
One/Two Story Residence: One story Living Alone: No 
Support Systems: Child(billy) Patient Expects to be Discharged to[de-identified] Private residence Current DME Used/Available at Home: Kelly Caroline, straight, Commode, bedside, Shower chair, Wheelchair Tub or Shower Type: Shower Prior Level of Function/Work/Activity: 
Pt lives with his daughter and reports he uses a cane for functional mobility. Pt completes most ADL with modified independence but does need assistance with LB dressing at times. No reports of any recent falls. Personal Factors:   
      Social Background:  Pt is home alone while daughter is at work Other factors that influence how disability is experienced by the patient:  Multiple co-morbidities Number of Personal Factors/Comorbidities that affect the Plan of Care: Expanded review of therapy/medical records (1-2):  MODERATE COMPLEXITY ASSESSMENT OF OCCUPATIONAL PERFORMANCE[de-identified]  
Activities of Daily Living:  
Basic ADLs (From Assessment) Complex ADLs (From Assessment) Feeding: Supervision Oral Facial Hygiene/Grooming: Stand-by assistance Bathing: Moderate assistance Upper Body Dressing: Minimum assistance Lower Body Dressing: Moderate assistance Toileting: Moderate assistance Instrumental ADL Meal Preparation: Maximum assistance Homemaking: Maximum assistance Grooming/Bathing/Dressing Activities of Daily Living Cognitive Retraining Orientation Retraining: Time Safety/Judgement: Fall prevention Bed/Mat Mobility Rolling: Contact guard assistance Supine to Sit: Contact guard assistance Sit to Supine: Contact guard assistance Sit to Stand: Moderate assistance Bed to Chair: Contact guard assistance Scooting: Contact guard assistance Most Recent Physical Functioning:  
Gross Assessment: 
AROM: Generally decreased, functional 
Strength: Generally decreased, functional 
Coordination: Generally decreased, functional 
         
  
Posture: 
Posture (WDL): Exceptions to [facility] Posture Assessment: Forward head, Rounded shoulders Balance: 
Sitting: Intact Standing: Impaired Standing - Static: Fair (with rolling walker) Standing - Dynamic : Fair (with rolling walker) Bed Mobility: 
Rolling: Contact guard assistance Supine to Sit: Contact guard assistance Sit to Supine: Contact guard assistance Scooting: Contact guard assistance Wheelchair Mobility: 
  
Transfers: 
Sit to Stand: Moderate assistance Stand to Sit: Minimum assistance Bed to Chair: Contact guard assistance Interventions: Verbal cues; Safety awareness training; Tactile cues Duration: 10 Minutes Patient Vitals for the past 6 hrs: 
 BP BP Patient Position SpO2 Pulse  
09/26/18 0556 138/65 - 98 % 65  
09/26/18 0625 149/66 - 99 % 63  
09/26/18 0814 144/78 At rest 97 % 69  
09/26/18 0900 - - 97 % 76  
09/26/18 1120 122/70 Sitting 98 % 61 Mental Status Neurologic State: Alert Orientation Level: Disoriented to time, Oriented to person, Oriented to place Cognition: Decreased attention/concentration, Follows commands Perception: Appears intact Perseveration: No perseveration noted Safety/Judgement: Fall prevention Physical Skills Involved: 
1. Balance 2. Strength 3. Activity Tolerance Cognitive Skills Affected (resulting in the inability to perform in a timely and safe manner): 1. Executive Function 2. Short Term Recall Psychosocial Skills Affected: 1. Habits/Routines 2. Self-Awareness Number of elements that affect the Plan of Care: 5+:  HIGH COMPLEXITY CLINICAL DECISION MAKING:  
[employer] AM-PAC 6 Clicks Daily Activity Inpatient Short Form How much help from another person does the patient currently need. .. Total A Lot A Little None 1. Putting on and taking off regular lower body clothing? [] 1   [x] 2   [] 3   [] 4  
2. Bathing (including washing, rinsing, drying)? [] 1   [x] 2   [] 3   [] 4  
3. Toileting, which includes using toilet, bedpan or urinal?   [] 1   [x] 2   [] 3   [] 4  
4. Putting on and taking off regular upper body clothing? [] 1   [] 2   [x] 3   [] 4  
5. Taking care of personal grooming such as brushing teeth? [] 1   [] 2   [x] 3   [] 4  
6. Eating meals? [] 1   [] 2   [x] 3   [] 4  
© 2007, Trustees of [facility] [employer], under license to [employer]. All rights reserved Score:  Initial:15 Most Recent: X (Date: -- ) Interpretation of Tool:  Represents activities that are increasingly more difficult (i.e. Bed mobility, Transfers, Gait). Score 24 23 22-20 19-15 14-10 9-7 6 Modifier CH CI CJ CK CL CM CN   
 
? Self Care:  
  - CURRENT STATUS: CK - 40%-59% impaired, limited or restricted  - GOAL STATUS: CJ - 20%-39% impaired, limited or restricted  - D/C STATUS:  ---------------To be determined--------------- Payor: [address] [place] [address] / Plan: [place] BLUE CROSS STATE / Product Type: PPO /   
 
Medical Necessity:    
· Patient demonstrates good rehab potential due to higher previous functional level. Reason for Services/Other Comments: 
· Patient continues to require skilled intervention due to decreased independence with ADL/functional transfers.   
Use of outcome tool(s) and clinical judgement create a POC that gives a: LOW COMPLEXITY  
 
 
 
TREATMENT:  
 (In addition to Assessment/Re-Assessment sessions the following treatments were rendered) Pre-treatment Symptoms/Complaints:   
Pain: Initial:  
Pain Intensity 1: 0  Post Session:  0/10 Therapeutic Activity: ( 8 minutes): Therapeutic activities including Bed transfers, Chair transfers and Ambulation on level ground to improve mobility, strength, balance and coordination. Required minimal Verbal cues; Safety awareness training to promote static and dynamic balance in standing.  
 
n/a Braces/Orthotics/Lines/Etc:  
· O2 Device: Room air Treatment/Session Assessment:   
· Response to Treatment:  Pt tolerated session with additional time. · Interdisciplinary Collaboration:  
o Occupational Therapist 
o Registered Nurse · After treatment position/precautions:  
o Up in chair 
o Bed alarm/tab alert on 
o Bed/Chair-wheels locked 
o Call light within reach 
o RN notified · Compliance with Program/Exercises: Will assess as treatment progresses. · Recommendations/Intent for next treatment session: \"Next visit will focus on advancements to more challenging activities and reduction in assistance provided\". Total Treatment Duration: OT Patient Time In/Time Out Time In: 0935 Time Out: 0094 Liss Huang OT

## 2018-09-26 NOTE — H&P
HOSPITALIST INITIAL HISTORY AND PHYSICAL 
 
NAME:  Tylor Ramirez Age:  76 y.o. 
:   1940 MRN:   [id number] PCP: Abdullahi Ang MD 
Consulting MD: Treatment Team: Attending Provider: Carol Philip MD; Primary Nurse: Alejandro Loredo RN 
 
CHIEF COMPLAINT: confusion, weakness HISTORY OF PRESENT ILLNESS:  
Tylor Ramirez is a 76 y.o. male with a past medical history of HTN, atrial fibrillation on Pradaxa, CAD, moderate dementia who presents to the ER with complaint of weakness in his lower extremities and difficulty walking starting this morning. Admits to strong smelling urine, occasional chills. Pt denies any pain, fevers, nausea, vomiting, abdominal pain, chest pain, SOB. REVIEW OF SYSTEMS: Comprehensive ROS performed and negative except as stated in HPI. Past Medical History:  
Diagnosis Date  Arrhythmia   
 pt takes pradaxa  Arthritis  Atrial fibrillation ([facility] 2012  CAD (coronary artery disease)  Cancer [facility])   
 prostate  GERD (gastroesophageal reflux disease)   
 controlled with medication  Heart failure ([facility]   
 pt takes lasix as needed, reports SOB with exertion  Hypertension   
 controlled with medication  Morbid obesity ([facility] Past Surgical History:  
Procedure Laterality Date  HX ORTHOPAEDIC    
 left TKA  HX PACEMAKER  2012 [facility] pacemaker  HX PROSTATECTOMY Prior to Admission Medications Prescriptions Last Dose Informant Patient Reported? Taking? ARIPiprazole (ABILIFY) 10 mg tablet   No No  
Sig: Take 1 Tab by mouth daily. acetaminophen (TYLENOL ARTHRITIS PAIN) 650 mg CR tablet   Yes No  
Sig: Take 650 mg by mouth every six (6) hours as needed for Pain. aspirin 81 mg chewable tablet   No No  
Sig: Take 1 Tab by mouth daily. Indications: prevention of cerebrovascular accident  
bicalutamide (CASODEX) 50 mg tablet   No No  
Sig: Take 1 Tab by mouth daily. celecoxib (CELEBREX) 200 mg capsule   Yes No  
Sig: Take  by mouth two (2) times a day. dabigatran etexilate (PRADAXA) 150 mg capsule   No No  
Sig: Take 1 Cap by mouth every twelve (12) hours. diclofenac (VOLTAREN) 1 % gel   Yes No  
Sig: APPLY 4 GRAMS TO AFFECTED AREA FOUR TIMES DAILY  
donepezil (ARICEPT) 5 mg tablet   No No  
Sig: Take 1 Tab by mouth nightly. famotidine (PEPCID) 20 mg tablet   No No  
Sig: Take 1 Tab by mouth two (2) times a day. levomilnacipran (FETZIMA) 40 mg ER capsule   No No  
Sig: TAKE 1 CAP BY MOUTH DAILY. senna-docusate (SENNA LAXATIVE-STOOL SOFTENER) 8.6-50 mg per tablet   Yes No  
Sig: Take 1 Tab by mouth daily. tamsulosin (FLOMAX) 0.4 mg capsule   No No  
Sig: Take 1 Cap by mouth daily. zolpidem (AMBIEN) 5 mg tablet   No No  
Sig: Take 1 Tab by mouth nightly as needed for Sleep. Max Daily Amount: 5 mg. Indications: SLEEP-ONSET INSOMNIA Facility-Administered Medications: None No Known Allergies FAMILY HISTORY: Reviewed. Negative except Family History Problem Relation Age of Onset  Cancer Father Social History Substance Use Topics  Smoking status: Never Smoker  Smokeless tobacco: Never Used  Alcohol use No  
 
 
 
Objective:  
 
Visit Vitals  /62  Pulse 65  Temp 98.3 °F (36.8 °C)  Resp 18  Ht 6' 2\" (1.88 m)  Wt 115.2 kg (254 lb)  SpO2 100%  BMI 32.61 kg/m2 Temp (24hrs), Av.3 °F (36.8 °C), Min:98.3 °F (36.8 °C), Max:98.3 °F (36.8 °C) Oxygen Therapy O2 Sat (%): 100 % (18) Pulse via Oximetry: 61 beats per minute (18) O2 Device: Room air (18 162) Physical Exam: 
General:    The patient is a very pleasant elderly male in no acute distress. Head:   Normocephalic/atraumatic. Eyes:  No palpebral pallor or scleral icterus. ENT:  External auricular and nasal exam within normal limits. Mucous membranes are moist. 
Neck:  Supple, non-tender, no JVD. Lungs:   Clear to auscultation bilaterally without wheezes or crackles. No respiratory distress or accessory muscle use. Heart:   Regular rate and rhythm, without murmurs, rubs, or gallops. Abdomen:   Soft, non-tender, non-distended with normoactive bowel sounds. Genitourinary: No tenderness over the bladder or bilateral CVAs. Extremities: Without clubbing, cyanosis, or edema. Skin:     Normal color, texture, and turgor. No rashes, lesions, or jaundice. Pulses: Radial and dorsalis pedis pulses present 2+ bilaterally. Capillary refill <2s. Neurologic: CN II-XII grossly intact and symmetrical.  
  Moving all four extremities well with no focal deficits. Psychiatric: Pleasant demeanor, appropriate affect. Alert and oriented x 3 Data Review:  
Recent Results (from the past 24 hour(s)) CBC WITH AUTOMATED DIFF Collection Time: 09/25/18  5:35 PM  
Result Value Ref Range WBC 5.5 4.3 - 11.1 K/uL  
 RBC 4.64 4.23 - 5.6 M/uL  
 HGB 14.3 13.6 - 17.2 g/dL HCT 42.8 41.1 - 50.3 % MCV 92.2 79.6 - 97.8 FL  
 MCH 30.8 26.1 - 32.9 PG  
 MCHC 33.4 31.4 - 35.0 g/dL  
 RDW 14.2 % PLATELET 162 565 - 469 K/uL MPV 12.2 9.4 - 12.3 FL ABSOLUTE NRBC 0.00 0.0 - 0.2 K/uL  
 DF AUTOMATED NEUTROPHILS 61 43 - 78 % LYMPHOCYTES 30 13 - 44 % MONOCYTES 7 4.0 - 12.0 % EOSINOPHILS 2 0.5 - 7.8 % BASOPHILS 0 0.0 - 2.0 % IMMATURE GRANULOCYTES 0 0.0 - 5.0 %  
 ABS. NEUTROPHILS 3.3 1.7 - 8.2 K/UL  
 ABS. LYMPHOCYTES 1.6 0.5 - 4.6 K/UL  
 ABS. MONOCYTES 0.4 0.1 - 1.3 K/UL  
 ABS. EOSINOPHILS 0.1 0.0 - 0.8 K/UL  
 ABS. BASOPHILS 0.0 0.0 - 0.2 K/UL  
 ABS. IMM. GRANS. 0.0 0.0 - 0.5 K/UL BNP Collection Time: 09/25/18  5:35 PM  
Result Value Ref Range BNP 44 pg/mL POC TROPONIN-I Collection Time: 09/25/18  7:15 PM  
Result Value Ref Range Troponin-I (POC) 0.06 (H) 0.02 - 0.05 ng/ml METABOLIC PANEL, COMPREHENSIVE Collection Time: 09/25/18  7:31 PM  
Result Value Ref Range Sodium 143 136 - 145 mmol/L Potassium 6.8 (HH) 3.5 - 5.1 mmol/L Chloride 114 (H) 98 - 107 mmol/L  
 CO2 23 21 - 32 mmol/L Anion gap 6 (L) 7 - 16 mmol/L Glucose 53 (L) 65 - 100 mg/dL BUN 12 8 - 23 MG/DL Creatinine 0.91 0.8 - 1.5 MG/DL  
 GFR est AA >60 >60 ml/min/1.73m2 GFR est non-AA >60 >60 ml/min/1.73m2 Calcium 9.0 8.3 - 10.4 MG/DL Bilirubin, total 1.0 0.2 - 1.1 MG/DL  
 ALT (SGPT) 33 12 - 65 U/L  
 AST (SGOT) 125 (H) 15 - 37 U/L Alk. phosphatase 101 50 - 136 U/L Protein, total 7.3 6.3 - 8.2 g/dL Albumin 2.6 (L) 3.2 - 4.6 g/dL Globulin 4.7 (H) 2.3 - 3.5 g/dL A-G Ratio 0.6 (L) 1.2 - 3.5 URINE MICROSCOPIC Collection Time: 09/25/18  8:05 PM  
Result Value Ref Range WBC >100 (H) 0 /hpf  
 RBC 0-3 0 /hpf Epithelial cells 0 0 /hpf Bacteria 4+ (H) 0 /hpf Casts 10-20 0 /lpf Imaging /Procedures /Studies: 
Ct Head Wo Cont Result Date: 9/25/2018 Impression: 1. Findings most compatible with mild chronic small vessel ischemic changes. 2. Mild volume loss. Xr Chest [facility] Result Date: 9/25/2018 Impression: Stable portable chest.  
  
 
 
Assessment and Plan:  
 
Principal Problem: 
  Acute metabolic encephalopathy (3/72/8931) Evident on urine dipstick. Check official U/A and culture. Blood cultures pending. CT head unremarkable. Active Problems: 
  UTI. Rocephin, urine and blood cultures pending. Hyperkalemia (9/25/2018) Hemolyzed result, repeat BMP. IVF. Atrial fibrillation ([address] (1/27/2012) Stable, continue home meds/Pradaxa. Moderate dementia without behavioral disturbance (7/26/2018) Per above. HTN (hypertension) (1/27/2012) Stable, continue home meds Depression (8/28/2012) Stable, continue home meds Generalized weakness (12/23/2016) 2/2 UTI. PT/OT consults. DVT Prophylaxis: Pradaxa Code Status: FULL CODE   Disposition: Admit to med/surg for evaluation and treatment as per above. 
 
  Anticipated discharge: 3-4 days Signed By: Aysha Jay MD   
 September 25, 2018

## 2018-09-26 NOTE — ED NOTES
TRANSFER - OUT REPORT: 
 
Verbal report given to Zhane RN on Cadimus Juan Luis Knows  being transferred to [phone] for routine progression of care Report consisted of patients Situation, Background, Assessment and  
Recommendations(SBAR). Information from the following report(s) SBAR was reviewed with the receiving nurse. Lines:  
Peripheral IV 09/25/18 Left Wrist (Active) Site Assessment Clean, dry, & intact 9/25/2018  4:36 PM  
Phlebitis Assessment 0 9/25/2018  4:36 PM  
Infiltration Assessment 0 9/25/2018  4:36 PM  
Dressing Status Clean, dry, & intact 9/25/2018  4:36 PM  
Dressing Type Transparent 9/25/2018  4:36 PM  
  
 
Opportunity for questions and clarification was provided. Patient transported with: 
 [employer]

## 2018-09-26 NOTE — ED NOTES
Full report to [employer] Care assumed by that RN at this time. At current, patient restful in bed with eyes closed and even, non-labored respirations. VS as posted, stable. IVF infusing. No needs apparent at this time.

## 2018-09-26 NOTE — ED NOTES
Full report from RN Gayle Gentile. Patient restful in bed in NAD. Patient denies any needs at current.

## 2018-09-26 NOTE — PROGRESS NOTES
Problem: Mobility Impaired (Adult and Pediatric) Goal: *Acute Goals and Plan of Care (Insert Text) LTG: 
(1.)Mr. Ajay Nunez will move from supine to sit and sit to supine, scoot up and down and roll side to side INDEPENDENTLY within 7 treatment day(s). (2.)Mr. Ajay Nunez will transfer from bed to chair and chair to bed with MODIFIED INDEPENDENCE using the least restrictive device within 7 treatment day(s). (3.)Mr. Ajay Nunez will ambulate with MODIFIED INDEPENDENCE for 200+ feet with the least restrictive device within 7 treatment day(s). (4.)Mr. Ajay Nunez will ascend and descend 7 steps with CONTACT GUARD ASSIST using handrail(s) within 7 days. ________________________________________________________________________________________________ PHYSICAL THERAPY: Initial Assessment, AM 9/26/2018 INPATIENT: Hospital Day: 2 Payor: [address] [place] [address] / Plan: [facility] STATE / Product Type: PPO /  
  
NAME/AGE/GENDER: Pauline Wong is a 76 y.o. male PRIMARY DIAGNOSIS: Acute metabolic encephalopathy Acute metabolic encephalopathy Acute metabolic encephalopathy ICD-10: Treatment Diagnosis:  
 · Generalized Muscle Weakness (M62.81) · Other abnormalities of gait and mobility (R26.89) Precaution/Allergies: 
Review of patient's allergies indicates no known allergies. ASSESSMENT:  
 
Mr. Ajay Nunez is a 76year old male admitted from home for encephalopathy, weakness. He lives with daughter and is mod I at baseline with cane or walker. Presents in supine after recent transfer from ICU. He is agreeable to therapy assessment with some encouragement. Denies pain. Performs all bed mobility, transfers, and ambulation in room slowly and with close CGA for safety. Verbal cues for transfer technique and walker management.  Does need assist with seated functional activities/dressing however demosntrates intact seated balance throughout task involving moving outside base of support. Pt requests to return to supine after session and was left with needs in reach, bed alarm present. Tylor Morse appears to be functioning below baseline with strength and mobility at this time. He will benefit from continued therapy during acute care stay to address deficits/maximize independence with mobility. Discharge needs TBD pending progress; rehab vs [place] PT? This section established at most recent assessment PROBLEM LIST (Impairments causing functional limitations): 1. Decreased Strength 2. Decreased ADL/Functional Activities 3. Decreased Transfer Abilities 4. Decreased Ambulation Ability/Technique 5. Decreased Balance 6. Increased Pain 7. Decreased Activity Tolerance 8. Decreased Cognition INTERVENTIONS PLANNED: (Benefits and precautions of physical therapy have been discussed with the patient.) 1. Balance Exercise 2. Bed Mobility 3. Gait Training 4. Home Exercise Program (HEP) 5. Therapeutic Activites 6. Therapeutic Exercise/Strengthening 7. Transfer Training TREATMENT PLAN: Frequency/Duration: 3 times a week for duration of hospital stay Rehabilitation Potential For Stated Goals: Good RECOMMENDED REHABILITATION/EQUIPMENT: (at time of discharge pending progress): Due to the probability of continued deficits (see above) this patient will likely need continued skilled physical therapy after discharge. Equipment:  
? None at this time HISTORY:  
History of Present Injury/Illness (Reason for Referral): 
Per H&P, \" 
Past Medical History/Comorbidities:  
Mr. Bobo Chandler  has a past medical history of Arrhythmia; Arthritis; Atrial fibrillation ([address] (1/27/2012); CAD (coronary artery disease); Cancer ([address] Dementia; GERD (gastroesophageal reflux disease); Heart failure ([address] Hypertension; and Morbid obesity ([address]   Mr. Bobo Chandler  has a past surgical history that includes hx orthopaedic (2010); hx prostatectomy; and hx pacemaker (8/30/2012). Social History/Living Environment:  
Home Environment: Private residence # Steps to Enter: 7 Rails to Enter: Yes Wheelchair Ramp: No 
One/Two Story Residence: One story Living Alone: No 
Support Systems: Child(billy), Family member(s) Patient Expects to be Discharged to[de-identified] Unknown Current DME Used/Available at Home: Yuvonne Pounds, straight, Walker, rolling, Shower chair Tub or Shower Type: Shower Prior Level of Function/Work/Activity: 
Lives with daughter who works during the day. Mod I with cane or walker. Reports mod I with ADLs. Denies falls. No driving recently. Number of Personal Factors/Comorbidities that affect the Plan of Care: 1-2: MODERATE COMPLEXITY EXAMINATION:  
Most Recent Physical Functioning:  
Gross Assessment: 
AROM: Within functional limits Strength: Generally decreased, functional 
Coordination: Within functional limits Sensation: Intact Posture: 
Posture (WDL): Exceptions to [facility] Posture Assessment: Forward head, Rounded shoulders Balance: 
Sitting: Intact Standing: Impaired Standing - Static: Fair (+) Standing - Dynamic : Fair (+) Bed Mobility: 
Rolling: Contact guard assistance Supine to Sit: Contact guard assistance; Additional time Sit to Supine: Contact guard assistance; Additional time Scooting: Contact guard assistance Wheelchair Mobility: 
  
Transfers: 
Sit to Stand: Contact guard assistance Stand to Sit: Contact guard assistance Bed to Chair: Contact guard assistance Interventions: Verbal cues; Safety awareness training; Tactile cues Duration: 10 Minutes Gait: 
  
Base of Support: Widened;Center of gravity altered Speed/Ángela: Pace decreased (<100 feet/min); Slow;Delayed Step Length: Left shortened;Right shortened Gait Abnormalities: Trunk sway increased;Decreased step clearance; Path deviations Distance (ft): 20 Feet (ft) Assistive Device: Walker, rolling Ambulation - Level of Assistance: Contact guard assistance Interventions: Verbal cues; Safety awareness training Body Structures Involved: 1. Muscles Body Functions Affected: 1. Mental 
2. Movement Related Activities and Participation Affected: 1. General Tasks and Demands 2. Mobility 3. Self Care 4. Domestic Life 5. Community, Social and [place] Number of elements that affect the Plan of Care: 4+: HIGH COMPLEXITY CLINICAL PRESENTATION:  
Presentation: Stable and uncomplicated: LOW COMPLEXITY CLINICAL DECISION MAKIN[address] [place] [address] AM-PAC 6 Clicks Basic Mobility Inpatient Short Form How much difficulty does the patient currently have. .. Unable A Lot A Little None 1. Turning over in bed (including adjusting bedclothes, sheets and blankets)? [] 1   [] 2   [] 3   [x] 4  
2. Sitting down on and standing up from a chair with arms ( e.g., wheelchair, bedside commode, etc.)   [] 1   [] 2   [x] 3   [] 4  
3. Moving from lying on back to sitting on the side of the bed? [] 1   [] 2   [x] 3   [] 4 How much help from another person does the patient currently need. .. Total A Lot A Little None 4. Moving to and from a bed to a chair (including a wheelchair)? [] 1   [] 2   [x] 3   [] 4  
5. Need to walk in hospital room? [] 1   [] 2   [x] 3   [] 4  
6. Climbing 3-5 steps with a railing? [] 1   [x] 2   [] 3   [] 4  
© , Trustees of [address] [place] [address], under license to [email or web address]. All rights reserved Score:  Initial: 18 Most Recent: X (Date: -- ) Interpretation of Tool:  Represents activities that are increasingly more difficult (i.e. Bed mobility, Transfers, Gait). Score 24 23 22-20 19-15 14-10 9-7 6 Modifier CH CI CJ CK CL CM CN   
 
? Mobility - Walking and Moving Around:  
  - CURRENT STATUS: CK - 40%-59% impaired, limited or restricted  - GOAL STATUS: CI - 1%-19% impaired, limited or restricted   - D/C STATUS:  ---------------To be determined--------------- 
 Payor: [facility] / Plan: [facility] / Product Type: PPO /   
 
Medical Necessity:    
· Patient demonstrates good rehab potential due to higher previous functional level. Reason for Services/Other Comments: 
· Patient continues to demonstrate capacity to improve strength, mobility, balance, transfers, activity tolerance which will increase independence, decrease amount of assistance required from caregiver and increase safety. Use of outcome tool(s) and clinical judgement create a POC that gives a: Clear prediction of patient's progress: LOW COMPLEXITY  
  
 
 
 
TREATMENT:  
(In addition to Assessment/Re-Assessment sessions the following treatments were rendered) Pre-treatment Symptoms/Complaints:  \"ok\" Pain: Initial:  
Pain Intensity 1: 0  Post Session:  0/10 Therapeutic Activity: (  10 Minutes ):  Therapeutic activities including Bed transfers, Chair transfers, Ambulation on level ground and seated functional activities at edge of bed to improve mobility, strength, balance, coordination and activity tolerance. Required minimal Verbal cues; Safety awareness training to promote static and dynamic balance in standing and promote motor control of bilateral, lower extremity(s). Braces/Orthotics/Lines/Etc:  
· O2 Device: Room air Treatment/Session Assessment:   
· Response to Treatment:  Pt performs mobility with CGA, additional time, min cueing · Interdisciplinary Collaboration:  
o Physical Therapist 
o Registered Nurse · After treatment position/precautions:  
o Supine in bed 
o Bed alarm/tab alert on 
o Bed/Chair-wheels locked 
o Bed in low position 
o Call light within reach · Compliance with Program/Exercises: compliant all of the time. · Recommendations/Intent for next treatment session: \"Next visit will focus on advancements to more challenging activities and reduction in assistance provided\". Total Treatment Duration: PT Patient Time In/Time Out Time In: 2159 Time Out: 0900 Marya Melgar DPT

## 2018-09-26 NOTE — ROUTINE PROCESS
TRANSFER - IN REPORT: 
 
Verbal report received from [facility]) on Tylor Dasilva John  being received from "[employer] for routine post - op Report consisted of patients Situation, Background, Assessment and  
Recommendations(SBAR). Information from the following report(s) SBAR was reviewed with the receiving nurse. Opportunity for questions and clarification was provided. Assessment completed upon patients arrival to unit and care assumed.

## 2018-09-26 NOTE — PROGRESS NOTES
Spiritual Care Visit, initial visit. Attempted visited with patient. Minimal response from him. Visit by Garrison Angel, Staff .  Marie., Yanet.LANA., B.A.

## 2018-09-26 NOTE — ED NOTES
Per Ana Andersen, pharm consult is on a med that is not carried and will be patient supplied. MIREYA Sanders aware- will see if patient has med with him and if not will contact patient's daughter in AM to bring to Ed.

## 2018-09-27 LAB
ANION GAP SERPL CALC-SCNC: 8 MMOL/L (ref 7–16)
BASOPHILS # BLD: 0 K/UL (ref 0–0.2)
BASOPHILS NFR BLD: 1 % (ref 0–2)
BUN SERPL-MCNC: 11 MG/DL (ref 8–23)
CALCIUM SERPL-MCNC: 8.8 MG/DL (ref 8.3–10.4)
CHLORIDE SERPL-SCNC: 116 MMOL/L (ref 98–107)
CO2 SERPL-SCNC: 22 MMOL/L (ref 21–32)
CREAT SERPL-MCNC: 0.82 MG/DL (ref 0.8–1.5)
DIFFERENTIAL METHOD BLD: ABNORMAL
EOSINOPHIL # BLD: 0.2 K/UL (ref 0–0.8)
EOSINOPHIL NFR BLD: 4 % (ref 0.5–7.8)
ERYTHROCYTE [DISTWIDTH] IN BLOOD BY AUTOMATED COUNT: 13.7 %
GLUCOSE SERPL-MCNC: 81 MG/DL (ref 65–100)
HCT VFR BLD AUTO: 40.4 % (ref 41.1–50.3)
HGB BLD-MCNC: 13.3 G/DL (ref 13.6–17.2)
IMM GRANULOCYTES # BLD: 0 K/UL (ref 0–0.5)
IMM GRANULOCYTES NFR BLD AUTO: 0 % (ref 0–5)
LYMPHOCYTES # BLD: 1.5 K/UL (ref 0.5–4.6)
LYMPHOCYTES NFR BLD: 36 % (ref 13–44)
MCH RBC QN AUTO: 29.8 PG (ref 26.1–32.9)
MCHC RBC AUTO-ENTMCNC: 32.9 G/DL (ref 31.4–35)
MCV RBC AUTO: 90.6 FL (ref 79.6–97.8)
MM INDURATION POC: 0 MM (ref 0–5)
MONOCYTES # BLD: 0.3 K/UL (ref 0.1–1.3)
MONOCYTES NFR BLD: 8 % (ref 4–12)
NEUTS SEG # BLD: 2.2 K/UL (ref 1.7–8.2)
NEUTS SEG NFR BLD: 51 % (ref 43–78)
NRBC # BLD: 0 K/UL (ref 0–0.2)
PLATELET # BLD AUTO: 137 K/UL (ref 150–450)
PMV BLD AUTO: 10.9 FL (ref 9.4–12.3)
POTASSIUM SERPL-SCNC: 3.6 MMOL/L (ref 3.5–5.1)
PPD POC: NEGATIVE NEGATIVE
RBC # BLD AUTO: 4.46 M/UL (ref 4.23–5.6)
SODIUM SERPL-SCNC: 146 MMOL/L (ref 136–145)
WBC # BLD AUTO: 4.3 K/UL (ref 4.3–11.1)

## 2018-09-27 PROCEDURE — 74011250637 HC RX REV CODE- 250/637: Performed by: FAMILY MEDICINE

## 2018-09-27 PROCEDURE — 80048 BASIC METABOLIC PNL TOTAL CA: CPT

## 2018-09-27 PROCEDURE — 74011250636 HC RX REV CODE- 250/636: Performed by: FAMILY MEDICINE

## 2018-09-27 PROCEDURE — 85025 COMPLETE CBC W/AUTO DIFF WBC: CPT

## 2018-09-27 PROCEDURE — 77030020263 HC SOL INJ SOD CL0.9% LFCR 1000ML

## 2018-09-27 PROCEDURE — 36415 COLL VENOUS BLD VENIPUNCTURE: CPT

## 2018-09-27 PROCEDURE — 65660000000 HC RM CCU STEPDOWN

## 2018-09-27 PROCEDURE — 74011000258 HC RX REV CODE- 258: Performed by: FAMILY MEDICINE

## 2018-09-27 RX ADMIN — FAMOTIDINE 20 MG: 20 TABLET, FILM COATED ORAL at 17:17

## 2018-09-27 RX ADMIN — CEFTRIAXONE SODIUM 1 G: 1 INJECTION, POWDER, FOR SOLUTION INTRAMUSCULAR; INTRAVENOUS at 20:39

## 2018-09-27 RX ADMIN — TAMSULOSIN HYDROCHLORIDE 0.4 MG: 0.4 CAPSULE ORAL at 08:11

## 2018-09-27 RX ADMIN — ASPIRIN 81 MG: 81 TABLET, CHEWABLE ORAL at 08:11

## 2018-09-27 RX ADMIN — DABIGATRAN ETEXILATE MESYLATE 150 MG: 150 CAPSULE ORAL at 08:11

## 2018-09-27 RX ADMIN — ARIPIPRAZOLE 10 MG: 10 TABLET ORAL at 09:26

## 2018-09-27 RX ADMIN — CELECOXIB 200 MG: 200 CAPSULE ORAL at 08:11

## 2018-09-27 RX ADMIN — DONEPEZIL HYDROCHLORIDE 5 MG: 5 TABLET, FILM COATED ORAL at 20:39

## 2018-09-27 RX ADMIN — BICALUTAMIDE 50 MG: 50 TABLET, FILM COATED ORAL at 08:11

## 2018-09-27 RX ADMIN — DABIGATRAN ETEXILATE MESYLATE 150 MG: 150 CAPSULE ORAL at 20:39

## 2018-09-27 RX ADMIN — Medication 10 ML: at 15:52

## 2018-09-27 RX ADMIN — FAMOTIDINE 20 MG: 20 TABLET, FILM COATED ORAL at 08:11

## 2018-09-27 RX ADMIN — SODIUM CHLORIDE 125 ML/HR: 900 INJECTION, SOLUTION INTRAVENOUS at 15:52

## 2018-09-27 RX ADMIN — CELECOXIB 200 MG: 200 CAPSULE ORAL at 17:17

## 2018-09-27 RX ADMIN — Medication 10 ML: at 20:40

## 2018-09-27 NOTE — PROGRESS NOTES
Problem: Falls - Risk of 
Goal: *Absence of Falls Document [facility] Fall Risk and appropriate interventions in the flowsheet. Outcome: Progressing Towards Goal 
Fall Risk Interventions: 
Mobility Interventions: Bed/chair exit alarm, Communicate number of staff needed for ambulation/transfer, OT consult for ADLs, Patient to call before getting OOB, PT Consult for mobility concerns, PT Consult for assist device competence Mentation Interventions: Bed/chair exit alarm, Door open when patient unattended, Reorient patient Medication Interventions: Bed/chair exit alarm, Patient to call before getting OOB, Evaluate medications/consider consulting pharmacy Elimination Interventions: Bed/chair exit alarm, Call light in reach, Patient to call for help with toileting needs Problem: Pressure Injury - Risk of 
Goal: *Prevention of pressure injury Document Maxwell Scale and appropriate interventions in the flowsheet. Outcome: Progressing Towards Goal 
Pressure Injury Interventions: 
  
 
Moisture Interventions: Absorbent underpads, Minimize layers, Limit adult briefs Activity Interventions: Increase time out of bed, Pressure redistribution bed/mattress(bed type), PT/OT evaluation Mobility Interventions: HOB 30 degrees or less, Pressure redistribution bed/mattress (bed type), PT/OT evaluation Nutrition Interventions: Document food/fluid/supplement intake Friction and Shear Interventions: HOB 30 degrees or less, Minimize layers

## 2018-09-27 NOTE — CONSULTS
Fernando Puckett 
MR#: [id number] : 1940 ACCOUNT #: [de-identified] DATE OF SERVICE: 2018 SUBJECTIVE:  The patient's mental status is much better. His urine culture growing gram-negative rods, awaiting final identification. He is currently on Rocephin. He is more alert and oriented, and he ate well today. No nausea, vomiting, or fevers. PHYSICAL EXAMINATION: 
VITAL SIGNS:  Temperature is 98.3, blood pressure is 144/78, respiratory rate 16, and O2 saturation is 98. GENERAL:  The patient is lying on the bed, in no apparent distress. HEENT:  Sclerae are nonicteric. Oropharynx appears normal. 
NECK:  Supple. LUNGS:  Clear to auscultation. HEART:  S1, S2. No murmurs. ABDOMEN:  Soft. Bowel sounds are active. Obese. EXTREMITIES:  Trace pitting edema. No calf muscle tenderness or swelling. NEUROLOGIC:  He is alert, oriented x2. No focal neurological deficits. LABORATORY DATA:  White blood cells 5.5. Urine culture growing gram-negative rods, awaiting final identification. ASSESSMENT AND PLAN: 
1. Acute metabolic encephalopathy likely secondary to urinary tract infection, improving. 2.  Urinary tract infection with urine cultures growing gram-negative rods. Continue Rocephin for now, awaiting final identification. 3.  Known history of atrial fibrillation with pacemaker in place. Continue Pradaxa and rate control medications. 4.  Dementia, currently with no behavioral disturbances or delirium. We will monitor. 5.  Debility. Physical Therapy/Occupational Therapy consulted. 6.  Deep venous thrombosis prophylaxis. The patient is on Pradaxa. MD ZORAIDA Burch / SHAUN 
D: 2018 20:12    
T: 2018 20:42 JOB #: [id number]

## 2018-09-27 NOTE — PROGRESS NOTES
Hospitalist Progress Note Subjective:  
Daily Progress Note: 2018 6:10 PM 
 
Overngith, mental status cont to get better. No fever. Still awaiting final urine cx results. Ge. Weakness and balance better. Current Facility-Administered Medications Medication Dose Route Frequency  influenza vaccine - (6 mos+)(PF) (FLUARIX QUAD/FLULAVAL QUAD) injection 0.5 mL  0.5 mL IntraMUSCular PRIOR TO DISCHARGE  cefTRIAXone (ROCEPHIN) 1 g in 0.9% sodium chloride (MBP/ADV) 50 mL  1 g IntraVENous Q24H  
 aspirin chewable tablet 81 mg  81 mg Oral DAILY  ARIPiprazole (ABILIFY) tablet 10 mg  10 mg Oral DAILY  bicalutamide (CASODEX) tablet 50 mg (Patient Supplied)  50 mg Oral DAILY  celecoxib (CELEBREX) capsule 200 mg  200 mg Oral BID  dabigatran etexilate (PRADAXA) capsule 150 mg  150 mg Oral Q12H  
 donepezil (ARICEPT) tablet 5 mg  5 mg Oral QHS  famotidine (PEPCID) tablet 20 mg  20 mg Oral BID  zolpidem (AMBIEN) tablet 5 mg  5 mg Oral QHS PRN  
 tamsulosin (FLOMAX) capsule 0.4 mg  0.4 mg Oral DAILY  levomilnacipran (FETZIMA) ER capsule 40 mg (Patient Supplied)  40 mg Oral DAILY  sodium chloride (NS) flush 5-10 mL  5-10 mL IntraVENous Q8H  
 sodium chloride (NS) flush 5-10 mL  5-10 mL IntraVENous PRN  
 acetaminophen (TYLENOL) tablet 650 mg  650 mg Oral Q4H PRN  
 ondansetron (ZOFRAN) injection 4 mg  4 mg IntraVENous Q4H PRN  
 magnesium hydroxide (MILK OF MAGNESIA) 400 mg/5 mL oral suspension 30 mL  30 mL Oral DAILY PRN  
 0.9% sodium chloride infusion  125 mL/hr IntraVENous CONTINUOUS Objective:  
 
Visit Vitals  /68 (BP 1 Location: Right arm, BP Patient Position: At rest)  Pulse 60  Temp 97.7 °F (36.5 °C)  Resp 20  
 Ht 6' 2\" (1.88 m)  Wt 115.2 kg (254 lb)  SpO2 99%  BMI 32.61 kg/m2 O2 Device: Room air Temp (24hrs), Av.8 °F (36.6 °C), Min:97.3 °F (36.3 °C), Max:98.1 °F (36.7 °C) 
 
 
701 -  8579 In: 480 [P.O.:480] Out: -  
09/25 1901 - 09/27 0700 In: 240 [P.O.:240] Out: -  
 
NAD, obese Lungs CTA BL Abd obese, N T Ext Neuro Alert and OX2 - No FND Data Review Recent Results (from the past 24 hour(s)) PLEASE READ & DOCUMENT PPD TEST IN 24 HRS Collection Time: 09/26/18 10:41 PM  
Result Value Ref Range PPD Negative Negative  
 mm Induration 0 mm METABOLIC PANEL, BASIC Collection Time: 09/27/18  5:05 AM  
Result Value Ref Range Sodium 146 (H) 136 - 145 mmol/L Potassium 3.6 3.5 - 5.1 mmol/L Chloride 116 (H) 98 - 107 mmol/L  
 CO2 22 21 - 32 mmol/L Anion gap 8 7 - 16 mmol/L Glucose 81 65 - 100 mg/dL BUN 11 8 - 23 MG/DL Creatinine 0.82 0.8 - 1.5 MG/DL  
 GFR est AA >60 >60 ml/min/1.73m2 GFR est non-AA >60 >60 ml/min/1.73m2 Calcium 8.8 8.3 - 10.4 MG/DL  
CBC WITH AUTOMATED DIFF Collection Time: 09/27/18  5:05 AM  
Result Value Ref Range WBC 4.3 4.3 - 11.1 K/uL  
 RBC 4.46 4.23 - 5.6 M/uL  
 HGB 13.3 (L) 13.6 - 17.2 g/dL HCT 40.4 (L) 41.1 - 50.3 % MCV 90.6 79.6 - 97.8 FL  
 MCH 29.8 26.1 - 32.9 PG  
 MCHC 32.9 31.4 - 35.0 g/dL  
 RDW 13.7 % PLATELET 239 (L) 722 - 450 K/uL MPV 10.9 9.4 - 12.3 FL ABSOLUTE NRBC 0.00 0.0 - 0.2 K/uL  
 DF AUTOMATED NEUTROPHILS 51 43 - 78 % LYMPHOCYTES 36 13 - 44 % MONOCYTES 8 4.0 - 12.0 % EOSINOPHILS 4 0.5 - 7.8 % BASOPHILS 1 0.0 - 2.0 % IMMATURE GRANULOCYTES 0 0.0 - 5.0 %  
 ABS. NEUTROPHILS 2.2 1.7 - 8.2 K/UL  
 ABS. LYMPHOCYTES 1.5 0.5 - 4.6 K/UL  
 ABS. MONOCYTES 0.3 0.1 - 1.3 K/UL  
 ABS. EOSINOPHILS 0.2 0.0 - 0.8 K/UL  
 ABS. BASOPHILS 0.0 0.0 - 0.2 K/UL  
 ABS. IMM. GRANS. 0.0 0.0 - 0.5 K/UL Assessment/Plan:  
 
Principal Problem: 
  Acute metabolic encephalopathy (7/15/4166) Active Problems: 
  Atrial fibrillation ([facility] [address] (1/27/2012) HTN (hypertension) (1/27/2012) Depression (8/28/2012) Generalized weakness (12/23/2016) Moderate dementia without behavioral disturbance (7/26/2018) Hyperkalemia (9/25/2018) 1. Acute metabolic encephalopathy likely secondary to urinary tract infection 
      -resolved. Cont to tx underlying UTI 2. Urinary tract infection  
      - Urine cultures still growing gram-negative rods (no final results yet). Continue Rocephin for now, awaiting final identification. 3.  Known history of atrial fibrillation with pacemaker in place 
     - Continue Pradaxa and rate control medications. 4.  Dementia, currently with no behavioral disturbances or delirium.   
      - We will monitor. 5.  Debility - Physical Therapy/Occupational Therapy consulted. [address]. 6. Deep venous thrombosis prophylaxis. - The patient is on Pradaxa. Dispo: once we have the final urine cx results, can go home at am with home health. Signed By: Marie Teran MD   
 September 27, 2018

## 2018-09-27 NOTE — PROGRESS NOTES
Problem: Falls - Risk of 
Goal: *Absence of Falls Document Khris Pena Fall Risk and appropriate interventions in the flowsheet. Outcome: Progressing Towards Goal 
Fall Risk Interventions: 
Mobility Interventions: Bed/chair exit alarm, Patient to call before getting OOB, OT consult for ADLs, PT Consult for mobility concerns, PT Consult for assist device competence Mentation Interventions: Adequate sleep, hydration, pain control, Bed/chair exit alarm, Door open when patient unattended, Evaluate medications/consider consulting pharmacy, Eyeglasses and hearing aids, Familiar objects from home, Update white board, Toileting rounds, Self-releasing belt, Room close to nurse's station, Reorient patient, More frequent rounding, Increase mobility, Gait belt with transfers/ambulation, Family/sitter at bedside Medication Interventions: Assess postural VS orthostatic hypotension, Bed/chair exit alarm, Evaluate medications/consider consulting pharmacy, Patient to call before getting OOB, Teach patient to arise slowly, Utilize gait belt for transfers/ambulation Elimination Interventions: Bed/chair exit alarm, Call light in reach, Toileting schedule/hourly rounds Problem: Pressure Injury - Risk of 
Goal: *Prevention of pressure injury Document Maxwell Scale and appropriate interventions in the flowsheet. Outcome: Progressing Towards Goal 
Pressure Injury Interventions: 
  
 
Moisture Interventions: Limit adult briefs, Minimize layers Activity Interventions: Increase time out of bed, Pressure redistribution bed/mattress(bed type), PT/OT evaluation Mobility Interventions: HOB 30 degrees or less, Pressure redistribution bed/mattress (bed type), PT/OT evaluation Nutrition Interventions: Document food/fluid/supplement intake Friction and Shear Interventions: HOB 30 degrees or less, Minimize layers

## 2018-09-28 ENCOUNTER — HOME HEALTH ADMISSION (OUTPATIENT)
Dept: HOME HEALTH SERVICES | Facility: HOME HEALTH | Age: 78
End: 2018-09-28
Payer: MEDICARE

## 2018-09-28 VITALS
SYSTOLIC BLOOD PRESSURE: 121 MMHG | DIASTOLIC BLOOD PRESSURE: 67 MMHG | RESPIRATION RATE: 20 BRPM | HEIGHT: 74 IN | TEMPERATURE: 98.3 F | BODY MASS INDEX: 32.6 KG/M2 | WEIGHT: 254 LBS | OXYGEN SATURATION: 99 % | HEART RATE: 60 BPM

## 2018-09-28 PROBLEM — N39.0 E. COLI UTI (URINARY TRACT INFECTION): Status: ACTIVE | Noted: 2018-09-28

## 2018-09-28 PROBLEM — B96.20 E. COLI UTI (URINARY TRACT INFECTION): Status: ACTIVE | Noted: 2018-09-28

## 2018-09-28 LAB
BACTERIA SPEC CULT: ABNORMAL
SERVICE CMNT-IMP: ABNORMAL

## 2018-09-28 PROCEDURE — 97535 SELF CARE MNGMENT TRAINING: CPT

## 2018-09-28 PROCEDURE — 90686 IIV4 VACC NO PRSV 0.5 ML IM: CPT | Performed by: FAMILY MEDICINE

## 2018-09-28 PROCEDURE — 74011250636 HC RX REV CODE- 250/636: Performed by: FAMILY MEDICINE

## 2018-09-28 PROCEDURE — 77030020263 HC SOL INJ SOD CL0.9% LFCR 1000ML

## 2018-09-28 PROCEDURE — 97110 THERAPEUTIC EXERCISES: CPT

## 2018-09-28 PROCEDURE — 74011250637 HC RX REV CODE- 250/637: Performed by: FAMILY MEDICINE

## 2018-09-28 PROCEDURE — 97530 THERAPEUTIC ACTIVITIES: CPT

## 2018-09-28 PROCEDURE — 90471 IMMUNIZATION ADMIN: CPT

## 2018-09-28 RX ORDER — CEFPODOXIME PROXETIL 200 MG/1
200 TABLET, FILM COATED ORAL 2 TIMES DAILY
Qty: 14 TAB | Refills: 0 | Status: SHIPPED | OUTPATIENT
Start: 2018-09-28 | End: 2018-10-05

## 2018-09-28 RX ADMIN — BICALUTAMIDE 50 MG: 50 TABLET, FILM COATED ORAL at 08:57

## 2018-09-28 RX ADMIN — ARIPIPRAZOLE 10 MG: 10 TABLET ORAL at 10:09

## 2018-09-28 RX ADMIN — ASPIRIN 81 MG: 81 TABLET, CHEWABLE ORAL at 08:55

## 2018-09-28 RX ADMIN — FAMOTIDINE 20 MG: 20 TABLET, FILM COATED ORAL at 08:54

## 2018-09-28 RX ADMIN — DABIGATRAN ETEXILATE MESYLATE 150 MG: 150 CAPSULE ORAL at 08:54

## 2018-09-28 RX ADMIN — TAMSULOSIN HYDROCHLORIDE 0.4 MG: 0.4 CAPSULE ORAL at 08:54

## 2018-09-28 RX ADMIN — CELECOXIB 200 MG: 200 CAPSULE ORAL at 08:54

## 2018-09-28 RX ADMIN — INFLUENZA VIRUS VACCINE 0.5 ML: 15; 15; 15; 15 SUSPENSION INTRAMUSCULAR at 10:22

## 2018-09-28 RX ADMIN — SODIUM CHLORIDE 125 ML/HR: 900 INJECTION, SOLUTION INTRAVENOUS at 08:54

## 2018-09-28 NOTE — PROGRESS NOTES
Patient on heart monitor, has been running afib since admission. Last night and this am patient running aflutter. Notified Hospitalist, no new orders received.

## 2018-09-28 NOTE — PROGRESS NOTES
Discharge instructions and prescriptions given and explained to pt. Pt verbalized understanding. Medication side effects sheet reviewed with pt. Pt to be discharged home by medical transport. AVS will be sent in envelope with pt. Also reviewed discharge instructions with pt's daughter over the phone. Daughter verbalized understanding and confirmed that pt's niece would be home when pt arrives with transport.

## 2018-09-28 NOTE — PROGRESS NOTES
Family and patient requesting ambulance transport home. Patient scheduled for transport to home today at 12 noon via stretcher by [facility] ambulance. Patient notified. Care Management Interventions PCP Verified by CM: Yes Transition of Care Consult (CM Consult): Discharge Planning Current Support Network:  (lives with daughter) Confirm Follow Up Transport: Family Plan discussed with Pt/Family/Caregiver: Yes Freedom of Choice Offered: Yes Discharge Location Discharge Placement: Home with home health

## 2018-09-28 NOTE — PROGRESS NOTES
Problem: Self Care Deficits Care Plan (Adult) Goal: *Acute Goals and Plan of Care (Insert Text) 1. Patient will complete lower body bathing and dressing with SBA and adaptive equipment as needed. 2. Patient will complete toileting with CGA. 3. Patient will tolerate 30 minutes of OT treatment with 2-3 rest breaks to increase activity tolerance for ADLs. 4. Patient will complete functional transfers with supervision and adaptive equipment as needed. 5. Patient will complete functional mobility for household distances with supervision and appropriate safety awareness. Timeframe: 7 visits OCCUPATIONAL THERAPY: Daily Note, Treatment Day: 2nd and AM 9/28/2018 INPATIENT: Hospital Day: 4 [place] / Product Type: Medicare /  
  
NAME/AGE/GENDER: Carley Echevarria is a 76 y.o. male PRIMARY DIAGNOSIS:  Acute metabolic encephalopathy E. coli UTI (urinary tract infection) E. coli UTI (urinary tract infection) ICD-10: Treatment Diagnosis:  
 · Generalized Muscle Weakness (M62.81) · Other lack of cordination (R27.8) Precautions/Allergies: 
   Review of patient's allergies indicates no known allergies. ASSESSMENT:  
 
Mr. Kenia Aguilar presents to the hospital with acute metabolic encephalopathy. Pt completed grooming while sitting in chair at sink side. Pt is progressing towards goals. Continue POC. This section established at most recent assessment PROBLEM LIST (Impairments causing functional limitations): 1. Decreased Strength 2. Decreased ADL/Functional Activities 3. Decreased Transfer Abilities 4. Decreased Ambulation Ability/Technique 5. Decreased Balance 6. Decreased Activity Tolerance 7. Decreased Flexibility/Joint Mobility 8. Decreased [place] with Home Exercise Program 
 INTERVENTIONS PLANNED: (Benefits and precautions of occupational therapy have been discussed with the patient.) 1. Activities of daily living training 2. Adaptive equipment training 3. Balance training 4. Clothing management 5. Donning&doffing training 6. Neuromuscular re-eduation 7. Therapeutic activity 8. Therapeutic exercise TREATMENT PLAN: Frequency/Duration: Follow patient 3 times per week to address above goals. Rehabilitation Potential For Stated Goals: Good RECOMMENDED REHABILITATION/EQUIPMENT: (at time of discharge pending progress): Due to the probability of continued deficits (see above) this patient will likely need continued skilled occupational therapy after discharge. Equipment:  
? None at this time OCCUPATIONAL PROFILE AND HISTORY:  
History of Present Injury/Illness (Reason for Referral): 
See H&P Past Medical History/Comorbidities:  
Mr. Meliton Hand  has a past medical history of Arrhythmia; Arthritis; Atrial fibrillation ([facility] [address] (1/27/2012); CAD (coronary artery disease); Cancer ([facility] [address] Dementia; GERD (gastroesophageal reflux disease); Heart failure ([facility] [address] Hypertension; and Morbid obesity ([facility] [address] Mr. Meliton Hand  has a past surgical history that includes hx orthopaedic (2010); hx prostatectomy; and hx pacemaker (8/30/2012). Social History/Living Environment:  
Home Environment: Private residence # Steps to Enter: 7 Rails to Enter: Yes Hand Rails : Bilateral 
Wheelchair Ramp: No 
One/Two Story Residence: One story Living Alone: No 
Support Systems: Child(billy) Patient Expects to be Discharged to[de-identified] Private residence Current DME Used/Available at Home: Girish Push, straight, Commode, bedside, Shower chair, Wheelchair Tub or Shower Type: Shower Prior Level of Function/Work/Activity: 
Pt lives with his daughter and reports he uses a cane for functional mobility. Pt completes most ADL with modified independence but does need assistance with LB dressing at times. No reports of any recent falls. Personal Factors:   
      Social Background:  Pt is home alone while daughter is at work Other factors that influence how disability is experienced by the patient:  Multiple co-morbidities Number of Personal Factors/Comorbidities that affect the Plan of Care: Expanded review of therapy/medical records (1-2):  MODERATE COMPLEXITY ASSESSMENT OF OCCUPATIONAL PERFORMANCE[de-identified]  
Activities of Daily Living:  
Basic ADLs (From Assessment) Complex ADLs (From Assessment) Feeding: Supervision Oral Facial Hygiene/Grooming: Stand-by assistance Bathing: Moderate assistance Upper Body Dressing: Minimum assistance Lower Body Dressing: Moderate assistance Toileting: Moderate assistance Instrumental ADL Meal Preparation: Maximum assistance Homemaking: Maximum assistance Grooming/Bathing/Dressing Activities of Daily Living Grooming Washing Face: Supervision/set-up Brushing Teeth: Supervision/set-up Cognitive Retraining Safety/Judgement: Fall prevention Bed/Mat Mobility Supine to Sit: Contact guard assistance; Additional time Sit to Stand: Minimum assistance Most Recent Physical Functioning:  
Gross Assessment: 
  
         
  
Posture: 
Posture (WDL): Exceptions to [facility] Posture Assessment: Forward head, Rounded shoulders Balance: 
Sitting: Intact Standing: Impaired; With support Standing - Static: Good;Constant support Standing - Dynamic : Fair Bed Mobility: 
Supine to Sit: Contact guard assistance; Additional time Wheelchair Mobility: 
  
Transfers: 
Sit to Stand: Minimum assistance Stand to Sit: Minimum assistance Patient Vitals for the past 6 hrs: 
 BP BP Patient Position SpO2 Pulse  
09/28/18 0722 117/73 At rest 98 % 65  
09/28/18 1136 121/67 Sitting 99 % 60 Mental Status Neurologic State: Alert Orientation Level: Oriented to person Cognition: Follows commands Perception: Appears intact Perseveration: No perseveration noted Safety/Judgement: Fall prevention Physical Skills Involved: 
1. Balance 2. Strength 3. Activity Tolerance Cognitive Skills Affected (resulting in the inability to perform in a timely and safe manner): 1. Executive Function 2. Short Term Recall Psychosocial Skills Affected: 1. Habits/Routines 2. Self-Awareness Number of elements that affect the Plan of Care: 5+:  HIGH COMPLEXITY CLINICAL DECISION MAKIN[address] [place] [address] AM-PAC 6 Clicks Daily Activity Inpatient Short Form How much help from another person does the patient currently need. .. Total A Lot A Little None 1. Putting on and taking off regular lower body clothing? [] 1   [x] 2   [] 3   [] 4  
2. Bathing (including washing, rinsing, drying)? [] 1   [x] 2   [] 3   [] 4  
3. Toileting, which includes using toilet, bedpan or urinal?   [] 1   [x] 2   [] 3   [] 4  
4. Putting on and taking off regular upper body clothing? [] 1   [] 2   [x] 3   [] 4  
5. Taking care of personal grooming such as brushing teeth? [] 1   [] 2   [x] 3   [] 4  
6. Eating meals? [] 1   [] 2   [x] 3   [] 4  
© 2007, Trustees of [address] [place] [address], under license to [employer]. All rights reserved Score:  Initial:15 Most Recent: X (Date: -- ) Interpretation of Tool:  Represents activities that are increasingly more difficult (i.e. Bed mobility, Transfers, Gait). Score 24 23 22-20 19-15 14-10 9-7 6 Modifier CH CI CJ CK CL CM CN   
 
? Self Care:  
  - CURRENT STATUS: CK - 40%-59% impaired, limited or restricted  - GOAL STATUS: CJ - 20%-39% impaired, limited or restricted  - D/C STATUS:  ---------------To be determined--------------- [place] / [place] / Product Type: Medicare /   
 
Medical Necessity:    
· Patient demonstrates good rehab potential due to higher previous functional level. Reason for Services/Other Comments: 
· Patient continues to require skilled intervention due to decreased independence with ADL/functional transfers. Use of outcome tool(s) and clinical judgement create a POC that gives a: LOW COMPLEXITY  
 
 
 
TREATMENT:  
(In addition to Assessment/Re-Assessment sessions the following treatments were rendered) Pre-treatment Symptoms/Complaints:   
Pain: Initial:  
Pain Intensity 1: 0  Post Session:  0/10 Self Care: (15): Procedure(s) (per grid) utilized to improve and/or restore self-care/home management as related to grooming. Required min verbal cueing to facilitate activities of daily living skills. Braces/Orthotics/Lines/Etc:  
· O2 Device: Room air Treatment/Session Assessment:   
· Response to Treatment:  Pt tolerated session with additional time. · Interdisciplinary Collaboration:  
o Certified Occupational Therapy Assistant 
o Registered Nurse · After treatment position/precautions:  
o Up in chair 
o Bed alarm/tab alert on 
o Bed/Chair-wheels locked 
o Call light within reach 
o RN notified · Compliance with Program/Exercises: Will assess as treatment progresses. · Recommendations/Intent for next treatment session: \"Next visit will focus on advancements to more challenging activities and reduction in assistance provided\". Total Treatment Duration: OT Patient Time In/Time Out Time In: 6769 Time Out: 6016 [address]

## 2018-09-28 NOTE — DISCHARGE SUMMARY
Hospitalist Discharge Summary Patient ID: 
Abe Castro [id number] 
40 y.o. 
1940 Admit date: 9/25/2018  4:22 PM 
Discharge date and time: 9/28/2018 Attending: Aysha Jay MD 
PCP:  Sharlot Dubin, MD 
Treatment Team: Attending Provider: Aysha Jay MD; Care Manager: Tao Graves RN; Utilization Review: Gary Casillas RN; Charge Nurse: Malena New RN 
 
Principal Diagnosis E. coli UTI (urinary tract infection) Hospital Problems as of 9/28/2018  Date Reviewed: 5/19/2017 Codes Class Noted - Resolved POA * (Principal)E. coli UTI (urinary tract infection) ICD-10-CM: N39.0, B96.20 ICD-9-CM: 599.0, 041.49  9/28/2018 - Present Yes Hyperkalemia ICD-10-CM: E87.5 ICD-9-CM: 276.7  9/25/2018 - Present Yes Moderate dementia without behavioral disturbance ICD-10-CM: F03.90 ICD-9-CM: 290.0  7/26/2018 - Present Yes Acute metabolic encephalopathy RRE-11-BI: G93.41 
ICD-9-CM: 348.31  7/22/2017 - Present Yes Generalized weakness ICD-10-CM: R53.1 ICD-9-CM: 780.79  12/23/2016 - Present Yes Depression (Chronic) ICD-10-CM: F32.9 ICD-9-CM: 215  8/28/2012 - Present Yes Atrial fibrillation (HCC) (Chronic) ICD-10-CM: I48.91 
ICD-9-CM: 427.31  1/27/2012 - Present Yes HTN (hypertension) (Chronic) ICD-10-CM: I10 
ICD-9-CM: 401.9  1/27/2012 - Present Yes HPI:  'Tylor Arana is a 76 y.o. male with a past medical history of HTN, atrial fibrillation on Pradaxa, CAD, moderate dementia who presents to the ER with complaint of weakness in his lower extremities and difficulty walking starting this morning. Admits to strong smelling urine, occasional chills. Pt denies any pain, fevers, nausea, vomiting, abdominal pain, chest pain, SOB.' Hospital Course: 1. Acute E coli UTI- he was started on rocephin and received 3 days of rocephin while here. Urine culture grew E coli.   He will be transitioned to [facility] on discharge for an additional 7 days. He had E coli UTI and bacteremia in May, but no blood cultures obtained on admission this hospitalization. It is recommended he follow up with his urologist, Dr. Nohemi Garcia at [facility] due to recurrent UTI. 2. Acute encephalopathy/confusion- mentation improved with treatment of UTI. He was alert and oriented x 3 on day of discharge. 3. Debility- seen by PT. He declined STR and will be discharged with home PT> Significant Diagnostic Studies:  
 
 
Labs: Results:  
   
Chemistry Recent Labs  
   09/27/18 
 0505  09/26/18 
 0409  09/26/18 
 0023   09/25/18 
 1931 GLU  81  75  88   --   53* NA  146*  147*  148*   --   143  
K  3.6  3.6  3.1*   < >  6.8*  
CL  116*  114*  115*   --   114* CO2  22  25  25   --   23 BUN  11  12  11   --   12  
CREA  0.82  0.92  0.92   --   0.91  
CA  8.8  8.9  8.9   --   9.0 AGAP  8  8  8   --   6* AP   --    --    --    --   101 TP   --    --    --    --   7.3 ALB   --    --    --    --   2.6*  
GLOB   --    --    --    --   4.7* AGRAT   --    --    --    --   0.6*  
 < > = values in this interval not displayed. CBC w/Diff Recent Labs  
   09/27/18 
 0505  09/26/18 
 0409  09/26/18 
 0023 WBC  4.3  5.6  5.5  
RBC  4.46  4.69  4.62  
HGB  13.3*  14.0  14.0  
HCT  40.4*  42.9  41.5 PLT  137*  149*  145* GRANS  51  54  61 LYMPH  36  34  28 EOS  4  3  2 Cardiac Enzymes No results for input(s): CPK, CKND1, ALEXANDRA in the last 72 hours. No lab exists for component: Quintella Mink Coagulation No results for input(s): PTP, INR, APTT in the last 72 hours. No lab exists for component: INREXT, INREXT Lipid Panel Lab Results Component Value Date/Time  Cholesterol, total 142 04/17/2017 11:59 AM  
 HDL Cholesterol 76 04/17/2017 11:59 AM  
 LDL, calculated 52 04/17/2017 11:59 AM  
 VLDL, calculated 14 04/17/2017 11:59 AM  
 Triglyceride 69 04/17/2017 11:59 AM  
 CHOL/HDL Ratio 1.9 08/29/2012 01:43 AM  
 BNP No results for input(s): BNPP in the last 72 hours. Liver Enzymes Recent Labs  
   09/25/18 
 1931 TP  7.3 ALB  2.6* AP  101 SGOT  125* Thyroid Studies Lab Results Component Value Date/Time T4, Total 7.6 05/12/2016 10:24 AM  
 TSH 0.587 05/13/2018 01:23 AM  
    
 
 
Discharge Exam: 
Visit Vitals  /73 (BP 1 Location: Right arm, BP Patient Position: At rest)  Pulse 65  Temp 97.8 °F (36.6 °C)  Resp 20  
 Ht 6' 2\" (1.88 m)  Wt 115.2 kg (254 lb)  SpO2 98%  BMI 32.61 kg/m2 General appearance: alert, cooperative, no distress, appears stated age Lungs: clear to auscultation bilaterally Heart: regular rate and rhythm, S1, S2 normal, no murmur, click, rub or gallop Abdomen: soft, non-tender. Bowel sounds normal. No masses,  no organomegaly Extremities: no cyanosis, trace-1+ edema Neurologic: Grossly normal 
 
Disposition: home Discharge Condition: stable Patient Instructions:  
Current Discharge Medication List  
  
START taking these medications Details  
cefpodoxime (VANTIN) 200 mg tablet Take 1 Tab by mouth two (2) times a day for 7 days. Indications: E. COLI URINARY TRACT INFECTION Qty: 14 Tab, Refills: 0 CONTINUE these medications which have NOT CHANGED Details  
zolpidem (AMBIEN) 5 mg tablet Take 1 Tab by mouth nightly as needed for Sleep. Max Daily Amount: 5 mg. Indications: SLEEP-ONSET INSOMNIA Qty: 30 Tab, Refills: 2 Associated Diagnoses: Primary insomnia  
  
donepezil (ARICEPT) 5 mg tablet Take 1 Tab by mouth nightly. Qty: 90 Tab, Refills: 3 Associated Diagnoses: Mild dementia  
  
levomilnacipran (FETZIMA) 40 mg ER capsule TAKE 1 CAP BY MOUTH DAILY. Qty: 90 Cap, Refills: 3 Associated Diagnoses: Other depression  
  
tamsulosin (FLOMAX) 0.4 mg capsule Take 1 Cap by mouth daily. Qty: 90 Cap, Refills: 3  
  
bicalutamide (CASODEX) 50 mg tablet Take 1 Tab by mouth daily. Qty: 90 Tab, Refills: 3 Associated Diagnoses: CA of prostate ([facility] [address] [address] famotidine (PEPCID) 20 mg tablet Take 1 Tab by mouth two (2) times a day. Qty: 180 Tab, Refills: 3 Associated Diagnoses: Gastroesophageal reflux disease without esophagitis ARIPiprazole (ABILIFY) 10 mg tablet Take 1 Tab by mouth daily. Qty: 90 Tab, Refills: 3 Associated Diagnoses: Major depressive disorder with single episode, in remission ([facility] [address]  
  
dabigatran etexilate (PRADAXA) 150 mg capsule Take 1 Cap by mouth every twelve (12) hours. Qty: 180 Cap, Refills: 3 Associated Diagnoses: Chronic a-fib ([facility] [address] celecoxib (CELEBREX) 200 mg capsule Take  by mouth two (2) times a day. senna-docusate (SENNA LAXATIVE-STOOL SOFTENER) 8.6-50 mg per tablet Take 1 Tab by mouth daily. aspirin 81 mg chewable tablet Take 1 Tab by mouth daily. Indications: prevention of cerebrovascular accident Qty: 30 Tab, Refills: 0  
  
diclofenac (VOLTAREN) 1 % gel APPLY 4 GRAMS TO AFFECTED AREA FOUR TIMES DAILY Refills: 2  
  
acetaminophen (TYLENOL ARTHRITIS PAIN) 650 mg CR tablet Take 650 mg by mouth every six (6) hours as needed for Pain. Activity: PT/OT per Home Health Diet: Cardiac Diet Follow-up Follow-up Appointments Procedures  FOLLOW UP VISIT Appointment in: Other (Specify) 1. Dr. Pa Wallace (PCP) in 1 week for hospital follow up. 2. Dr. Viji Hummel [facility]) in 2 weeks for recurrent UTI (he is established patient with Dr. Viji Hummel). 1. Dr. Pa Wallace (PCP) in 1 week for hospital follow up. 2. Dr. Viji Hummel [facility]) in 2 weeks for recurrent UTI (he is established patient with Dr. Viji Hummel). Standing Status:   Standing Number of Occurrences:   1 Order Specific Question:   Appointment in Answer: Other (Specify) ·  
· Time spent to discharge patient 33 minutes Signed: 
Vikki Espinoza.  Candy Lanier MD 
9/28/2018 
9:24 AM

## 2018-09-28 NOTE — PROGRESS NOTES
[facility] to Face En[facility] Patients Name: Adeel Loco    YOB: 1940 Ordering Physician: Dr. Marquis Cotot Primary Diagnosis: Acute metabolic encephalopathy Date of Face to Face:   9/28/2018 Face to Face Encounter findings are related to primary reason for home care:   yes. 1. I certify that the patient needs intermittent care as follows: physical therapy: strengthening, stretching/ROM, transfer training, gait/stair training, balance training and pt/caregiver education 
occupational therapy:  ADL safety (ie. cooking, bathing, dressing), ROM and pt/caregiver education 2. I certify that this patient is homebound, that is: 1) patient requires the use of a walker device, special transportation, or assistance of another to leave the home; or 2) patient's condition makes leaving the home medically contraindicated; and 3) patient has a normal inability to leave the home and leaving the home requires considerable and taxing effort. Patient may leave the home for infrequent and short duration for medical reasons, and occasional absences for non-medical reasons. Homebound status is due to the following functional limitations: Patient with strength deficits limiting the performance of all ADL's without caregiver assistance or the use of an assistive device. Patient with poor safety awareness and is at risk for falls without assistance of another person and the use of an assistive device. Patient with poor ambulation endurance limiting their safe ability to ascend/descend the required number of steps to leave the home. 3. I certify that this patient is under my care and that I, or a nurse practitioner or [facility], or clinical nurse specialist, or certified nurse midwife, working with me, had a Face-to-Face Encounter that meets the physician Face-to-Face Encounter requirements.   The following are the clinical findings from the Face-to-Face encounter that support the need for skilled services and is a summary of the encounter: See hospital chart See hospital chart Hayden Laughlin RN 
9/28/2018 THE FOLLOWING TO BE COMPLETED BY THE COMMUNITY PHYSICIAN: 
 
I concur with the findings described above from the F2F encounter that this patient is homebound and in need of a skilled service. Certifying Physician: _____________________________________ Printed Certifying Physician Name: _____________________________________ Date: _________________

## 2018-09-28 NOTE — PROGRESS NOTES
Pt discharged home with family and [facility]. Transported with  [facility] ambulance service.

## 2018-09-28 NOTE — PROGRESS NOTES
Problem: Mobility Impaired (Adult and Pediatric) Goal: *Acute Goals and Plan of Care (Insert Text) LTG: 
(1.)Mr. Ajay Nunez will move from supine to sit and sit to supine, scoot up and down and roll side to side INDEPENDENTLY within 7 treatment day(s). (2.)Mr. Ajay Nunez will transfer from bed to chair and chair to bed with MODIFIED INDEPENDENCE using the least restrictive device within 7 treatment day(s). (3.)Mr. Ajay Nunez will ambulate with MODIFIED INDEPENDENCE for 200+ feet with the least restrictive device within 7 treatment day(s). (4.)Mr. Ajay Nunez will ascend and descend 7 steps with CONTACT GUARD ASSIST using handrail(s) within 7 days. ________________________________________________________________________________________________ PHYSICAL THERAPY: Daily Note, Treatment Day: 1st, AM 9/28/2018 INPATIENT: Hospital Day: 4 [place] / Product Type: Medicare /  
  
NAME/AGE/GENDER: Pauline Wong is a 76 y.o. male PRIMARY DIAGNOSIS: Acute metabolic encephalopathy E. coli UTI (urinary tract infection) E. coli UTI (urinary tract infection) ICD-10: Treatment Diagnosis:  
 · Generalized Muscle Weakness (M62.81) · Other abnormalities of gait and mobility (R26.89) Precaution/Allergies: 
Review of patient's allergies indicates no known allergies. ASSESSMENT:  
 
Mr. Ajay Nunez is a 76year old male admitted from home for encephalopathy, weakness. Patient was supine upon contact and agreeable to PT. Patient was able to perform supine to sit with CGA, additional time, and cues for improved/proper technique. Transfers to standing with min assist and cues for hand placement/ technique. Once standing patient increases gait distance to 50' with use of rolling walker, CGA and occasional cues for posture/sequencing.  Patient returns to recliner chair where he participates in therapeutic strengthening exercises to improve functional strength for transfers, gait and overall mobility. Patient requires cues to perform exercises correctly. Overall good progress towards physical therapy goals. No goals have been met thus far. Will continue efforts. HHPT at d/c. This section established at most recent assessment PROBLEM LIST (Impairments causing functional limitations): 1. Decreased Strength 2. Decreased ADL/Functional Activities 3. Decreased Transfer Abilities 4. Decreased Ambulation Ability/Technique 5. Decreased Balance 6. Increased Pain 7. Decreased Activity Tolerance 8. Decreased Cognition INTERVENTIONS PLANNED: (Benefits and precautions of physical therapy have been discussed with the patient.) 1. Balance Exercise 2. Bed Mobility 3. Gait Training 4. Home Exercise Program (HEP) 5. Therapeutic Activites 6. Therapeutic Exercise/Strengthening 7. Transfer Training TREATMENT PLAN: Frequency/Duration: 3 times a week for duration of hospital stay Rehabilitation Potential For Stated Goals: Good RECOMMENDED REHABILITATION/EQUIPMENT: (at time of discharge pending progress): Due to the probability of continued deficits (see above) this patient will likely need continued skilled physical therapy after discharge. Equipment:  
? None at this time HISTORY:  
History of Present Injury/Illness (Reason for Referral): 
Per H&P, \" 
Past Medical History/Comorbidities:  
Mr. Ruthie Alvarez  has a past medical history of Arrhythmia; Arthritis; Atrial fibrillation ([address] (1/27/2012); CAD (coronary artery disease); Cancer ([address] Dementia; GERD (gastroesophageal reflux disease); Heart failure ([address] Hypertension; and Morbid obesity ([address] Mr. Ruthie Alvarez  has a past surgical history that includes hx orthopaedic (2010); hx prostatectomy; and hx pacemaker (8/30/2012). Social History/Living Environment:  
Home Environment: Private residence # Steps to Enter: 7 Rails to Enter: Yes Hand Rails : Bilateral 
Wheelchair Ramp: No 
 One/Two Story Residence: One story Living Alone: No 
Support Systems: Child(billy) Patient Expects to be Discharged to[de-identified] Private residence Current DME Used/Available at Home: [employer], straight, Commode, bedside, Shower chair, Wheelchair Tub or Shower Type: Shower Prior Level of Function/Work/Activity: 
Lives with daughter who works during the day. Mod I with cane or walker. Reports mod I with ADLs. Denies falls. No driving recently. Number of Personal Factors/Comorbidities that affect the Plan of Care: 1-2: MODERATE COMPLEXITY EXAMINATION:  
Most Recent Physical Functioning:  
Gross Assessment: 
  
         
  
Posture: 
  
Balance: 
Sitting: Intact Standing: Impaired; With support Standing - Static: Good;Constant support Standing - Dynamic : Fair Bed Mobility: 
Supine to Sit: Contact guard assistance; Additional time Wheelchair Mobility: 
  
Transfers: 
Sit to Stand: Minimum assistance Stand to Sit: Minimum assistance Gait: 
  
Base of Support: Widened;Center of gravity altered Speed/Ángela: Pace decreased (<100 feet/min); Slow Step Length: Left shortened;Right shortened Gait Abnormalities: Decreased step clearance;Trunk sway increased; Path deviations Distance (ft): 50 Feet (ft) Assistive Device: Walker, rolling Ambulation - Level of Assistance: Contact guard assistance Interventions: Safety awareness training; Tactile cues; Verbal cues Body Structures Involved: 1. Muscles Body Functions Affected: 1. Mental 
2. Movement Related Activities and Participation Affected: 1. General Tasks and Demands 2. Mobility 3. Self Care 4. Domestic Life 5. Community, Social and [place] [employer] Number of elements that affect the Plan of Care: 4+: HIGH COMPLEXITY CLINICAL PRESENTATION:  
Presentation: Stable and uncomplicated: LOW COMPLEXITY CLINICAL DECISION MAKING:  
[employer] 6 Clicks Basic Mobility Inpatient Short Form How much difficulty does the patient currently have. .. Unable A Lot A Little None 1. Turning over in bed (including adjusting bedclothes, sheets and blankets)? [] 1   [] 2   [] 3   [x] 4  
2. Sitting down on and standing up from a chair with arms ( e.g., wheelchair, bedside commode, etc.)   [] 1   [] 2   [x] 3   [] 4  
3. Moving from lying on back to sitting on the side of the bed? [] 1   [] 2   [x] 3   [] 4 How much help from another person does the patient currently need. .. Total A Lot A Little None 4. Moving to and from a bed to a chair (including a wheelchair)? [] 1   [] 2   [x] 3   [] 4  
5. Need to walk in hospital room? [] 1   [] 2   [x] 3   [] 4  
6. Climbing 3-5 steps with a railing? [] 1   [x] 2   [] 3   [] 4  
© 2007, Trustees of [address] [place] [address], under license to [employer]. All rights reserved Score:  Initial: 18 Most Recent: X (Date: -- ) Interpretation of Tool:  Represents activities that are increasingly more difficult (i.e. Bed mobility, Transfers, Gait). Score 24 23 22-20 19-15 14-10 9-7 6 Modifier CH CI CJ CK CL CM CN   
 
? Mobility - Walking and Moving Around:  
  - CURRENT STATUS: CK - 40%-59% impaired, limited or restricted  - GOAL STATUS: CI - 1%-19% impaired, limited or restricted  - D/C STATUS:  ---------------To be determined--------------- [place] / Product Type: Medicare /   
 
Medical Necessity:    
· Patient demonstrates good rehab potential due to higher previous functional level. Reason for Services/Other Comments: 
· Patient continues to demonstrate capacity to improve strength, mobility, balance, transfers, activity tolerance which will increase independence, decrease amount of assistance required from caregiver and increase safety. Use of outcome tool(s) and clinical judgement create a POC that gives a: Clear prediction of patient's progress: LOW COMPLEXITY TREATMENT:  
(In addition to Assessment/Re-Assessment sessions the following treatments were rendered) Pre-treatment Symptoms/Complaints:  None Pain: Initial:  
Pain Intensity 1: 0  Post Session:  0/10 Therapeutic Activity: (    14 Minutes): Therapeutic activities including bed mobility training, transfer training, ambulation on level ground, static/dynamic standing balance training, posture training, instruction in sequencing with rolling walker, scooting, and patient education to improve mobility, strength, balance, coordination and activity tolerance. Required minimal Safety awareness training; Tactile cues; Verbal cues to promote static and dynamic balance in standing, promote coordination of bilateral, lower extremity(s) and promote motor control of bilateral, lower extremity(s). Therapeutic Exercise: (  10 Minutes):  Exercises per grid below to improve mobility, strength and balance. Required minimal visual and verbal cues to promote proper body alignment, promote proper body posture and promote proper body mechanics. Progressed range, repetitions and complexity of movement as indicated. Date: 
9/28/18 Date: 
 Date: Activity/Exercise Parameters Parameters Parameters Heel raises 2x15B A Toe raises 2x15B A    
LAQ 2x15B A Hip flexion 2x15B A Hip abduction 2x15B A Braces/Orthotics/Lines/Etc:  
· O2 Device: Room air Treatment/Session Assessment:   
· Response to Treatment:  See above · Interdisciplinary Collaboration:  
o Physical Therapy Assistant 
o Registered Nurse · After treatment position/precautions:  
o Up in chair 
o Bed alarm/tab alert on 
o Bed/Chair-wheels locked 
o Call light within reach 
o RN notified · Compliance with Program/Exercises: compliant all of the time. · Recommendations/Intent for next treatment session: \"Next visit will focus on advancements to more challenging activities and reduction in assistance provided\". Total Treatment Duration: PT Patient Time In/Time Out Time In: 3266 Time Out: 5681 Sagar Arambula, PTA

## 2018-09-28 NOTE — DISCHARGE INSTRUCTIONS
Urinary Tract Infections in Men: Care Instructions  Your Care Instructions    A urinary tract infection, or UTI, is a general term for an infection anywhere between the kidneys and the tip of the penis. UTIs can also be a result of a prostate problem. Most cause pain or burning when you urinate. Most UTIs are caused by bacteria and can be cured with antibiotics. It is important to complete your treatment so that the infection does not get worse. Follow-up care is a key part of your treatment and safety. Be sure to make and go to all appointments, and call your doctor if you are having problems. It's also a good idea to know your test results and keep a list of the medicines you take. How can you care for yourself at home? · Take your antibiotics as prescribed. Do not stop taking them just because you feel better. You need to take the full course of antibiotics. · Take your medicines exactly as prescribed. Your doctor may have prescribed a medicine, such as phenazopyridine (Pyridium), to help relieve pain when you urinate. This turns your urine orange. You may stop taking it when your symptoms get better. But be sure to take all of your antibiotics, which treat the infection. · Drink extra water for the next day or two. This will help make the urine less concentrated and help wash out the bacteria causing the infection. (If you have kidney, heart, or liver disease and have to limit your fluids, talk with your doctor before you increase your fluid intake.)  · Avoid drinks that are carbonated or have caffeine. They can irritate the bladder. · Urinate often. Try to empty your bladder each time. · To relieve pain, take a hot bath or lay a heating pad (set on low) over your lower belly or genital area. Never go to sleep with a heating pad in place. To help prevent UTIs  · Drink plenty of fluids, enough so that your urine is light yellow or clear like water.  If you have kidney, heart, or liver disease and have to limit fluids, talk with your doctor before you increase the amount of fluids you drink. · Urinate when you have the urge. Do not hold your urine for a long time. Urinate before you go to sleep. · Keep your penis clean. Catheter care  If you have a drainage tube (catheter) in place, the following steps will help you care for it. · Always wash your hands before and after touching your catheter. · Check the area around the urethra for inflammation or signs of infection. Signs of infection include irritated, swollen, red, or tender skin, or pus around the catheter. · Clean the area around the catheter with soap and water two times a day. Dry with a clean towel afterward. · Do not apply powder or lotion to the skin around the catheter. To empty the urine collection bag  · Wash your hands with soap and water. · Without touching the drain spout, remove the spout from its sleeve at the bottom of the collection bag. Open the valve on the spout. · Let the urine flow out of the bag and into the toilet or a container. Do not let the tubing or drain spout touch anything. · After you empty the bag, clean the end of the drain spout with tissue and water. Close the valve and put the drain spout back into its sleeve at the bottom of the collection bag. · Wash your hands with soap and water. When should you call for help? Call your doctor now or seek immediate medical care if:    · Symptoms such as a fever, chills, nausea, or vomiting get worse or happen for the first time.     · You have new pain in your back just below your rib cage. This is called flank pain.     · There is new blood or pus in your urine.     · You are not able to take or keep down your antibiotics.    Watch closely for changes in your health, and be sure to contact your doctor if:    · You are not getting better after taking an antibiotic for 2 days.     · Your symptoms go away but then come back. Where can you learn more?   Go to http://virgil-nancy.info/. Enter G727 in the search box to learn more about \"Urinary Tract Infections in Men: Care Instructions. \"  Current as of: May 12, 2017  Content Version: 11.7  © 5933-4430 [employer]. Care instructions adapted under license by [employer] (which disclaims liability or warranty for this information). If you have questions about a medical condition or this instruction, always ask your healthcare professional. [address] any warranty or liability for your use of this information. DISCHARGE SUMMARY from Nurse    PATIENT INSTRUCTIONS:    After general anesthesia or intravenous sedation, for 24 hours or while taking prescription Narcotics:  · Limit your activities  · Do not drive and operate hazardous machinery  · Do not make important personal or business decisions  · Do  not drink alcoholic beverages  · If you have not urinated within 8 hours after discharge, please contact your surgeon on call. Report the following to your surgeon:  · Excessive pain, swelling, redness or odor of or around the surgical area  · Temperature over 100.5  · Nausea and vomiting lasting longer than 4 hours or if unable to take medications  · Any signs of decreased circulation or nerve impairment to extremity: change in color, persistent  numbness, tingling, coldness or increase pain  · Any questions    What to do at Home:  Recommended activity: Activity as tolerated, diet as tolerated. *  Please give a list of your current medications to your Primary Care Provider. *  Please update this list whenever your medications are discontinued, doses are      changed, or new medications (including over-the-counter products) are added. *  Please carry medication information at all times in case of emergency situations.     These are general instructions for a healthy lifestyle:    No smoking/ No tobacco products/ Avoid exposure to second hand smoke  Surgeon General's Warning:  Quitting smoking now greatly reduces serious risk to your health. Obesity, smoking, and sedentary lifestyle greatly increases your risk for illness    A healthy diet, regular physical exercise & weight monitoring are important for maintaining a healthy lifestyle    You may be retaining fluid if you have a history of heart failure or if you experience any of the following symptoms:  Weight gain of 3 pounds or more overnight or 5 pounds in a week, increased swelling in our hands or feet or shortness of breath while lying flat in bed. Please call your doctor as soon as you notice any of these symptoms; do not wait until your next office visit. Recognize signs and symptoms of STROKE:    F-face looks uneven    A-arms unable to move or move unevenly    S-speech slurred or non-existent    T-time-call 911 as soon as signs and symptoms begin-DO NOT go       Back to bed or wait to see if you get better-TIME IS BRAIN. Warning Signs of HEART ATTACK     Call 911 if you have these symptoms:   Chest discomfort. Most heart attacks involve discomfort in the center of the chest that lasts more than a few minutes, or that goes away and comes back. It can feel like uncomfortable pressure, squeezing, fullness, or pain.  Discomfort in other areas of the upper body. Symptoms can include pain or discomfort in one or both arms, the back, neck, jaw, or stomach.  Shortness of breath with or without chest discomfort.  Other signs may include breaking out in a cold sweat, nausea, or lightheadedness. Don't wait more than five minutes to call 911 - MINUTES MATTER! Fast action can save your life. Calling 911 is almost always the fastest way to get lifesaving treatment. Emergency Medical Services staff can begin treatment when they arrive -- up to an hour sooner than if someone gets to the hospital by car. The discharge information has been reviewed with the patient.   The patient verbalized understanding. Discharge medications reviewed with the patient and appropriate educational materials and side effects teaching were provided.   ___________________________________________________________________________________________________________________________________

## 2018-09-28 NOTE — PROGRESS NOTES
Patient requesting [facility] for [facility] services. Order, referral and face to face completed for PT/OT disciplines upon discharge. Patient aware. Care Management Interventions PCP Verified by CM: Yes Transition of Care Consult (CM Consult): Discharge Planning Current Support Network:  (lives with daughter) Confirm Follow Up Transport: Family Plan discussed with Pt/Family/Caregiver: Yes Freedom of Choice Offered: Yes Discharge Location Discharge Placement: Home with home health

## 2018-09-29 ENCOUNTER — HOME CARE VISIT (OUTPATIENT)
Dept: SCHEDULING | Facility: HOME HEALTH | Age: 78
End: 2018-09-29
Payer: MEDICARE

## 2018-09-29 PROCEDURE — 3331090001 HH PPS REVENUE CREDIT

## 2018-09-29 PROCEDURE — 3331090002 HH PPS REVENUE DEBIT

## 2018-09-29 PROCEDURE — 400013 HH SOC

## 2018-09-29 PROCEDURE — G0151 HHCP-SERV OF PT,EA 15 MIN: HCPCS

## 2018-09-30 VITALS
SYSTOLIC BLOOD PRESSURE: 112 MMHG | HEIGHT: 74 IN | WEIGHT: 254 LBS | DIASTOLIC BLOOD PRESSURE: 65 MMHG | RESPIRATION RATE: 16 BRPM | BODY MASS INDEX: 32.6 KG/M2 | HEART RATE: 65 BPM | TEMPERATURE: 98.4 F

## 2018-09-30 PROCEDURE — 3331090002 HH PPS REVENUE DEBIT

## 2018-09-30 PROCEDURE — 3331090001 HH PPS REVENUE CREDIT

## 2018-10-01 ENCOUNTER — PATIENT OUTREACH (OUTPATIENT)
Dept: CASE MANAGEMENT | Age: 78
End: 2018-10-01

## 2018-10-01 PROCEDURE — 3331090001 HH PPS REVENUE CREDIT

## 2018-10-01 PROCEDURE — 3331090002 HH PPS REVENUE DEBIT

## 2018-10-01 NOTE — PROGRESS NOTES
This note will not be viewable in [address] Initial [facility] outreach attempt to patient's home/cell number was unsuccessful. Left message to return call. Will attempt second outreach within 24 hours.

## 2018-10-02 ENCOUNTER — PATIENT OUTREACH (OUTPATIENT)
Dept: CASE MANAGEMENT | Age: 78
End: 2018-10-02

## 2018-10-02 ENCOUNTER — HOME CARE VISIT (OUTPATIENT)
Dept: SCHEDULING | Facility: HOME HEALTH | Age: 78
End: 2018-10-02
Payer: MEDICARE

## 2018-10-02 VITALS
HEART RATE: 60 BPM | DIASTOLIC BLOOD PRESSURE: 70 MMHG | SYSTOLIC BLOOD PRESSURE: 142 MMHG | RESPIRATION RATE: 16 BRPM | TEMPERATURE: 97.7 F

## 2018-10-02 PROCEDURE — 3331090001 HH PPS REVENUE CREDIT

## 2018-10-02 PROCEDURE — 3331090002 HH PPS REVENUE DEBIT

## 2018-10-02 PROCEDURE — G0157 HHC PT ASSISTANT EA 15: HCPCS

## 2018-10-02 NOTE — PROGRESS NOTES
This note will not be viewable in [address] Second PRASANNA outreach attempt made to patient's home and/or cell number was unsuccessful. Left message to return call. Will attempt third outreach within 5 business days.

## 2018-10-03 PROCEDURE — 3331090002 HH PPS REVENUE DEBIT

## 2018-10-03 PROCEDURE — 3331090001 HH PPS REVENUE CREDIT

## 2018-10-04 ENCOUNTER — HOME CARE VISIT (OUTPATIENT)
Dept: HOME HEALTH SERVICES | Facility: HOME HEALTH | Age: 78
End: 2018-10-04
Payer: MEDICARE

## 2018-10-04 ENCOUNTER — HOME CARE VISIT (OUTPATIENT)
Dept: SCHEDULING | Facility: HOME HEALTH | Age: 78
End: 2018-10-04
Payer: MEDICARE

## 2018-10-04 ENCOUNTER — PATIENT OUTREACH (OUTPATIENT)
Dept: CASE MANAGEMENT | Age: 78
End: 2018-10-04

## 2018-10-04 VITALS
HEART RATE: 64 BPM | DIASTOLIC BLOOD PRESSURE: 80 MMHG | SYSTOLIC BLOOD PRESSURE: 134 MMHG | RESPIRATION RATE: 18 BRPM | TEMPERATURE: 97.2 F

## 2018-10-04 PROCEDURE — 3331090001 HH PPS REVENUE CREDIT

## 2018-10-04 PROCEDURE — G0157 HHC PT ASSISTANT EA 15: HCPCS

## 2018-10-04 PROCEDURE — 3331090002 HH PPS REVENUE DEBIT

## 2018-10-05 PROCEDURE — 3331090002 HH PPS REVENUE DEBIT

## 2018-10-05 PROCEDURE — 3331090001 HH PPS REVENUE CREDIT

## 2018-10-06 PROCEDURE — 3331090001 HH PPS REVENUE CREDIT

## 2018-10-06 PROCEDURE — 3331090002 HH PPS REVENUE DEBIT

## 2018-10-07 PROCEDURE — 3331090002 HH PPS REVENUE DEBIT

## 2018-10-07 PROCEDURE — 3331090001 HH PPS REVENUE CREDIT

## 2018-10-08 ENCOUNTER — HOME CARE VISIT (OUTPATIENT)
Dept: SCHEDULING | Facility: HOME HEALTH | Age: 78
End: 2018-10-08
Payer: MEDICARE

## 2018-10-08 VITALS
SYSTOLIC BLOOD PRESSURE: 136 MMHG | TEMPERATURE: 97.2 F | HEART RATE: 68 BPM | RESPIRATION RATE: 14 BRPM | DIASTOLIC BLOOD PRESSURE: 78 MMHG

## 2018-10-08 PROCEDURE — G0157 HHC PT ASSISTANT EA 15: HCPCS

## 2018-10-08 PROCEDURE — 3331090001 HH PPS REVENUE CREDIT

## 2018-10-08 PROCEDURE — 3331090002 HH PPS REVENUE DEBIT

## 2018-10-09 PROCEDURE — 3331090001 HH PPS REVENUE CREDIT

## 2018-10-09 PROCEDURE — 3331090002 HH PPS REVENUE DEBIT

## 2018-10-10 PROCEDURE — 3331090002 HH PPS REVENUE DEBIT

## 2018-10-10 PROCEDURE — 3331090001 HH PPS REVENUE CREDIT

## 2018-10-11 PROCEDURE — 3331090002 HH PPS REVENUE DEBIT

## 2018-10-11 PROCEDURE — 3331090001 HH PPS REVENUE CREDIT

## 2018-10-12 ENCOUNTER — HOME CARE VISIT (OUTPATIENT)
Dept: SCHEDULING | Facility: HOME HEALTH | Age: 78
End: 2018-10-12
Payer: MEDICARE

## 2018-10-12 VITALS
HEART RATE: 78 BPM | TEMPERATURE: 98.2 F | SYSTOLIC BLOOD PRESSURE: 150 MMHG | RESPIRATION RATE: 18 BRPM | DIASTOLIC BLOOD PRESSURE: 88 MMHG

## 2018-10-12 PROCEDURE — G0151 HHCP-SERV OF PT,EA 15 MIN: HCPCS

## 2018-10-12 PROCEDURE — 3331090003 HH PPS REVENUE ADJ

## 2018-10-12 PROCEDURE — 3331090002 HH PPS REVENUE DEBIT

## 2018-10-12 PROCEDURE — 3331090001 HH PPS REVENUE CREDIT

## 2018-10-13 PROCEDURE — 3331090001 HH PPS REVENUE CREDIT

## 2018-10-13 PROCEDURE — 3331090002 HH PPS REVENUE DEBIT

## 2018-10-14 PROCEDURE — 3331090001 HH PPS REVENUE CREDIT

## 2018-10-14 PROCEDURE — 3331090002 HH PPS REVENUE DEBIT

## 2019-02-04 ENCOUNTER — APPOINTMENT (OUTPATIENT)
Dept: GENERAL RADIOLOGY | Age: 79
End: 2019-02-04
Attending: EMERGENCY MEDICINE
Payer: MEDICARE

## 2019-02-04 LAB
ALBUMIN SERPL-MCNC: 3.2 G/DL (ref 3.2–4.6)
ALBUMIN/GLOB SERPL: 0.7 {RATIO} (ref 1.2–3.5)
ALP SERPL-CCNC: 130 U/L (ref 50–136)
ALT SERPL-CCNC: 27 U/L (ref 12–65)
ANION GAP SERPL CALC-SCNC: 6 MMOL/L (ref 7–16)
AST SERPL-CCNC: 29 U/L (ref 15–37)
BASOPHILS # BLD: 0 K/UL (ref 0–0.2)
BASOPHILS NFR BLD: 1 % (ref 0–2)
BILIRUB SERPL-MCNC: 0.7 MG/DL (ref 0.2–1.1)
BNP SERPL-MCNC: 76 PG/ML
BUN SERPL-MCNC: 15 MG/DL (ref 8–23)
CALCIUM SERPL-MCNC: 9.3 MG/DL (ref 8.3–10.4)
CHLORIDE SERPL-SCNC: 110 MMOL/L (ref 98–107)
CO2 SERPL-SCNC: 26 MMOL/L (ref 21–32)
CREAT SERPL-MCNC: 1 MG/DL (ref 0.8–1.5)
DIFFERENTIAL METHOD BLD: ABNORMAL
EOSINOPHIL # BLD: 0.1 K/UL (ref 0–0.8)
EOSINOPHIL NFR BLD: 2 % (ref 0.5–7.8)
ERYTHROCYTE [DISTWIDTH] IN BLOOD BY AUTOMATED COUNT: 13.2 % (ref 11.9–14.6)
GLOBULIN SER CALC-MCNC: 4.3 G/DL (ref 2.3–3.5)
GLUCOSE SERPL-MCNC: 89 MG/DL (ref 65–100)
HCT VFR BLD AUTO: 44.6 % (ref 41.1–50.3)
HGB BLD-MCNC: 14.7 G/DL (ref 13.6–17.2)
IMM GRANULOCYTES # BLD AUTO: 0 K/UL (ref 0–0.5)
IMM GRANULOCYTES NFR BLD AUTO: 0 % (ref 0–5)
LACTATE BLD-SCNC: 0.87 MMOL/L (ref 0.5–1.9)
LYMPHOCYTES # BLD: 0.9 K/UL (ref 0.5–4.6)
LYMPHOCYTES NFR BLD: 22 % (ref 13–44)
MCH RBC QN AUTO: 30.6 PG (ref 26.1–32.9)
MCHC RBC AUTO-ENTMCNC: 33 G/DL (ref 31.4–35)
MCV RBC AUTO: 92.9 FL (ref 79.6–97.8)
MONOCYTES # BLD: 0.4 K/UL (ref 0.1–1.3)
MONOCYTES NFR BLD: 10 % (ref 4–12)
NEUTS SEG # BLD: 2.7 K/UL (ref 1.7–8.2)
NEUTS SEG NFR BLD: 65 % (ref 43–78)
NRBC # BLD: 0 K/UL (ref 0–0.2)
PLATELET # BLD AUTO: 100 K/UL (ref 150–450)
PMV BLD AUTO: 11.9 FL (ref 9.4–12.3)
POTASSIUM SERPL-SCNC: 3.8 MMOL/L (ref 3.5–5.1)
PROT SERPL-MCNC: 7.5 G/DL (ref 6.3–8.2)
RBC # BLD AUTO: 4.8 M/UL (ref 4.23–5.6)
SODIUM SERPL-SCNC: 142 MMOL/L (ref 136–145)
TROPONIN I BLD-MCNC: 0.07 NG/ML (ref 0.02–0.05)
WBC # BLD AUTO: 4.1 K/UL (ref 4.3–11.1)

## 2019-02-04 PROCEDURE — 80053 COMPREHEN METABOLIC PANEL: CPT

## 2019-02-04 PROCEDURE — 93005 ELECTROCARDIOGRAM TRACING: CPT | Performed by: EMERGENCY MEDICINE

## 2019-02-04 PROCEDURE — 83880 ASSAY OF NATRIURETIC PEPTIDE: CPT

## 2019-02-04 PROCEDURE — 83605 ASSAY OF LACTIC ACID: CPT

## 2019-02-04 PROCEDURE — 84484 ASSAY OF TROPONIN QUANT: CPT

## 2019-02-04 PROCEDURE — 71046 X-RAY EXAM CHEST 2 VIEWS: CPT

## 2019-02-04 PROCEDURE — 99285 EMERGENCY DEPT VISIT HI MDM: CPT

## 2019-02-04 PROCEDURE — 85025 COMPLETE CBC W/AUTO DIFF WBC: CPT

## 2019-02-04 PROCEDURE — 81003 URINALYSIS AUTO W/O SCOPE: CPT

## 2019-02-05 ENCOUNTER — HOSPITAL ENCOUNTER (EMERGENCY)
Age: 79
Discharge: HOME | End: 2019-02-05
Payer: MEDICARE

## 2019-02-05 VITALS
DIASTOLIC BLOOD PRESSURE: 75 MMHG | OXYGEN SATURATION: 98 % | HEART RATE: 80 BPM | TEMPERATURE: 98.4 F | SYSTOLIC BLOOD PRESSURE: 145 MMHG | HEIGHT: 74 IN | WEIGHT: 250 LBS | RESPIRATION RATE: 16 BRPM | BODY MASS INDEX: 32.08 KG/M2

## 2019-02-05 DIAGNOSIS — N39.0 URINARY TRACT INFECTION WITHOUT HEMATURIA, SITE UNSPECIFIED: Primary | ICD-10-CM

## 2019-02-05 LAB
ATRIAL RATE: 97 BPM
BACTERIA URNS QL MICRO: ABNORMAL /HPF
CALCULATED R AXIS, ECG10: -79 DEGREES
CALCULATED T AXIS, ECG11: 85 DEGREES
CASTS URNS QL MICRO: ABNORMAL /LPF
DIAGNOSIS, 93000: NORMAL
EPI CELLS #/AREA URNS HPF: 0 /HPF
Q-T INTERVAL, ECG07: 436 MS
QRS DURATION, ECG06: 142 MS
QTC CALCULATION (BEZET), ECG08: 436 MS
RBC #/AREA URNS HPF: ABNORMAL /HPF
TROPONIN I BLD-MCNC: 0.07 NG/ML (ref 0.02–0.05)
VENTRICULAR RATE, ECG03: 60 BPM
WBC URNS QL MICRO: ABNORMAL /HPF

## 2019-02-05 PROCEDURE — 96365 THER/PROPH/DIAG IV INF INIT: CPT

## 2019-02-05 PROCEDURE — 84484 ASSAY OF TROPONIN QUANT: CPT

## 2019-02-05 PROCEDURE — 81015 MICROSCOPIC EXAM OF URINE: CPT

## 2019-02-05 PROCEDURE — 74011250636 HC RX REV CODE- 250/636

## 2019-02-05 PROCEDURE — 74011000258 HC RX REV CODE- 258

## 2019-02-05 RX ORDER — CIPROFLOXACIN 500 MG/1
500 TABLET ORAL 2 TIMES DAILY
Qty: 14 TAB | Refills: 0 | Status: SHIPPED | OUTPATIENT
Start: 2019-02-05 | End: 2019-02-12

## 2019-02-05 RX ADMIN — CEFTRIAXONE 1 G: 1 INJECTION, POWDER, FOR SOLUTION INTRAMUSCULAR; INTRAVENOUS at 04:08

## 2019-02-05 NOTE — ED TRIAGE NOTES
Pt arrives via EMS from home, pt has hx of chronic UTIs, pt c/o progressive weakness over the last few days. PT has hx of sepsis, he has a pacemaker, EMS reported pt was going in and out of a.fib en route to our hospital. Pt denies any pain, denies shob, denies dizziness/lightheadedness. Pt's LLE slightly swollen, pt states he sleeps sitting up, and hasnt been sleeping well lately, unable to explain why, pt poor historian.

## 2019-02-05 NOTE — ED PROVIDER NOTES
78-year-old male brought in for weakness fatigue and malaise. Family thinks she may have a UTI. Patient had difficulty walking today due to the weakness. No falls or injuries. No reported fevers chills. Fatigue This is a new problem. The current episode started 12 to 24 hours ago. The problem has not changed since onset. There was no focality noted. Pertinent negatives include no focal weakness, no slurred speech, no memory loss, no auditory change and no mental status change. There has been no fever. Associated symptoms include bladder incontinence. Past Medical History:  
Diagnosis Date  Arrhythmia   
 pt takes pradaxa  Arthritis  Atrial fibrillation ([address] 1/27/2012  CAD (coronary artery disease)  Cancer [facility])   
 prostate  Dementia  GERD (gastroesophageal reflux disease)   
 controlled with medication  Heart failure ([address]   
 pt takes lasix as needed, reports SOB with exertion  Hypertension   
 controlled with medication  Morbid obesity ([address] Past Surgical History:  
Procedure Laterality Date  HX ORTHOPAEDIC  2010  
 left TKA  HX PACEMAKER  8/30/2012 [employer] pacemaker  HX PROSTATECTOMY Family History:  
Problem Relation Age of Onset  Cancer Father Social History Socioeconomic History  Marital status:  Spouse name: Not on file  Number of children: Not on file  Years of education: Not on file  Highest education level: Not on file Social Needs  Financial resource strain: Not on file  Food insecurity - worry: Not on file  Food insecurity - inability: Not on file  Transportation needs - medical: Not on file  Transportation needs - non-medical: Not on file Occupational History  Not on file Tobacco Use  Smoking status: Never Smoker  Smokeless tobacco: Never Used Substance and Sexual Activity  Alcohol use: No  
 Drug use: No  
 Sexual activity: No  
 Other Topics Concern  Not on file Social History Narrative  Not on file ALLERGIES: Patient has no known allergies. Review of Systems Constitutional: Positive for fatigue. Negative for activity change. HENT: Negative. Eyes: Negative. Respiratory: Negative. Cardiovascular: Negative. Gastrointestinal: Negative. Genitourinary: Positive for bladder incontinence. Musculoskeletal: Negative. Skin: Negative. Neurological: Negative. Negative for focal weakness. Psychiatric/Behavioral: Negative. Negative for memory loss. All other systems reviewed and are negative. Vitals:  
 02/04/19 2146 02/05/19 0582 BP: 148/72 Pulse: 62 Resp: 16 Temp: 98.4 °F (36.9 °C) SpO2: 99% 99% Weight: 113.4 kg (250 lb) Height: 6' 2\" (1.88 m) Physical Exam  
Constitutional: He is oriented to person, place, and time. He appears well-developed and well-nourished. No distress. HENT:  
Head: Normocephalic and atraumatic. Right Ear: External ear normal.  
Left Ear: External ear normal.  
Nose: Nose normal.  
Mouth/Throat: Oropharynx is clear and moist. No oropharyngeal exudate. Eyes: Conjunctivae and EOM are normal. Pupils are equal, round, and reactive to light. Right eye exhibits no discharge. Left eye exhibits no discharge. No scleral icterus. Neck: Normal range of motion. Neck supple. No JVD present. No tracheal deviation present. Cardiovascular: Normal rate, regular rhythm and intact distal pulses. Pulmonary/Chest: Effort normal and breath sounds normal. No stridor. No respiratory distress. He has no wheezes. He exhibits no tenderness. Abdominal: Soft. Bowel sounds are normal. He exhibits no distension and no mass. There is no tenderness. Musculoskeletal: Normal range of motion. He exhibits no edema or tenderness. Neurological: He is alert and oriented to person, place, and time. No cranial nerve deficit. Skin: Skin is warm and dry. No rash noted. He is not diaphoretic. No erythema. No pallor. Psychiatric: He has a normal mood and affect. His behavior is normal. Thought content normal.  
Nursing note and vitals reviewed. MDM Number of Diagnoses or Management Options Urinary tract infection without hematuria, site unspecified: minor Diagnosis management comments: Patient has UTI however white count is normal and lactic acid is normal although labs are normal.  Patient's awake alert and oriented no pains complaint. We'll treat him with IV antibiotics the ED at home antibiotics. Also will ask  to help with home health possible physical therapy if necessary. They're to contact primary care doctor today Amount and/or Complexity of Data Reviewed Clinical lab tests: ordered and reviewed Tests in the radiology section of CPT®: ordered and reviewed Tests in the medicine section of CPT®: ordered and reviewed Risk of Complications, Morbidity, and/or Mortality Presenting problems: high Diagnostic procedures: high Management options: high Patient Progress Patient progress: stable Procedures

## 2019-02-05 NOTE — ED NOTES
I have reviewed discharge instructions with the caregiver. The caregiver verbalized understanding. Patient left ED via Discharge Method: stretcher to Home with [facility] [employer] for questions and clarification provided. Patient given 0 scripts. To continue your aftercare when you leave the hospital, you may receive an automated call from our care team to check in on how you are doing. This is a free service and part of our promise to provide the best care and service to meet your aftercare needs.  If you have questions, or wish to unsubscribe from this service please call [phone]. Thank you for Choosing our [facility].

## 2019-02-05 NOTE — DISCHARGE INSTRUCTIONS
Patient Education        Urinary Tract Infections in Men: Care Instructions  Your Care Instructions    A urinary tract infection, or UTI, is a general term for an infection anywhere between the kidneys and the tip of the penis. UTIs can also be a result of a prostate problem. Most cause pain or burning when you urinate. Most UTIs are caused by bacteria and can be cured with antibiotics. It is important to complete your treatment so that the infection does not get worse. Follow-up care is a key part of your treatment and safety. Be sure to make and go to all appointments, and call your doctor if you are having problems. It's also a good idea to know your test results and keep a list of the medicines you take. How can you care for yourself at home? · Take your antibiotics as prescribed. Do not stop taking them just because you feel better. You need to take the full course of antibiotics. · Take your medicines exactly as prescribed. Your doctor may have prescribed a medicine, such as phenazopyridine (Pyridium), to help relieve pain when you urinate. This turns your urine orange. You may stop taking it when your symptoms get better. But be sure to take all of your antibiotics, which treat the infection. · Drink extra water for the next day or two. This will help make the urine less concentrated and help wash out the bacteria causing the infection. (If you have kidney, heart, or liver disease and have to limit your fluids, talk with your doctor before you increase your fluid intake.)  · Avoid drinks that are carbonated or have caffeine. They can irritate the bladder. · Urinate often. Try to empty your bladder each time. · To relieve pain, take a hot bath or lay a heating pad (set on low) over your lower belly or genital area. Never go to sleep with a heating pad in place. To help prevent UTIs  · Drink plenty of fluids, enough so that your urine is light yellow or clear like water.  If you have kidney, heart, or liver disease and have to limit fluids, talk with your doctor before you increase the amount of fluids you drink. · Urinate when you have the urge. Do not hold your urine for a long time. Urinate before you go to sleep. · Keep your penis clean. Catheter care  If you have a drainage tube (catheter) in place, the following steps will help you care for it. · Always wash your hands before and after touching your catheter. · Check the area around the urethra for inflammation or signs of infection. Signs of infection include irritated, swollen, red, or tender skin, or pus around the catheter. · Clean the area around the catheter with soap and water two times a day. Dry with a clean towel afterward. · Do not apply powder or lotion to the skin around the catheter. To empty the urine collection bag  · Wash your hands with soap and water. · Without touching the drain spout, remove the spout from its sleeve at the bottom of the collection bag. Open the valve on the spout. · Let the urine flow out of the bag and into the toilet or a container. Do not let the tubing or drain spout touch anything. · After you empty the bag, clean the end of the drain spout with tissue and water. Close the valve and put the drain spout back into its sleeve at the bottom of the collection bag. · Wash your hands with soap and water. When should you call for help? Call your doctor now or seek immediate medical care if:    · Symptoms such as a fever, chills, nausea, or vomiting get worse or happen for the first time.     · You have new pain in your back just below your rib cage. This is called flank pain.     · There is new blood or pus in your urine.     · You are not able to take or keep down your antibiotics.    Watch closely for changes in your health, and be sure to contact your doctor if:    · You are not getting better after taking an antibiotic for 2 days.     · Your symptoms go away but then come back.    Where can you learn more?  Go to http://virgil-nancy.info/. Enter U895 in the search box to learn more about \"Urinary Tract Infections in Men: Care Instructions. \"  Current as of: March 20, 2018  Content Version: 11.9  © 5633-7215 [employer]. Care instructions adapted under license by [employer] (which disclaims liability or warranty for this information). If you have questions about a medical condition or this instruction, always ask your healthcare professional. [place] [address] any warranty or liability for your use of this information.

## 2019-05-01 ENCOUNTER — HOSPITAL ENCOUNTER (EMERGENCY)
Age: 79
Discharge: HOME | End: 2019-05-01
Attending: EMERGENCY MEDICINE
Payer: MEDICARE

## 2019-05-01 VITALS
SYSTOLIC BLOOD PRESSURE: 125 MMHG | OXYGEN SATURATION: 98 % | BODY MASS INDEX: 31.57 KG/M2 | HEART RATE: 68 BPM | TEMPERATURE: 98.4 F | RESPIRATION RATE: 16 BRPM | WEIGHT: 246 LBS | DIASTOLIC BLOOD PRESSURE: 59 MMHG | HEIGHT: 74 IN

## 2019-05-01 DIAGNOSIS — N39.0 ACUTE UTI: Primary | ICD-10-CM
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
## 2019-05-01 LAB
ANION GAP SERPL CALC-SCNC: 8 MMOL/L
BACTERIA URNS QL MICRO: ABNORMAL /HPF
BUN SERPL-MCNC: 14 MG/DL (ref 8–23)
CALCIUM SERPL-MCNC: 10.2 MG/DL (ref 8.3–10.4)
CASTS URNS QL MICRO: ABNORMAL /LPF
CHLORIDE SERPL-SCNC: 109 MMOL/L (ref 98–107)
CO2 SERPL-SCNC: 26 MMOL/L (ref 21–32)
CREAT SERPL-MCNC: 1.04 MG/DL (ref 0.8–1.5)
EPI CELLS #/AREA URNS HPF: 0 /HPF
ERYTHROCYTE [DISTWIDTH] IN BLOOD BY AUTOMATED COUNT: 14.3 % (ref 11.9–14.6)
GLUCOSE SERPL-MCNC: 79 MG/DL (ref 65–100)
HCT VFR BLD AUTO: 46.2 % (ref 41.1–50.3)
HGB BLD-MCNC: 15.2 G/DL (ref 13.6–17.2)
MCH RBC QN AUTO: 30.2 PG (ref 26.1–32.9)
MCHC RBC AUTO-ENTMCNC: 32.9 G/DL (ref 31.4–35)
MCV RBC AUTO: 91.7 FL (ref 79.6–97.8)
NRBC # BLD: 0 K/UL (ref 0–0.2)
PLATELET # BLD AUTO: 168 K/UL (ref 150–450)
PMV BLD AUTO: 10.5 FL (ref 9.4–12.3)
POTASSIUM SERPL-SCNC: 5.3 MMOL/L (ref 3.5–5.1)
RBC # BLD AUTO: 5.04 M/UL (ref 4.23–5.6)
RBC #/AREA URNS HPF: ABNORMAL /HPF
SODIUM SERPL-SCNC: 143 MMOL/L (ref 136–145)
WBC # BLD AUTO: 5.2 K/UL (ref 4.3–11.1)
WBC URNS QL MICRO: >100 /HPF

## 2019-05-01 PROCEDURE — 99284 EMERGENCY DEPT VISIT MOD MDM: CPT | Performed by: EMERGENCY MEDICINE

## 2019-05-01 PROCEDURE — 74011250636 HC RX REV CODE- 250/636: Performed by: EMERGENCY MEDICINE

## 2019-05-01 PROCEDURE — 81015 MICROSCOPIC EXAM OF URINE: CPT

## 2019-05-01 PROCEDURE — 74011000258 HC RX REV CODE- 258: Performed by: EMERGENCY MEDICINE

## 2019-05-01 PROCEDURE — 81003 URINALYSIS AUTO W/O SCOPE: CPT | Performed by: EMERGENCY MEDICINE

## 2019-05-01 PROCEDURE — 80048 BASIC METABOLIC PNL TOTAL CA: CPT

## 2019-05-01 PROCEDURE — 96365 THER/PROPH/DIAG IV INF INIT: CPT | Performed by: EMERGENCY MEDICINE

## 2019-05-01 PROCEDURE — 85027 COMPLETE CBC AUTOMATED: CPT

## 2019-05-01 RX ORDER — SULFAMETHOXAZOLE AND TRIMETHOPRIM 800; 160 MG/1; MG/1
1 TABLET ORAL 2 TIMES DAILY
Qty: 14 TAB | Refills: 0 | Status: SHIPPED | OUTPATIENT
Start: 2019-05-01 | End: 2019-05-08

## 2019-05-01 RX ADMIN — CEFTRIAXONE 1 G: 1 INJECTION, POWDER, FOR SOLUTION INTRAMUSCULAR; INTRAVENOUS at 22:15

## 2019-05-01 NOTE — ED PROVIDER NOTES
The patient is an 29-year-old male who presented to the emergency department after EMS was called out to his house because he had slid down in the shower and could not get up. EMS states that they were concerned that he had some altered mental status according to family and they were worried he may have a urinary tract infection. Patient states that he just couldn't get out of the shower last why he is here. He denies any pain in the arms or legs. Patient denies any chest pain or shortness of breath abdominal pain nausea or vomiting. Past Medical History:  
Diagnosis Date  Arrhythmia   
 pt takes pradaxa  Arthritis  Atrial fibrillation ([address] 1/27/2012  CAD (coronary artery disease)  Cancer [facility])   
 prostate  Dementia  GERD (gastroesophageal reflux disease)   
 controlled with medication  Heart failure ([address]   
 pt takes lasix as needed, reports SOB with exertion  Hypertension   
 controlled with medication  Morbid obesity ([address] Past Surgical History:  
Procedure Laterality Date  HX ORTHOPAEDIC  2010  
 left TKA  HX PACEMAKER  8/30/2012 [employer] pacemaker  HX PROSTATECTOMY Family History:  
Problem Relation Age of Onset  Cancer Father Social History Socioeconomic History  Marital status:  Spouse name: Not on file  Number of children: Not on file  Years of education: Not on file  Highest education level: Not on file Occupational History  Not on file Social Needs  Financial resource strain: Not on file  Food insecurity:  
  Worry: Not on file Inability: Not on file  Transportation needs:  
  Medical: Not on file Non-medical: Not on file Tobacco Use  Smoking status: Never Smoker  Smokeless tobacco: Never Used Substance and Sexual Activity  Alcohol use: No  
 Drug use: No  
 Sexual activity: Never Lifestyle  Physical activity:  
  Days per week: Not on file Minutes per session: Not on file  Stress: Not on file Relationships  Social connections:  
  Talks on phone: Not on file Gets together: Not on file Attends Quaker service: Not on file Active member of club or organization: Not on file Attends meetings of clubs or organizations: Not on file Relationship status: Not on file  Intimate partner violence:  
  Fear of current or ex partner: Not on file Emotionally abused: Not on file Physically abused: Not on file Forced sexual activity: Not on file Other Topics Concern  Not on file Social History Narrative  Not on file ALLERGIES: Patient has no known allergies. Review of Systems All other systems reviewed and are negative. Vitals:  
 05/01/19 1846 05/01/19 1855 BP: 114/57 Pulse: 68 Resp: 14 Temp: 98.4 °F (36.9 °C) SpO2: 98% 98% Weight: 111.6 kg (246 lb) Height: 6' 2\" (1.88 m) Physical Exam  
 
GENERAL:The patient is obese, and well-hydrated. VITAL SIGNS: Heart rate, blood pressure, respiratory rate reviewed as recorded in 
nurse's notes HEAD: negative zhong and raccoon sign EYES: Pupils reactive. Extraocular motion intact. No conjunctival redness or drainage. EARS: No external masses or lesions. External auditory canals are clear with no 
hemotympanum NOSE: No nasal drainage or epistaxis. No septal hematoma noted MOUTH/THROAT: Pharynx clear; airway patent. No loose dentition or intraoral 
lacerations. Floor the mouth is soft. NECK: Supple, no meningeal signs. Trachea midline. No masses or thyromegaly. C- 
collar in place No step-off deformities noted LUNGS: Breath sounds clear and equal bilaterally no accessory muscle use CHEST: No deformity, no crepitus. CARDIOVASCULAR: Regular rate and rhythm ABDOMEN: Soft without tenderness. No palpable masses or organomegaly. No 
peritoneal signs. No rigidity. PELVIS: stable no laxity EXTREMITIES: No clubbing or cyanosis. No joint swelling. Normal muscle tone. No 
restricted range of motion appreciated. NEUROLOGIC: Sensation is grossly intact. Cranial nerve exam reveals face is 
symmetrical, tongue is midline speech is clear. SKIN: No rash or petechiae. Good skin turgor palpated. PSYCHIATRIC: Alert and oriented to person. Pleasant demeanor and answers questions well. MDM Number of Diagnoses or Management Options Diagnosis management comments: TRAUMA Sprain, strain, tendon injury, contusion, Abrasion, laceration, neurovascular injury, foreign body Fracture, open fracture, dislocation, joint separation, articular surface injury, 
 
 
ED Course as of May 01 2212 Wed May 01, 2019  
2208 Spoke with the patient's daughter who he lives with and she is agreeable with him coming back to the house. He will get 1 dose of antibiotics here in the emergency department and then be discharged back to home by EMS since he is unable to ambulate up stairs on his own. The daughter states she will get antibiotics filled tomorrow and start them in the morning. I talked to the patient and he is doing well. He is agreeable with receiving the antibiotics and then being transferred back home as well. Zena Heard ED Course User Index [KH] Laura Morales, DO  
 
 
Procedures

## 2019-05-01 NOTE — ED TRIAGE NOTES
EMS was called to the pt's home due to his altered mentation and a possible UTI. The pt was confused to year and president only with EMS. The pt himself, has no complaints.

## 2019-05-02 NOTE — DISCHARGE INSTRUCTIONS
Take the antibiotics starting tomorrow morning. Have him follow up with his family doctor in the next 3-5 days for repeat evaluation. He starts developing any new or concerning symptoms have him return to the emergency department at that time.

## 2019-05-02 NOTE — ED NOTES
I have reviewed discharge instructions with the patient. The patient verbalized understanding. Patient left ED via Discharge Method: stretcher to Home with [facility]. Opportunity for questions and clarification provided. Patient given 1 scripts. To continue your aftercare when you leave the hospital, you may receive an automated call from our care team to check in on how you are doing. This is a free service and part of our promise to provide the best care and service to meet your aftercare needs.  If you have questions, or wish to unsubscribe from this service please call [phone]. Thank you for Choosing our [facility] Emergency Department.

## 2019-06-04 ENCOUNTER — APPOINTMENT (OUTPATIENT)
Dept: GENERAL RADIOLOGY | Age: 79
DRG: 557 | End: 2019-06-04
Attending: EMERGENCY MEDICINE
Payer: MEDICARE

## 2019-06-04 ENCOUNTER — HOSPITAL ENCOUNTER (INPATIENT)
Age: 79
LOS: 6 days | Discharge: NURSING, REHAB OR LONG-TERM CARE | DRG: 557 | End: 2019-06-10
Attending: EMERGENCY MEDICINE | Admitting: INTERNAL MEDICINE
Payer: MEDICARE

## 2019-06-04 ENCOUNTER — APPOINTMENT (OUTPATIENT)
Dept: CT IMAGING | Age: 79
DRG: 557 | End: 2019-06-04
Attending: EMERGENCY MEDICINE
Payer: MEDICARE

## 2019-06-04 DIAGNOSIS — N30.00 ACUTE CYSTITIS WITHOUT HEMATURIA: ICD-10-CM

## 2019-06-04 DIAGNOSIS — R77.8 ELEVATED TROPONIN I LEVEL: Primary | ICD-10-CM

## 2019-06-04 DIAGNOSIS — N17.9 ACUTE KIDNEY INJURY (HCC): ICD-10-CM

## 2019-06-04 PROBLEM — M62.82 RHABDOMYOLYSIS: Status: ACTIVE | Noted: 2019-06-04

## 2019-06-04 PROBLEM — G93.41 METABOLIC ENCEPHALOPATHY: Status: ACTIVE | Noted: 2019-06-04

## 2019-06-04 PROBLEM — N39.0 UTI (URINARY TRACT INFECTION): Status: ACTIVE | Noted: 2019-06-04

## 2019-06-04 LAB
ALBUMIN SERPL-MCNC: 3.6 G/DL (ref 3.2–4.6)
ALBUMIN/GLOB SERPL: 0.9 {RATIO} (ref 1.2–3.5)
ALP SERPL-CCNC: 126 U/L (ref 50–136)
ALT SERPL-CCNC: 29 U/L (ref 12–65)
ANION GAP SERPL CALC-SCNC: 12 MMOL/L (ref 7–16)
APPEARANCE UR: ABNORMAL
AST SERPL-CCNC: 64 U/L (ref 15–37)
ATRIAL RATE: 81 BPM
BACTERIA URNS QL MICRO: ABNORMAL /HPF
BASOPHILS # BLD: 0 K/UL (ref 0–0.2)
BASOPHILS NFR BLD: 0 % (ref 0–2)
BILIRUB SERPL-MCNC: 1 MG/DL (ref 0.2–1.1)
BILIRUB UR QL: ABNORMAL
BUN SERPL-MCNC: 15 MG/DL (ref 8–23)
CALCIUM SERPL-MCNC: 10.5 MG/DL (ref 8.3–10.4)
CALCULATED R AXIS, ECG10: -49 DEGREES
CALCULATED T AXIS, ECG11: -3 DEGREES
CASTS URNS QL MICRO: ABNORMAL /LPF
CHLORIDE SERPL-SCNC: 111 MMOL/L (ref 98–107)
CK SERPL-CCNC: 1580 U/L (ref 21–215)
CK SERPL-CCNC: 4913 U/L (ref 21–215)
CO2 SERPL-SCNC: 22 MMOL/L (ref 21–32)
COLOR UR: ABNORMAL
CREAT SERPL-MCNC: 1.56 MG/DL (ref 0.8–1.5)
DIAGNOSIS, 93000: NORMAL
DIFFERENTIAL METHOD BLD: ABNORMAL
EOSINOPHIL # BLD: 0 K/UL (ref 0–0.8)
EOSINOPHIL NFR BLD: 0 % (ref 0.5–7.8)
EPI CELLS #/AREA URNS HPF: 0 /HPF
ERYTHROCYTE [DISTWIDTH] IN BLOOD BY AUTOMATED COUNT: 13.8 % (ref 11.9–14.6)
GLOBULIN SER CALC-MCNC: 4.2 G/DL (ref 2.3–3.5)
GLUCOSE SERPL-MCNC: 98 MG/DL (ref 65–100)
GLUCOSE UR STRIP.AUTO-MCNC: NEGATIVE MG/DL
HCT VFR BLD AUTO: 46.6 % (ref 41.1–50.3)
HGB BLD-MCNC: 15.8 G/DL (ref 13.6–17.2)
HGB UR QL STRIP: ABNORMAL
IMM GRANULOCYTES # BLD AUTO: 0.1 K/UL (ref 0–0.5)
IMM GRANULOCYTES NFR BLD AUTO: 1 % (ref 0–5)
KETONES UR QL STRIP.AUTO: ABNORMAL MG/DL
LEUKOCYTE ESTERASE UR QL STRIP.AUTO: ABNORMAL
LYMPHOCYTES # BLD: 0.5 K/UL (ref 0.5–4.6)
LYMPHOCYTES NFR BLD: 5 % (ref 13–44)
MAGNESIUM SERPL-MCNC: 2.3 MG/DL (ref 1.8–2.4)
MCH RBC QN AUTO: 30.6 PG (ref 26.1–32.9)
MCHC RBC AUTO-ENTMCNC: 33.9 G/DL (ref 31.4–35)
MCV RBC AUTO: 90.3 FL (ref 79.6–97.8)
MONOCYTES # BLD: 0.7 K/UL (ref 0.1–1.3)
MONOCYTES NFR BLD: 6 % (ref 4–12)
NEUTS SEG # BLD: 9.9 K/UL (ref 1.7–8.2)
NEUTS SEG NFR BLD: 88 % (ref 43–78)
NITRITE UR QL STRIP.AUTO: POSITIVE
NRBC # BLD: 0 K/UL (ref 0–0.2)
P-R INTERVAL, ECG05: 134 MS
PH UR STRIP: 5.5 [PH] (ref 5–9)
PHOSPHATE SERPL-MCNC: 2.9 MG/DL (ref 2.3–3.7)
PLATELET # BLD AUTO: 148 K/UL (ref 150–450)
PMV BLD AUTO: 10.3 FL (ref 9.4–12.3)
POTASSIUM SERPL-SCNC: 3.5 MMOL/L (ref 3.5–5.1)
PROT SERPL-MCNC: 7.8 G/DL (ref 6.3–8.2)
PROT UR STRIP-MCNC: 100 MG/DL
Q-T INTERVAL, ECG07: 400 MS
QRS DURATION, ECG06: 130 MS
QTC CALCULATION (BEZET), ECG08: 464 MS
RBC # BLD AUTO: 5.16 M/UL (ref 4.23–5.6)
RBC #/AREA URNS HPF: ABNORMAL /HPF
SODIUM SERPL-SCNC: 145 MMOL/L (ref 136–145)
SP GR UR REFRACTOMETRY: 1.01 (ref 1–1.02)
TROPONIN I SERPL-MCNC: 1.65 NG/ML (ref 0.02–0.05)
TROPONIN I SERPL-MCNC: 3.72 NG/ML (ref 0.02–0.05)
TROPONIN I SERPL-MCNC: 4.99 NG/ML (ref 0.02–0.05)
UROBILINOGEN UR QL STRIP.AUTO: 1 EU/DL (ref 0.2–1)
VENTRICULAR RATE, ECG03: 81 BPM
WBC # BLD AUTO: 11.2 K/UL (ref 4.3–11.1)
WBC URNS QL MICRO: >100 /HPF

## 2019-06-04 PROCEDURE — 70486 CT MAXILLOFACIAL W/O DYE: CPT

## 2019-06-04 PROCEDURE — 87086 URINE CULTURE/COLONY COUNT: CPT

## 2019-06-04 PROCEDURE — 77030005518 HC CATH URETH FOL 2W BARD -B

## 2019-06-04 PROCEDURE — 80053 COMPREHEN METABOLIC PANEL: CPT

## 2019-06-04 PROCEDURE — 84100 ASSAY OF PHOSPHORUS: CPT

## 2019-06-04 PROCEDURE — 74011000258 HC RX REV CODE- 258: Performed by: EMERGENCY MEDICINE

## 2019-06-04 PROCEDURE — 85025 COMPLETE CBC W/AUTO DIFF WBC: CPT

## 2019-06-04 PROCEDURE — 72125 CT NECK SPINE W/O DYE: CPT

## 2019-06-04 PROCEDURE — 73562 X-RAY EXAM OF KNEE 3: CPT

## 2019-06-04 PROCEDURE — 92610 EVALUATE SWALLOWING FUNCTION: CPT

## 2019-06-04 PROCEDURE — 74011000302 HC RX REV CODE- 302: Performed by: INTERNAL MEDICINE

## 2019-06-04 PROCEDURE — 87186 SC STD MICRODIL/AGAR DIL: CPT

## 2019-06-04 PROCEDURE — 74011250636 HC RX REV CODE- 250/636: Performed by: INTERNAL MEDICINE

## 2019-06-04 PROCEDURE — 77030020263 HC SOL INJ SOD CL0.9% LFCR 1000ML

## 2019-06-04 PROCEDURE — 51702 INSERT TEMP BLADDER CATH: CPT | Performed by: EMERGENCY MEDICINE

## 2019-06-04 PROCEDURE — 83735 ASSAY OF MAGNESIUM: CPT

## 2019-06-04 PROCEDURE — 65610000001 HC ROOM ICU GENERAL

## 2019-06-04 PROCEDURE — 84484 ASSAY OF TROPONIN QUANT: CPT

## 2019-06-04 PROCEDURE — 87088 URINE BACTERIA CULTURE: CPT

## 2019-06-04 PROCEDURE — 77030032490 HC SLV COMPR SCD KNE COVD -B

## 2019-06-04 PROCEDURE — 93005 ELECTROCARDIOGRAM TRACING: CPT | Performed by: EMERGENCY MEDICINE

## 2019-06-04 PROCEDURE — 87040 BLOOD CULTURE FOR BACTERIA: CPT

## 2019-06-04 PROCEDURE — 86580 TB INTRADERMAL TEST: CPT | Performed by: INTERNAL MEDICINE

## 2019-06-04 PROCEDURE — 36415 COLL VENOUS BLD VENIPUNCTURE: CPT

## 2019-06-04 PROCEDURE — 74011250637 HC RX REV CODE- 250/637: Performed by: INTERNAL MEDICINE

## 2019-06-04 PROCEDURE — 96365 THER/PROPH/DIAG IV INF INIT: CPT | Performed by: EMERGENCY MEDICINE

## 2019-06-04 PROCEDURE — 82550 ASSAY OF CK (CPK): CPT

## 2019-06-04 PROCEDURE — 73110 X-RAY EXAM OF WRIST: CPT

## 2019-06-04 PROCEDURE — C8929 TTE W OR WO FOL WCON,DOPPLER: HCPCS

## 2019-06-04 PROCEDURE — 70450 CT HEAD/BRAIN W/O DYE: CPT

## 2019-06-04 PROCEDURE — 74011250636 HC RX REV CODE- 250/636: Performed by: EMERGENCY MEDICINE

## 2019-06-04 PROCEDURE — 74011000250 HC RX REV CODE- 250: Performed by: INTERNAL MEDICINE

## 2019-06-04 PROCEDURE — 74011250637 HC RX REV CODE- 250/637: Performed by: FAMILY MEDICINE

## 2019-06-04 PROCEDURE — 81001 URINALYSIS AUTO W/SCOPE: CPT

## 2019-06-04 PROCEDURE — 99284 EMERGENCY DEPT VISIT MOD MDM: CPT | Performed by: EMERGENCY MEDICINE

## 2019-06-04 RX ORDER — BICALUTAMIDE 50 MG/1
50 TABLET, FILM COATED ORAL DAILY
Status: DISCONTINUED | OUTPATIENT
Start: 2019-06-05 | End: 2019-06-10 | Stop reason: HOSPADM

## 2019-06-04 RX ORDER — ARIPIPRAZOLE 10 MG/1
10 TABLET ORAL DAILY
Status: DISCONTINUED | OUTPATIENT
Start: 2019-06-05 | End: 2019-06-10 | Stop reason: HOSPADM

## 2019-06-04 RX ORDER — DONEPEZIL HYDROCHLORIDE 5 MG/1
5 TABLET, FILM COATED ORAL
Status: DISCONTINUED | OUTPATIENT
Start: 2019-06-04 | End: 2019-06-10 | Stop reason: HOSPADM

## 2019-06-04 RX ORDER — SODIUM CHLORIDE 0.9 % (FLUSH) 0.9 %
5-40 SYRINGE (ML) INJECTION EVERY 8 HOURS
Status: DISCONTINUED | OUTPATIENT
Start: 2019-06-04 | End: 2019-06-10 | Stop reason: HOSPADM

## 2019-06-04 RX ORDER — GUAIFENESIN 100 MG/5ML
81 LIQUID (ML) ORAL DAILY
Status: DISCONTINUED | OUTPATIENT
Start: 2019-06-05 | End: 2019-06-10 | Stop reason: HOSPADM

## 2019-06-04 RX ORDER — ACETAMINOPHEN 325 MG/1
650 TABLET ORAL
Status: DISCONTINUED | OUTPATIENT
Start: 2019-06-04 | End: 2019-06-10 | Stop reason: HOSPADM

## 2019-06-04 RX ORDER — SODIUM CHLORIDE 0.9 % (FLUSH) 0.9 %
5-40 SYRINGE (ML) INJECTION AS NEEDED
Status: DISCONTINUED | OUTPATIENT
Start: 2019-06-04 | End: 2019-06-10 | Stop reason: HOSPADM

## 2019-06-04 RX ORDER — FAMOTIDINE 20 MG/1
20 TABLET, FILM COATED ORAL 2 TIMES DAILY
Status: DISCONTINUED | OUTPATIENT
Start: 2019-06-04 | End: 2019-06-10 | Stop reason: HOSPADM

## 2019-06-04 RX ORDER — TAMSULOSIN HYDROCHLORIDE 0.4 MG/1
0.4 CAPSULE ORAL
Status: DISCONTINUED | OUTPATIENT
Start: 2019-06-04 | End: 2019-06-10 | Stop reason: HOSPADM

## 2019-06-04 RX ORDER — SODIUM CHLORIDE 9 MG/ML
75 INJECTION, SOLUTION INTRAVENOUS CONTINUOUS
Status: DISCONTINUED | OUTPATIENT
Start: 2019-06-04 | End: 2019-06-07

## 2019-06-04 RX ADMIN — MINERAL OIL AND PETROLATUM: 150; 830 OINTMENT OPHTHALMIC at 22:31

## 2019-06-04 RX ADMIN — TUBERCULIN PURIFIED PROTEIN DERIVATIVE 5 UNITS: 5 INJECTION INTRADERMAL at 13:00

## 2019-06-04 RX ADMIN — FAMOTIDINE 20 MG: 20 TABLET, FILM COATED ORAL at 18:31

## 2019-06-04 RX ADMIN — SODIUM CHLORIDE 125 ML/HR: 900 INJECTION, SOLUTION INTRAVENOUS at 22:29

## 2019-06-04 RX ADMIN — PERFLUTREN 1 ML: 6.52 INJECTION, SUSPENSION INTRAVENOUS at 16:31

## 2019-06-04 RX ADMIN — TAMSULOSIN HYDROCHLORIDE 0.4 MG: 0.4 CAPSULE ORAL at 22:28

## 2019-06-04 RX ADMIN — DONEPEZIL HYDROCHLORIDE 5 MG: 5 TABLET, FILM COATED ORAL at 22:28

## 2019-06-04 RX ADMIN — SODIUM CHLORIDE 125 ML/HR: 900 INJECTION, SOLUTION INTRAVENOUS at 12:35

## 2019-06-04 RX ADMIN — Medication 10 ML: at 13:13

## 2019-06-04 RX ADMIN — CEFTRIAXONE 1 G: 1 INJECTION, POWDER, FOR SOLUTION INTRAMUSCULAR; INTRAVENOUS at 09:11

## 2019-06-04 RX ADMIN — Medication 10 ML: at 22:28

## 2019-06-04 RX ADMIN — FAMOTIDINE 20 MG: 20 TABLET, FILM COATED ORAL at 13:13

## 2019-06-04 RX ADMIN — SODIUM CHLORIDE 125 ML/HR: 900 INJECTION, SOLUTION INTRAVENOUS at 09:11
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
## 2019-06-04 NOTE — PROGRESS NOTES
Daughter at bedside. Visited to confirm patient's answers - received tearful holding patient's hand. Daughter states she does have to assist patient with bathing and dressing especially with recurrent UTIs. States he was admitted to [facility] 2/17/19 and was discharged to [facility] where he stayed for 3-4 weeks. States he has had home health with [facility] - states PT is complete but RN still visits once/week. Discussed possible discharge plans and daughter states that patient was at [facility] approximately 1 1/2 yrs ago and if rehab needed would like to go there. Daughter is aware the inpatient admission status is reviewed daily and for medicare to cover would need 3 inpatient midnights. PPD and PT verbal orders placed per Dr Suzy Oliveira.

## 2019-06-04 NOTE — PROGRESS NOTES
Speech language pathology: bedside swallow note: Initial Assessment and Discharge    NAME/AGE/GENDER: Loly Kwan is a [de-identified] y.o. male  DATE: 6/4/2019  PRIMARY DIAGNOSIS: UTI (urinary tract infection) [Z36.4]  Metabolic encephalopathy [Z95.05]  Rhabdomyolysis [M62.82]  Troponin I above reference range [R74.8]      ICD-10: Treatment Diagnosis: oropharyngeal dysphagia R13.12  INTERDISCIPLINARY COLLABORATION: Registered Nurse  PRECAUTIONS/ALLERGIES: Patient has no known allergies. ASSESSMENT:Based on the objective data described below, Mr. Kate Goff presents with normal oropharyngeal swallow function. Oral mech exam unremarkable. Patient currently on a mechanical soft consistency diet and thin liquids. Consuming lunch meal upon arrival.  Patient exhibited no overt s/sx airway compromise with any trialed consistencies: thin liquids via cup/straw/successive swallows, puree, mechanical soft, or chopped meat. Patient declined trial regular consistency stating he prefers soft food items. Oral prep time and oral clearance WNL with all trials. Laryngeal elevation present upon palpation with single swallow per bolus. Recommend patient continue prescribed diet: mechanical soft consistency and thin liquids. Medications can be taken whole with liquids. No further speech therapy indicated at this time as swallow function is within normal limits. Please re-consult if new concerns arise. ?????? ? ? This section established at most recent assessment??????????  PROBLEM LIST (Impairments causing functional limitations):  1. Oropharyngeal dysphagia- No symptoms identified    REHABILITATION POTENTIAL FOR STATED GOALS: Excellent  PLAN OF CARE:   Patient will benefit from skilled intervention to address the following impairments.   RECOMMENDATIONS AND PLANNED INTERVENTIONS (Benefits and precautions of therapy have been discussed with the patient.):  · continue prescribed diet  · PO:  Mechanical soft  · Liquids:  regular thin  MEDICATIONS:  · With liquid  ASPIRATION PRECAUTIONS:  · Slow rate of intake  · Small bites/sips  · Upright at 90 degrees during meal  COMPENSATORY STRATEGIES/MODIFICATIONS INCLUDING:  · None  OTHER RECOMMENDATIONS (including follow up treatment recommendations): · Family training/education  · Patient education  RECOMMENDED DIET MODIFICATIONS DISCUSSED WITH:  · Nursing  · Patient  FREQUENCY/DURATION: No further speech therapy indicated at this time as oropharyngeal swallow function is within normal limits. RECOMMENDED REHABILITATION/EQUIPMENT: (at time of discharge pending progress): Due to the probability of continued deficits (see above) this patient will not likely need continued skilled speech therapy after discharge. SUBJECTIVE:   Cooperative and participatory throughout evaluation. History of Present Injury/Illness: Mr. Carla Reza  has a past medical history of Arrhythmia, Arthritis, Atrial fibrillation ([facility] (1/27/2012), CAD (coronary artery disease), Cancer ([facility] Dementia, GERD (gastroesophageal reflux disease), Heart failure ([facility] Hypertension, and Morbid obesity ([facility] .  He also  has a past surgical history that includes hx orthopaedic (2010); hx prostatectomy; and hx pacemaker (8/30/2012). Present Symptoms:    Pain Scale 1: Numeric (0 - 10)  Pain Intensity 1: 0  Current Medications:   No current facility-administered medications on file prior to encounter. Current Outpatient Medications on File Prior to Encounter   Medication Sig Dispense Refill    dabigatran etexilate (PRADAXA) 150 mg capsule Take 1 Cap by mouth every twelve (12) hours. 180 Cap 3    polyethylene glycol (MIRALAX) 17 gram/dose powder Take 17 g by mouth daily. Indications: constipation      multivitamin, tx-iron-ca-min (THERA-M W/ IRON) 9 mg iron-400 mcg tab tablet Take 1 Tab by mouth daily.  zolpidem (AMBIEN) 5 mg tablet Take 1 Tab by mouth nightly as needed for Sleep. Max Daily Amount: 5 mg.  Indications: SLEEP-ONSET INSOMNIA 30 Tab 2    donepezil (ARICEPT) 5 mg tablet Take 1 Tab by mouth nightly. 90 Tab 3    levomilnacipran (FETZIMA) 40 mg ER capsule TAKE 1 CAP BY MOUTH DAILY. 90 Cap 3    tamsulosin (FLOMAX) 0.4 mg capsule Take 1 Cap by mouth daily. 90 Cap 3    bicalutamide (CASODEX) 50 mg tablet Take 1 Tab by mouth daily. 90 Tab 3    famotidine (PEPCID) 20 mg tablet Take 1 Tab by mouth two (2) times a day. 180 Tab 3    ARIPiprazole (ABILIFY) 10 mg tablet Take 1 Tab by mouth daily. 90 Tab 3    celecoxib (CELEBREX) 200 mg capsule Take  by mouth two (2) times a day.  senna-docusate (SENNA LAXATIVE-STOOL SOFTENER) 8.6-50 mg per tablet Take 1 Tab by mouth daily.  aspirin 81 mg chewable tablet Take 1 Tab by mouth daily. Indications: prevention of cerebrovascular accident 30 Tab 0    diclofenac (VOLTAREN) 1 % gel APPLY 4 GRAMS TO AFFECTED AREA FOUR TIMES DAILY  2    acetaminophen (TYLENOL ARTHRITIS PAIN) 650 mg CR tablet Take 650 mg by mouth every six (6) hours as needed for Pain. Current Dietary Status:     Mechanical soft/thin liquids  History of reflux:  YES    Reflux medication:famotidine  Social History/Home Situation:       OBJECTIVE:   Respiratory Status:  Room air     CXR Results: n/a  MRI/CT Results: no acute findings  Oral Motor Structure/Speech:  Oral-Motor Structure/Motor Speech  Labial: No impairment  Dentition: Upper dentures  Oral Hygiene: adequate  Lingual: No impairment  Velum: No impairment  Mandible: No impairment    Cognitive and Communication Status:  Neurologic State: Alert  Orientation Level: Oriented to person;Oriented to place; Disoriented to time  Cognition: Follows commands  Perception: Appears intact  Perseveration: No perseveration noted  Safety/Judgement: Not assessed    BEDSIDE SWALLOW EVALUATION  Oral Assessment:  Oral Assessment  Labial: No impairment  Dentition: Upper dentures  Oral Hygiene: adequate  Lingual: No impairment  Velum: No impairment  Mandible: No impairment  P.O. Trials:  Patient Position: Upright in bed    The patient was given the following:   Consistency Presented: Mechanical soft;Puree; Thin liquid  How Presented: Self-fed/presented;Cup/sip;Straw;Successive swallows    ORAL PHASE:  Bolus Acceptance: No impairment  Bolus Formation/Control: No impairment  Propulsion: No impairment     Oral Residue: None    PHARYNGEAL PHASE:  Initiation of Swallow: No impairment  Laryngeal Elevation: Functional  Aspiration Signs/Symptoms: None  Vocal Quality: No impairment           Pharyngeal Phase Characteristics: No impairment, issues, or problems     OTHER OBSERVATIONS:  Rate/bite size: WNL   Endurance: WNL   Comments: Tool Used: Dysphagia Outcome and Severity Scale (DOMINGO)    Score Comments   Normal Diet  [] 7 With no strategies or extra time needed   Functional Swallow  [x] 6 May have mild oral or pharyngeal delay       Mild Dysphagia    [] 5 Which may require one diet consistency restricted (those who demonstrate penetration which is entirely cleared on MBS would be included)   Mild-Moderate Dysphagia  [] 4 With 1-2 diet consistencies restricted       Moderate Dysphagia  [] 3 With 2 or more diet consistencies restricted       Moderately Severe Dysphagia  [] 2 With partial PO strategies (trials with ST only)       Severe Dysphagia  [] 1 With inability to tolerate any PO safely          Score:  Initial: 6 Most Recent: X (Date: --)   Interpretation of Tool: The Dysphagia Outcome and Severity Scale (DOMINGO) is a simple, easy-to-use, 7-point scale developed to systematically rate the functional severity of dysphagia based on objective assessment and make recommendations for diet level, independence level, and type of nutrition.        [place] / Plan[place] / Product Type: Medicare /     TREATMENT:    (In addition to Assessment/Re-Assessment sessions the following treatments were rendered)  Assessment/Reassessment only, no treatment provided today  MODALITIES:                                                                    ORAL MOTOR  EXERCISES:                                                                                                                                                                      LARYNGEAL / PHARYNGEAL EXERCISES:                                                                                                                                     __________________________________________________________________________________________________  Safety:   After treatment position/precautions:  · RN notified  · Upright in Bed  Treatment Assessment:  Oropharyngeal dysphagia- No symptoms identified. Progression/Medical Necessity:   · No futher speech therapy indicated for dysphagia. Compliance with Program/Exercises: Compliant with clinical swallowing evaluation. Reason for Continuation of Services/Other Comments:  · No futher skilled speech therapy clinically indicated due to oropharyngeal swallow function WNL    Recommendations/Intent for next treatment session: No further speech therapy indicated at this time as swallow function is within normal limits. Please re-consult if new concerns arise.      Total Treatment Duration:  Time In: 1457  Time Out: Mirtha HURLEY, CCC-SLP

## 2019-06-04 NOTE — PROGRESS NOTES
Received pt to unit from ED. Awake and alert, oriented x4. Lung sounds clear and diminished; 02 sats 90's on room air. Abdomen soft and intact with active bowel sounds. Pulses palpable and present all extremities. Trevizo present and draining jessica cloudy urine. VSS. Pt denies specific pain, states he has generalized soreness from fall at home.  Will continue to monitor

## 2019-06-04 NOTE — ED NOTES
TRANSFER - OUT REPORT:    Verbal report given to Aarti Becker RN(name) on Tylor Ramirez  being transferred to 373(unit) for routine progression of care       Report consisted of patients Situation, Background, Assessment and   Recommendations(SBAR). Information from the following report(s) SBAR and ED Summary was reviewed with the receiving nurse. Lines:   Peripheral IV 06/04/19 Right Antecubital (Active)   Site Assessment Clean, dry, & intact 6/4/2019  7:49 AM   Phlebitis Assessment 0 6/4/2019  7:49 AM   Infiltration Assessment 0 6/4/2019  7:49 AM   Dressing Status Clean, dry, & intact 6/4/2019  7:49 AM   Dressing Type Tape;Transparent 6/4/2019  7:49 AM   Hub Color/Line Status Green;Patent; Flushed 6/4/2019  7:49 AM   Action Taken Blood drawn 6/4/2019  7:49 AM        Opportunity for questions and clarification was provided.       Patient transported with:   Registered Nurse

## 2019-06-04 NOTE — PROGRESS NOTES
Care Management Interventions  PCP Verified by CM: Yes  Mode of Transport at Discharge: Other (see comment)  Transition of Care Consult (CM Consult): Other  Current Support Network: Lives with Caregiver, Relative's Home  Confirm Follow Up Transport: Family  Plan discussed with Pt/Family/Caregiver: Yes  Freedom of Choice Offered: Yes  Discharge Location  Discharge Placement: Skilled nursing facility  Per chart review, pt lives with dtr/her spouse and grandchildren, she assist with bathing and dressing, takes him to all his doctors appt. EH has had recurrent UTI's with admission to [address] [place] [address] and then to [facility] for 3-4 weeks. PPD/PT/OT ordered. If pt goes to [facility], they would prefer [facility]. A referral has been placed in [facility].

## 2019-06-04 NOTE — PROGRESS NOTES
Visited with patient. Received awake and alert  resting in bed and able to answer all my questions appropriately. There is no one at bedside. Patient states he lives at home with his daughter, her , and their 3 teenager children. States at baseline he is completely independent in his activities of daily living and uses a walker for ambulation assistance. States he no longer drives but his daughter takes him where he needs to go and also does the cooking and provides meals. States his daughter and her  work and he is alone during the day. States he is current with Dr. Yanni Miller and last saw him this week.

## 2019-06-04 NOTE — PROGRESS NOTES
TRANSFER - IN REPORT:    Verbal report received from [facility] RN(name) on Tylor Ibarra Garret  being received from ED(unit) for routine progression of care      Report consisted of patients Situation, Background, Assessment and   Recommendations(SBAR). Information from the following report(s) SBAR, Kardex, ED Summary, Intake/Output, MAR, Recent Results and Cardiac Rhythm SR was reviewed with the receiving nurse. Opportunity for questions and clarification was provided. Assessment completed upon patients arrival to unit and care assumed.

## 2019-06-04 NOTE — H&P
HOSPITALIST H&P/CONSULT  NAME:  Maranda Barnes   Age:  [de-identified] y.o.  :   1940   MRN:   [id number]  PCP: Brayan Cano MD  Consulting MD:  Treatment Team: Attending Provider: Lio Donovan MD; Primary Nurse: Marcy Costa RN; Care Manager: Maria Del Carmen Shafer RN  HPI:     CC: found him on the floor    This is an [de-identified] YO male patient with a PMH of CAD, Afib on anticoagulation, CHF, HTN, obesity, previous admissions for UTI,  who was found laying on the floor this morning by his daughter. He was confused and very weak. He had a large R periorbital hematoma and was brought to the ER. He can not provide too much information due to his mental status. Here he was found to have normal VS.  His WBC count was 11.2K, Na 145, Cr 1.56, Ca 10.5, troponin 1.65, CK 1500. His UA was suggestive of UTI with positive nitrates, moderate leukocyte esterase, > 100 WBCs. A brain CT scan showed NO acute intra-cranial abnormality. He was started on IV fluids, IV ceftriaxone and was presented to the hospitalist for admission. 10 point ROS done and is negative except as noted in HPI. Past Medical History:   Diagnosis Date    Arrhythmia     pt takes pradaxa    Arthritis     Atrial fibrillation ([address] 2012    CAD (coronary artery disease)     Cancer (HCC)     prostate    Dementia     GERD (gastroesophageal reflux disease)     controlled with medication    Heart failure ([address]     pt takes lasix as needed, reports SOB with exertion    Hypertension     controlled with medication    Morbid obesity ([address]       Past Surgical History:   Procedure Laterality Date    HX ORTHOPAEDIC      left TKA    HX PACEMAKER  2012    [employer] pacemaker    HX PROSTATECTOMY        Prior to Admission Medications   Prescriptions Last Dose Informant Patient Reported? Taking? ARIPiprazole (ABILIFY) 10 mg tablet   No No   Sig: Take 1 Tab by mouth daily.    acetaminophen (TYLENOL ARTHRITIS PAIN) 650 mg CR tablet   Yes No   Sig: Take 650 mg by mouth every six (6) hours as needed for Pain. aspirin 81 mg chewable tablet   No No   Sig: Take 1 Tab by mouth daily. Indications: prevention of cerebrovascular accident   bicalutamide (CASODEX) 50 mg tablet   No No   Sig: Take 1 Tab by mouth daily. celecoxib (CELEBREX) 200 mg capsule   Yes No   Sig: Take  by mouth two (2) times a day. dabigatran etexilate (PRADAXA) 150 mg capsule   No No   Sig: Take 1 Cap by mouth every twelve (12) hours. diclofenac (VOLTAREN) 1 % gel   Yes No   Sig: APPLY 4 GRAMS TO AFFECTED AREA FOUR TIMES DAILY   donepezil (ARICEPT) 5 mg tablet   No No   Sig: Take 1 Tab by mouth nightly. famotidine (PEPCID) 20 mg tablet   No No   Sig: Take 1 Tab by mouth two (2) times a day. levomilnacipran (FETZIMA) 40 mg ER capsule   No No   Sig: TAKE 1 CAP BY MOUTH DAILY. multivitamin, tx-iron-ca-min (THERA-M W/ IRON) 9 mg iron-400 mcg tab tablet   Yes No   Sig: Take 1 Tab by mouth daily. polyethylene glycol (MIRALAX) 17 gram/dose powder   Yes No   Sig: Take 17 g by mouth daily. Indications: constipation   senna-docusate (SENNA LAXATIVE-STOOL SOFTENER) 8.6-50 mg per tablet   Yes No   Sig: Take 1 Tab by mouth daily. tamsulosin (FLOMAX) 0.4 mg capsule   No No   Sig: Take 1 Cap by mouth daily. zolpidem (AMBIEN) 5 mg tablet   No No   Sig: Take 1 Tab by mouth nightly as needed for Sleep. Max Daily Amount: 5 mg. Indications: SLEEP-ONSET INSOMNIA      Facility-Administered Medications: None     Home meds reconciled.   No Known Allergies   Social History     Tobacco Use    Smoking status: Never Smoker    Smokeless tobacco: Never Used   Substance Use Topics    Alcohol use: No      Family History   Problem Relation Age of Onset    Cancer Father       Immunization History   Administered Date(s) Administered    Influenza Vaccine 09/26/2012    Influenza Vaccine (Quad) Mdck Pf 11/30/2017    Influenza Vaccine (Quad) PF 10/24/2016, 09/28/2018    Pneumococcal Vaccine (Unspecified Type) 2013    TB Skin Test (PPD) Intradermal 2017, 2018, 2018    Tdap 2016     Objective:     Visit Vitals  /67   Pulse 78   Temp 98.5 °F (36.9 °C)   Resp 8   Ht 6' 2\" (1.88 m)   Wt 120.2 kg (265 lb)   SpO2 99%   BMI 34.02 kg/m²      Temp (24hrs), Av.5 °F (36.9 °C), Min:98.5 °F (36.9 °C), Max:98.5 °F (36.9 °C)    Oxygen Therapy  O2 Sat (%): 99 % (19)  Pulse via Oximetry: 78 beats per minute (19)  O2 Device: Room air (19)  Physical Exam:  General:    Alert, drowsy   Head:   NCAT. No obvious deformity  Nose:  Nares normal. No drainage  Lungs:   CTABL. No wheezing/rhonchi/rales  Heart:   RRR. No m/r/g. Abdomen:   S/nt/nd. Bowel sounds normal.   Extremities: No cyanosis. Skin:     Large bruise in the R side of the face. Lacerations in both knees   Neurologic: Moves all extremities. no gross focal deficits      Data Review:   Recent Results (from the past 24 hour(s))   EKG, 12 LEAD, INITIAL    Collection Time: 19  7:07 AM   Result Value Ref Range    Ventricular Rate 81 BPM    Atrial Rate 81 BPM    P-R Interval 134 ms    QRS Duration 130 ms    Q-T Interval 400 ms    QTC Calculation (Bezet) 464 ms    Calculated R Axis -49 degrees    Calculated T Axis -3 degrees    Diagnosis       Normal sinus rhythm  Right bundle branch block  Left anterior fascicular block  !!! Bifascicular block !!!   Possible Lateral infarct , age undetermined  Cannot rule out Inferior infarct (masked by fascicular block?) , age   undetermined  Abnormal ECG  When compared with ECG of 2019 21:48,  Sinus rhythm has replaced Electronic ventricular pacemaker  Confirmed by JUSTIN CERVANTES (), Delilah Aguilera ([id number]) on 2019 8:45:25 AM     CBC WITH AUTOMATED DIFF    Collection Time: 19  7:34 AM   Result Value Ref Range    WBC 11.2 (H) 4.3 - 11.1 K/uL    RBC 5.16 4.23 - 5.6 M/uL    HGB 15.8 13.6 - 17.2 g/dL    HCT 46.6 41.1 - 50.3 %    MCV 90.3 79.6 - 97.8 FL    MCH 30.6 26.1 - 32.9 PG    MCHC 33.9 31.4 - 35.0 g/dL    RDW 13.8 11.9 - 14.6 %    PLATELET 393 (L) 264 - 450 K/uL    MPV 10.3 9.4 - 12.3 FL    ABSOLUTE NRBC 0.00 0.0 - 0.2 K/uL    DF AUTOMATED      NEUTROPHILS 88 (H) 43 - 78 %    LYMPHOCYTES 5 (L) 13 - 44 %    MONOCYTES 6 4.0 - 12.0 %    EOSINOPHILS 0 (L) 0.5 - 7.8 %    BASOPHILS 0 0.0 - 2.0 %    IMMATURE GRANULOCYTES 1 0.0 - 5.0 %    ABS. NEUTROPHILS 9.9 (H) 1.7 - 8.2 K/UL    ABS. LYMPHOCYTES 0.5 0.5 - 4.6 K/UL    ABS. MONOCYTES 0.7 0.1 - 1.3 K/UL    ABS. EOSINOPHILS 0.0 0.0 - 0.8 K/UL    ABS. BASOPHILS 0.0 0.0 - 0.2 K/UL    ABS. IMM. GRANS. 0.1 0.0 - 0.5 K/UL   METABOLIC PANEL, COMPREHENSIVE    Collection Time: 06/04/19  7:34 AM   Result Value Ref Range    Sodium 145 136 - 145 mmol/L    Potassium 3.5 3.5 - 5.1 mmol/L    Chloride 111 (H) 98 - 107 mmol/L    CO2 22 21 - 32 mmol/L    Anion gap 12 7 - 16 mmol/L    Glucose 98 65 - 100 mg/dL    BUN 15 8 - 23 MG/DL    Creatinine 1.56 (H) 0.8 - 1.5 MG/DL    GFR est AA 55 (L) >60 ml/min/1.73m2    GFR est non-AA 46 (L) >60 ml/min/1.73m2    Calcium 10.5 (H) 8.3 - 10.4 MG/DL    Bilirubin, total 1.0 0.2 - 1.1 MG/DL    ALT (SGPT) 29 12 - 65 U/L    AST (SGOT) 64 (H) 15 - 37 U/L    Alk.  phosphatase 126 50 - 136 U/L    Protein, total 7.8 6.3 - 8.2 g/dL    Albumin 3.6 3.2 - 4.6 g/dL    Globulin 4.2 (H) 2.3 - 3.5 g/dL    A-G Ratio 0.9 (L) 1.2 - 3.5     MAGNESIUM    Collection Time: 06/04/19  7:34 AM   Result Value Ref Range    Magnesium 2.3 1.8 - 2.4 mg/dL   CK    Collection Time: 06/04/19  7:34 AM   Result Value Ref Range    CK 1,580 (H) 21 - 215 U/L   TROPONIN I    Collection Time: 06/04/19  7:34 AM   Result Value Ref Range    Troponin-I, Qt. 1.65 (HH) 0.02 - 0.05 NG/ML   PHOSPHORUS    Collection Time: 06/04/19  7:34 AM   Result Value Ref Range    Phosphorus 2.9 2.3 - 3.7 MG/DL   URINALYSIS W/ RFLX MICROSCOPIC    Collection Time: 06/04/19  7:34 AM   Result Value Ref Range    Color VIRGIL      Appearance TURBID Specific gravity 1.015 1.001 - 1.023      pH (UA) 5.5 5.0 - 9.0      Protein 100 (A) NEG mg/dL    Glucose NEGATIVE  mg/dL    Ketone TRACE (A) NEG mg/dL    Bilirubin SMALL (A) NEG      Blood LARGE (A) NEG      Urobilinogen 1.0 0.2 - 1.0 EU/dL    Nitrites POSITIVE (A) NEG      Leukocyte Esterase MODERATE (A) NEG      WBC >100 (H) 0 /hpf    RBC 20-50 0 /hpf    Epithelial cells 0 0 /hpf    Bacteria 4+ (H) 0 /hpf    Casts 3-5 0 /lpf     Imaging /Procedures /Studies:  I personally reviewed all labs, imaging, and other studies this admission:  CXR reviewed by me. EKG reviewed by me. CXR Results  (Last 48 hours)    None        CT Results  (Last 48 hours)               06/04/19 0803  CT HEAD WO CONT Final result    Impression:  IMPRESSION:       1. No CT evidence of acute intracranial process. 2. Right periorbital soft tissue swelling. Narrative:  Noncontrast CT of the brain. COMPARISON: September 25, 2018       INDICATION: Fall, face first       TECHNIQUE: Contiguous axial images were obtained from the skull base through the   vertex without IV contrast. Radiation dose reduction techniques were used for   this study:  Our CT scanners use one or all of the following: Automated exposure   control, adjustment of the mA and/or kVp according to patient's size, iterative   reconstruction. FINDINGS:       There is no acute intracranial hemorrhage or evidence for acute territorial   infarction. There is no mass effect, midline shift or hydrocephalus. There is no   extra-axial fluid collection. The cerebellum and brainstem are grossly   unremarkable. Periventricular diffuse hypodensities are nonspecific and likely secondary to   chronic small vessel changes. There is no skull fracture. 06/04/19 0803  CT MAXILLOFACIAL WO CONT Final result    Impression:  IMPRESSION:       1. Large right periorbital soft tissue swelling. Globes and orbital walls are   intact.        2. No acute maxillofacial fracture is seen. 3. A 12 mm metallic structure within the upper lip soft tissue       Narrative:  CT face       COMPARISON: None       INDICATION: Right-sided facial and orbital swelling after fall       TECHNIQUE: Contiguous axial images of the face were obtained with coronal and   sagittal reformations. Radiation dose reduction techniques were used for this   study:  Our CT scanners use one or all of the following: Automated exposure   control, adjustment of the mA and/or kVp according to patient's size, iterative   reconstruction. FINDINGS:       The nasal bone, zygomatic arches, and the mandible are intact. The pterygoid   processes are intact. Orbital walls are intact. The globes appear intact. There is large right   periorbital soft tissue swelling. The paranasal sinuses and the mastoid air cells are aerated. The craniocervical junction is unremarkable. There is a 12 mm metallic structure within the upper lip soft tissue. 06/04/19 0803  CT SPINE CERV WO CONT Final result    Impression:  IMPRESSION:       1. No acute fracture or dislocation in the cervical spine. 2. Moderate degenerative changes. Narrative:  CT cervical spine        COMPARISON: December 23, 2016       INDICATION: Neck pain after injury       TECHNIQUE: Contiguous axial images from the skull base through the superior   mediastinum were obtained with coronal and sagittal reformations. Radiation dose   reduction techniques were used for this study:  Our CT scanners use one or all   of the following: Automated exposure control, adjustment of the mA and/or kVp   according to patient's size, iterative reconstruction. FINDINGS:       Bones are osteopenic. Alignment of the cervical spine is otherwise maintained. There is no acute fracture or dislocation. Prevertebral soft tissue and the   craniocervical junction are unremarkable.  The C1-C2 articulation is maintained. There are multilevel moderate disc height loss, most pronounced from C3 to C6. Anterior/posterior bone spurs and facet arthropathy are present. Suggestion of thyroid nodules. Assessment and Plan:      Active Hospital Problems    Diagnosis Date Noted    Metabolic encephalopathy 52/96/1986    Rhabdomyolysis 06/04/2019    UTI (urinary tract infection) 06/04/2019    Troponin I above reference range 06/04/2019       PLAN    # Metabolic encephalopathy due to UTI   -IV fluids   -IV ceftriaxone   -urine culture and blood cultures ordered     # Rhabdomyolysis   -IV fluids   -monitor CK level     # history of CAD with elevated troponin level   -telemetry monitoring   -monitor trop level  -echo ordered     # severe facial trauma with R periorbital hematoma   -hold anticoagulation for now   -ophthalmology eval         FEN:  Mechanical soft diet   DVT ppx:  BCD  Code status:  DNR  Estimated LOS:  > 2MN  Risk assessment:  High   Plan of care discussed with: patient's daughter     Signed By: Aditya Doss MD     June 4, 2019

## 2019-06-04 NOTE — ED TRIAGE NOTES
[facility] brought pt in from home after family called 911 because was found laying face down on floor. Pt has swollen right eye, skin tear to right wrist, skin tears on both knees. EMS states pt smells strongly of urine.

## 2019-06-05 ENCOUNTER — APPOINTMENT (OUTPATIENT)
Dept: GENERAL RADIOLOGY | Age: 79
DRG: 557 | End: 2019-06-05
Attending: INTERNAL MEDICINE
Payer: MEDICARE

## 2019-06-05 PROBLEM — S05.10XA TRAUMATIC ORBITAL HEMATOMA: Status: ACTIVE | Noted: 2019-06-05

## 2019-06-05 PROBLEM — I48.92 ATRIAL FLUTTER WITH CONTROLLED RESPONSE (HCC): Status: ACTIVE | Noted: 2019-06-05

## 2019-06-05 PROBLEM — I49.5 SSS (SICK SINUS SYNDROME) (HCC): Status: ACTIVE | Noted: 2019-06-05

## 2019-06-05 LAB
ALBUMIN SERPL-MCNC: 2.9 G/DL (ref 3.2–4.6)
ALBUMIN/GLOB SERPL: 0.8 {RATIO} (ref 1.2–3.5)
ALP SERPL-CCNC: 99 U/L (ref 50–136)
ALT SERPL-CCNC: 61 U/L (ref 12–65)
ANION GAP SERPL CALC-SCNC: 9 MMOL/L (ref 7–16)
AST SERPL-CCNC: 248 U/L (ref 15–37)
ATRIAL RATE: 340 BPM
BILIRUB SERPL-MCNC: 1.8 MG/DL (ref 0.2–1.1)
BUN SERPL-MCNC: 12 MG/DL (ref 8–23)
CALCIUM SERPL-MCNC: 9.6 MG/DL (ref 8.3–10.4)
CALCULATED R AXIS, ECG10: -110 DEGREES
CALCULATED T AXIS, ECG11: 81 DEGREES
CHLORIDE SERPL-SCNC: 111 MMOL/L (ref 98–107)
CK SERPL-CCNC: 4160 U/L (ref 21–215)
CO2 SERPL-SCNC: 23 MMOL/L (ref 21–32)
CREAT SERPL-MCNC: 0.84 MG/DL (ref 0.8–1.5)
DIAGNOSIS, 93000: NORMAL
ERYTHROCYTE [DISTWIDTH] IN BLOOD BY AUTOMATED COUNT: 13.8 % (ref 11.9–14.6)
GLOBULIN SER CALC-MCNC: 3.8 G/DL (ref 2.3–3.5)
GLUCOSE SERPL-MCNC: 93 MG/DL (ref 65–100)
HCT VFR BLD AUTO: 43.2 % (ref 41.1–50.3)
HGB BLD-MCNC: 14.8 G/DL (ref 13.6–17.2)
MAGNESIUM SERPL-MCNC: 2.2 MG/DL (ref 1.8–2.4)
MCH RBC QN AUTO: 30.5 PG (ref 26.1–32.9)
MCHC RBC AUTO-ENTMCNC: 34.3 G/DL (ref 31.4–35)
MCV RBC AUTO: 89.1 FL (ref 79.6–97.8)
MM INDURATION POC: 0 MM (ref 0–5)
NRBC # BLD: 0 K/UL (ref 0–0.2)
PLATELET # BLD AUTO: 129 K/UL (ref 150–450)
PMV BLD AUTO: 10.4 FL (ref 9.4–12.3)
POTASSIUM SERPL-SCNC: 3.6 MMOL/L (ref 3.5–5.1)
PPD POC: NEGATIVE NEGATIVE
PROT SERPL-MCNC: 6.7 G/DL (ref 6.3–8.2)
Q-T INTERVAL, ECG07: 460 MS
QRS DURATION, ECG06: 156 MS
QTC CALCULATION (BEZET), ECG08: 482 MS
RBC # BLD AUTO: 4.85 M/UL (ref 4.23–5.6)
SODIUM SERPL-SCNC: 143 MMOL/L (ref 136–145)
TROPONIN I SERPL-MCNC: 5.27 NG/ML (ref 0.02–0.05)
TROPONIN I SERPL-MCNC: 5.64 NG/ML (ref 0.02–0.05)
VENTRICULAR RATE, ECG03: 66 BPM
WBC # BLD AUTO: 8 K/UL (ref 4.3–11.1)

## 2019-06-05 PROCEDURE — 97535 SELF CARE MNGMENT TRAINING: CPT

## 2019-06-05 PROCEDURE — 77030020263 HC SOL INJ SOD CL0.9% LFCR 1000ML

## 2019-06-05 PROCEDURE — 85027 COMPLETE CBC AUTOMATED: CPT

## 2019-06-05 PROCEDURE — 36415 COLL VENOUS BLD VENIPUNCTURE: CPT

## 2019-06-05 PROCEDURE — 65610000001 HC ROOM ICU GENERAL

## 2019-06-05 PROCEDURE — 83735 ASSAY OF MAGNESIUM: CPT

## 2019-06-05 PROCEDURE — 84484 ASSAY OF TROPONIN QUANT: CPT

## 2019-06-05 PROCEDURE — 74011000258 HC RX REV CODE- 258: Performed by: INTERNAL MEDICINE

## 2019-06-05 PROCEDURE — 80053 COMPREHEN METABOLIC PANEL: CPT

## 2019-06-05 PROCEDURE — 71045 X-RAY EXAM CHEST 1 VIEW: CPT

## 2019-06-05 PROCEDURE — 97163 PT EVAL HIGH COMPLEX 45 MIN: CPT

## 2019-06-05 PROCEDURE — 93005 ELECTROCARDIOGRAM TRACING: CPT | Performed by: INTERNAL MEDICINE

## 2019-06-05 PROCEDURE — 82550 ASSAY OF CK (CPK): CPT

## 2019-06-05 PROCEDURE — 97530 THERAPEUTIC ACTIVITIES: CPT

## 2019-06-05 PROCEDURE — 74011250637 HC RX REV CODE- 250/637: Performed by: FAMILY MEDICINE

## 2019-06-05 PROCEDURE — 74011250636 HC RX REV CODE- 250/636: Performed by: INTERNAL MEDICINE

## 2019-06-05 PROCEDURE — 97165 OT EVAL LOW COMPLEX 30 MIN: CPT

## 2019-06-05 PROCEDURE — 74011250637 HC RX REV CODE- 250/637: Performed by: INTERNAL MEDICINE

## 2019-06-05 RX ORDER — POTASSIUM CHLORIDE 1.5 G/1.77G
20 POWDER, FOR SOLUTION ORAL DAILY
Status: DISCONTINUED | OUTPATIENT
Start: 2019-06-05 | End: 2019-06-10 | Stop reason: HOSPADM

## 2019-06-05 RX ORDER — ONDANSETRON 2 MG/ML
4 INJECTION INTRAMUSCULAR; INTRAVENOUS
Status: DISCONTINUED | OUTPATIENT
Start: 2019-06-05 | End: 2019-06-10 | Stop reason: HOSPADM

## 2019-06-05 RX ADMIN — Medication 10 ML: at 05:19

## 2019-06-05 RX ADMIN — Medication 10 ML: at 14:00

## 2019-06-05 RX ADMIN — Medication 10 ML: at 21:35

## 2019-06-05 RX ADMIN — ASPIRIN 81 MG 81 MG: 81 TABLET ORAL at 09:29

## 2019-06-05 RX ADMIN — ARIPIPRAZOLE 10 MG: 10 TABLET ORAL at 09:29

## 2019-06-05 RX ADMIN — FAMOTIDINE 20 MG: 20 TABLET, FILM COATED ORAL at 17:38

## 2019-06-05 RX ADMIN — DONEPEZIL HYDROCHLORIDE 5 MG: 5 TABLET, FILM COATED ORAL at 21:35

## 2019-06-05 RX ADMIN — POTASSIUM CHLORIDE 20 MEQ: 1.5 POWDER, FOR SOLUTION ORAL at 09:30

## 2019-06-05 RX ADMIN — CEFTRIAXONE 2 G: 2 INJECTION, POWDER, FOR SOLUTION INTRAMUSCULAR; INTRAVENOUS at 09:30

## 2019-06-05 RX ADMIN — SODIUM CHLORIDE 125 ML/HR: 900 INJECTION, SOLUTION INTRAVENOUS at 16:30

## 2019-06-05 RX ADMIN — MINERAL OIL AND PETROLATUM: 150; 830 OINTMENT OPHTHALMIC at 21:35

## 2019-06-05 RX ADMIN — TAMSULOSIN HYDROCHLORIDE 0.4 MG: 0.4 CAPSULE ORAL at 21:35

## 2019-06-05 RX ADMIN — FAMOTIDINE 20 MG: 20 TABLET, FILM COATED ORAL at 09:29

## 2019-06-05 RX ADMIN — SODIUM CHLORIDE 125 ML/HR: 900 INJECTION, SOLUTION INTRAVENOUS at 05:19

## 2019-06-05 RX ADMIN — MINERAL OIL AND PETROLATUM: 150; 830 OINTMENT OPHTHALMIC at 09:31

## 2019-06-05 RX ADMIN — MINERAL OIL AND PETROLATUM: 150; 830 OINTMENT OPHTHALMIC at 13:00

## 2019-06-05 RX ADMIN — BICALUTAMIDE 50 MG: 50 TABLET, FILM COATED ORAL at 09:00

## 2019-06-05 NOTE — PROGRESS NOTES
OT note: orders received, chart reviewed. Patient eating his lunch using spoon effectively to eat ice cream. Will re attempt full  eval at another time. Natali Pace OTR/L

## 2019-06-05 NOTE — PROGRESS NOTES
Progress Note Patient: Jeannine Moody MRN: [id number]  SSN: [id number] YOB: 1940  Age: [de-identified] y.o. Sex: male Admit Date: 6/4/2019 LOS: 1 day Subjective:  
 
Patient was seen at bedside. Clinically stable. Renal function stable. Seen by ophthalmology yesterday. No acute intervention recommended. Continue holding Pradaxa. Seen by cardiology. No acute intervention recommended. Objective:  
 
Vitals:  
 06/05/19 1100 06/05/19 1200 06/05/19 1300 06/05/19 1309 BP: 143/61 154/64 170/81 135/67 Pulse: 87 84 85 86 Resp: 19 (!) 33 (!) 41 (!) 39 Temp:  99 °F (37.2 °C) SpO2: 97% 100% 93% (!) 88% Weight:      
Height:      
  
 
Intake and Output: 
Current Shift: No intake/output data recorded. Last three shifts: 06/04 0701 - 06/05 1900 In: 4770.3 [P.O.:1560; I.V.:3210.3] Out: 3258 [Urine:2575] Physical Exam:  
GENERAL: fatigued, appears stated age EYE: R eye hematoma LYMPHATIC: Cervical, supraclavicular, and axillary nodes normal.  
THROAT & NECK: normal and no erythema or exudates noted. LUNG: clear to auscultation bilaterally HEART: regular rate and rhythm, S1, S2 normal, no murmur, click, rub or gallop ABDOMEN: soft, non-tender. Bowel sounds normal. No masses,  no organomegaly EXTREMITIES:  extremities normal, atraumatic, no cyanosis or edema SKIN: Normal. 
NEUROLOGIC: No focal deficits PSYCHIATRIC: non focal 
 
Lab/Data Review: All lab results for the last 24 hours reviewed. Assessment:  
 
Principal Problem: 
  Rhabdomyolysis (6/4/2019) Active Problems: Metabolic encephalopathy (7/8/9243) UTI (urinary tract infection) (6/4/2019) Troponin I above reference range (6/4/2019) Atrial flutter with controlled response ([address] (6/5/2019) SSS (sick sinus syndrome) ([address] (6/5/2019) Traumatic orbital hematoma (6/5/2019) Plan: # Metabolic encephalopathy due to UTI  
-improving  
-IV fluids  
-IV ceftriaxone -urine culture and blood cultures in progress  
  
# Rhabdomyolysis -IV fluids  
-monitor CK level on a daily basis  
-will check x ray to avoid fluid overload  
  
# history of CAD with elevated troponin level  
-telemetry monitoring  
- trop level with plateau around 5.5  
-echo ordered and not showing regional wall motion abnormalities  
  
# severe facial trauma with R periorbital hematoma  
-holding  anticoagulation for now  
-ophthalmology eval  appreciated Signed By: Zeinab Montiel MD   
 June 5, 2019

## 2019-06-05 NOTE — PROGRESS NOTES
Patient resting quietly. Right eye with swelling and redness. Patient not able to open right eye. Patient able to open left eye. Patient oriented. Follow commands. Denies pain. Head of bed elevated. Respiration even and unlabored. Oxygen saturation 97 % on room air. Sinus rhythm with BBB with occasional PVC'S. Raised bluish/purple area to right cheek. Lip with swelling. Skin tear to both knees and right forearm. Feet are dry and scaly. Allevyn peeled back. Sacral/coccyx intact. Bed in low/lock position. Bed alarm on.

## 2019-06-05 NOTE — PROGRESS NOTES
Problem: Self Care Deficits Care Plan (Adult)  Goal: *Acute Goals and Plan of Care (Insert Text)  Description  1. Patient will perform grooming with supervision. 2. Patient will perform Upper body dressing with min assist  3. Patient will perform upper body bathing with  Min assist  4. Patient will perform toilet transfers with mod x 2  5. Patient will participate in 30 + minutes of ADL/ therapeutic exercise/therapeutic activity with min rest breaks to increase activity tolerance for self care. Goals to be achieved in 7 days. Outcome: Progressing Towards Goal     OCCUPATIONAL THERAPY: Initial Assessment and PM 6/5/2019  INPATIENT: OT Visit Days: 1  [place] / [place] / Product Type: Medicare /      NAME/AGE/GENDER: Ena Santos is a [de-identified] y.o. male   PRIMARY DIAGNOSIS:  UTI (urinary tract infection) [V09.2]  Metabolic encephalopathy [M06.14]  Rhabdomyolysis [M62.82]  Troponin I above reference range [R74.8] Rhabdomyolysis   Rhabdomyolysis          ICD-10: Treatment Diagnosis:    · Generalized Muscle Weakness (M62.81)  · Other lack of cordination (R27.8)  · Difficulty in walking, Not elsewhere classified (R26.2)   Precautions/Allergies:     Patient has no known allergies. ASSESSMENT:     Mr. Gregor Goff presents sitting up in recliner. PT and OT assisted patient max x 2 sit to stand. Patient unable to take steps back to bed. He stood and was total assist for janice care in standing. Mod assist x 2 to maintain standing balance. Bed was moved behind patient to return to sitting. Max x 2 sit to supine. Patient was bathed and gown changed by nursing and OT. He was total assist for these tasks. He was observed earlier feeding himself ice cream with a spoon. He did not speak unless spoken to and offer one word answer. He has skin tear on R forearm, and B knees. Catheter in place. HE was living at home with family. He would benefit from skilled OT services.  Will follow. This section established at most recent assessment   PROBLEM LIST (Impairments causing functional limitations):  1. Decreased Strength  2. Decreased ADL/Functional Activities  3. Decreased Transfer Abilities  4. Decreased Ambulation Ability/Technique  5. Decreased Balance  6. Decreased Activity Tolerance  7. Decreased Flexibility/Joint Mobility  8. Decreased Skin Integrity/Hygeine   INTERVENTIONS PLANNED: (Benefits and precautions of occupational therapy have been discussed with the patient.)  1. Activities of daily living training  2. Adaptive equipment training  3. Balance training  4. Clothing management  5. Cognitive training  6. Donning&doffing training  7. Neuromuscular re-eduation  8. Therapeutic activity  9. Therapeutic exercise     TREATMENT PLAN: Frequency/Duration: Follow patient 3 times to address above goals. Rehabilitation Potential For Stated Goals: Good     REHAB RECOMMENDATIONS (at time of discharge pending progress):    Placement: It is my opinion, based on this patient's performance to date, that Mr. Anh Alamo may benefit from intensive therapy at a [address] [place] [address] after discharge due to the functional deficits listed above that are likely to improve with skilled rehabilitation and concerns that he/she may be unsafe to be unsupervised at home due to the level of assist for ADLs and transfers . Equipment:    None at this time              OCCUPATIONAL PROFILE AND HISTORY:   History of Present Injury/Illness (Reason for Referral):  See h and P  Past Medical History/Comorbidities:   Mr. Anh Alamo  has a past medical history of Arrhythmia, Arthritis, Atrial fibrillation ([address] (1/27/2012), CAD (coronary artery disease), Cancer ([address] Dementia, GERD (gastroesophageal reflux disease), Heart failure ([address] Hypertension, and Morbid obesity ([address] Mr. Anh Alamo  has a past surgical history that includes hx orthopaedic (2010); hx prostatectomy; and hx pacemaker (8/30/2012).   Social History/Living Environment:   Home Environment: Private residence  # Steps to Enter: 7  Rails to Enter: Yes  [employer] : Right  One/Two Story Residence: One story  Living Alone: Yes(during the day)  Support Systems: Family member(s)  Patient Expects to be Discharged to[de-identified] Skilled nursing facility  Current DME Used/Available at Home: [employer], bedside, Hospital bed, Walker, rolling  Prior Level of Function/Work/Activity:  Assist with bathing and dressing from daughter     Number of Personal Factors/Comorbidities that affect the Plan of Care: Expanded review of therapy/medical records (1-2):  MODERATE COMPLEXITY   ASSESSMENT OF OCCUPATIONAL PERFORMANCE[de-identified]   Activities of Daily Living:   Basic ADLs (From Assessment) Complex ADLs (From Assessment)   Feeding: Supervision(observed eating ice cream with a spoon )  Oral Facial Hygiene/Grooming: Maximum assistance  Bathing: Total assistance  Upper Body Dressing: Maximum assistance, Total assistance  Lower Body Dressing: Total assistance  Toileting: Total assistance     Grooming/Bathing/Dressing Activities of Daily Living     Cognitive Retraining  Safety/Judgement: Awareness of environment; Fall prevention                       Bed/Mat Mobility  Supine to Sit: Maximum assistance  Sit to Supine: Maximum assistance;Assist x2  Sit to Stand: Maximum assistance;Assist x2  Stand to Sit: Maximum assistance;Assist x2  Bed to Chair: Maximum assistance;Assist x2  Scooting: Maximum assistance       Most Recent Physical Functioning:   Gross Assessment:                  Posture:     Balance:  Sitting: Impaired;High guard  Sitting - Static: (fair to fair-)  Sitting - Dynamic: (fair-)  Standing: Pull to stand; With support  Standing - Static: Poor  Standing - Dynamic : Poor Bed Mobility:  Supine to Sit: Maximum assistance  Sit to Supine: Maximum assistance;Assist x2  Scooting: Maximum assistance  Wheelchair Mobility:     Transfers:  Sit to Stand: Maximum assistance;Assist x2  Stand to Sit: Maximum assistance;Assist x2  Bed to Chair: Maximum assistance;Assist x2  Duration: 15 Minutes            Patient Vitals for the past 6 hrs:   BP SpO2 Pulse   06/05/19 1000 154/70 97 % 80   06/05/19 1100 143/61 97 % 87   06/05/19 1200 154/64 100 % 84   06/05/19 1300 170/81 93 % 85   06/05/19 1309 135/67 (!) 88 % 86       Mental Status  Neurologic State: Alert  Orientation Level: Disoriented to time, Oriented to person, Oriented to place, Oriented to situation  Cognition: Decreased attention/concentration, Follows commands, Impaired decision making  Perception: Cues to maintain midline in standing  Perseveration: No perseveration noted  Safety/Judgement: Awareness of environment, Fall prevention            LLE Assessment  LLE Assessment (WDL): Exception to WDL RLE Assessment  RLE Assessment (WDL): Exceptions to [facility]           Physical Skills Involved:  1. Range of Motion  2. Balance  3. Strength  4. Activity Tolerance  5. Skin Integrity Cognitive Skills Affected (resulting in the inability to perform in a timely and safe manner):  1. Perception  2. Comprehension  3. Expression Psychosocial Skills Affected:  1. Habits/Routines  2. Environmental Adaptation  3. Self-Awareness   Number of elements that affect the Plan of Care: 5+:  HIGH COMPLEXITY   CLINICAL DECISION MAKING:   [employer] AM-PAC 6 Clicks   Daily Activity Inpatient Short Form  How much help from another person does the patient currently need. .. Total A Lot A Little None   1. Putting on and taking off regular lower body clothing? ? 1   ? 2   ? 3   ? 4   2. Bathing (including washing, rinsing, drying)? ? 1   ? 2   ? 3   ? 4   3. Toileting, which includes using toilet, bedpan or urinal?   ? 1   ? 2   ? 3   ? 4   4. Putting on and taking off regular upper body clothing? ? 1   ? 2   ? 3   ? 4   5. Taking care of personal grooming such as brushing teeth? ? 1   ? 2   ? 3   ? 4   6. Eating meals?    ? 1   ? 2   ? 3   ? 4   © 2007, Trustees of [facility] [address] [address], under license to [employer]. All rights reserved      Score:  Initial: 9   Most Recent: X (Date: -- )    Interpretation of Tool:  Represents activities that are increasingly more difficult (i.e. Bed mobility, Transfers, Gait). Medical Necessity:     · Patient is expected to demonstrate progress in balance, coordination and functional technique  ·  to decrease assistance required with self care and functional mobility and improve safety during  self care and functional mobility   · . Reason for Services/Other Comments:  · Patient continues to require skilled intervention due to not at baseline function   · . Use of outcome tool(s) and clinical judgement create a POC that gives a: HIGH COMPLEXITY         TREATMENT:   (In addition to Assessment/Re-Assessment sessions the following treatments were rendered)     Pre-treatment Symptoms/Complaints:    Pain: Initial:     0/10 Post Session:  0/10     Self Care: (15): Procedure(s) (per grid) utilized to improve and/or restore self-care/home management as related to dressing, bathing and toileting. Required maximal visual, verbal, manual and tactile cueing to facilitate activities of daily living skills and compensatory activities. Assessment/Reassessment  10    Braces/Orthotics/Lines/Etc:   · IV  · álvarez catheter  · ICU monitors   · O2 Device: Room air  Treatment/Session Assessment:    · Response to Treatment:  max x 2 for sit to stand, very weak  · Interdisciplinary Collaboration:   o Physical Therapist  o Physical Therapy Assistant  o Occupational Therapist  o Registered Nurse  o Rehabilitation Attendant  o Certified Nursing Assistant/Patient Care Technician  · After treatment position/precautions:   o Supine in bed  o Bed/Chair-wheels locked  o Bed in low position  o Caregiver at bedside  o Call light within reach  o Nurse at bedside  o Side rails x 3   · Compliance with Program/Exercises: Compliant most of the time.   · Recommendations/Intent for next treatment session: \"Next visit will focus on advancements to more challenging activities and reduction in assistance provided\".   Total Treatment Duration:15  OT Patient Time In/Time Out  Time In: 3007  Time Out: [address] [facility],

## 2019-06-05 NOTE — CONSULTS
[facility] Cardiology Consult                Date of  Admission: 6/4/2019  7:10 AM     Primary Care Physician: Dr Jose Camarena  Primary Cardiologist: Dr Aleksandra Arana (only saw in 2012)  Referring Physician: Dr Tyrone Mejia  Consulting Physician: Dr Callum Moura    CC/Reason for consult: a fib, elevated troponin       Tylor Tavarez is a [de-identified] y.o. male admitted for UTI (urinary tract infection) [T83.3]  Metabolic encephalopathy [H04.27]  Rhabdomyolysis [M62.82]  Troponin I above reference range [R74.8]. He has a h/o a fib treated by Dr Mars Funez with [facility] Peng PM placed for SSS by Dr Aleksandra Arana in 2012. He was admitted yesterday when found by daughter laying on the floor, was found to have a UTI and be in rhabdomyolysis, started on hydration and antibiotics. Troponin elevated at 1.65 and increased to 5.64, CK 4160, cr 1.56, EKG rate controlled flutter. Echo showed EF 60-65% with mod LA dilation and no WMA.      Patient Active Problem List   Diagnosis Code    Osteoarthritis of right knee M17.11    Total knee replacement status Z96.659    Atrial fibrillation (HCC) I48.91    HTN (hypertension) I10    Depression F32.9    Morbid obesity ([address] E66.01    History of prostate cancer Z85.46    Generalized weakness R53.1    Acute metabolic encephalopathy U09.49    Vitamin B12 deficiency E53.8    Recurrent major depressive disorder, in full remission ([address] F33.42    Moderate dementia without behavioral disturbance F03.90    Hyperkalemia E87.5    E. coli UTI (urinary tract infection) N39.0, B43.84    Metabolic encephalopathy T82.02    Rhabdomyolysis M62.82    UTI (urinary tract infection) N39.0    Troponin I above reference range R74.8       Past Medical History:   Diagnosis Date    Arrhythmia     pt takes pradaxa    Arthritis     Atrial fibrillation ([address] 1/27/2012    CAD (coronary artery disease)     Cancer (HCC)     prostate    Dementia     GERD (gastroesophageal reflux disease)     controlled with medication    Heart failure ([facility]     pt takes lasix as needed, reports SOB with exertion    Hypertension     controlled with medication    Morbid obesity ([facility]       Past Surgical History:   Procedure Laterality Date    HX ORTHOPAEDIC  2010    left TKA    HX PACEMAKER  8/30/2012    [employer] Peng pacemaker    HX PROSTATECTOMY       No Known Allergies   Family History   Problem Relation Age of Onset    Cancer Father       Social History     Tobacco Use    Smoking status: Never Smoker    Smokeless tobacco: Never Used   Substance Use Topics    Alcohol use: No        Current Facility-Administered Medications   Medication Dose Route Frequency    potassium chloride (KLOR-CON) packet for solution 20 mEq  20 mEq Oral DAILY    sodium chloride (NS) flush 5-40 mL  5-40 mL IntraVENous Q8H    sodium chloride (NS) flush 5-40 mL  5-40 mL IntraVENous PRN    0.9% sodium chloride infusion  125 mL/hr IntraVENous CONTINUOUS    acetaminophen (TYLENOL) tablet 650 mg  650 mg Oral Q6H PRN    ARIPiprazole (ABILIFY) tablet 10 mg  10 mg Oral DAILY    aspirin chewable tablet 81 mg  81 mg Oral DAILY    bicalutamide (CASODEX) tablet 50 mg  50 mg Oral DAILY    donepezil (ARICEPT) tablet 5 mg  5 mg Oral QHS    famotidine (PEPCID) tablet 20 mg  20 mg Oral BID    tamsulosin (FLOMAX) capsule 0.4 mg  0.4 mg Oral QHS    cefTRIAXone (ROCEPHIN) 2 g in 0.9% sodium chloride (MBP/ADV) 50 mL  2 g IntraVENous Q24H    tuberculin injection 5 Units  5 Units IntraDERMal ONCE    white petrolatum-mineral oil (AKWA TEARS) 83-15 % ophthalmic ointment   Right Eye QID       Review of Symptoms:  General: *  Skin: no rashes, lumps, or other skin changes  HEENT: no headache, dizziness, lightheadedness, vision changes, hearing changes, tinnitus, vertigo, sinus pressure/pain, bleeding gums, sore throat, or hoarseness  Neck: no swollen glands, goiter, pain or stiffness  Respiratory: no cough, sputum, hemoptysis, no dyspnea, wheezing  Cardiovascular: + as per HPI  Gastrointestinal: no GERD, constipation, diarrhea, liver problems, or h/o GI bleed  Urinary: no frequency, urgency , hematuria, burning/pain with urination, recent flank pain, polyuria, nocturia, or difficulty urinating  Peripheral Vascular: no claudication, leg cramps, prior DVTs, swelling of calves, legs, or feet, color change, or swelling with redness or tenderness  Musculoskeletal: no muscle or joint pain/stiffness, joint swelling, erythema of joints, or back pain  Psychiatric: no depression or excessive stress  Neurological: no sensory or motor loss, seizures, syncope, tremors, numbness, no dementia  Hematologic: no anemia, easy bruising or bleeding  Endocrine: no thyroid problems, heat or cold intolerance, excessive sweating, polyuria, polydipsia,   .        Physical Exam  Vitals:    06/05/19 0600 06/05/19 0700 06/05/19 0900 06/05/19 1000   BP: 137/63 119/67 126/70 154/70   Pulse: 83 82 84 80   Resp: 27 25 (!) 36 28   Temp:  99.4 °F (37.4 °C)     SpO2: 94% 95% 94% 97%   Weight:       Height:           Physical Exam:  General: Well Developed, Well Nourished, No Acute Distress  HEENT: pupils equal and round, no abnormalities noted  Neck: supple, no JVD, no carotid bruits  Heart: S1S2 irregularly irregular   Lungs: Clear throughout auscultation bilaterally without adventitious sounds  Abd: soft, nontender, nondistended, with good bowel sounds  Ext: warm, no edema, calves supple/nontender, pulses 2+ bilaterally  Skin: warm and dry  Psychiatric: Normal mood and affect  Neurologic: Alert and oriented X 3      Labs:   Recent Labs     06/05/19  0605 06/04/19  0734    145   K 3.6 3.5   MG 2.2 2.3   BUN 12 15   CREA 0.84 1.56*   GLU 93 98   WBC 8.0 11.2*   HGB 14.8 15.8   HCT 43.2 46.6   * 148*        Assessment/Plan:     Assessment:   Rhabdomyolysis (6/4/2019)- NS at 125 cc/hr     Metabolic encephalopathy (1/5/0516)- rocephin, IV hydration     UTI (urinary tract infection) (6/4/2019)- Rocephin Troponin I above reference range (6/4/2019)- Probably demand ischemia, echo with nml EF and no WMA    Atrial flutter with controlled response- rate controlled s/p St Peng PM, pradaxa held due to orbital trauma    Traumatic orbital hematoma (6/5/2019)- pradaxa on hold     Thank you very much for this referral. We appreciate the opportunity to participate in this patient's care. We will follow along with above stated plan. Kevin Hart MD: [facility] CARDIOLOGY     6/5/2019     8:30 PM    I have personally seen and examined Tylor Sung with the physician extender listed above. .  I agree and confirm findings in history, physical exam, and assessment/plan as outlined above with following pertinent additions/exceptions:   Elevated troponin in the setting of rhabdomyolysis. Symptomatology and presentation not consistent with acute coronary syndrome. Patient chest pain-free at this time. Echocardiogram reasonable to further characterize left ventricular systolic function. Of concern is events preceding the syncopal event patient recalls no information prior to this. Device interrogation appropriate to evaluate for arrhythmia. Telemetry strips reviewed revealing atrial fibrillation as well as apparent conduction. No ventricular arrhythmias have been noted this time.       Teddy Serrato MD

## 2019-06-05 NOTE — PROGRESS NOTES
Problem: Mobility Impaired (Adult and Pediatric)  Goal: *Acute Goals and Plan of Care (Insert Text)  Description  DISCHARGE GOALS :  (1.)Mr. Beena Coombs will move from supine to sit and sit to supine  with MINIMAL ASSIST with HOB 30 deg & rail. (2.)Mr. Beena Coombs will transfer from bed to chair and chair to bed with CONTACT GUARD ASSIST using a walker  (3.)Mr. Beena Coombs will ambulate with CONTACT GUARD ASSIST for 40-50 feet with a rolling walker. 4) pt participating in AROM to UE & LE's with just verbal or visual cues. Outcome: Progressing Towards Goal     PHYSICAL THERAPY: Initial Assessment and AM 6/5/2019  INPATIENT: PT Visit Days : 1  [place] / Product Type: Medicare /       NAME/AGE/GENDER: Krystal Araujo is a [de-identified] y.o. male   PRIMARY DIAGNOSIS: UTI (urinary tract infection) [J46.0]  Metabolic encephalopathy [K69.00]  Rhabdomyolysis [M62.82]  Troponin I above reference range [R74.8] Rhabdomyolysis   Rhabdomyolysis         ICD-10: Treatment Diagnosis:    · Generalized Muscle Weakness (M62.81)  · Other lack of cordination (R27.8)  · Difficulty in walking, Not elsewhere classified (R26.2)  · Other abnormalities of gait and mobility (R26.89)   Precaution/Allergies:  Patient has no known allergies. ASSESSMENT:     Mr. Beena Coombs presents with severe weakness & debility needing max assist for bed mobility & moderate assist for transfers & gait non-functional distance while using a rolling walker. This pt will not be manageable for caregiver in home environment & will need SNF rehab on hospital [place]. This section established at most recent assessment   PROBLEM LIST (Impairments causing functional limitations):  1. Decreased Strength  2. Decreased ADL/Functional Activities  3. Decreased Transfer Abilities  4. Decreased Ambulation Ability/Technique  5. Decreased Balance  6. Decreased Activity Tolerance  7.  Decreased [place] with Home Exercise Program INTERVENTIONS PLANNED: (Benefits and precautions of physical therapy have been discussed with the patient.)  1. Balance Exercise  2. Bed Mobility  3. Family Education  4. Gait Training  5. Home Exercise Program (HEP)  6. Therapeutic Activites  7. Therapeutic Exercise/Strengthening  8. Transfer Training     TREATMENT PLAN: Frequency/Duration: daily for duration of hospital stay  Rehabilitation Potential For Stated Goals: Good     REHAB RECOMMENDATIONS (at time of discharge pending progress):    Placement: It is my opinion, based on this patient's performance to date, that Mr. Finesse French may benefit from intensive therapy at a [address] [place] [address] after discharge due to the functional deficits listed above that are likely to improve with skilled rehabilitation and concerns that he/she may be unsafe to be unsupervised at home due to severe weakness & debility . Equipment:    To be determined               HISTORY:   History of Present Injury/Illness (Reason for Referral): This is an [de-identified] YO male patient with a PMH of CAD, Afib on anticoagulation, CHF, HTN, obesity, previous admissions for UTI,  who was found laying on the floor this morning by his daughter. He was confused and very weak. He had a large R periorbital hematoma and was brought to the ER. He can not provide too much information due to his mental status. Here he was found to have normal VS.  His WBC count was 11.2K, Na 145, Cr 1.56, Ca 10.5, troponin 1.65, CK 1500. His UA was suggestive of UTI with positive nitrates, moderate leukocyte esterase, > 100 WBCs. A brain CT scan showed NO acute intra-cranial abnormality. He was started on IV fluids, IV ceftriaxone and was presented to the hospitalist for admission.         Past Medical History/Comorbidities:   Mr. Finesse French  has a past medical history of Arrhythmia, Arthritis, Atrial fibrillation ([facility] (1/27/2012), CAD (coronary artery disease), Cancer ([facility] Dementia, GERD (gastroesophageal reflux disease), Heart failure ([facility] Hypertension, and Morbid obesity ([facility] Mr. Dolly Ramirez  has a past surgical history that includes hx orthopaedic (2010); hx prostatectomy; and hx pacemaker (8/30/2012). Social History/Living Environment:   Home Environment: Private residence  # Steps to Enter: 7  Rails to Enter: Yes  Hand Rails : Right  One/Two Story Residence: One story  Living Alone: Yes(during the day)  Support Systems: Family member(s)  Patient Expects to be Discharged to[de-identified] Skilled nursing facility  Current DME Used/Available at Home: Commode, bedside, Hospital bed, Walker, rolling  Prior Level of Function/Work/Activity:  Pt was functioning with a walker in the home prior to this admission  Personal Factors: Other factors that influence how disability is experienced by the patient:  current & PMH    Number of Personal Factors/Comorbidities that affect the Plan of Care: 3+: HIGH COMPLEXITY   EXAMINATION:   Most Recent Physical Functioning:   Gross Assessment:  AROM: Grossly decreased, non-functional(all limbs & core)  Strength: Grossly decreased, non-functional(all limbs & core)  Coordination: Grossly decreased, non-functional(all limbs & core)                    Balance:  Sitting: Impaired;High guard; Without support  Sitting - Static: (fair to fair-)  Sitting - Dynamic: (fair-)  Standing: Impaired; Without support  Standing - Static: (poor with walker)  Standing - Dynamic : (poor- with walker) Bed Mobility:  Supine to Sit: Maximum assistance  Sit to Supine: (NT)  Scooting: Maximum assistance       Transfers:  Sit to Stand: Moderate assistance  Stand to Sit: Moderate assistance  Bed to Chair: Moderate assistance(with walker)  Gait:     Speed/Ángela: Shuffled; Slow  Step Length: Left shortened;Right shortened  Gait Abnormalities: Decreased step clearance;Shuffling gait;Trunk sway increased(forward lean)  Distance (ft): 5 Feet (ft)  Assistive Device: Walker, rolling  Ambulation - Level of Assistance:  Moderate assistance Functional Mobility:         Gait/Ambulation:  mod        Transfers:  mod        Bed Mobility:  max   Body Structures Involved:  1. Metabolic  2. Muscles Body Functions Affected:  1. Movement Related  2. Metobolic/Endocrine Activities and Participation Affected:  1. General Tasks and Demands  2. Mobility   Number of elements that affect the Plan of Care: 4+: HIGH COMPLEXITY   CLINICAL PRESENTATION:   Presentation: Evolving clinical presentation with unstable and unpredictable characteristics: HIGH COMPLEXITY   CLINICAL DECISION MAKIN [facility] Mobility Inpatient Short Form  How much difficulty does the patient currently have. .. Unable A Lot A Little None   1. Turning over in bed (including adjusting bedclothes, sheets and blankets)? ? 1   ? 2   ? 3   ? 4   2. Sitting down on and standing up from a chair with arms ( e.g., wheelchair, bedside commode, etc.)   ? 1   ? 2   ? 3   ? 4   3. Moving from lying on back to sitting on the side of the bed?   ? 1   ? 2   ? 3   ? 4   How much help from another person does the patient currently need. .. Total A Lot A Little None   4. Moving to and from a bed to a chair (including a wheelchair)? ? 1   ? 2   ? 3   ? 4   5. Need to walk in hospital room? ? 1   ? 2   ? 3   ? 4   6. Climbing 3-5 steps with a railing? ? 1   ? 2   ? 3   ? 4   © , Trustees of [facility] [employer], under license to [employer]. All rights reserved      Score:  Initial: 11  Most Recent: X (Date: -- )    Interpretation of Tool:  Represents activities that are increasingly more difficult (i.e. Bed mobility, Transfers, Gait).     Medical Necessity:     · Patient is expected to demonstrate progress in strength, range of motion, balance, coordination and functional technique  ·  to decrease assistance required with bed mobility,transfers & gait   · .  Reason for Services/Other Comments:  · Patient continues to require skilled intervention due to pt unsafe to manage in home environment   · . Use of outcome tool(s) and clinical judgement create a POC that gives a: Difficult prediction of patient's progress: HIGH COMPLEXITY            TREATMENT:   (In addition to Assessment/Re-Assessment sessions the following treatments were rendered)   Pre-treatment Symptoms/Complaints:  pt was agreeable  Pain: Initial: numeric scale  Pain Intensity 1: 0  Post Session:  0/10     Therapeutic Activity: (    23 min ( extra time to work through activity noted): Therapeutic activities including EOB sitting static balance, wt-shifting R<>L, & F<>B while EOB, repeated sit<>stand with walker, pt performed wt shift in standing R<>L & F<>B along with pre-gait stepping exercises using a walker to improve mobility, strength, balance, coordination and dynamic movement of arm - bilateral, leg - bilateral and core  to improve functional WB potential . pt also performed as a cool down AAROM to UE & LE's : ankle pumps, hipABD-ADD, heel slides, SAQ's, overhead reach, UE push pull, shoulder horizontal ABD-ADD. Assessment/ 15 min    Braces/Orthotics/Lines/Etc:   · IV  · Icu lines   Treatment/Session Assessment:    · Response to Treatment:  participated well for the first time  · Interdisciplinary Collaboration:   o Registered Nurse  o   o Rehabilitation Attendant  · After treatment position/precautions:   o Up in chair  o Bed alarm/tab alert on  o Bed/Chair-wheels locked  o Call light within reach  o RN notified   · Compliance with Program/Exercises: Will assess as treatment progresses  · Recommendations/Intent for next treatment session: \"Next visit will focus on reduction in assistance provided\".   Total Treatment Duration:  PT Patient Time In/Time Out  Time In: 1015  Time Out: [address], PT

## 2019-06-05 NOTE — PROGRESS NOTES
Problem: Mobility Impaired (Adult and Pediatric)  Goal: *Acute Goals and Plan of Care (Insert Text)  Description  DISCHARGE GOALS :  (1.)Mr. Frankie Brunson will move from supine to sit and sit to supine  with MINIMAL ASSIST with HOB 30 deg & rail. (2.)Mr. Frankie Brunson will transfer from bed to chair and chair to bed with CONTACT GUARD ASSIST using a walker  (3.)Mr. Frankie Brunson will ambulate with CONTACT GUARD ASSIST for 40-50 feet with a rolling walker. 4) pt participating in AROM to UE & LE's with just verbal or visual cues. Outcome: Progressing Towards Goal     PHYSICAL THERAPY: Initial Assessment and PM 6/5/2019  INPATIENT: PT Visit Days : 1  [place] / Product Type: Medicare /       NAME/AGE/GENDER: Jacinda Bruce is a [de-identified] y.o. male   PRIMARY DIAGNOSIS: UTI (urinary tract infection) [J56.9]  Metabolic encephalopathy [R49.53]  Rhabdomyolysis [M62.82]  Troponin I above reference range [R74.8] Rhabdomyolysis   Rhabdomyolysis         ICD-10: Treatment Diagnosis:    · Generalized Muscle Weakness (M62.81)  · Other lack of cordination (R27.8)  · Difficulty in walking, Not elsewhere classified (R26.2)  · Other abnormalities of gait and mobility (R26.89)   Precaution/Allergies:  Patient has no known allergies. ASSESSMENT:     Mr. Frankie Brunson sitting in the chair upon arrival.  Performs sit<>stand with Max x2 and gait belt, unable to take steps with verbal cues , so sat back down. Therapist, nursing and OT had to work together to get him to stand up again move the chair and slide the bed under him with Max A x 3 with gait belt. Let pt with nursing and OT. This section established at most recent assessment   PROBLEM LIST (Impairments causing functional limitations):  1. Decreased Strength  2. Decreased ADL/Functional Activities  3. Decreased Transfer Abilities  4. Decreased Ambulation Ability/Technique  5. Decreased Balance  6. Decreased Activity Tolerance  7.  Decreased [place] with Home Exercise Program   INTERVENTIONS PLANNED: (Benefits and precautions of physical therapy have been discussed with the patient.)  1. Balance Exercise  2. Bed Mobility  3. Family Education  4. Gait Training  5. Home Exercise Program (HEP)  6. Therapeutic Activites  7. Therapeutic Exercise/Strengthening  8. Transfer Training     TREATMENT PLAN: Frequency/Duration: daily for duration of hospital stay  Rehabilitation Potential For Stated Goals: Good     REHAB RECOMMENDATIONS (at time of discharge pending progress):    Placement: It is my opinion, based on this patient's performance to date, that Mr. Dolly Ramirez may benefit from intensive therapy at a [address] [place] [address] after discharge due to the functional deficits listed above that are likely to improve with skilled rehabilitation and concerns that he/she may be unsafe to be unsupervised at home due to severe weakness & debility . Equipment:    To be determined               HISTORY:   History of Present Injury/Illness (Reason for Referral): This is an [de-identified] YO male patient with a PMH of CAD, Afib on anticoagulation, CHF, HTN, obesity, previous admissions for UTI,  who was found laying on the floor this morning by his daughter. He was confused and very weak. He had a large R periorbital hematoma and was brought to the ER. He can not provide too much information due to his mental status. Here he was found to have normal VS.  His WBC count was 11.2K, Na 145, Cr 1.56, Ca 10.5, troponin 1.65, CK 1500. His UA was suggestive of UTI with positive nitrates, moderate leukocyte esterase, > 100 WBCs. A brain CT scan showed NO acute intra-cranial abnormality. He was started on IV fluids, IV ceftriaxone and was presented to the hospitalist for admission.         Past Medical History/Comorbidities:   Mr. Dolly Ramirez  has a past medical history of Arrhythmia, Arthritis, Atrial fibrillation ([facility] (1/27/2012), CAD (coronary artery disease), Cancer ([facility] Dementia, GERD (gastroesophageal reflux disease), Heart failure ([facility] Hypertension, and Morbid obesity ([facility] Mr. Reza Milian  has a past surgical history that includes hx orthopaedic (2010); hx prostatectomy; and hx pacemaker (8/30/2012). Social History/Living Environment:   Home Environment: Private residence  # Steps to Enter: 7  Rails to Enter: Yes  Hand Rails : Right  One/Two Story Residence: One story  Living Alone: Yes(during the day)  Support Systems: Family member(s)  Patient Expects to be Discharged to[de-identified] Skilled nursing facility  Current DME Used/Available at Home: Commode, bedside, Hospital bed, Walker, rolling  Prior Level of Function/Work/Activity:  Pt was functioning with a walker in the home prior to this admission  Personal Factors: Other factors that influence how disability is experienced by the patient:  current & PMH    Number of Personal Factors/Comorbidities that affect the Plan of Care: 3+: HIGH COMPLEXITY   EXAMINATION:   Most Recent Physical Functioning:   Gross Assessment:                       Balance:    Bed Mobility:  Sit to Supine: Maximum assistance;Assist x2       Transfers:  Sit to Stand: Maximum assistance;Assist x2  Stand to Sit: Maximum assistance;Assist x2  Bed to Chair: Maximum assistance;Assist x2  Duration: 15 Minutes  Gait:     Speed/Ángela: Shuffled; Slow  Step Length: Left shortened;Right shortened  Gait Abnormalities: Decreased step clearance  Distance (ft): 0 Feet (ft)(due to pt tired)  Ambulation - Level of Assistance: Maximum assistance;Assist x2   Functional Mobility:         Gait/Ambulation:  mod        Transfers:  mod        Bed Mobility:  max   Body Structures Involved:  1. Metabolic  2. Muscles Body Functions Affected:  1. Movement Related  2. Metobolic/Endocrine Activities and Participation Affected:  1. General Tasks and Demands  2.  Mobility   Number of elements that affect the Plan of Care: 4+: HIGH COMPLEXITY   CLINICAL PRESENTATION:   Presentation: Evolving clinical presentation with unstable and unpredictable characteristics: HIGH COMPLEXITY   CLINICAL DECISION MAKING:   [facility] AM-PAC 6 Clicks   Basic Mobility Inpatient Short Form  How much difficulty does the patient currently have. .. Unable A Lot A Little None   1. Turning over in bed (including adjusting bedclothes, sheets and blankets)? ? 1   ? 2   ? 3   ? 4   2. Sitting down on and standing up from a chair with arms ( e.g., wheelchair, bedside commode, etc.)   ? 1   ? 2   ? 3   ? 4   3. Moving from lying on back to sitting on the side of the bed?   ? 1   ? 2   ? 3   ? 4   How much help from another person does the patient currently need. .. Total A Lot A Little None   4. Moving to and from a bed to a chair (including a wheelchair)? ? 1   ? 2   ? 3   ? 4   5. Need to walk in hospital room? ? 1   ? 2   ? 3   ? 4   6. Climbing 3-5 steps with a railing? ? 1   ? 2   ? 3   ? 4   © 2007, Trustees of [facility], under license to [employer]. All rights reserved      Score:  Initial: 11  Most Recent: X (Date: -- )    Interpretation of Tool:  Represents activities that are increasingly more difficult (i.e. Bed mobility, Transfers, Gait). Medical Necessity:     · Patient is expected to demonstrate progress in strength, range of motion, balance, coordination and functional technique  ·  to decrease assistance required with bed mobility,transfers & gait   · .  Reason for Services/Other Comments:  · Patient continues to require skilled intervention due to pt unsafe to manage in home environment   · .    Use of outcome tool(s) and clinical judgement create a POC that gives a: Difficult prediction of patient's progress: HIGH COMPLEXITY            TREATMENT:   (In addition to Assessment/Re-Assessment sessions the following treatments were rendered)   Pre-treatment Symptoms/Complaints:  Ready to get in bed  Pain: Initial: 0/10     Post Session:       Therapeutic Activity: (  15 Minutes get pt back in the bed with Max A x 2-3  Braces/Orthotics/Lines/Etc:   · IV  · Icu lines   Treatment/Session Assessment:    · Response to Treatment:  Was very tired,unable to help. · Interdisciplinary Collaboration:   o Registered Nurse  o   o Rehabilitation Attendant  · After treatment position/precautions:   o Supine in bed  o Bed/Chair-wheels locked  o Call light within reach  o RN notified   · Compliance with Program/Exercises: Will assess as treatment progresses  · Recommendations/Intent for next treatment session: \"Next visit will focus on reduction in assistance provided\".   Total Treatment Duration:  PT Patient Time In/Time Out  Time In: 1430  Time Out: [address], PTA

## 2019-06-05 NOTE — PROGRESS NOTES
Saw pt in interdisciplinarily rounds with the following disciplines: Physician, Case Management, Nursing, Dietary,Therapy and Pharmacy. The plan of care was discussed along with discharge date/ location. D/c date is TBD at this point in time. PT accepted to [facility], but a bed is not available until Saturday.

## 2019-06-05 NOTE — PROGRESS NOTES
Interdisciplinary team rounds were held 6/5/2019 with the following team members:Care Management, Nursing, Pastoral Care, Physician and Clinical Coordinator and the patient. Plan of care discussed. See clinical pathway and/or care plan for interventions and desired outcomes.

## 2019-06-05 NOTE — PROGRESS NOTES
Spiritual Care Visit, follow up visit. Visited with patient at bedside. Prayed for patient's healing and health. Visit by Fifi Segundo, Staff .  Marie., Yanet.B., B.A.

## 2019-06-05 NOTE — PROGRESS NOTES
Spiritual Care Visit, initial visit. , By Request.    Visited with patient at bedside. Prayed for patient's healing and health. Visit by Rafael Teague, Staff .  Marie., Yanet.B., B.A.

## 2019-06-05 NOTE — PROGRESS NOTES
Patient oriented X 2. Follow simple commands. Denies pain. Right eye remain with swelling and redness. Respiration even and unlabored. Oxygen saturation 97 % on room air. Bed in low/lock position. Bed alarm on.

## 2019-06-06 LAB
ALBUMIN SERPL-MCNC: 2.6 G/DL (ref 3.2–4.6)
ALBUMIN/GLOB SERPL: 0.7 {RATIO} (ref 1.2–3.5)
ALP SERPL-CCNC: 93 U/L (ref 50–136)
ALT SERPL-CCNC: 71 U/L (ref 12–65)
ANION GAP SERPL CALC-SCNC: 8 MMOL/L (ref 7–16)
AST SERPL-CCNC: 219 U/L (ref 15–37)
BACTERIA SPEC CULT: ABNORMAL
BILIRUB SERPL-MCNC: 1.5 MG/DL (ref 0.2–1.1)
BUN SERPL-MCNC: 8 MG/DL (ref 8–23)
CALCIUM SERPL-MCNC: 9.3 MG/DL (ref 8.3–10.4)
CHLORIDE SERPL-SCNC: 109 MMOL/L (ref 98–107)
CK SERPL-CCNC: 2867 U/L (ref 21–215)
CO2 SERPL-SCNC: 23 MMOL/L (ref 21–32)
CREAT SERPL-MCNC: 0.83 MG/DL (ref 0.8–1.5)
ERYTHROCYTE [DISTWIDTH] IN BLOOD BY AUTOMATED COUNT: 13.7 % (ref 11.9–14.6)
GLOBULIN SER CALC-MCNC: 3.7 G/DL (ref 2.3–3.5)
GLUCOSE SERPL-MCNC: 122 MG/DL (ref 65–100)
HCT VFR BLD AUTO: 42.9 % (ref 41.1–50.3)
HGB BLD-MCNC: 14.3 G/DL (ref 13.6–17.2)
MAGNESIUM SERPL-MCNC: 2.2 MG/DL (ref 1.8–2.4)
MCH RBC QN AUTO: 30 PG (ref 26.1–32.9)
MCHC RBC AUTO-ENTMCNC: 33.3 G/DL (ref 31.4–35)
MCV RBC AUTO: 89.9 FL (ref 79.6–97.8)
MM INDURATION POC: 0 MM (ref 0–5)
NRBC # BLD: 0 K/UL (ref 0–0.2)
PLATELET # BLD AUTO: 123 K/UL (ref 150–450)
PMV BLD AUTO: 10.1 FL (ref 9.4–12.3)
POTASSIUM SERPL-SCNC: 3.5 MMOL/L (ref 3.5–5.1)
PPD POC: NEGATIVE NEGATIVE
PROT SERPL-MCNC: 6.3 G/DL (ref 6.3–8.2)
RBC # BLD AUTO: 4.77 M/UL (ref 4.23–5.6)
SERVICE CMNT-IMP: ABNORMAL
SODIUM SERPL-SCNC: 140 MMOL/L (ref 136–145)
WBC # BLD AUTO: 7.8 K/UL (ref 4.3–11.1)

## 2019-06-06 PROCEDURE — 77030020263 HC SOL INJ SOD CL0.9% LFCR 1000ML

## 2019-06-06 PROCEDURE — 74011250636 HC RX REV CODE- 250/636: Performed by: INTERNAL MEDICINE

## 2019-06-06 PROCEDURE — 74011000258 HC RX REV CODE- 258: Performed by: INTERNAL MEDICINE

## 2019-06-06 PROCEDURE — 82550 ASSAY OF CK (CPK): CPT

## 2019-06-06 PROCEDURE — 65660000000 HC RM CCU STEPDOWN

## 2019-06-06 PROCEDURE — 77030027138 HC INCENT SPIROMETER -A

## 2019-06-06 PROCEDURE — 85027 COMPLETE CBC AUTOMATED: CPT

## 2019-06-06 PROCEDURE — 36415 COLL VENOUS BLD VENIPUNCTURE: CPT

## 2019-06-06 PROCEDURE — 97530 THERAPEUTIC ACTIVITIES: CPT

## 2019-06-06 PROCEDURE — 74011250637 HC RX REV CODE- 250/637: Performed by: INTERNAL MEDICINE

## 2019-06-06 PROCEDURE — 80053 COMPREHEN METABOLIC PANEL: CPT

## 2019-06-06 PROCEDURE — 74011250637 HC RX REV CODE- 250/637: Performed by: PHYSICIAN ASSISTANT

## 2019-06-06 PROCEDURE — 83735 ASSAY OF MAGNESIUM: CPT

## 2019-06-06 RX ORDER — METOPROLOL TARTRATE 25 MG/1
12.5 TABLET, FILM COATED ORAL EVERY 6 HOURS
Status: DISCONTINUED | OUTPATIENT
Start: 2019-06-06 | End: 2019-06-10 | Stop reason: HOSPADM

## 2019-06-06 RX ADMIN — ARIPIPRAZOLE 10 MG: 10 TABLET ORAL at 09:25

## 2019-06-06 RX ADMIN — BICALUTAMIDE 50 MG: 50 TABLET, FILM COATED ORAL at 09:00

## 2019-06-06 RX ADMIN — POTASSIUM CHLORIDE 20 MEQ: 1.5 POWDER, FOR SOLUTION ORAL at 09:25

## 2019-06-06 RX ADMIN — TAMSULOSIN HYDROCHLORIDE 0.4 MG: 0.4 CAPSULE ORAL at 21:59

## 2019-06-06 RX ADMIN — FAMOTIDINE 20 MG: 20 TABLET, FILM COATED ORAL at 18:00

## 2019-06-06 RX ADMIN — ASPIRIN 81 MG 81 MG: 81 TABLET ORAL at 09:25

## 2019-06-06 RX ADMIN — FAMOTIDINE 20 MG: 20 TABLET, FILM COATED ORAL at 09:25

## 2019-06-06 RX ADMIN — Medication 10 ML: at 05:04

## 2019-06-06 RX ADMIN — MINERAL OIL AND PETROLATUM: 150; 830 OINTMENT OPHTHALMIC at 18:05

## 2019-06-06 RX ADMIN — SODIUM CHLORIDE 125 ML/HR: 900 INJECTION, SOLUTION INTRAVENOUS at 02:18

## 2019-06-06 RX ADMIN — METOPROLOL TARTRATE 12.5 MG: 25 TABLET, FILM COATED ORAL at 15:38

## 2019-06-06 RX ADMIN — MINERAL OIL AND PETROLATUM: 150; 830 OINTMENT OPHTHALMIC at 13:00

## 2019-06-06 RX ADMIN — MINERAL OIL AND PETROLATUM: 150; 830 OINTMENT OPHTHALMIC at 09:00

## 2019-06-06 RX ADMIN — MINERAL OIL AND PETROLATUM: 150; 830 OINTMENT OPHTHALMIC at 22:03

## 2019-06-06 RX ADMIN — Medication 10 ML: at 22:01

## 2019-06-06 RX ADMIN — DONEPEZIL HYDROCHLORIDE 5 MG: 5 TABLET, FILM COATED ORAL at 21:59

## 2019-06-06 RX ADMIN — CEFTRIAXONE 2 G: 2 INJECTION, POWDER, FOR SOLUTION INTRAMUSCULAR; INTRAVENOUS at 09:26

## 2019-06-06 NOTE — PROGRESS NOTES
Patient received from ICU. Lung sounds clear-tachy at 36 respirations per minute-MD aware. Heart sounds regular/paced. Bowel sounds active. Abdomen soft. Edema to BLE and RUE. Dressing to bilateral knees and right arm. Tan drainage noted around álvarez and stat lock completely off leg. Álvarez care completed and new stat lock applied. Patient stated he had no pain. Currently resting in bed with family at bedside. Will continue to monitor with hourly rounding.

## 2019-06-06 NOTE — PROGRESS NOTES
Nutrition Reason for assessment:  Consult received for General Nutrition Management and Supplements 6/06:  Dr. Benton Rojas Assessment:  
Diet: DIET MECHANICAL SOFT Pt presented past being found on floor by his daughter; confused. Past medical history notable for CAD, AFib on anticoagulant therapy, CHF, HTN, obesity. Food/Nutrition Patient History:  Pt lives at home with his daughter and son in law. Daughter does all meal preparation. Pt currently sitting in bed on RD arrival; eating supper meal with feeding assistance from niece. Niece thinks patient ate well at home. No nutrition risk factors identified on nursing admission malnutrition screening tool. Per Yuniel Grover RN, patient had not been eating well while in ICU-transferred out late afternoon. Anthropometrics:Height: 6' 2\" (188 cm),  Weight: 124.3 kg (274 lb), Weight Source: Bed, Body mass index is 35.18 kg/m². BMI class of Obesity Class 1 for Older American Male. Weight history per EMR:  Unable to verify if weights are actual vs stated due to the functionality of [facility]. WT / BMI WEIGHT BODY MASS INDEX  
6/5/2019 274 lb 35.18 kg/m2 5/1/2019 246 lb 31.58 kg/m2 3/20/2019 262 lb 33.64 kg/m2 2/5/2019  32.1 kg/m2 2/4/2019 250 lb   
9/29/2018 254 lb 32.61 kg/m2 9/25/2018 254 lb 32.61 kg/m2 7/26/2018 249 lb 31.97 kg/m2 5/16/2018 261 lb 33.51 kg/m2 5/12/2018 248 lb 31.84 kg/m2 Macronutrient needs: EER:  6878-2595 kcal /day (12-15 kcal/kg BW) EPR:   grams protein/day (1-1.2 grams/kg IBW) Intake/Comparative Standards: Per RD meal rounds: pt currently eating supper meal with assitance from niece. 4 recorded intake with average intake of 44%. Pt potentially meets ~55% estimated kcal needs and 35% estimated protein needs.  
Nutrition Diagnosis: Inadequate oral intake r/t decreased ability to consume sufficient oral intake as evidenced by estimates of insufficient intake of energy or high quality protein from diet when compared to requirements. Intervention: 
Meals and snacks: Continue current diet. Pt may need assistance at meals. Nutrition Supplement Therapy: Initiated Ensure High Protein supplement at all meals; vanilla flavored preferred. Discharge nutrition plan: Too soon to determine. Marilin Ortiz, MPH, [address] [place] [address],  
[phone]

## 2019-06-06 NOTE — PROGRESS NOTES
[facility] CARDIOLOGY PROGRESS NOTE 
      
 
6/6/2019 9:52 AM 
 
Admit Date: 6/4/2019 Subjective: No CP or SOB, remains v paced w underlying flutter, device interrogation showed a fib w RVR June 4 at 1:30 w irregular rate. ROS: 
Cardiovascular:  As noted above Objective:  
  
Vitals:  
 06/06/19 0600 06/06/19 0700 06/06/19 0721 06/06/19 0800 BP: 132/64 135/56  132/66 Pulse: 71 79  79 Resp: 27 27  29 Temp:   98.6 °F (37 °C) 98.6 °F (37 °C) SpO2: 98% 99%  95% Weight:      
Height:      
 
 
Physical Exam: 
General-No Acute Distress, RA Neck- supple, no JVD, R eye swollen CV- regular Lung- clear bilaterally Abd- soft, nontender, nondistended Ext- no edema bilaterally. Skin- warm and dry Data Review:  
Recent Labs  
  06/06/19 
0323 06/05/19 
6847 06/05/19 
0044  143  --   
K 3.5 3.6  --   
MG 2.2 2.2  --   
BUN 8 12  --   
CREA 0.83 0.84  --   
* 93  --   
WBC 7.8 8.0  --   
HGB 14.3 14.8  --   
HCT 42.9 43.2  --   
* 129*  --   
TROIQ  --  5.27* 5.64* Assessment/Plan:  
Rhabdomyolysis (6/4/2019)- NS at 125 cc/hr Metabolic encephalopathy (5/5/1094)- rocephin, hydration UTI (urinary tract infection) (6/4/2019)- Rocephin Troponin I above reference range (6/4/2019)- Supply demand mismatch due to rhabdo, no anginal symptoms, echo with nml EF and no WMA Atrial flutter with controlled response ([address] (6/5/2019)- add bb, no anticoagulation due to fall 
 
SSS (sick sinus syndrome) ([facility] (6/5/2019)- s/p St Peng PM, pradaxa held due to orbital trauma Traumatic orbital hematoma (6/5/2019)- pradaxa on hold DASH Pereira 
6/6/2019 9:52 AM

## 2019-06-06 NOTE — PROGRESS NOTES
06/06/19 1742 Dual Skin Pressure Injury Assessment Dual Skin Pressure Injury Assessment WDL Second Care Provider (Based on [address] [place] [address]) Laury Lara RN Skin Integumentary Skin Integumentary (WDL) X Skin Integrity Abrasion (Simone knees and right arm ) Pressure  Injury Documentation No Pressure Injury Noted-Pressure Ulcer Prevention Initiated

## 2019-06-06 NOTE — PROGRESS NOTES
TRANSFER - IN REPORT: 
 
Verbal report received from Radha RN(name) on Tylor Dietz  being received from ICU(unit) for routine progression of care Report consisted of patients Situation, Background, Assessment and  
Recommendations(SBAR). Information from the following report(s) SBAR was reviewed with the receiving nurse. Opportunity for questions and clarification was provided. Assessment will be completed upon patients arrival to unit and care will be assumed.

## 2019-06-06 NOTE — PROGRESS NOTES
TRANSFER - OUT REPORT: 
 
Verbal report given to [facility] RN on Tylor Romero  being transferred to [address] [place] [address] for routine progression of care Report consisted of patients Situation, Background, Assessment and  
Recommendations(SBAR). Information from the following report(s) SBAR, Kardex, Intake/Output, MAR, Med Rec Status and Cardiac Rhythm  paced, ventricular rhythm was reviewed with the receiving nurse. Lines:  
Peripheral IV 06/05/19 Posterior;Right Hand (Active) Site Assessment Clean;Dry 6/6/2019  9:26 AM  
Phlebitis Assessment 0 6/6/2019  9:26 AM  
Infiltration Assessment 0 6/6/2019  9:26 AM  
Dressing Status Clean, dry, & intact 6/6/2019  9:26 AM  
Dressing Type Tape;Transparent 6/6/2019  9:26 AM  
Hub Color/Line Status Infusing 6/6/2019  9:26 AM  
Alcohol Cap Used No 6/5/2019  7:00 PM  
  
 
Opportunity for questions and clarification was provided. Patient transported with: 
 Registered Nurse Tech  
monitor

## 2019-06-06 NOTE — PROGRESS NOTES
Problem: Mobility Impaired (Adult and Pediatric) Goal: *Acute Goals and Plan of Care (Insert Text) Description DISCHARGE GOALS : 
(1.)Mr. Brock Lua will move from supine to sit and sit to supine  with MINIMAL ASSIST with HOB 30 deg & rail. (2.)Mr. Brock Lua will transfer from bed to chair and chair to bed with CONTACT GUARD ASSIST using a walker (3.)Mr. Brock Lua will ambulate with CONTACT GUARD ASSIST for 40-50 feet with a rolling walker. 4) pt participating in AROM to UE & LE's with just verbal or visual cues. Outcome: Progressing Towards Goal 
  
PHYSICAL THERAPY: Daily Note and AM 6/6/2019 INPATIENT: PT Visit Days : 2 [place] / [place] B / Product Type: Medicare /   
  
NAME/AGE/GENDER: Jeannine Moody is a [de-identified] y.o. male PRIMARY DIAGNOSIS: UTI (urinary tract infection) [N39.0] Metabolic encephalopathy [I42.78] Rhabdomyolysis [M62.82] Troponin I above reference range [R74.8] Rhabdomyolysis Rhabdomyolysis ICD-10: Treatment Diagnosis:  
 · Generalized Muscle Weakness (M62.81) · Other lack of cordination (R27.8) · Difficulty in walking, Not elsewhere classified (R26.2) · Other abnormalities of gait and mobility (R26.89) Precaution/Allergies: 
Patient has no known allergies. ASSESSMENT:  
Mr. Brock Lua presents supine on contact. Would answer questions when asked and stated that he was not having any pain. Shook head yes that he would try working with therapy. Worked on bed mobility with max assist of 2, pt did help move his legs and used his arm to help pull up. Needed max assist of 2 to scoot to the edge of the bed. At times was able to hold his balance without support. Worked for a while on assisting him with sitting. Did a few long arc quads with each leg. Pt attempted to lay back down from fatigue but encouraged him to sit up a little longer. But he fatigued and could not sit any longer.  While sitting his sats dropped but was not sure if it was artifact from movement, when asked about shortness of breath he stated no and he did not appear to be short of breath. Helped him lay back down and positioned with pillows. Very weak and debilitated, he will benefit from continued therapy interventions. This section established at most recent assessment PROBLEM LIST (Impairments causing functional limitations): 1. Decreased Strength 2. Decreased ADL/Functional Activities 3. Decreased Transfer Abilities 4. Decreased Ambulation Ability/Technique 5. Decreased Balance 6. Decreased Activity Tolerance 7. Decreased [place] with Home Exercise Program 
 INTERVENTIONS PLANNED: (Benefits and precautions of physical therapy have been discussed with the patient.) 1. Balance Exercise 2. Bed Mobility 3. Family Education 4. Gait Training 5. Home Exercise Program (HEP) 6. Therapeutic Activites 7. Therapeutic Exercise/Strengthening 8. Transfer Training TREATMENT PLAN: Frequency/Duration: daily for duration of hospital stay Rehabilitation Potential For Stated Goals: Good REHAB RECOMMENDATIONS (at time of discharge pending progress):   
Placement: It is my opinion, based on this patient's performance to date, that Mr. Carla Reza may benefit from intensive therapy at a [address] [place] [address] after discharge due to the functional deficits listed above that are likely to improve with skilled rehabilitation and concerns that he/she may be unsafe to be unsupervised at home due to severe weakness & debility . Equipment: ? To be determined HISTORY:  
History of Present Injury/Illness (Reason for Referral): This is an [de-identified] YO male patient with a PMH of CAD, Afib on anticoagulation, CHF, HTN, obesity, previous admissions for UTI,  who was found laying on the floor this morning by his daughter. He was confused and very weak. He had a large R periorbital hematoma and was brought to the ER.  He can not provide too much information due to his mental status. Here he was found to have normal VS.  His WBC count was 11.2K, Na 145, Cr 1.56, Ca 10.5, troponin 1.65, CK 1500. His UA was suggestive of UTI with positive nitrates, moderate leukocyte esterase, > 100 WBCs. A brain CT scan showed NO acute intra-cranial abnormality. He was started on IV fluids, IV ceftriaxone and was presented to the hospitalist for admission. Past Medical History/Comorbidities:  
Mr. Shaan Gloria  has a past medical history of Arrhythmia, Arthritis, Atrial fibrillation ([facility] (1/27/2012), CAD (coronary artery disease), Cancer ([facility] Dementia, GERD (gastroesophageal reflux disease), Heart failure ([facility] Hypertension, and Morbid obesity ([facility] Mr. Shaan Gloria  has a past surgical history that includes hx orthopaedic (2010); hx prostatectomy; and hx pacemaker (8/30/2012). Social History/Living Environment:  
Home Environment: Private residence # Steps to Enter: 7 Rails to Enter: Yes Hand Rails : Right One/Two Story Residence: One story Living Alone: Yes(during the day) Support Systems: Family member(s) Patient Expects to be Discharged to[de-identified] Skilled nursing facility Current DME Used/Available at Home: Commode, bedside, Hospital bed, Walker, rolling Prior Level of Function/Work/Activity: Pt was functioning with a walker in the home prior to this admission Personal Factors: Other factors that influence how disability is experienced by the patient:  current & PMH Number of Personal Factors/Comorbidities that affect the Plan of Care: 3+: HIGH COMPLEXITY EXAMINATION:  
Most Recent Physical Functioning:  
Gross Assessment: 
  
         
  
 
  
Balance: 
Sitting: Impaired;High guard Sitting - Static: Good (unsupported); Occassional 
Sitting - Dynamic: Fair (occasional) Bed Mobility: 
Supine to Sit: Maximum assistance; Additional time;Assist x2 Sit to Supine: Maximum assistance;Assist x2 Scooting: Maximum assistance;Assist x2 
 Level of Assistance: Assist X2 Interventions: Safety awareness training;Verbal cues Duration: 23 Minutes Transfers: 
  
Gait: 
  
   
Functional Mobility:  
      Gait/Ambulation:  mod Transfers:  mod Bed Mobility:  max Body Structures Involved: 1. Metabolic 2. Muscles Body Functions Affected: 1. Movement Related 2. Metobolic/Endocrine Activities and Participation Affected: 1. General Tasks and Demands 2. Mobility Number of elements that affect the Plan of Care: 4+: HIGH COMPLEXITY CLINICAL PRESENTATION:  
Presentation: Evolving clinical presentation with unstable and unpredictable characteristics: HIGH COMPLEXITY CLINICAL DECISION MAKIN[address] [place] [address] AM-PAC 6 Clicks Basic Mobility Inpatient Short Form How much difficulty does the patient currently have. .. Unable A Lot A Little None 1. Turning over in bed (including adjusting bedclothes, sheets and blankets)? ? 1   ? 2   ? 3   ? 4  
2. Sitting down on and standing up from a chair with arms ( e.g., wheelchair, bedside commode, etc.)   ? 1   ? 2   ? 3   ? 4  
3. Moving from lying on back to sitting on the side of the bed?   ? 1   ? 2   ? 3   ? 4 How much help from another person does the patient currently need. .. Total A Lot A Little None 4. Moving to and from a bed to a chair (including a wheelchair)? ? 1   ? 2   ? 3   ? 4  
5. Need to walk in hospital room? ? 1   ? 2   ? 3   ? 4  
6. Climbing 3-5 steps with a railing? ? 1   ? 2   ? 3   ? 4  
© , Trustees of [address] [place] [address], under license to [employer]. All rights reserved Score:  Initial: 11  Most Recent: X (Date: -- ) Interpretation of Tool:  Represents activities that are increasingly more difficult (i.e. Bed mobility, Transfers, Gait). Medical Necessity:    
· Patient is expected to demonstrate progress in strength, range of motion, balance, coordination and functional technique ·  to decrease assistance required with bed mobility,transfers & gait · . Reason for Services/Other Comments: 
· Patient continues to require skilled intervention due to pt unsafe to manage in home environment · . Use of outcome tool(s) and clinical judgement create a POC that gives a: Difficult prediction of patient's progress: HIGH COMPLEXITY  
  
 
 
 
TREATMENT:  
(In addition to Assessment/Re-Assessment sessions the following treatments were rendered) Pre-treatment Symptoms/Complaints:  Ready to get in bed Pain: Initial: 0/10 Pain Intensity 1: 0  Post Session:  0/10 Therapeutic Activity: (23 Minutes   ):  Therapeutic activities including Bed transfers and sitting balance to improve mobility, strength, balance and coordination. Required maximal assist of 2 for bed mobility, min/mod assist for sitting balance   to promote static and dynamic balance in sitting. Braces/Orthotics/Lines/Etc:  
· IV 
· telemetry monitoring Treatment/Session Assessment:   
· Response to Treatment: fatigued quickly. · Interdisciplinary Collaboration:  
o Registered Nurse 
o Rehabilitation Attendant · After treatment position/precautions:  
o Supine in bed 
o Bed/Chair-wheels locked 
o Call light within reach 
o RN notified · Compliance with Program/Exercises: Will assess as treatment progresses · Recommendations/Intent for next treatment session: \"Next visit will focus on reduction in assistance provided\". Total Treatment Duration: PT Patient Time In/Time Out Time In: 1127 Time Out: 1150 Kimberly Villanueva PT

## 2019-06-06 NOTE — PROGRESS NOTES
Interdisciplinary team rounds were held 6/6/2019 with the following team members:Care Management, Nursing, Physical Therapy and Physician and the patient. Plan of care discussed. See clinical pathway and/or care plan for interventions and desired outcomes.

## 2019-06-06 NOTE — PROGRESS NOTES
Pt. Has no complaints this am.  Follows commands. Resp. Even and reg. Answers questions faster today compared to yesterday. Speech clearer also.

## 2019-06-06 NOTE — PROGRESS NOTES
Patient discussed during huddle and is anticipated to discharge Saturday to [facility] Care Management Interventions PCP Verified by CM: Yes Mode of Transport at Discharge: BLS Transition of Care Consult (CM Consult): SNF Partner SNF: No 
Reason Why Partner SNF Not Chosen: Location Discharge Durable Medical Equipment: No 
Physical Therapy Consult: Yes Occupational Therapy Consult: Yes Current Support Network: Own Home Confirm Follow Up Transport: Family Plan discussed with Pt/Family/Caregiver: Yes Freedom of Choice Offered: Yes Discharge Location Discharge Placement: Skilled nursing facility([facility]) SHANNAN Lang  [facility] [email or web address]

## 2019-06-07 LAB
ANION GAP SERPL CALC-SCNC: 10 MMOL/L (ref 7–16)
BUN SERPL-MCNC: 7 MG/DL (ref 8–23)
CALCIUM SERPL-MCNC: 9 MG/DL (ref 8.3–10.4)
CHLORIDE SERPL-SCNC: 112 MMOL/L (ref 98–107)
CK SERPL-CCNC: 1460 U/L (ref 21–215)
CO2 SERPL-SCNC: 18 MMOL/L (ref 21–32)
CREAT SERPL-MCNC: 0.7 MG/DL (ref 0.8–1.5)
ERYTHROCYTE [DISTWIDTH] IN BLOOD BY AUTOMATED COUNT: 13.7 % (ref 11.9–14.6)
GLUCOSE SERPL-MCNC: 97 MG/DL (ref 65–100)
HCT VFR BLD AUTO: 43.4 % (ref 41.1–50.3)
HGB BLD-MCNC: 14.8 G/DL (ref 13.6–17.2)
MCH RBC QN AUTO: 30.5 PG (ref 26.1–32.9)
MCHC RBC AUTO-ENTMCNC: 34.1 G/DL (ref 31.4–35)
MCV RBC AUTO: 89.5 FL (ref 79.6–97.8)
NRBC # BLD: 0 K/UL (ref 0–0.2)
PLATELET # BLD AUTO: 142 K/UL (ref 150–450)
PMV BLD AUTO: 10.3 FL (ref 9.4–12.3)
POTASSIUM SERPL-SCNC: 4.5 MMOL/L (ref 3.5–5.1)
RBC # BLD AUTO: 4.85 M/UL (ref 4.23–5.6)
SODIUM SERPL-SCNC: 140 MMOL/L (ref 136–145)
WBC # BLD AUTO: 7.1 K/UL (ref 4.3–11.1)

## 2019-06-07 PROCEDURE — 74011000258 HC RX REV CODE- 258: Performed by: INTERNAL MEDICINE

## 2019-06-07 PROCEDURE — 82550 ASSAY OF CK (CPK): CPT

## 2019-06-07 PROCEDURE — 74011250636 HC RX REV CODE- 250/636: Performed by: INTERNAL MEDICINE

## 2019-06-07 PROCEDURE — 74011250637 HC RX REV CODE- 250/637: Performed by: FAMILY MEDICINE

## 2019-06-07 PROCEDURE — 85027 COMPLETE CBC AUTOMATED: CPT

## 2019-06-07 PROCEDURE — 97530 THERAPEUTIC ACTIVITIES: CPT

## 2019-06-07 PROCEDURE — 77030020263 HC SOL INJ SOD CL0.9% LFCR 1000ML

## 2019-06-07 PROCEDURE — 65660000000 HC RM CCU STEPDOWN

## 2019-06-07 PROCEDURE — 80048 BASIC METABOLIC PNL TOTAL CA: CPT

## 2019-06-07 PROCEDURE — 74011250637 HC RX REV CODE- 250/637: Performed by: PHYSICIAN ASSISTANT

## 2019-06-07 PROCEDURE — 36415 COLL VENOUS BLD VENIPUNCTURE: CPT

## 2019-06-07 PROCEDURE — 74011250637 HC RX REV CODE- 250/637: Performed by: INTERNAL MEDICINE

## 2019-06-07 RX ADMIN — MINERAL OIL AND PETROLATUM: 150; 830 OINTMENT OPHTHALMIC at 09:00

## 2019-06-07 RX ADMIN — MINERAL OIL AND PETROLATUM: 150; 830 OINTMENT OPHTHALMIC at 18:10

## 2019-06-07 RX ADMIN — FAMOTIDINE 20 MG: 20 TABLET, FILM COATED ORAL at 09:19

## 2019-06-07 RX ADMIN — MINERAL OIL AND PETROLATUM: 150; 830 OINTMENT OPHTHALMIC at 13:00

## 2019-06-07 RX ADMIN — SODIUM CHLORIDE 75 ML/HR: 900 INJECTION, SOLUTION INTRAVENOUS at 04:38

## 2019-06-07 RX ADMIN — DONEPEZIL HYDROCHLORIDE 5 MG: 5 TABLET, FILM COATED ORAL at 21:39

## 2019-06-07 RX ADMIN — ASPIRIN 81 MG 81 MG: 81 TABLET ORAL at 09:19

## 2019-06-07 RX ADMIN — TAMSULOSIN HYDROCHLORIDE 0.4 MG: 0.4 CAPSULE ORAL at 21:39

## 2019-06-07 RX ADMIN — METOPROLOL TARTRATE 12.5 MG: 25 TABLET, FILM COATED ORAL at 04:33

## 2019-06-07 RX ADMIN — BICALUTAMIDE 50 MG: 50 TABLET, FILM COATED ORAL at 09:00

## 2019-06-07 RX ADMIN — POTASSIUM CHLORIDE 20 MEQ: 1.5 POWDER, FOR SOLUTION ORAL at 09:18

## 2019-06-07 RX ADMIN — MINERAL OIL AND PETROLATUM: 150; 830 OINTMENT OPHTHALMIC at 22:58

## 2019-06-07 RX ADMIN — Medication 10 ML: at 22:59

## 2019-06-07 RX ADMIN — Medication 10 ML: at 18:07

## 2019-06-07 RX ADMIN — METOPROLOL TARTRATE 12.5 MG: 25 TABLET, FILM COATED ORAL at 18:07

## 2019-06-07 RX ADMIN — ARIPIPRAZOLE 10 MG: 10 TABLET ORAL at 09:18

## 2019-06-07 RX ADMIN — CEFTRIAXONE 2 G: 2 INJECTION, POWDER, FOR SOLUTION INTRAMUSCULAR; INTRAVENOUS at 09:18

## 2019-06-07 RX ADMIN — METOPROLOL TARTRATE 12.5 MG: 25 TABLET, FILM COATED ORAL at 09:19

## 2019-06-07 RX ADMIN — FAMOTIDINE 20 MG: 20 TABLET, FILM COATED ORAL at 18:07

## 2019-06-07 NOTE — PROGRESS NOTES
Patient up to bedside chair. Assisted patient to eat mashed potatoes and pudding. Patient completed his Ensure drink but would not eat the chicken or greens.

## 2019-06-07 NOTE — PROGRESS NOTES
PM assessment complete. No s/s distress noted. Denies needs or pain. Family at bedside. Will continue to monitor. Bed alarm on. Will continue to monitor through hourly rounding.

## 2019-06-07 NOTE — PROGRESS NOTES
[facility] CARDIOLOGY PROGRESS NOTE 
      
 
6/7/2019 9:25 AM 
 
Admit Date: 6/4/2019 Subjective:  
Pt denies CP, SOB or dizziness. Remains a flutter v paced w rate controlled. ROS: 
Cardiovascular:  As noted above Objective:  
  
Vitals:  
 06/07/19 0110 06/07/19 8625 06/07/19 8133 06/07/19 0745 BP: (!) 175/92 111/85 111/85 110/68 Pulse: 79 67 65 60 Resp: 16 18  18 Temp: 97.4 °F (36.3 °C) 99.7 °F (37.6 °C)  99.2 °F (37.3 °C) SpO2: 97% 96%  93% Weight:      
Height:      
 
 
Physical Exam: 
General-No Acute Distress, RA Neck- supple, no JVD, R eye swollen CV- regular rate and rhythm Lung- clear bilaterally Abd- soft, nontender, nondistended Ext- trace edema bilaterally. Skin- warm and dry Data Review:  
Recent Labs  
  06/07/19 
5967 06/07/19 
5750 06/06/19 
2522 06/05/19 
5800  06/05/19 
0044 NA  --  140 140 143   < >  --   
K  --  4.5 3.5 3.6   < >  --   
MG  --   --  2.2 2.2  --   --   
BUN  --  7* 8 12   < >  --   
CREA  --  0.70* 0.83 0.84   < >  --   
GLU  --  97 122* 93   < >  --   
WBC 7.1  --  7.8 8.0   < >  --   
HGB 14.8  --  14.3 14.8   < >  --   
HCT 43.4  --  42.9 43.2   < >  --   
*  --  123* 129*   < >  --   
TROIQ  --   --   --  5.27*  --  5.64*  
 < > = values in this interval not displayed. Assessment/Plan:  
Rhabdomyolysis (6/4/2019)- NS at 75 cc/hr  
  
Metabolic encephalopathy (2/7/8324)- rocephin, hydration  
  
UTI (urinary tract infection) (6/4/2019)- Rocephin  
  
Troponin I above reference range (6/4/2019)- Supply demand mismatch due to rhabdo, no anginal symptoms, echo with nml EF and no WMA  
  
Atrial flutter with controlled response ([address] (6/5/2019)- rate controlled w BB, pradaxa held due to fall 
  
SSS (sick sinus syndrome) ([facility] (6/5/2019)- s/p St Peng PM, pradaxa held due to orbital trauma 
  
Traumatic orbital hematoma (6/5/2019)- pradaxa on hold DASH Fay 
6/7/2019 9:25 AM

## 2019-06-07 NOTE — PROGRESS NOTES
Problem: Falls - Risk of 
Goal: *Absence of Falls Description Document [place] Sukumar Fall Risk and appropriate interventions in the flowsheet. Outcome: Progressing Towards Goal 
  
Problem: Patient Education: Go to Patient Education Activity Goal: Patient/Family Education Outcome: Progressing Towards Goal 
  
Problem: Pressure Injury - Risk of 
Goal: *Prevention of pressure injury Description Document Maxwell Scale and appropriate interventions in the flowsheet. Outcome: Progressing Towards Goal 
  
Problem: Patient Education: Go to Patient Education Activity Goal: Patient/Family Education Outcome: Progressing Towards Goal 
  
Problem: Patient Education: Go to Patient Education Activity Goal: Patient/Family Education Outcome: Progressing Towards Goal 
  
Problem: Patient Education: Go to Patient Education Activity Goal: Patient/Family Education Outcome: Progressing Towards Goal 
  
Problem: Nutrition Deficit Goal: *Optimize nutritional status Outcome: Progressing Towards Goal

## 2019-06-07 NOTE — PROGRESS NOTES
Problem: Falls - Risk of 
Goal: *Absence of Falls Description Document Yifan Hernandez Fall Risk and appropriate interventions in the flowsheet. Outcome: Progressing Towards Goal 
  
Problem: Pressure Injury - Risk of 
Goal: *Prevention of pressure injury Description Document Maxwell Scale and appropriate interventions in the flowsheet.  
Outcome: Progressing Towards Goal

## 2019-06-07 NOTE — PROGRESS NOTES
Patient shift assessment completed this am. 
He is alert to person and place but disoriented to time. He has a large subconjunctival hemorrhage to the right eye with scleral and palpebral ecchymosis. Visual field are full to confrontation. His pupils are round but minimally reactive to light if at all. Patient has large contusion to the right upper cheek bone. Multiple abrasions to his hands, arms, and bilateral shins. Dressings intact to bilateral shins. Allevyn in place.

## 2019-06-07 NOTE — PROGRESS NOTES
Maricarmen spoke with Vanessa Cedillo at [facility] Friday and asked him about the bed being avail on Sat. He stated I haven't heard from anyone so he will just have to come next week. Maricarmen spoke with Md who agreed pt will require hospitalization till Monday. Pt is accepted and can transfer when medically stable. Ирина Morrison

## 2019-06-07 NOTE — PROGRESS NOTES
Problem: Mobility Impaired (Adult and Pediatric) Goal: *Acute Goals and Plan of Care (Insert Text) Description DISCHARGE GOALS : 
(1.)Mr. Anh Alamo will move from supine to sit and sit to supine  with MINIMAL ASSIST with HOB 30 deg & rail. (2.)Mr. Anh Alamo will transfer from bed to chair and chair to bed with CONTACT GUARD ASSIST using a walker (3.)Mr. Anh Alamo will ambulate with CONTACT GUARD ASSIST for 40-50 feet with a rolling walker. 4) pt participating in AROM to UE & LE's with just verbal or visual cues. Outcome: Progressing Towards Goal 
  
PHYSICAL THERAPY: Daily Note and AM 6/7/2019 INPATIENT: PT Visit Days : 3 [place] / Product Type: Medicare /   
  
NAME/AGE/GENDER: Eugene Partida is a [de-identified] y.o. male PRIMARY DIAGNOSIS: UTI (urinary tract infection) [N39.0] Metabolic encephalopathy [M00.63] Rhabdomyolysis [M62.82] Troponin I above reference range [R74.8] Rhabdomyolysis Rhabdomyolysis ICD-10: Treatment Diagnosis:  
 · Generalized Muscle Weakness (M62.81) · Other lack of cordination (R27.8) · Difficulty in walking, Not elsewhere classified (R26.2) · Other abnormalities of gait and mobility (R26.89) Precaution/Allergies: 
Patient has no known allergies. ASSESSMENT:  
Mr. Anh Alamo showed increased ability to participate today, but needs to be more consistent with functional mobility. Pt will need extensive SNF rehab on DC. This section established at most recent assessment PROBLEM LIST (Impairments causing functional limitations): 1. Decreased Strength 2. Decreased ADL/Functional Activities 3. Decreased Transfer Abilities 4. Decreased Ambulation Ability/Technique 5. Decreased Balance 6. Decreased Activity Tolerance 7. Decreased [place] with Home Exercise Program 
 INTERVENTIONS PLANNED: (Benefits and precautions of physical therapy have been discussed with the patient.) 1. Balance Exercise 2. Bed Mobility 3. Family Education 4. Gait Training 5. Home Exercise Program (HEP) 6. Therapeutic Activites 7. Therapeutic Exercise/Strengthening 8. Transfer Training TREATMENT PLAN: Frequency/Duration: daily for duration of hospital stay Rehabilitation Potential For Stated Goals: Good REHAB RECOMMENDATIONS (at time of discharge pending progress):   
Placement: It is my opinion, based on this patient's performance to date, that Mr. Peng Lynn may benefit from intensive therapy at a [address] [place] [address] after discharge due to the functional deficits listed above that are likely to improve with skilled rehabilitation and concerns that he/she may be unsafe to be unsupervised at home due to severe weakness & debility . Equipment: ? To be determined HISTORY:  
History of Present Injury/Illness (Reason for Referral): This is an [de-identified] YO male patient with a PMH of CAD, Afib on anticoagulation, CHF, HTN, obesity, previous admissions for UTI,  who was found laying on the floor this morning by his daughter. He was confused and very weak. He had a large R periorbital hematoma and was brought to the ER. He can not provide too much information due to his mental status. Here he was found to have normal VS.  His WBC count was 11.2K, Na 145, Cr 1.56, Ca 10.5, troponin 1.65, CK 1500. His UA was suggestive of UTI with positive nitrates, moderate leukocyte esterase, > 100 WBCs. A brain CT scan showed NO acute intra-cranial abnormality. He was started on IV fluids, IV ceftriaxone and was presented to the hospitalist for admission. Past Medical History/Comorbidities:  
Mr. Peng Lynn  has a past medical history of Arrhythmia, Arthritis, Atrial fibrillation ([address] (1/27/2012), CAD (coronary artery disease), Cancer ([address] Dementia, GERD (gastroesophageal reflux disease), Heart failure ([address] Hypertension, and Morbid obesity ([address]   Mr. Peng Lynn  has a past surgical history that includes hx orthopaedic (2010); hx prostatectomy; and hx pacemaker (8/30/2012). Social History/Living Environment:  
Home Environment: Private residence # Steps to Enter: 7 Rails to Enter: Yes Hand Rails : Right One/Two Story Residence: One story Living Alone: Yes(during the day) Support Systems: Family member(s) Patient Expects to be Discharged to[de-identified] Skilled nursing facility Current DME Used/Available at Home: Commode, bedside, Hospital bed, Walker, rolling Prior Level of Function/Work/Activity: Pt was functioning with a walker in the home prior to this admission Personal Factors: Other factors that influence how disability is experienced by the patient:  current & PMH Number of Personal Factors/Comorbidities that affect the Plan of Care: 3+: HIGH COMPLEXITY EXAMINATION:  
Most Recent Physical Functioning:  
Gross Assessment: 
AROM: Grossly decreased, non-functional(all limbs & core) Strength: Grossly decreased, non-functional(all limbs & core) Coordination: Grossly decreased, non-functional(all limbs & core) Balance: 
Sitting: Impaired;High guard; Without support Sitting - Static: (fair to fair-) Sitting - Dynamic: (fair-) Standing: Impaired; With support(walker) Standing - Static: (fair- with walker) Standing - Dynamic : (fair- with walker) Bed Mobility: 
Supine to Sit: Minimum assistance Sit to Supine: (NT) Scooting: Total assistance Duration: 30 Minutes(extra time to work through activity noted) Transfers: 
Sit to Stand: Minimum assistance Stand to Sit: Minimum assistance Bed to Chair: Minimum assistance(with walker) Gait: NA Functional Mobility:  
      Gait/Ambulation:  NA Transfers:  min Bed Mobility:  min Body Structures Involved: 1. Metabolic 2. Muscles Body Functions Affected: 1. Movement Related 2. Metobolic/Endocrine Activities and Participation Affected: 1. General Tasks and Demands 2. Mobility Number of elements that affect the Plan of Care: 4+: HIGH COMPLEXITY CLINICAL PRESENTATION:  
Presentation: Evolving clinical presentation with unstable and unpredictable characteristics: HIGH COMPLEXITY CLINICAL DECISION MAKING:  
[employer] AM-PAC 6 Clicks Basic Mobility Inpatient Short Form How much difficulty does the patient currently have. .. Unable A Lot A Little None 1. Turning over in bed (including adjusting bedclothes, sheets and blankets)? ? 1   ? 2   ? 3   ? 4  
2. Sitting down on and standing up from a chair with arms ( e.g., wheelchair, bedside commode, etc.)   ? 1   ? 2   ? 3   ? 4  
3. Moving from lying on back to sitting on the side of the bed?   ? 1   ? 2   ? 3   ? 4 How much help from another person does the patient currently need. .. Total A Lot A Little None 4. Moving to and from a bed to a chair (including a wheelchair)? ? 1   ? 2   ? 3   ? 4  
5. Need to walk in hospital room? ? 1   ? 2   ? 3   ? 4  
6. Climbing 3-5 steps with a railing? ? 1   ? 2   ? 3   ? 4  
© 2007, Trustees of [facility] [employer], under license to [employer]. All rights reserved Score:  Initial: 11  Most Recent: X (Date: -- ) Interpretation of Tool:  Represents activities that are increasingly more difficult (i.e. Bed mobility, Transfers, Gait). Medical Necessity:    
· Patient is expected to demonstrate progress in strength, range of motion, balance, coordination and functional technique ·  to decrease assistance required with bed mobility,transfers & gait · . Reason for Services/Other Comments: 
· Patient continues to require skilled intervention due to pt unsafe to manage in home environment · . Use of outcome tool(s) and clinical judgement create a POC that gives a: Difficult prediction of patient's progress: HIGH COMPLEXITY  
  
 
 
 
TREATMENT:  
(In addition to Assessment/Re-Assessment sessions the following treatments were rendered) Pre-treatment Symptoms/Complaints:  Pt was agreeable Pain: Initial: 0/10 Pain Intensity 1: 0  Post Session:  0/10 Therapeutic Activity: (30 Minutes(extra time to work through activity noted)   ):  Therapeutic activities including bed mobility, prolonged sitting balance, prolonged standing balance for clean up also standing balance wt-shift with walker & pre-gait exercise to improve mobility, strength, balance, coordination and dynamic movement of arm - bilateral, leg - bilateral and core to improve functional core stability. Braces/Orthotics/Lines/Etc:  
· IV 
· telemetry monitoring Treatment/Session Assessment:   
· Response to Treatment: pt engaged better today · Interdisciplinary Collaboration:  
o Registered Nurse 
o Rehabilitation Attendant · After treatment position/precautions:  
o Up in chair 
o Bed alarm/tab alert on 
o Bed/Chair-wheels locked 
o Call light within reach 
o RN notified · Compliance with Program/Exercises: Will assess as treatment progresses · Recommendations/Intent for next treatment session: \"Next visit will focus on reduction in assistance provided\". Total Treatment Duration: PT Patient Time In/Time Out Time In: 1050 Time Out: 1120 Sherri Roy PT

## 2019-06-07 NOTE — PROGRESS NOTES
Progress Note Patient: Wesley Titus MRN: [id number]  SSN: [id number] YOB: 1940  Age: [de-identified] y.o. Sex: male Admit Date: 6/4/2019 LOS: 2 days Subjective:  
 
Patient was seen at bedside. Clinically stable. Renal function stable. Will transfer him to the medical floor today. Objective:  
 
Vitals:  
 06/06/19 1400 06/06/19 1500 06/06/19 1710 06/06/19 2034 BP:   117/58 110/57 Pulse: 87 87 61 65 Resp: (!) 34 29 24 22 Temp:   99.2 °F (37.3 °C) 99.3 °F (37.4 °C) SpO2: 95% 93% 94% 98% Weight:      
Height:      
  
 
Intake and Output: 
Current Shift: No intake/output data recorded. Last three shifts: 06/05 0701 - 06/06 1900 In: 4206.7 [P.O.:1240; I.V.:2966.7] Out: 3600 [[id number]] Physical Exam:  
GENERAL: fatigued, appears stated age EYE: R eye hematoma LYMPHATIC: Cervical, supraclavicular, and axillary nodes normal.  
THROAT & NECK: normal and no erythema or exudates noted. LUNG: clear to auscultation bilaterally HEART: regular rate and rhythm, S1, S2 normal, no murmur, click, rub or gallop ABDOMEN: soft, non-tender. Bowel sounds normal. No masses,  no organomegaly EXTREMITIES:  extremities normal, atraumatic, no cyanosis or edema SKIN: Normal. 
NEUROLOGIC: No focal deficits PSYCHIATRIC: non focal 
 
Lab/Data Review: All lab results for the last 24 hours reviewed. Assessment:  
 
Principal Problem: 
  Rhabdomyolysis (6/4/2019) Active Problems: Metabolic encephalopathy (5/8/5101) UTI (urinary tract infection) (6/4/2019) Troponin I above reference range (6/4/2019) Atrial flutter with controlled response ([address] (6/5/2019) SSS (sick sinus syndrome) ([address] (6/5/2019) Traumatic orbital hematoma (6/5/2019) Plan: # Metabolic encephalopathy due to UTI  
-improving  
-IV fluids decreased  
-IV ceftriaxone  
-urine culture with gram negative rods  
  
# Rhabdomyolysis -IV fluids ( decreasing rate) -monitor CK level on a daily basis  
 
  
# history of CAD with elevated troponin level  
-telemetry monitoring  
- trop level with plateau around 5.5  
-echo ordered and not showing regional wall motion abnormalities -appreciate cardiology recommendations  
  
# severe facial trauma with R periorbital hematoma  
-holding  anticoagulation for now  
-ophthalmology eval  appreciated Signed By: Willie Skaggs MD   
 June 6, 2019

## 2019-06-07 NOTE — PROGRESS NOTES
Progress Note Patient: Dori Bell MRN: [id number]  SSN: [id number] YOB: 1940  Age: [de-identified] y.o. Sex: male Admit Date: 6/4/2019 LOS: 3 days Subjective:  
 
Patient was seen at bedside. Clinically stable. Renal function stable. More alert and active. Will need rehab placement. Objective:  
 
Vitals:  
 06/07/19 5767 06/07/19 0745 06/07/19 1215 06/07/19 1658 BP: 111/85 110/68 121/59 129/69 Pulse: 65 60 65 65 Resp:  18 18 17 Temp:  99.2 °F (37.3 °C) 99.3 °F (37.4 °C) 98.9 °F (37.2 °C) SpO2:  93% 96% 98% Weight:      
Height:      
  
 
Intake and Output: 
Current Shift: No intake/output data recorded. Last three shifts: 06/06 0701 - 06/07 1900 In: 328 [P.O.:120; I.V.:831] Out: 3765 [[id number]] Physical Exam:  
GENERAL: fatigued, appears stated age EYE: R eye hematoma LYMPHATIC: Cervical, supraclavicular, and axillary nodes normal.  
THROAT & NECK: normal and no erythema or exudates noted. LUNG: clear to auscultation bilaterally HEART: regular rate and rhythm, S1, S2 normal, no murmur, click, rub or gallop ABDOMEN: soft, non-tender. Bowel sounds normal. No masses,  no organomegaly EXTREMITIES:  extremities normal, atraumatic, no cyanosis or edema SKIN: Normal. 
NEUROLOGIC: No focal deficits PSYCHIATRIC: non focal 
 
Lab/Data Review: All lab results for the last 24 hours reviewed. Assessment:  
 
Principal Problem: 
  Rhabdomyolysis (6/4/2019) Active Problems: Metabolic encephalopathy (7/9/0655) UTI (urinary tract infection) (6/4/2019) Troponin I above reference range (6/4/2019) Atrial flutter with controlled response ([address] (6/5/2019) SSS (sick sinus syndrome) ([address] (6/5/2019) Traumatic orbital hematoma (6/5/2019) Plan: # Metabolic encephalopathy due to UTI  
-improving  
-IV fluids stopped today  
-IV ceftriaxone  
-urine culture reported E coli 
  
# Rhabdomyolysis -IV fluids stopped today  
-monitor CK level on a daily basis  
 
  
# history of CAD with elevated troponin level at admission  
-telemetry monitoring  
- trop level with plateau around 5.5  
-echo ordered and not showing regional wall motion abnormalities -appreciate cardiology recommendations  
  
# severe facial trauma with R periorbital hematoma  
-holding  anticoagulation for now  
-ophthalmology eval  appreciated Signed By: Brandt Garcia MD   
 June 7, 2019

## 2019-06-08 LAB
ALBUMIN SERPL-MCNC: 2.6 G/DL (ref 3.2–4.6)
ALBUMIN/GLOB SERPL: 0.6 {RATIO} (ref 1.2–3.5)
ALP SERPL-CCNC: 108 U/L (ref 50–136)
ALT SERPL-CCNC: 77 U/L (ref 12–65)
ANION GAP SERPL CALC-SCNC: 9 MMOL/L (ref 7–16)
AST SERPL-CCNC: 116 U/L (ref 15–37)
BILIRUB SERPL-MCNC: 1.5 MG/DL (ref 0.2–1.1)
BUN SERPL-MCNC: 11 MG/DL (ref 8–23)
CALCIUM SERPL-MCNC: 9.5 MG/DL (ref 8.3–10.4)
CHLORIDE SERPL-SCNC: 107 MMOL/L (ref 98–107)
CK SERPL-CCNC: 675 U/L (ref 21–215)
CO2 SERPL-SCNC: 25 MMOL/L (ref 21–32)
CREAT SERPL-MCNC: 0.82 MG/DL (ref 0.8–1.5)
GLOBULIN SER CALC-MCNC: 4.1 G/DL (ref 2.3–3.5)
GLUCOSE SERPL-MCNC: 94 MG/DL (ref 65–100)
POTASSIUM SERPL-SCNC: 3.7 MMOL/L (ref 3.5–5.1)
PROT SERPL-MCNC: 6.7 G/DL (ref 6.3–8.2)
SODIUM SERPL-SCNC: 141 MMOL/L (ref 136–145)

## 2019-06-08 PROCEDURE — 36415 COLL VENOUS BLD VENIPUNCTURE: CPT

## 2019-06-08 PROCEDURE — 97530 THERAPEUTIC ACTIVITIES: CPT

## 2019-06-08 PROCEDURE — 74011000258 HC RX REV CODE- 258: Performed by: INTERNAL MEDICINE

## 2019-06-08 PROCEDURE — 82550 ASSAY OF CK (CPK): CPT

## 2019-06-08 PROCEDURE — 74011250637 HC RX REV CODE- 250/637: Performed by: INTERNAL MEDICINE

## 2019-06-08 PROCEDURE — 65660000000 HC RM CCU STEPDOWN

## 2019-06-08 PROCEDURE — 80053 COMPREHEN METABOLIC PANEL: CPT

## 2019-06-08 PROCEDURE — 74011250636 HC RX REV CODE- 250/636: Performed by: INTERNAL MEDICINE

## 2019-06-08 PROCEDURE — 74011250637 HC RX REV CODE- 250/637: Performed by: PHYSICIAN ASSISTANT

## 2019-06-08 PROCEDURE — 97110 THERAPEUTIC EXERCISES: CPT

## 2019-06-08 RX ORDER — HEPARIN SODIUM 5000 [USP'U]/ML
5000 INJECTION, SOLUTION INTRAVENOUS; SUBCUTANEOUS EVERY 12 HOURS
Status: DISCONTINUED | OUTPATIENT
Start: 2019-06-08 | End: 2019-06-10 | Stop reason: HOSPADM

## 2019-06-08 RX ORDER — CEFPODOXIME PROXETIL 200 MG/1
100 TABLET, FILM COATED ORAL EVERY 12 HOURS
Status: DISCONTINUED | OUTPATIENT
Start: 2019-06-08 | End: 2019-06-10 | Stop reason: HOSPADM

## 2019-06-08 RX ADMIN — METOPROLOL TARTRATE 12.5 MG: 25 TABLET, FILM COATED ORAL at 14:57

## 2019-06-08 RX ADMIN — METOPROLOL TARTRATE 12.5 MG: 25 TABLET, FILM COATED ORAL at 01:13

## 2019-06-08 RX ADMIN — TAMSULOSIN HYDROCHLORIDE 0.4 MG: 0.4 CAPSULE ORAL at 23:00

## 2019-06-08 RX ADMIN — Medication 10 ML: at 06:50

## 2019-06-08 RX ADMIN — ARIPIPRAZOLE 10 MG: 10 TABLET ORAL at 08:45

## 2019-06-08 RX ADMIN — FAMOTIDINE 20 MG: 20 TABLET, FILM COATED ORAL at 17:43

## 2019-06-08 RX ADMIN — DONEPEZIL HYDROCHLORIDE 5 MG: 5 TABLET, FILM COATED ORAL at 23:00

## 2019-06-08 RX ADMIN — BICALUTAMIDE 50 MG: 50 TABLET, FILM COATED ORAL at 09:00

## 2019-06-08 RX ADMIN — MINERAL OIL AND PETROLATUM: 150; 830 OINTMENT OPHTHALMIC at 13:00

## 2019-06-08 RX ADMIN — CEFTRIAXONE 2 G: 2 INJECTION, POWDER, FOR SOLUTION INTRAMUSCULAR; INTRAVENOUS at 08:45

## 2019-06-08 RX ADMIN — MINERAL OIL AND PETROLATUM: 150; 830 OINTMENT OPHTHALMIC at 09:00

## 2019-06-08 RX ADMIN — METOPROLOL TARTRATE 12.5 MG: 25 TABLET, FILM COATED ORAL at 23:01

## 2019-06-08 RX ADMIN — CEFPODOXIME PROXETIL 100 MG: 200 TABLET, FILM COATED ORAL at 23:01

## 2019-06-08 RX ADMIN — METOPROLOL TARTRATE 12.5 MG: 25 TABLET, FILM COATED ORAL at 06:48

## 2019-06-08 RX ADMIN — HEPARIN SODIUM 5000 UNITS: 5000 INJECTION INTRAVENOUS; SUBCUTANEOUS at 17:50

## 2019-06-08 RX ADMIN — POTASSIUM CHLORIDE 20 MEQ: 1.5 POWDER, FOR SOLUTION ORAL at 08:45

## 2019-06-08 RX ADMIN — ASPIRIN 81 MG 81 MG: 81 TABLET ORAL at 08:45

## 2019-06-08 RX ADMIN — MINERAL OIL AND PETROLATUM: 150; 830 OINTMENT OPHTHALMIC at 20:49

## 2019-06-08 RX ADMIN — FAMOTIDINE 20 MG: 20 TABLET, FILM COATED ORAL at 09:00

## 2019-06-08 NOTE — PROGRESS NOTES
Problem: Self Care Deficits Care Plan (Adult) Goal: *Acute Goals and Plan of Care (Insert Text) Description 1. Patient will perform grooming with supervision. 2. Patient will perform Upper body dressing with min assist 
3. Patient will perform upper body bathing with  Min assist 
4. Patient will perform toilet transfers with mod x 2 
5. Patient will participate in 30 + minutes of ADL/ therapeutic exercise/therapeutic activity with min rest breaks to increase activity tolerance for self care. Goals to be achieved in 7 days. Outcome: Progressing Towards Goal 
  
OCCUPATIONAL THERAPY: Daily Note and PM 6/8/2019 INPATIENT: OT Visit Days: 2 [place] / Product Type: Medicare /  
  
NAME/AGE/GENDER: Justa Batista is a [de-identified] y.o. male PRIMARY DIAGNOSIS:  UTI (urinary tract infection) [N39.0] Metabolic encephalopathy [I11.45] Rhabdomyolysis [M62.82] Troponin I above reference range [R74.8] Rhabdomyolysis Rhabdomyolysis ICD-10: Treatment Diagnosis:  
 · Generalized Muscle Weakness (M62.81) · Other lack of cordination (R27.8) · Difficulty in walking, Not elsewhere classified (R26.2) Precautions/Allergies: 
   Patient has no known allergies. ASSESSMENT:  
Mr. Afia Molina presents sitting up in recliner. PT and OT assisted patient max x 2 sit to stand. Patient unable to take steps back to bed. He stood and was total assist for janice care in standing. Mod assist x 2 to maintain standing balance. Bed was moved behind patient to return to sitting. Max x 2 sit to supine. Patient was bathed and gown changed by nursing and OT. He was total assist for these tasks. He was observed earlier feeding himself ice cream with a spoon. He did not speak unless spoken to and offer one word answer. He has skin tear on R forearm, and B knees. Catheter in place. HE was living at home with family. He would benefit from skilled OT services. Will follow. 6/8/19 Pt completed bed mobility with max A. Pt sat EOB for 15 minutes with occasional loss of balance. Pt stood for 1 minute with rolling walker with max A X 2. Pt washed face with min A. Continue POC. This section established at most recent assessment PROBLEM LIST (Impairments causing functional limitations): 1. Decreased Strength 2. Decreased ADL/Functional Activities 3. Decreased Transfer Abilities 4. Decreased Ambulation Ability/Technique 5. Decreased Balance 6. Decreased Activity Tolerance 7. Decreased Flexibility/Joint Mobility 8. Decreased Skin Integrity/Hygeine INTERVENTIONS PLANNED: (Benefits and precautions of occupational therapy have been discussed with the patient.) 1. Activities of daily living training 2. Adaptive equipment training 3. Balance training 4. Clothing management 5. Cognitive training 6. Donning&doffing training 7. Neuromuscular re-eduation 8. Therapeutic activity 9. Therapeutic exercise TREATMENT PLAN: Frequency/Duration: Follow patient 3 times to address above goals. Rehabilitation Potential For Stated Goals: Good REHAB RECOMMENDATIONS (at time of discharge pending progress):   
Placement: It is my opinion, based on this patient's performance to date, that Mr. Margy Meeks may benefit from intensive therapy at [address] after discharge due to the functional deficits listed above that are likely to improve with skilled rehabilitation and concerns that he/she may be unsafe to be unsupervised at home due to the level of assist for ADLs and transfers . Equipment:  
? None at this time OCCUPATIONAL PROFILE AND HISTORY:  
History of Present Injury/Illness (Reason for Referral): 
See h and P Past Medical History/Comorbidities:  
Mr. Margy Meeks  has a past medical history of Arrhythmia, Arthritis, Atrial fibrillation ([facility] (1/27/2012), CAD (coronary artery disease), Cancer ([facility] Dementia, GERD (gastroesophageal reflux disease), Heart failure ([facility] Hypertension, and Morbid obesity ([facility] Mr. Rocael Ignacio  has a past surgical history that includes hx orthopaedic (2010); hx prostatectomy; and hx pacemaker (8/30/2012). Social History/Living Environment:  
Home Environment: Private residence # Steps to Enter: 7 Rails to Enter: Yes Hand Rails : Right One/Two Story Residence: One story Living Alone: Yes(during the day) Support Systems: Family member(s) Patient Expects to be Discharged to[de-identified] Skilled nursing facility Current DME Used/Available at Home: Commode, bedside, Hospital bed, Walker, rolling Prior Level of Function/Work/Activity: 
Assist with bathing and dressing from daughter Number of Personal Factors/Comorbidities that affect the Plan of Care: Expanded review of therapy/medical records (1-2):  MODERATE COMPLEXITY ASSESSMENT OF OCCUPATIONAL PERFORMANCE[de-identified]  
Activities of Daily Living:  
Basic ADLs (From Assessment) Complex ADLs (From Assessment) Feeding: Supervision(observed eating ice cream with a spoon ) Oral Facial Hygiene/Grooming: Maximum assistance Bathing: Total assistance Upper Body Dressing: Maximum assistance, Total assistance Lower Body Dressing: Total assistance Toileting: Total assistance Grooming/Bathing/Dressing Activities of Daily Living Grooming Washing Face: Minimum assistance Cognitive Retraining Safety/Judgement: Fall prevention Bed/Mat Mobility Supine to Sit: Maximum assistance Sit to Supine: Maximum assistance Sit to Stand: Maximum assistance;Assist x2 Stand to Sit: Maximum assistance Most Recent Physical Functioning:  
Gross Assessment: 
  
         
  
Posture: 
  
Balance: 
Sitting: With support;High guard Sitting - Static: Fair (occasional) Standing: Pull to stand; With support Bed Mobility: 
Supine to Sit: Maximum assistance Sit to Supine: Maximum assistance Wheelchair Mobility: 
  
Transfers: Sit to Stand: Maximum assistance;Assist x2 Stand to Sit: Maximum assistance Patient Vitals for the past 6 hrs: 
 BP BP Patient Position SpO2 Pulse 19 1204 138/68 At rest;Supine 94 % (!) 57 Mental Status Neurologic State: Alert Orientation Level: Oriented to person Cognition: Follows commands Perception: Verbal, Tactile Perseveration: Verbal cues provided, Tactile cues provided Safety/Judgement: Fall prevention Physical Skills Involved: 1. Range of Motion 2. Balance 3. Strength 4. Activity Tolerance 5. Skin Integrity Cognitive Skills Affected (resulting in the inability to perform in a timely and safe manner): 1. Perception 2. Comprehension 3. Expression Psychosocial Skills Affected: 1. Habits/Routines 2. Environmental Adaptation 3. Self-Awareness Number of elements that affect the Plan of Care: 5+:  HIGH COMPLEXITY CLINICAL DECISION MAKIN[address] [place] [id number] AM-PAC 6 Clicks Daily Activity Inpatient Short Form How much help from another person does the patient currently need. .. Total A Lot A Little None 1. Putting on and taking off regular lower body clothing? ? 1   ? 2   ? 3   ? 4  
2. Bathing (including washing, rinsing, drying)? ? 1   ? 2   ? 3   ? 4  
3. Toileting, which includes using toilet, bedpan or urinal?   ? 1   ? 2   ? 3   ? 4  
4. Putting on and taking off regular upper body clothing? ? 1   ? 2   ? 3   ? 4  
5. Taking care of personal grooming such as brushing teeth? ? 1   ? 2   ? 3   ? 4  
6. Eating meals? ? 1   ? 2   ? 3   ? 4  
© , Trustees of [address] [place] [id number], under license to [employer]. All rights reserved Score:  Initial: 9 Most Recent: X (Date: -- ) Interpretation of Tool:  Represents activities that are increasingly more difficult (i.e. Bed mobility, Transfers, Gait).  
 
Medical Necessity:    
· Patient is expected to demonstrate progress in balance, coordination and functional technique ·  to decrease assistance required with self care and functional mobility and improve safety during  self care and functional mobility · . Reason for Services/Other Comments: 
· Patient continues to require skilled intervention due to not at baseline function · . Use of outcome tool(s) and clinical judgement create a POC that gives a: HIGH COMPLEXITY  
 
 
 
TREATMENT:  
(In addition to Assessment/Re-Assessment sessions the following treatments were rendered) Pre-treatment Symptoms/Complaints:   
Pain: Initial:  
Pain Intensity 1: 0 0/10 Post Session:  0/10 Therapeutic Activity: (    29): Therapeutic activities including bed mobility and sitting EOB to improve mobility and strength. Required max   to promote motor control of bilateral, upper extremity(s), lower extremity(s). Braces/Orthotics/Lines/Etc:  
· IV 
· álvarez catheter · O2 Device: Room air Treatment/Session Assessment:   
· Response to Treatment: low tolerance · Interdisciplinary Collaboration:  
o Certified Occupational Therapy Assistant 
o Registered Nurse 
o Certified Nursing Assistant/Patient Care Technician · After treatment position/precautions:  
o Supine in bed 
o Bed/Chair-wheels locked 
o Bed in low position 
o Caregiver at bedside 
o Call light within reach 
o Side rails x 3  
· Compliance with Program/Exercises: Compliant most of the time. · Recommendations/Intent for next treatment session: \"Next visit will focus on advancements to more challenging activities and reduction in assistance provided\". Total Treatment Duration: OT Patient Time In/Time Out Time In: 0633 Time Out: 1320 Na Thee Wyman

## 2019-06-08 NOTE — PROGRESS NOTES
Resting quietly, resp even, unlab. Eyes closed with relaxed facial expression with no distress noted during report given to Courtney Reilly RN. Head of bed elevated. BLE elevated on pillows, heels off bed.

## 2019-06-08 NOTE — PROGRESS NOTES
Problem: Mobility Impaired (Adult and Pediatric) Goal: *Acute Goals and Plan of Care (Insert Text) Description DISCHARGE GOALS : 
(1.)Mr. Beena Coombs will move from supine to sit and sit to supine  with MINIMAL ASSIST with HOB 30 deg & rail. (2.)Mr. Beena Coombs will transfer from bed to chair and chair to bed with CONTACT GUARD ASSIST using a walker (3.)Mr. Beena Coombs will ambulate with CONTACT GUARD ASSIST for 40-50 feet with a rolling walker. 4) pt participating in AROM to UE & LE's with just verbal or visual cues. Outcome: Progressing Towards Goal 
  
PHYSICAL THERAPY: Daily Note and AM 6/8/2019 INPATIENT: PT Visit Days : 3 [place] / [place] / Product Type: Medicare /   
  
NAME/AGE/GENDER: Krystal Araujo is a [de-identified] y.o. male PRIMARY DIAGNOSIS: UTI (urinary tract infection) [N39.0] Metabolic encephalopathy [C21.99] Rhabdomyolysis [M62.82] Troponin I above reference range [R74.8] Rhabdomyolysis Rhabdomyolysis ICD-10: Treatment Diagnosis:  
 · Generalized Muscle Weakness (M62.81) · Other lack of cordination (R27.8) · Difficulty in walking, Not elsewhere classified (R26.2) · Other abnormalities of gait and mobility (R26.89) Precaution/Allergies: 
Patient has no known allergies. ASSESSMENT:  
Mr. Beena Coombs was able to verbalize answers to several questions today. We worked on transferring supine to sit followed by static and dynamic sitting balance as well as LE exercises in sitting and supine. He was unable to stand today. This section established at most recent assessment PROBLEM LIST (Impairments causing functional limitations): 1. Decreased Strength 2. Decreased ADL/Functional Activities 3. Decreased Transfer Abilities 4. Decreased Ambulation Ability/Technique 5. Decreased Balance 6. Decreased Activity Tolerance 7.  Decreased [place] with Home Exercise Program 
 INTERVENTIONS PLANNED: (Benefits and precautions of physical therapy have been discussed with the patient.) 1. Balance Exercise 2. Bed Mobility 3. Family Education 4. Gait Training 5. Home Exercise Program (HEP) 6. Therapeutic Activites 7. Therapeutic Exercise/Strengthening 8. Transfer Training TREATMENT PLAN: Frequency/Duration: daily for duration of hospital stay Rehabilitation Potential For Stated Goals: Good REHAB RECOMMENDATIONS (at time of discharge pending progress):   
Placement: It is my opinion, based on this patient's performance to date, that Mr. Bhavesh Paulino may benefit from intensive therapy at a [facility] after discharge due to the functional deficits listed above that are likely to improve with skilled rehabilitation and concerns that he/she may be unsafe to be unsupervised at home due to severe weakness & debility . Equipment: ? To be determined HISTORY:  
History of Present Injury/Illness (Reason for Referral): This is an [de-identified] YO male patient with a PMH of CAD, Afib on anticoagulation, CHF, HTN, obesity, previous admissions for UTI,  who was found laying on the floor this morning by his daughter. He was confused and very weak. He had a large R periorbital hematoma and was brought to the ER. He can not provide too much information due to his mental status. Here he was found to have normal VS.  His WBC count was 11.2K, Na 145, Cr 1.56, Ca 10.5, troponin 1.65, CK 1500. His UA was suggestive of UTI with positive nitrates, moderate leukocyte esterase, > 100 WBCs. A brain CT scan showed NO acute intra-cranial abnormality. He was started on IV fluids, IV ceftriaxone and was presented to the hospitalist for admission.   
  
 
Past Medical History/Comorbidities:  
Mr. Bhavesh Paulino  has a past medical history of Arrhythmia, Arthritis, Atrial fibrillation ([facility] [address] (1/27/2012), CAD (coronary artery disease), Cancer ([facility] Dementia, GERD (gastroesophageal reflux disease), Heart failure ([facility] Hypertension, and Morbid obesity ([facility] [address] Mr. Omayra Mann  has a past surgical history that includes hx orthopaedic (2010); hx prostatectomy; and hx pacemaker (8/30/2012). Social History/Living Environment:  
Home Environment: Private residence # Steps to Enter: 7 Rails to Enter: Yes Hand Rails : Right One/Two Story Residence: One story Living Alone: Yes(during the day) Support Systems: Family member(s) Patient Expects to be Discharged to[de-identified] Skilled nursing facility Current DME Used/Available at Home: Commode, bedside, Hospital bed, Walker, rolling Prior Level of Function/Work/Activity: Pt was functioning with a walker in the home prior to this admission Personal Factors: Other factors that influence how disability is experienced by the patient:  current & PMH Number of Personal Factors/Comorbidities that affect the Plan of Care: 3+: HIGH COMPLEXITY EXAMINATION:  
Most Recent Physical Functioning:  
Gross Assessment: 
  
         
  
 
Posture Assessment: Forward head, Rounded shoulders Balance: 
Sitting - Static: Fair (occasional) Sitting - Dynamic: Poor (constant support) Standing: (Unable today) Bed Mobility: 
Rolling: Moderate assistance Supine to Sit: Moderate assistance Sit to Supine: Maximum assistance Transfers: 
Sit to Stand: (Unable today) Gait: NA Functional Mobility:  
      Gait/Ambulation:  NA Transfers:  min Bed Mobility:  min Body Structures Involved: 1. Metabolic 2. Muscles Body Functions Affected: 1. Movement Related 2. Metobolic/Endocrine Activities and Participation Affected: 1. General Tasks and Demands 2. Mobility Number of elements that affect the Plan of Care: 4+: HIGH COMPLEXITY CLINICAL PRESENTATION:  
Presentation: Evolving clinical presentation with unstable and unpredictable characteristics: HIGH COMPLEXITY CLINICAL DECISION MAKING:  
 [facility] Basic Mobility Inpatient Short Form How much difficulty does the patient currently have. .. Unable A Lot A Little None 1. Turning over in bed (including adjusting bedclothes, sheets and blankets)? ? 1   ? 2   ? 3   ? 4  
2. Sitting down on and standing up from a chair with arms ( e.g., wheelchair, bedside commode, etc.)   ? 1   ? 2   ? 3   ? 4  
3. Moving from lying on back to sitting on the side of the bed?   ? 1   ? 2   ? 3   ? 4 How much help from another person does the patient currently need. .. Total A Lot A Little None 4. Moving to and from a bed to a chair (including a wheelchair)? ? 1   ? 2   ? 3   ? 4  
5. Need to walk in hospital room? ? 1   ? 2   ? 3   ? 4  
6. Climbing 3-5 steps with a railing? ? 1   ? 2   ? 3   ? 4  
© 2007, Trustees of [employer], under license to [employer]. All rights reserved Score:  Initial: 11  Most Recent: X (Date: -- ) Interpretation of Tool:  Represents activities that are increasingly more difficult (i.e. Bed mobility, Transfers, Gait). Medical Necessity:    
· Patient is expected to demonstrate progress in strength, range of motion, balance, coordination and functional technique ·  to decrease assistance required with bed mobility,transfers & gait · . Reason for Services/Other Comments: 
· Patient continues to require skilled intervention due to pt unsafe to manage in home environment · . Use of outcome tool(s) and clinical judgement create a POC that gives a: Difficult prediction of patient's progress: HIGH COMPLEXITY  
  
 
 
 
TREATMENT:  
(In addition to Assessment/Re-Assessment sessions the following treatments were rendered) Pre-treatment Symptoms/Complaints:  Pt was agreeable Pain: Initial: 0/10 Post Session:  0/10 Therapeutic Activity: (    20 minutes):   Therapeutic activities including bed mobility, transfers supine to sit and sit to supine as well as static sitting balance and dynamic sitting balance (working on reaching with UE's) to improve mobility, strength, balance, coordination and dynamic movement of arm - bilateral, leg - bilateral and core to improve functional core stability. Therapeutic Exercise: ( 10 minutes):  Exercises per grid below to improve mobility and strength. Required moderate verbal, manual and tactile cues to promote proper body alignment and promote proper body posture. Progressed repetitions as indicated. Date: 
06-08-19 Date: 
 Date: Activity/Exercise Parameters Parameters Parameters Seated Ankle Pumps 20 reps B LE's    
LAQ's 20 reps B LE's Supine Hip Abd/Add 20 reps B LE's Braces/Orthotics/Lines/Etc:  
· IV 
· telemetry monitoring Treatment/Session Assessment:   
· Response to Treatment: pt engaged better today · Interdisciplinary Collaboration:  
o Registered Nurse 
o Rehabilitation Attendant · After treatment position/precautions:  
o Supine in bed 
o Bed/Chair-wheels locked 
o Bed in low position 
o Call light within reach 
o RN notified · Compliance with Program/Exercises: Will assess as treatment progresses · Recommendations/Intent for next treatment session: \"Next visit will focus on reduction in assistance provided\". Total Treatment Duration: PT Patient Time In/Time Out Time In: 4460 Time Out: 6301 Jewel Bird PT

## 2019-06-08 NOTE — PROGRESS NOTES
Patient assessment completed. Patient is lethargic and with flat affect. His right eye has scleral evulsion and increased ecchymosis 360 degrees. His pupils are 4 mm in diameter and minimally reactive to light. Visual fields are full to confrontation. Contusion beneath the right eye is dark purple in color, but patient notes this causes him no pain. He does not engage in conversation unless strongly prompted. He enjoys Ensure shakes in either chocolate or vanilla and will complete two bottles with each meal. 
He needs help with feeding.

## 2019-06-08 NOTE — PROGRESS NOTES
Received call from Remote telemetry monitoring dept, [facility] that pt has had a 10 beat run of V-tach. Nurse to room immediately after notified, pt resting quietly, awake, resp even, unlab, skin warm, dry. AP 66, regular with no c/o, no distress noted.

## 2019-06-08 NOTE — PROGRESS NOTES
Continues to rest quietly, resp even, unlab, skin warm, dry. AP 64, regular. Lungs sounds clear. Right eye red, bruised, swollen with left eye edema. Generalized edema noted to upper and lower extremities. Bilateral knee dsgs and left wrist dsg clean, dry, intact. Assessment noted. Head of bed elevated. Interacted with nurse minimally with short verbal responses. Facial expression relaxed, denying pain or discomfort. No distress noted. Bed alarm set, door open.

## 2019-06-08 NOTE — PROGRESS NOTES
Problem: Falls - Risk of 
Goal: *Absence of Falls Description Document Miguel Angel Juares Fall Risk and appropriate interventions in the flowsheet. Outcome: Progressing Towards Goal 
  
Problem: Patient Education: Go to Patient Education Activity Goal: Patient/Family Education Outcome: Progressing Towards Goal 
  
Problem: Pressure Injury - Risk of 
Goal: *Prevention of pressure injury Description Document Maxwell Scale and appropriate interventions in the flowsheet. Outcome: Progressing Towards Goal 
  
Problem: Patient Education: Go to Patient Education Activity Goal: Patient/Family Education Outcome: Progressing Towards Goal 
  
Problem: Patient Education: Go to Patient Education Activity Goal: Patient/Family Education Outcome: Progressing Towards Goal 
  
Problem: Patient Education: Go to Patient Education Activity Goal: Patient/Family Education Outcome: Progressing Towards Goal 
  
Problem: Nutrition Deficit Goal: *Optimize nutritional status Outcome: Progressing Towards Goal

## 2019-06-08 NOTE — PROGRESS NOTES
Progress Note Patient: Maranda Barnes MRN: [id number]  SSN: [id number] YOB: 1940  Age: [de-identified] y.o. Sex: male Admit Date: 6/4/2019 LOS: 4 days Subjective:  
 
Patient was seen at bedside. Clinically stable. Renal function stable. More alert and active. Will need rehab placement. Will add SQ heparin for DVT ppx today. If tolerated will considering resuming anticoagulation soon. Objective:  
 
Vitals:  
 06/08/19 9566 06/08/19 0803 06/08/19 1204 06/08/19 1606 BP: 125/80 146/76 138/68 139/76 Pulse: 72 82 (!) 57 67 Resp:  18 18 18 Temp:  99.5 °F (37.5 °C) 99 °F (37.2 °C) 98.2 °F (36.8 °C) SpO2:  96% 94% 97% Weight:      
Height:      
  
 
Intake and Output: 
Current Shift: 06/08 0701 - 06/08 1900 In: -  
Out: 446 [OKAVE:744] Last three shifts: 06/06 1901 - 06/08 0700 In: 831 [I.V.:831] Out: [address] [place] [MBDQR:[id number]] Physical Exam:  
GENERAL: fatigued, appears stated age EYE: R eye hematoma LYMPHATIC: Cervical, supraclavicular, and axillary nodes normal.  
THROAT & NECK: normal and no erythema or exudates noted. LUNG: clear to auscultation bilaterally HEART: regular rate and rhythm, S1, S2 normal, no murmur, click, rub or gallop ABDOMEN: soft, non-tender. Bowel sounds normal. No masses,  no organomegaly EXTREMITIES:  extremities normal, atraumatic, no cyanosis or edema SKIN: Normal. 
NEUROLOGIC: No focal deficits PSYCHIATRIC: non focal 
 
Lab/Data Review: All lab results for the last 24 hours reviewed. Assessment:  
 
Principal Problem: 
  Rhabdomyolysis (6/4/2019) Active Problems: Metabolic encephalopathy (6/7/6603) UTI (urinary tract infection) (6/4/2019) Troponin I above reference range (6/4/2019) Atrial flutter with controlled response ([address] (6/5/2019) SSS (sick sinus syndrome) ([facility] (6/5/2019) Traumatic orbital hematoma (6/5/2019) Plan: # Metabolic encephalopathy due to UTI  
-improving -switched to oral vantin today  
-urine culture reported E coli 
  
# Rhabdomyolysis -IV fluids stopped yesterday  
-monitor CK level on a daily basis  
 
  
# history of CAD with elevated troponin level at admission  
-telemetry monitoring  
- trop level with plateau around 5.5  
-echo ordered and not showing regional wall motion abnormalities -appreciate cardiology recommendations  
  
# severe facial trauma with R periorbital hematoma  
-holding  anticoagulation for now  
-will start SQ heparin today and will monitor response  
-ophthalmology eval  appreciated Signed By: Kimberlee Avendaño MD   
 June 8, 2019

## 2019-06-09 LAB
ANION GAP SERPL CALC-SCNC: 7 MMOL/L (ref 7–16)
BACTERIA SPEC CULT: NORMAL
BACTERIA SPEC CULT: NORMAL
BUN SERPL-MCNC: 14 MG/DL (ref 8–23)
CALCIUM SERPL-MCNC: 9.6 MG/DL (ref 8.3–10.4)
CHLORIDE SERPL-SCNC: 108 MMOL/L (ref 98–107)
CK SERPL-CCNC: 403 U/L (ref 21–215)
CO2 SERPL-SCNC: 26 MMOL/L (ref 21–32)
CREAT SERPL-MCNC: 0.8 MG/DL (ref 0.8–1.5)
ERYTHROCYTE [DISTWIDTH] IN BLOOD BY AUTOMATED COUNT: 13.6 % (ref 11.9–14.6)
GLUCOSE SERPL-MCNC: 99 MG/DL (ref 65–100)
HCT VFR BLD AUTO: 44.8 % (ref 41.1–50.3)
HGB BLD-MCNC: 15.2 G/DL (ref 13.6–17.2)
MCH RBC QN AUTO: 30.3 PG (ref 26.1–32.9)
MCHC RBC AUTO-ENTMCNC: 33.9 G/DL (ref 31.4–35)
MCV RBC AUTO: 89.4 FL (ref 79.6–97.8)
NRBC # BLD: 0 K/UL (ref 0–0.2)
PLATELET # BLD AUTO: 183 K/UL (ref 150–450)
PMV BLD AUTO: 10.3 FL (ref 9.4–12.3)
POTASSIUM SERPL-SCNC: 3.6 MMOL/L (ref 3.5–5.1)
RBC # BLD AUTO: 5.01 M/UL (ref 4.23–5.6)
SERVICE CMNT-IMP: NORMAL
SERVICE CMNT-IMP: NORMAL
SODIUM SERPL-SCNC: 141 MMOL/L (ref 136–145)
WBC # BLD AUTO: 6.9 K/UL (ref 4.3–11.1)

## 2019-06-09 PROCEDURE — 36415 COLL VENOUS BLD VENIPUNCTURE: CPT

## 2019-06-09 PROCEDURE — 85027 COMPLETE CBC AUTOMATED: CPT

## 2019-06-09 PROCEDURE — 82550 ASSAY OF CK (CPK): CPT

## 2019-06-09 PROCEDURE — 65660000000 HC RM CCU STEPDOWN

## 2019-06-09 PROCEDURE — 74011250636 HC RX REV CODE- 250/636: Performed by: INTERNAL MEDICINE

## 2019-06-09 PROCEDURE — 80048 BASIC METABOLIC PNL TOTAL CA: CPT

## 2019-06-09 PROCEDURE — 74011250637 HC RX REV CODE- 250/637: Performed by: INTERNAL MEDICINE

## 2019-06-09 PROCEDURE — 74011250637 HC RX REV CODE- 250/637: Performed by: PHYSICIAN ASSISTANT

## 2019-06-09 PROCEDURE — 97530 THERAPEUTIC ACTIVITIES: CPT

## 2019-06-09 RX ADMIN — MINERAL OIL AND PETROLATUM: 150; 830 OINTMENT OPHTHALMIC at 09:00

## 2019-06-09 RX ADMIN — MINERAL OIL AND PETROLATUM: 150; 830 OINTMENT OPHTHALMIC at 02:13

## 2019-06-09 RX ADMIN — TAMSULOSIN HYDROCHLORIDE 0.4 MG: 0.4 CAPSULE ORAL at 21:18

## 2019-06-09 RX ADMIN — POTASSIUM CHLORIDE 20 MEQ: 1.5 POWDER, FOR SOLUTION ORAL at 09:35

## 2019-06-09 RX ADMIN — CEFPODOXIME PROXETIL 100 MG: 200 TABLET, FILM COATED ORAL at 09:33

## 2019-06-09 RX ADMIN — DONEPEZIL HYDROCHLORIDE 5 MG: 5 TABLET, FILM COATED ORAL at 21:18

## 2019-06-09 RX ADMIN — FAMOTIDINE 20 MG: 20 TABLET, FILM COATED ORAL at 17:20

## 2019-06-09 RX ADMIN — Medication 5 ML: at 02:13

## 2019-06-09 RX ADMIN — MINERAL OIL AND PETROLATUM: 150; 830 OINTMENT OPHTHALMIC at 21:20

## 2019-06-09 RX ADMIN — MINERAL OIL AND PETROLATUM: 150; 830 OINTMENT OPHTHALMIC at 13:38

## 2019-06-09 RX ADMIN — BICALUTAMIDE 50 MG: 50 TABLET, FILM COATED ORAL at 09:00

## 2019-06-09 RX ADMIN — METOPROLOL TARTRATE 12.5 MG: 25 TABLET, FILM COATED ORAL at 15:37

## 2019-06-09 RX ADMIN — HEPARIN SODIUM 5000 UNITS: 5000 INJECTION INTRAVENOUS; SUBCUTANEOUS at 17:16

## 2019-06-09 RX ADMIN — Medication 5 ML: at 21:19

## 2019-06-09 RX ADMIN — CEFPODOXIME PROXETIL 100 MG: 200 TABLET, FILM COATED ORAL at 21:17

## 2019-06-09 RX ADMIN — Medication 5 ML: at 14:00

## 2019-06-09 RX ADMIN — MINERAL OIL AND PETROLATUM: 150; 830 OINTMENT OPHTHALMIC at 18:00

## 2019-06-09 RX ADMIN — FAMOTIDINE 20 MG: 20 TABLET, FILM COATED ORAL at 09:33

## 2019-06-09 RX ADMIN — ASPIRIN 81 MG 81 MG: 81 TABLET ORAL at 09:33

## 2019-06-09 RX ADMIN — HEPARIN SODIUM 5000 UNITS: 5000 INJECTION INTRAVENOUS; SUBCUTANEOUS at 05:22

## 2019-06-09 RX ADMIN — METOPROLOL TARTRATE 12.5 MG: 25 TABLET, FILM COATED ORAL at 09:35

## 2019-06-09 NOTE — PROGRESS NOTES
Resting quietly, resp even, unlab. Eyes closed with relaxed facial expression. No distress noted during report given to Gene Santacruz RN.

## 2019-06-09 NOTE — PROGRESS NOTES
HOB elevated, no acute distress, left eye protruding from eye socket, edema and erythema, lungs crackles bases, neurovascular checks WDL, pain level 0, LES dry and flaky, bilateral knee and right wrist dressing dry and intact, no complaints of calf pain.

## 2019-06-09 NOTE — PROGRESS NOTES
Resting quietly, resp even, unlab, arousing easily. Skin warm, dry. AP 72, irreg, lungs sounds clear. Assessed as noted. Bilateral eyes swollen, red, protruding with scant amount of purulent drainage noted from right. Head of bed elevated. Brief clean, dry, intact. LE elevated on pillows. Opened eyes upon request with  short answer verbal responses to nurse. Oriented to person. No c/o, no needs, no distress noted.

## 2019-06-09 NOTE — PROGRESS NOTES
Problem: Mobility Impaired (Adult and Pediatric) Goal: *Acute Goals and Plan of Care (Insert Text) Description DISCHARGE GOALS : 
(1.)Mr. Reza Milian will move from supine to sit and sit to supine  with MINIMAL ASSIST with HOB 30 deg & rail. (2.)Mr. Reza Milian will transfer from bed to chair and chair to bed with CONTACT GUARD ASSIST using a walker (3.)Mr. Reza Milian will ambulate with CONTACT GUARD ASSIST for 40-50 feet with a rolling walker. 4) pt participating in AROM to UE & LE's with just verbal or visual cues. Outcome: Progressing Towards Goal 
  
PHYSICAL THERAPY: Daily Note and PM 6/9/2019 INPATIENT: PT Visit Days : 5 [place] / [place] / Product Type: Medicare /   
  
NAME/AGE/GENDER: Adeel Alegria is a [de-identified] y.o. male PRIMARY DIAGNOSIS: UTI (urinary tract infection) [N39.0] Metabolic encephalopathy [K94.82] Rhabdomyolysis [M62.82] Troponin I above reference range [R74.8] Rhabdomyolysis Rhabdomyolysis ICD-10: Treatment Diagnosis:  
 · Generalized Muscle Weakness (M62.81) · Other lack of cordination (R27.8) · Difficulty in walking, Not elsewhere classified (R26.2) · Other abnormalities of gait and mobility (R26.89) Precaution/Allergies: 
Patient has no known allergies. ASSESSMENT:  
Mr. Reza Milian showed good effort with bed mobility & transfers today. Pt needs more out of bed time to prep for SNF rehab. This section established at most recent assessment PROBLEM LIST (Impairments causing functional limitations): 1. Decreased Strength 2. Decreased ADL/Functional Activities 3. Decreased Transfer Abilities 4. Decreased Ambulation Ability/Technique 5. Decreased Balance 6. Decreased Activity Tolerance 7. Decreased [place] with Home Exercise Program 
 INTERVENTIONS PLANNED: (Benefits and precautions of physical therapy have been discussed with the patient.) 1. Balance Exercise 2. Bed Mobility 3. Family Education 4. Gait Training 5. Home Exercise Program (HEP) 6. Therapeutic Activites 7. Therapeutic Exercise/Strengthening 8. Transfer Training TREATMENT PLAN: Frequency/Duration: daily for duration of hospital stay Rehabilitation Potential For Stated Goals: Good REHAB RECOMMENDATIONS (at time of discharge pending progress):   
Placement: It is my opinion, based on this patient's performance to date, that Mr. Amber Post may benefit from intensive therapy at a [address] [place] [address] after discharge due to the functional deficits listed above that are likely to improve with skilled rehabilitation and concerns that he/she may be unsafe to be unsupervised at home due to severe weakness & debility . Equipment: ? To be determined HISTORY:  
History of Present Injury/Illness (Reason for Referral): This is an [de-identified] YO male patient with a PMH of CAD, Afib on anticoagulation, CHF, HTN, obesity, previous admissions for UTI,  who was found laying on the floor this morning by his daughter. He was confused and very weak. He had a large R periorbital hematoma and was brought to the ER. He can not provide too much information due to his mental status. Here he was found to have normal VS.  His WBC count was 11.2K, Na 145, Cr 1.56, Ca 10.5, troponin 1.65, CK 1500. His UA was suggestive of UTI with positive nitrates, moderate leukocyte esterase, > 100 WBCs. A brain CT scan showed NO acute intra-cranial abnormality. He was started on IV fluids, IV ceftriaxone and was presented to the hospitalist for admission. Past Medical History/Comorbidities:  
Mr. Amber Post  has a past medical history of Arrhythmia, Arthritis, Atrial fibrillation ([address] (1/27/2012), CAD (coronary artery disease), Cancer ([address] Dementia, GERD (gastroesophageal reflux disease), Heart failure ([address] Hypertension, and Morbid obesity ([address]   Mr. Amber Post  has a past surgical history that includes hx orthopaedic (2010); hx prostatectomy; and hx pacemaker (8/30/2012). Social History/Living Environment:  
Home Environment: Private residence # Steps to Enter: 7 Rails to Enter: Yes Hand Rails : Right One/Two Story Residence: One story Living Alone: Yes(during the day) Support Systems: Family member(s) Patient Expects to be Discharged to[de-identified] Skilled nursing facility Current DME Used/Available at Home: Commode, bedside, Hospital bed, Walker, rolling Prior Level of Function/Work/Activity: Pt was functioning with a walker in the home prior to this admission Personal Factors: Other factors that influence how disability is experienced by the patient:  current & PMH Number of Personal Factors/Comorbidities that affect the Plan of Care: 3+: HIGH COMPLEXITY EXAMINATION:  
Most Recent Physical Functioning:  
Gross Assessment: 
AROM: Grossly decreased, non-functional(all limbs & core) Strength: Grossly decreased, non-functional(all limbs & core) Coordination: Grossly decreased, non-functional(all limbs & core) Balance: 
Sitting: Intact; Without support Sitting - Static: (fair to fair-) Sitting - Dynamic: (fair-) Standing: Impaired; With support(walker) Standing - Static: (fair- with walker) Standing - Dynamic : (afir- with walker) Bed Mobility: 
Rolling: Moderate assistance Supine to Sit: Moderate assistance Sit to Supine: (NT) Transfers: 
Sit to Stand: Minimum assistance Stand to Sit: Minimum assistance Bed to Chair: Minimum assistance(with walker) Duration: 23 Minutes(extra time to work through activity noted) Gait: NA Functional Mobility:  
      Gait/Ambulation:  NA Transfers:  min Bed Mobility:  mod Body Structures Involved: 1. Metabolic 2. Muscles Body Functions Affected: 1. Movement Related 2. Metobolic/Endocrine Activities and Participation Affected: 1. General Tasks and Demands 2. Mobility Number of elements that affect the Plan of Care: 4+: HIGH COMPLEXITY CLINICAL PRESENTATION:  
Presentation: Evolving clinical presentation with unstable and unpredictable characteristics: HIGH COMPLEXITY CLINICAL DECISION MAKING:  
[employer] 6 Clicks Basic Mobility Inpatient Short Form How much difficulty does the patient currently have. .. Unable A Lot A Little None 1. Turning over in bed (including adjusting bedclothes, sheets and blankets)? ? 1   ? 2   ? 3   ? 4  
2. Sitting down on and standing up from a chair with arms ( e.g., wheelchair, bedside commode, etc.)   ? 1   ? 2   ? 3   ? 4  
3. Moving from lying on back to sitting on the side of the bed?   ? 1   ? 2   ? 3   ? 4 How much help from another person does the patient currently need. .. Total A Lot A Little None 4. Moving to and from a bed to a chair (including a wheelchair)? ? 1   ? 2   ? 3   ? 4  
5. Need to walk in hospital room? ? 1   ? 2   ? 3   ? 4  
6. Climbing 3-5 steps with a railing? ? 1   ? 2   ? 3   ? 4  
© 2007, Trustees of [facility] [employer], under license to [employer] [place]. All rights reserved Score:  Initial: 11  Most Recent: X (Date: -- ) Interpretation of Tool:  Represents activities that are increasingly more difficult (i.e. Bed mobility, Transfers, Gait). Medical Necessity:    
· Patient is expected to demonstrate progress in strength, range of motion, balance, coordination and functional technique ·  to decrease assistance required with bed mobility,transfers & gait · . Reason for Services/Other Comments: 
· Patient continues to require skilled intervention due to pt unsafe to manage in home environment · . Use of outcome tool(s) and clinical judgement create a POC that gives a: Difficult prediction of patient's progress: HIGH COMPLEXITY  
  
 
 
 
TREATMENT:  
(In addition to Assessment/Re-Assessment sessions the following treatments were rendered) Pre-treatment Symptoms/Complaints:  none Pain: Initial: 0/10 Pain Intensity 1: 0  Post Session:  0/10 Therapeutic Activity: (  23 Minutes(extra time to work through activity noted) : Therapeutic activities including bed mobility, transfers supine to sit and sit to supine as well as static sitting balance and dynamic sitting balance (working on reaching with UE's), standing wt shifting with walkerto improve mobility, strength, balance, coordination and dynamic movement of arm - bilateral, leg - bilateral and core to improve functional core stability. Braces/Orthotics/Lines/Etc:  
· IV 
· telemetry monitoring Treatment/Session Assessment:   
· Response to Treatment: pt is unsafe to return home, not manageable for caregiver, needs SNF 
· Interdisciplinary Collaboration:  
o Registered Nurse 
o Rehabilitation Attendant · After treatment position/precautions:  
o Up in chair 
o Bed/Chair-wheels locked 
o Call light within reach 
o RN notified · Compliance with Program/Exercises: Will assess as treatment progresses · Recommendations/Intent for next treatment session: \"Next visit will focus on reduction in assistance provided\". Total Treatment Duration: PT Patient Time In/Time Out Time In: 1968 Time Out: 9466 Jorje Merida PT

## 2019-06-09 NOTE — PROGRESS NOTES
Progress Note Patient: Mary Wells MRN: [id number]  SSN: [id number] YOB: 1940  Age: [de-identified] y.o. Sex: male Admit Date: 6/4/2019 LOS: 5 days Subjective:  
 
Patient was seen at bedside. Clinically stable. Renal function stable. More alert and active. Will need rehab placement. Will remove álvarez catheter tomorrow. Objective:  
 
Vitals:  
 06/09/19 0782 06/09/19 9893 06/09/19 1206 06/09/19 1608 BP: 138/75 127/73 149/72 131/81 Pulse: (!) 58 (!) 59 76 65 Resp: 20 19 18 18 Temp: 99.5 °F (37.5 °C) 99.1 °F (37.3 °C) 99.2 °F (37.3 °C) 99 °F (37.2 °C) SpO2: 95% 94% 97% 95% Weight:      
Height:      
  
 
Intake and Output: 
Current Shift: No intake/output data recorded. Last three shifts: 06/08 0701 - 06/09 1900 In: 1960 [P.O.:460] Out: 900 [Urine:900] Physical Exam:  
GENERAL: fatigued, appears stated age EYE: R eye hematoma LYMPHATIC: Cervical, supraclavicular, and axillary nodes normal.  
THROAT & NECK: normal and no erythema or exudates noted. LUNG: clear to auscultation bilaterally HEART: regular rate and rhythm, S1, S2 normal, no murmur, click, rub or gallop ABDOMEN: soft, non-tender. Bowel sounds normal. No masses,  no organomegaly EXTREMITIES:  extremities normal, atraumatic, no cyanosis or edema SKIN: Normal. 
NEUROLOGIC: No focal deficits PSYCHIATRIC: non focal 
 
Lab/Data Review: All lab results for the last 24 hours reviewed. Assessment:  
 
Principal Problem: 
  Rhabdomyolysis (6/4/2019) Active Problems: Metabolic encephalopathy (0/2/2563) UTI (urinary tract infection) (6/4/2019) Troponin I above reference range (6/4/2019) Atrial flutter with controlled response ([address] (6/5/2019) SSS (sick sinus syndrome) ([address] (6/5/2019) Traumatic orbital hematoma (6/5/2019) Plan: # Metabolic encephalopathy due to UTI  
-improving  
-switched to oral vantin  
-urine culture reported E coli 
  
 # Rhabdomyolysis  
-resolved # history of CAD with elevated troponin level at admission  
-telemetry monitoring  
- trop level with plateau around 5.5  
-echo ordered and not showing regional wall motion abnormalities -appreciate cardiology recommendations  
  
# severe facial trauma with R periorbital hematoma  
-holding  anticoagulation for now  
-ophthalmology eval  appreciated Signed By: Arely Barksdale MD   
 June 9, 2019

## 2019-06-10 VITALS
SYSTOLIC BLOOD PRESSURE: 113 MMHG | RESPIRATION RATE: 17 BRPM | HEIGHT: 74 IN | WEIGHT: 274 LBS | HEART RATE: 60 BPM | DIASTOLIC BLOOD PRESSURE: 66 MMHG | BODY MASS INDEX: 35.16 KG/M2 | OXYGEN SATURATION: 98 % | TEMPERATURE: 98.6 F

## 2019-06-10 LAB
ANION GAP SERPL CALC-SCNC: 8 MMOL/L (ref 7–16)
BUN SERPL-MCNC: 15 MG/DL (ref 8–23)
CALCIUM SERPL-MCNC: 10 MG/DL (ref 8.3–10.4)
CHLORIDE SERPL-SCNC: 108 MMOL/L (ref 98–107)
CK SERPL-CCNC: 235 U/L (ref 21–215)
CO2 SERPL-SCNC: 26 MMOL/L (ref 21–32)
CREAT SERPL-MCNC: 0.78 MG/DL (ref 0.8–1.5)
ERYTHROCYTE [DISTWIDTH] IN BLOOD BY AUTOMATED COUNT: 13.4 % (ref 11.9–14.6)
GLUCOSE SERPL-MCNC: 90 MG/DL (ref 65–100)
HCT VFR BLD AUTO: 44.4 % (ref 41.1–50.3)
HGB BLD-MCNC: 15.2 G/DL (ref 13.6–17.2)
MCH RBC QN AUTO: 30.5 PG (ref 26.1–32.9)
MCHC RBC AUTO-ENTMCNC: 34.2 G/DL (ref 31.4–35)
MCV RBC AUTO: 89.2 FL (ref 79.6–97.8)
NRBC # BLD: 0 K/UL (ref 0–0.2)
PLATELET # BLD AUTO: 188 K/UL (ref 150–450)
PMV BLD AUTO: 10.3 FL (ref 9.4–12.3)
POTASSIUM SERPL-SCNC: 3.7 MMOL/L (ref 3.5–5.1)
RBC # BLD AUTO: 4.98 M/UL (ref 4.23–5.6)
SODIUM SERPL-SCNC: 142 MMOL/L (ref 136–145)
WBC # BLD AUTO: 6.8 K/UL (ref 4.3–11.1)

## 2019-06-10 PROCEDURE — 74011250637 HC RX REV CODE- 250/637: Performed by: PHYSICIAN ASSISTANT

## 2019-06-10 PROCEDURE — 74011250637 HC RX REV CODE- 250/637: Performed by: INTERNAL MEDICINE

## 2019-06-10 PROCEDURE — 97535 SELF CARE MNGMENT TRAINING: CPT

## 2019-06-10 PROCEDURE — 77030034849

## 2019-06-10 PROCEDURE — 74011250636 HC RX REV CODE- 250/636: Performed by: INTERNAL MEDICINE

## 2019-06-10 PROCEDURE — 80048 BASIC METABOLIC PNL TOTAL CA: CPT

## 2019-06-10 PROCEDURE — 82550 ASSAY OF CK (CPK): CPT

## 2019-06-10 PROCEDURE — 36415 COLL VENOUS BLD VENIPUNCTURE: CPT

## 2019-06-10 PROCEDURE — 85027 COMPLETE CBC AUTOMATED: CPT

## 2019-06-10 PROCEDURE — 51798 US URINE CAPACITY MEASURE: CPT

## 2019-06-10 RX ORDER — METOPROLOL TARTRATE 25 MG/1
25 TABLET, FILM COATED ORAL 2 TIMES DAILY
Qty: 60 TAB | Refills: 0 | Status: SHIPPED
Start: 2019-06-10

## 2019-06-10 RX ORDER — TAMSULOSIN HYDROCHLORIDE 0.4 MG/1
0.4 CAPSULE ORAL
Qty: 30 CAP | Refills: 0 | Status: SHIPPED
Start: 2019-06-10 | End: 2019-11-21 | Stop reason: SDUPTHER

## 2019-06-10 RX ORDER — FUROSEMIDE 40 MG/1
TABLET ORAL
Qty: 15 TAB | Refills: 0 | Status: SHIPPED | OUTPATIENT
Start: 2019-06-10

## 2019-06-10 RX ORDER — CEFPODOXIME PROXETIL 100 MG/1
100 TABLET, FILM COATED ORAL EVERY 12 HOURS
Qty: 8 TAB | Refills: 0 | Status: SHIPPED | OUTPATIENT
Start: 2019-06-10 | End: 2019-06-14

## 2019-06-10 RX ADMIN — Medication 10 ML: at 13:13

## 2019-06-10 RX ADMIN — MINERAL OIL AND PETROLATUM: 150; 830 OINTMENT OPHTHALMIC at 08:59

## 2019-06-10 RX ADMIN — METOPROLOL TARTRATE 12.5 MG: 25 TABLET, FILM COATED ORAL at 08:59

## 2019-06-10 RX ADMIN — POTASSIUM CHLORIDE 20 MEQ: 1.5 POWDER, FOR SOLUTION ORAL at 09:21

## 2019-06-10 RX ADMIN — ASPIRIN 81 MG 81 MG: 81 TABLET ORAL at 08:58

## 2019-06-10 RX ADMIN — CEFPODOXIME PROXETIL 100 MG: 200 TABLET, FILM COATED ORAL at 08:59

## 2019-06-10 RX ADMIN — ARIPIPRAZOLE 10 MG: 10 TABLET ORAL at 09:22

## 2019-06-10 RX ADMIN — FAMOTIDINE 20 MG: 20 TABLET, FILM COATED ORAL at 08:59

## 2019-06-10 RX ADMIN — METOPROLOL TARTRATE 12.5 MG: 25 TABLET, FILM COATED ORAL at 01:29

## 2019-06-10 RX ADMIN — BICALUTAMIDE 50 MG: 50 TABLET, FILM COATED ORAL at 09:00

## 2019-06-10 RX ADMIN — HEPARIN SODIUM 5000 UNITS: 5000 INJECTION INTRAVENOUS; SUBCUTANEOUS at 05:09

## 2019-06-10 RX ADMIN — MINERAL OIL AND PETROLATUM: 150; 830 OINTMENT OPHTHALMIC at 13:13

## 2019-06-10 RX ADMIN — Medication 5 ML: at 05:11

## 2019-06-10 RX ADMIN — METOPROLOL TARTRATE 12.5 MG: 25 TABLET, FILM COATED ORAL at 13:12

## 2019-06-10 NOTE — PROGRESS NOTES
PM assessment complete. No s/s pain or distress noted. Right eye swollen, red with small amount of drainage noted. Edema present to bilateral LE. Legs elevated on pillows. Bed alarm on. Will continue to monitor through hourly rounding.

## 2019-06-10 NOTE — PROGRESS NOTES
Nutrition Reason for initial inissessment:  Consult received for General Nutrition Management and Supplements 6/06:  Dr. Anival Perez Assessment:  
Diet: DIET MECHANICAL SOFT 
DIET NUTRITIONAL SUPPLEMENTS All Meals; Ensure High Protein Pt presented past being found on floor by his daughter; confused. Past medical history notable for CAD, AFib on anticoagulant therapy, CHF, HTN, obesity. Pt's appetite improving; eating lunch meal on RD arrival; pt has been drinking 100% Ensure High Protein sent on all meal trays. Noted patient had some difficulty chewing cut up meat. Anthropometrics:Height: 6' 2\" (188 cm),  Weight: 124.3 kg (274 lb), Weight Source: Bed, Body mass index is 35.18 kg/m². BMI class of Obesity Class 1 for Older American Male. Weight history per EMR:  Unable to verify if weights are actual vs stated due to the functionality of [facility]. WT / BMI WEIGHT BODY MASS INDEX  
6/5/2019 274 lb 35.18 kg/m2 5/1/2019 246 lb 31.58 kg/m2 3/20/2019 262 lb 33.64 kg/m2 2/5/2019  32.1 kg/m2 2/4/2019 250 lb   
9/29/2018 254 lb 32.61 kg/m2 9/25/2018 254 lb 32.61 kg/m2 7/26/2018 249 lb 31.97 kg/m2 5/16/2018 261 lb 33.51 kg/m2 5/12/2018 248 lb 31.84 kg/m2 Macronutrient needs: EER:  4639-3984 kcal /day (12-15 kcal/kg BW) EPR:   grams protein/day (1-1.2 grams/kg IBW) Intake/Comparative Standards: Per RD meal rounds: ~50% lunch meal and 100% Ensure High Protein. 1 recorded intake since last RD visit with 75% meal consumed and 100% Ensure High Protein. Pt potentially meets ~85% estimated kcal needs and > 90% estimated protein need based on po intake of ~50-60% of meals and 100% Ensure High Protein 3 X day. Intervention: 
Meals and snacks: Continue current diet. Pt may need assistance at meals. Modify texture of meats from cut up to ground. Nutrition Supplement Therapy: Continue Ensure High Protein supplement at all meals; vanilla flavored preferred. Discharge nutrition plan: Continue current diet with 3 Ensure High Protein / day while po intake remains ~ 50% at meals. Padma Phillips, MPH, [address],  
[phone]

## 2019-06-10 NOTE — PROGRESS NOTES
Care Management Interventions PCP Verified by CM: Yes Mode of Transport at Discharge: BLS Transition of Care Consult (CM Consult): Discharge Planning Partner SNF: No 
Reason Why Partner SNF Not Chosen: Location Discharge Durable Medical Equipment: No 
Physical Therapy Consult: Yes Occupational Therapy Consult: Yes Current Support Network: Lives with Caregiver Confirm Follow Up Transport: Family Plan discussed with Pt/Family/Caregiver: Yes Freedom of Choice Offered: Yes Discharge Location Discharge Placement: Skilled nursing facility Per MD pt stable for d/c.  SW spoke with pt's daughter Cheri Lamb [phone] Maddi who is aware of d/c time. Nsg called report, chart copied, transport arranged to [facility].

## 2019-06-10 NOTE — PROGRESS NOTES
Problem: Self Care Deficits Care Plan (Adult) Goal: *Acute Goals and Plan of Care (Insert Text) Description 1. Patient will perform grooming with supervision. PROGRESSING 2. Patient will perform Upper body dressing with min assist. PROGRESSING 3. Patient will perform upper body bathing with  Min assist. PROGRESSING 4. Patient will perform toilet transfers with mod x 2 
5. Patient will participate in 30 + minutes of ADL/ therapeutic exercise/therapeutic activity with min rest breaks to increase activity tolerance for self care. Randolph Deleon PROGRESSING Goals to be achieved in 7 days. Outcome: Progressing Towards Goal 
  
OCCUPATIONAL THERAPY: Daily Note and AM 6/10/2019 INPATIENT: OT Visit Days: 3 [place] / [place] / Product Type: Medicare /  
  
NAME/AGE/GENDER: Jeannine Moody is a [de-identified] y.o. male PRIMARY DIAGNOSIS:  UTI (urinary tract infection) [N39.0] Metabolic encephalopathy [S44.59] Rhabdomyolysis [M62.82] Troponin I above reference range [R74.8] Rhabdomyolysis Rhabdomyolysis ICD-10: Treatment Diagnosis:  
 · Generalized Muscle Weakness (M62.81) · Other lack of cordination (R27.8) · Difficulty in walking, Not elsewhere classified (R26.2) Precautions/Allergies: 
   Patient has no known allergies. ASSESSMENT:  
Mr. Brock Lua presents sitting up in recliner. PT and OT assisted patient max x 2 sit to stand. Patient unable to take steps back to bed. He stood and was total assist for janice care in standing. Mod assist x 2 to maintain standing balance. Bed was moved behind patient to return to sitting. Max x 2 sit to supine. Patient was bathed and gown changed by nursing and OT. He was total assist for these tasks. He was observed earlier feeding himself ice cream with a spoon. He did not speak unless spoken to and offer one word answer. He has skin tear on R forearm, and B knees.  Catheter in place. HE was living at home with family. He would benefit from skilled OT services. Will follow. 6/8/19 Pt completed bed mobility with max A. Pt sat EOB for 15 minutes with occasional loss of balance. Pt stood for 1 minute with rolling walker with max A X 2. Pt washed face with min A. Continue POC.  
 
6/10/19 1130am patient supine in bed agreeable to OT session. Talking today when spoken to, he reported he was in the hospital and that he fell. He rolled right and left with max assist, he was hand over hand assist form washing face and hands. He assisted with washing abdomen only, but was able to follow directions to raise each UE for OT to wash in axillary area. He was max to doff gown and mod to don clean gown. He is total assist for janice care and LE dressing and bathing. He did not complain of pain. He is making progress with OT goals and would continue to benefit from skilled OT. Recommend SNF for continued services due to level of care as compared to baseline. This section established at most recent assessment PROBLEM LIST (Impairments causing functional limitations): 1. Decreased Strength 2. Decreased ADL/Functional Activities 3. Decreased Transfer Abilities 4. Decreased Ambulation Ability/Technique 5. Decreased Balance 6. Decreased Activity Tolerance 7. Decreased Flexibility/Joint Mobility 8. Decreased Skin Integrity/Hygeine INTERVENTIONS PLANNED: (Benefits and precautions of occupational therapy have been discussed with the patient.) 1. Activities of daily living training 2. Adaptive equipment training 3. Balance training 4. Clothing management 5. Cognitive training 6. Donning&doffing training 7. Neuromuscular re-eduation 8. Therapeutic activity 9. Therapeutic exercise TREATMENT PLAN: Frequency/Duration: Follow patient 3 times to address above goals. Rehabilitation Potential For Stated Goals: Good REHAB RECOMMENDATIONS (at time of discharge pending progress):   
 Placement: It is my opinion, based on this patient's performance to date, that Mr. Kate Goff may benefit from intensive therapy at a [address] [place] [address] after discharge due to the functional deficits listed above that are likely to improve with skilled rehabilitation and concerns that he/she may be unsafe to be unsupervised at home due to the level of assist for ADLs and transfers . Equipment:  
? None at this time OCCUPATIONAL PROFILE AND HISTORY:  
History of Present Injury/Illness (Reason for Referral): 
See h and P Past Medical History/Comorbidities:  
Mr. Kate Goff  has a past medical history of Arrhythmia, Arthritis, Atrial fibrillation ([facility] (1/27/2012), CAD (coronary artery disease), Cancer ([facility] Dementia, GERD (gastroesophageal reflux disease), Heart failure ([facility] Hypertension, and Morbid obesity ([facility] Mr. Kate Goff  has a past surgical history that includes hx orthopaedic (2010); hx prostatectomy; and hx pacemaker (8/30/2012). Social History/Living Environment:  
Home Environment: Private residence # Steps to Enter: 7 Rails to Enter: Yes Hand Rails : Right One/Two Story Residence: One story Living Alone: Yes(during the day) Support Systems: Family member(s) Patient Expects to be Discharged to[de-identified] Skilled nursing facility Current DME Used/Available at Home: Commode, bedside, Hospital bed, Walker, rolling Prior Level of Function/Work/Activity: 
Assist with bathing and dressing from daughter Number of Personal Factors/Comorbidities that affect the Plan of Care: Expanded review of therapy/medical records (1-2):  MODERATE COMPLEXITY ASSESSMENT OF OCCUPATIONAL PERFORMANCE[de-identified]  
Activities of Daily Living:  
Basic ADLs (From Assessment) Complex ADLs (From Assessment) Feeding: Supervision(observed eating ice cream with a spoon ) Oral Facial Hygiene/Grooming: Maximum assistance Bathing: Total assistance Upper Body Dressing: Maximum assistance, Total assistance Lower Body Dressing: Total assistance Toileting: Total assistance Grooming/Bathing/Dressing Activities of Daily Living Grooming Grooming Assistance: Maximum assistance(hand over hand assist) Washing Face: Maximum assistance Washing Hands: Maximum assistance Upper Body Bathing Bathing Assistance: Maximum assistance(assisted with abd, able to raise UE for OT to wash) Position Performed: Long sitting on bed Lower Body Bathing Bathing Assistance: Total assistance(dependent)(able to raise B Le to assist caregiver) Upper Body Dressing Assistance Dressing Assistance: Moderate assistance;Maximum assistance Hospital Gown: Moderate assistance;Maximum assistance(change gown) Lower Body Dressing Assistance Dressing Assistance: Total assistance(dependent) Protective [place]: Total assistance (dependent)(brief change) Socks: Total assistance (dependent) Bed/Mat Mobility Rolling: Maximum assistance;Assist x1 Most Recent Physical Functioning:  
Gross Assessment: 
  
         
  
Posture: 
Posture Assessment: Forward head, Rounded shoulders Balance: 
  Bed Mobility: 
Rolling: Maximum assistance;Assist x1 Wheelchair Mobility: 
  
Transfers: 
   
 
    
 
Patient Vitals for the past 6 hrs: 
 BP BP Patient Position SpO2 Pulse 06/10/19 0930 131/58 At rest 94 % 75 Mental Status Neurologic State: Alert Orientation Level: Oriented to person, Oriented to place, Oriented to situation(told therapist he fell reason for admit) Cognition: Follows commands Perception: Verbal, Tactile Perseveration: Verbal cues provided, Tactile cues provided Safety/Judgement: Fall prevention Physical Skills Involved: 1. Range of Motion 2. Balance 3. Strength 4. Activity Tolerance 5. Skin Integrity Cognitive Skills Affected (resulting in the inability to perform in a timely and safe manner): 1. Perception 2. Comprehension 3. Expression Psychosocial Skills Affected: 1. Habits/Routines 2. Environmental Adaptation 3. Self-Awareness Number of elements that affect the Plan of Care: 5+:  HIGH COMPLEXITY CLINICAL DECISION MAKING:  
[facility] AM-PAC 6 Clicks Daily Activity Inpatient Short Form How much help from another person does the patient currently need. .. Total A Lot A Little None 1. Putting on and taking off regular lower body clothing? ? 1   ? 2   ? 3   ? 4  
2. Bathing (including washing, rinsing, drying)? ? 1   ? 2   ? 3   ? 4  
3. Toileting, which includes using toilet, bedpan or urinal?   ? 1   ? 2   ? 3   ? 4  
4. Putting on and taking off regular upper body clothing? ? 1   ? 2   ? 3   ? 4  
5. Taking care of personal grooming such as brushing teeth? ? 1   ? 2   ? 3   ? 4  
6. Eating meals? ? 1   ? 2   ? 3   ? 4  
© 2007, Trustees of [facility], under license to [employer]. All rights reserved Score:  Initial: 9 Most Recent: X (Date: -- ) Interpretation of Tool:  Represents activities that are increasingly more difficult (i.e. Bed mobility, Transfers, Gait). Medical Necessity:    
· Patient is expected to demonstrate progress in balance, coordination and functional technique ·  to decrease assistance required with self care and functional mobility and improve safety during  self care and functional mobility · . Reason for Services/Other Comments: 
· Patient continues to require skilled intervention due to not at baseline function · . Use of outcome tool(s) and clinical judgement create a POC that gives a: HIGH COMPLEXITY  
 
 
 
TREATMENT:  
(In addition to Assessment/Re-Assessment sessions the following treatments were rendered) Pre-treatment Symptoms/Complaints:   
Pain: Initial:  
Pain Intensity 1: 0 0/10 Post Session:  0/10 Self Care: (25): Procedure(s) (per grid) utilized to improve and/or restore self-care/home management as related to dressing, bathing, toileting and grooming. Required maximal visual, verbal, manual and tactile cueing to facilitate activities of daily living skills and compensatory activities. Braces/Orthotics/Lines/Etc:  
· IV 
· O2 Device: Room air Treatment/Session Assessment:   
· Response to Treatment: more alert today and following directions better · Interdisciplinary Collaboration:  
o Occupational Therapist 
o Registered Nurse 
o Certified Nursing Assistant/Patient Care Technician · After treatment position/precautions:  
o Supine in bed 
o Bed alarm/tab alert on 
o Bed/Chair-wheels locked 
o Bed in low position 
o Caregiver at bedside 
o Call light within reach 
o Side rails x 3  
· Compliance with Program/Exercises: Compliant most of the time. · Recommendations/Intent for next treatment session: \"Next visit will focus on advancements to more challenging activities and reduction in assistance provided\". Total Treatment Duration:25 OT Patient Time In/Time Out Time In: 1130 Time Out: 1125 Zuhair Stokes OT

## 2019-06-10 NOTE — DISCHARGE SUMMARY
Discharge Summary Patient: Brady Chiu MRN: [id number]  SSN: [id number] YOB: 1940  Age: [de-identified] y.o. Sex: male Admit Date: 6/4/2019 Discharge Date: 6/10/2019 Admission Diagnoses: UTI (urinary tract infection) [N39.0] Metabolic encephalopathy [D68.36] Rhabdomyolysis [M62.82] Troponin I above reference range [R74.8] Discharge Diagnoses:  
Problem List as of 6/10/2019 Date Reviewed: 3/20/2019 Codes Class Noted - Resolved Atrial flutter with controlled response ([facility] [address] ICD-10-CM: V18.94 
ICD-9-CM: 427.32  6/5/2019 - Present SSS (sick sinus syndrome) (HCC) ICD-10-CM: I49.5 ICD-9-CM: 427.81  6/5/2019 - Present Traumatic orbital hematoma ICD-10-CM: S05. 10XA ICD-9-CM: 921.2  6/5/2019 - Present Metabolic encephalopathy WEK-57-FC: G93.41 
ICD-9-CM: 348.31  6/4/2019 - Present * (Principal) Rhabdomyolysis ICD-10-CM: M73.63 ICD-9-CM: 728.88  6/4/2019 - Present UTI (urinary tract infection) ICD-10-CM: N39.0 ICD-9-CM: 599.0  6/4/2019 - Present Troponin I above reference range ICD-10-CM: R74.8 ICD-9-CM: 790.6  6/4/2019 - Present E. coli UTI (urinary tract infection) ICD-10-CM: N39.0, B96.20 ICD-9-CM: 599.0, 041.49  9/28/2018 - Present Hyperkalemia ICD-10-CM: E87.5 ICD-9-CM: 276.7  9/25/2018 - Present Moderate dementia without behavioral disturbance ICD-10-CM: F03.90 ICD-9-CM: 290.0  7/26/2018 - Present Recurrent major depressive disorder, in full remission [facility]) ICD-10-CM: F33.42 
ICD-9-CM: 296.36  11/30/2017 - Present Vitamin B12 deficiency ICD-10-CM: E53.8 ICD-9-CM: 266.2  7/23/2017 - Present Acute metabolic encephalopathy RDR-65-QQ: G93.41 
ICD-9-CM: 348.31  7/22/2017 - Present Generalized weakness ICD-10-CM: R53.1 ICD-9-CM: 780.79  12/23/2016 - Present History of prostate cancer ICD-10-CM: Z85.46 
ICD-9-CM: V10.46  8/4/2014 - Present Depression (Chronic) ICD-10-CM: F32.9 ICD-9-CM: 428  8/28/2012 - Present Morbid obesity ([facility] [address] (Chronic) ICD-10-CM: E66.01 
ICD-9-CM: 278.01  8/28/2012 - Present Atrial fibrillation (HCC) (Chronic) ICD-10-CM: I48.91 
ICD-9-CM: 427.31  1/27/2012 - Present HTN (hypertension) (Chronic) ICD-10-CM: I10 
ICD-9-CM: 401.9  1/27/2012 - Present Osteoarthritis of right knee ICD-10-CM: M17.11 ICD-9-CM: 715.96  1/26/2012 - Present Total knee replacement status ICD-10-CM: M88.391 ICD-9-CM: V43.65  1/26/2012 - Present RESOLVED: Altered mental status ICD-10-CM: R41.82 
ICD-9-CM: 780.97  5/12/2018 - 9/25/2018 RESOLVED: Acute UTI ICD-10-CM: N39.0 ICD-9-CM: 599.0  5/12/2018 - 9/25/2018 RESOLVED: CAP (community acquired pneumonia) ICD-10-CM: J18.9 ICD-9-CM: 935  7/26/2017 - 9/25/2018 RESOLVED: Acute encephalopathy ICD-10-CM: G93.40 ICD-9-CM: 348.30  7/23/2017 - 9/25/2018 RESOLVED: Febrile illness, acute ICD-10-CM: R50.9 ICD-9-CM: 780.60  7/22/2017 - 9/25/2018 RESOLVED: Fall ICD-10-CM: W19. Víctor Forte ICD-9-CM: A794.2  7/22/2017 - 9/25/2018 RESOLVED: CA of prostate ([facility] [address] ICD-10-CM: G73 ICD-9-CM: 185  4/17/2017 - 9/25/2018 RESOLVED: Elevated troponin I level ICD-10-CM: R74.8 ICD-9-CM: 790.6  12/23/2016 - 9/25/2018 RESOLVED: Lactic acid acidosis ICD-10-CM: E87.2 ICD-9-CM: 276.2  12/23/2016 - 9/25/2018 RESOLVED: Hypernatremia ICD-10-CM: E87.0 ICD-9-CM: 276.0  12/23/2016 - 9/25/2018 RESOLVED: Age-related physical debility ICD-10-CM: R54 
ICD-9-CM: 389  2/6/2015 - 9/25/2018 RESOLVED: Syncope and collapse ICD-10-CM: R55 
ICD-9-CM: 780.2  8/28/2012 - 9/25/2018 RESOLVED: Symptomatic sinus bradycardia ICD-10-CM: R00.1 ICD-9-CM: 427.89  8/28/2012 - 9/25/2018 Discharge Condition: [facility] Hospital Course: This is an [de-identified] YO male patient with a PMH of CAD, Afib on anticoagulation, CHF, HTN, obesity, previous admissions for UTI,  who was found laying on the floor this morning by his daughter. He was confused and very weak. He had a large R periorbital hematoma and was brought to the ER. Here he was found to have normal VS.  His WBC count was 11.2K, Na 145, Cr 1.56, Ca 10.5, troponin 1.65, CK 1500. His UA was suggestive of UTI with positive nitrates, moderate leukocyte esterase, > 100 WBCs. A brain CT scan showed NO acute intra-cranial abnormality. He was started on IV fluids, IV ceftriaxone and was presented to the hospitalist for admission. Due to his fragile condition and elevated troponin level he was admitted to the telemetry floor. A álvarez catheter was inserted and he was kept on IV fluids to treat his Rhabdo. His CK went up to 4000, and his Troponin went up to 5.64. Cardiology was consulted. NO acute intervention was recommended. Pradaxa  was held due to the severe facial and ocular trauma with large ocular hematoma. Ophthalmology was consulted and considered there was no severe damage to the ocular globe and the patient should recover his normal anatomy within the next weeks/ days. He should follow up with his regular eye doctor once his condition improves. He was found to have UTI due to E.coli. He received IV ceftriaxone for 5 days and was switched to oral Vantin on 6/9. He will need to complete 4 more days of oral antibiotics. His Álvarez catheter was removed on 6/10. Will need to monitor his urine output and bladder scans. If he develops retention may need to reinsert his álvarez until he is more mobile and strong. His Pradaxa could be resumed on 6/12 if his facial / ocular hematoma is stable. He is at risk of DVT. He has been kept on SQ heparin while in the hospital. He could receive SQ lovenox 40 mg SQ daily as DVT ppx until his Pradaxa is resumed.  Once he completes her treatment for the present episode of UTI he could receive prophylactic nitrofurantoin 100 mg po daily to avoid or decrease the frequency of future infections. He is very weak and his prognosis is guarded. At wali risk of re-admission. He has remained delirious with  Some confusion and poor interaction, but he is able to answer simple  Questions, eat by himself and follow simple commands. HE IS A DNR patient. PE: 
 
GENERAL: fatigued, appears stated age EYE: R eye hematoma LYMPHATIC: Cervical, supraclavicular, and axillary nodes normal.  
THROAT & NECK: normal and no erythema or exudates noted. LUNG: clear to auscultation bilaterally HEART: regular rate and rhythm, S1, S2 normal, no murmur, click, rub or gallop ABDOMEN: soft, non-tender. Bowel sounds normal. No masses,  no organomegaly EXTREMITIES:  extremities normal, atraumatic, no cyanosis or edema SKIN: Normal. 
NEUROLOGIC: No focal deficits PSYCHIATRIC: non focal 
 
 
Consults: Cardiology Significant Diagnostic Studies: see hospital course Disposition: rehab Discharge Medications:  
Current Discharge Medication List  
  
START taking these medications Details  
cefpodoxime (VANTIN) 100 mg tablet Take 1 Tab by mouth every twelve (12) hours for 4 days. Qty: 8 Tab, Refills: 0  
  
metoprolol tartrate (LOPRESSOR) 25 mg tablet Take 1 Tab by mouth two (2) times a day. Qty: 60 Tab, Refills: 0  
  
furosemide (LASIX) 40 mg tablet Take 40 mg po daily PRN ONLY for evidence of fluid retention 
Qty: 15 Tab, Refills: 0 CONTINUE these medications which have CHANGED Details  
tamsulosin (FLOMAX) 0.4 mg capsule Take 1 Cap by mouth nightly. Qty: 30 Cap, Refills: 0 CONTINUE these medications which have NOT CHANGED Details  
donepezil (ARICEPT) 5 mg tablet TAKE 1 TABLET BY MOUTH AT BEDTIME Qty: 90 Tab, Refills: 0 Associated Diagnoses: Mild dementia  
  
polyethylene glycol (MIRALAX) 17 gram/dose powder Take 17 g by mouth daily.  Indications: constipation  
  
multivitamin, tx-iron-ca-min (THERA-M W/ IRON) 9 mg iron-400 mcg tab tablet Take 1 Tab by mouth daily. levomilnacipran (FETZIMA) 40 mg ER capsule TAKE 1 CAP BY MOUTH DAILY. Qty: 90 Cap, Refills: 3 Associated Diagnoses: Other depression  
  
bicalutamide (CASODEX) 50 mg tablet Take 1 Tab by mouth daily. Qty: 90 Tab, Refills: 3 Associated Diagnoses: CA of prostate ([address] famotidine (PEPCID) 20 mg tablet Take 1 Tab by mouth two (2) times a day. Qty: 180 Tab, Refills: 3 Associated Diagnoses: Gastroesophageal reflux disease without esophagitis ARIPiprazole (ABILIFY) 10 mg tablet Take 1 Tab by mouth daily. Qty: 90 Tab, Refills: 3 Associated Diagnoses: Major depressive disorder with single episode, in remission ([address] celecoxib (CELEBREX) 200 mg capsule Take  by mouth two (2) times a day. senna-docusate (SENNA LAXATIVE-STOOL SOFTENER) 8.6-50 mg per tablet Take 1 Tab by mouth daily. aspirin 81 mg chewable tablet Take 1 Tab by mouth daily. Indications: prevention of cerebrovascular accident Qty: 30 Tab, Refills: 0  
  
acetaminophen (TYLENOL ARTHRITIS PAIN) 650 mg CR tablet Take 650 mg by mouth every six (6) hours as needed for Pain. STOP taking these medications  
  
 dabigatran etexilate (PRADAXA) 150 mg capsule Comments:  
Reason for Stopping:   
   
 zolpidem (AMBIEN) 5 mg tablet Comments:  
Reason for Stopping:   
   
 diclofenac (VOLTAREN) 1 % gel Comments:  
Reason for Stopping:   
   
  
 
 
Activity: Activity as tolerated Diet: Cardiac Diet Wound Care: None needed Follow-up Appointments Procedures  FOLLOW UP VISIT Appointment in: Ten Days pcp  
  pcp Standing Status:   Standing Number of Occurrences:   1 Order Specific Question:   Appointment in Answer:   Ten Days Signed By: Justina Hadley MD   
 Matilde 10, 2019

## 2019-06-10 NOTE — PROGRESS NOTES
Problem: Falls - Risk of 
Goal: *Absence of Falls Description Document Naina Getting Fall Risk and appropriate interventions in the flowsheet.  
Outcome: Progressing Towards Goal

## 2019-06-10 NOTE — PROGRESS NOTES
Problem: Falls - Risk of 
Goal: *Absence of Falls Description Document Norma Brew Fall Risk and appropriate interventions in the flowsheet. Outcome: Progressing Towards Goal 
  
Problem: Pressure Injury - Risk of 
Goal: *Prevention of pressure injury Description Document Maxwell Scale and appropriate interventions in the flowsheet. Outcome: Progressing Towards Goal 
  
Problem: Nutrition Deficit Goal: *Optimize nutritional status Outcome: Progressing Towards Goal

## 2019-06-10 NOTE — PROGRESS NOTES
Shift assessment complete. Pt resting in bed. Alert to person and place, disoriented to time, situation. Respirations present, even, unlabored. Lung sounds diminished on ausculation. Tele box in place. Right eye swollen and red. Bilateral edema to lower extremities. IV capped and patent. Trevizo draining jessica colored urine. Bed alarm in place, bed in lowest postion, call light within reach, side rails x3. Encouraged to call for help when needed.

## 2019-06-10 NOTE — PROGRESS NOTES
Report called to Adeline Marie at [facility]. Chance given to ask questions and answers provided. IV removed, tip intact.

## 2019-06-11 ENCOUNTER — PATIENT OUTREACH (OUTPATIENT)
Dept: CASE MANAGEMENT | Age: 79
End: 2019-06-11

## 2019-06-11 NOTE — PROGRESS NOTES
This note will not be viewable in [address] Patient discharged to [facility] on 6/10/19. Patient discharged to a  [facility] facility. Patient will be included in weekly care coordination calls. Information forwarded to Suzan Cortez RN,  [facility] RN Care Manager.

## 2019-06-12 ENCOUNTER — PATIENT OUTREACH (OUTPATIENT)
Dept: CASE MANAGEMENT | Age: 79
End: 2019-06-12

## 2019-06-13 ENCOUNTER — PATIENT OUTREACH (OUTPATIENT)
Dept: CASE MANAGEMENT | Age: 79
End: 2019-06-13

## 2019-06-13 NOTE — PROGRESS NOTES
Community Care Team documentation for patient in [facility] The information below provided by:[facility] PT Update: TOTAL A TRANSFERS AND ADL'S - ADDRESSING POSITIONING Nursing Update:requires 3 CNA with transfers, AMS, very weak Discharge Date:TBD Assign to [address]

## 2019-06-13 NOTE — PROGRESS NOTES
This note will not be viewable in [address] RNCM notified by SNF RNCM of pt dc to [facility] on 6/10/19, will follow for PRASANNA to home.

## 2019-06-26 ENCOUNTER — PATIENT OUTREACH (OUTPATIENT)
Dept: CASE MANAGEMENT | Age: 79
End: 2019-06-26

## 2019-06-26 NOTE — PROGRESS NOTES
Community Care Team documentation for patient in [facility] The information below provided by:[facility] PT Update: 50FT/CGA/FWW - CGA TRANSFERS MIN TO MOD A ADL'S PT/OT 7/2 Nursing [place] concerns, self propels w/c, voices needs: 
 
 
Discharge Date:7/3/19 W/ [facility] Assign to [address] [place] [address] Road

## 2019-07-05 ENCOUNTER — PATIENT OUTREACH (OUTPATIENT)
Dept: CASE MANAGEMENT | Age: 79
End: 2019-07-05

## 2019-07-05 NOTE — PROGRESS NOTES
Outreach for TOBI GUEVARA post SNF. No answer at primary number or daughter. Left message requesting return call. Will f/u on 7.8.19

## 2019-07-07 ENCOUNTER — HOSPITAL ENCOUNTER (EMERGENCY)
Age: 79
Discharge: HOME | End: 2019-07-08
Attending: EMERGENCY MEDICINE
Payer: MEDICARE

## 2019-07-07 DIAGNOSIS — R53.83 FATIGUE, UNSPECIFIED TYPE: ICD-10-CM

## 2019-07-07 DIAGNOSIS — R31.9 URINARY TRACT INFECTION WITH HEMATURIA, SITE UNSPECIFIED: Primary | ICD-10-CM

## 2019-07-07 DIAGNOSIS — N39.0 URINARY TRACT INFECTION WITH HEMATURIA, SITE UNSPECIFIED: Primary | ICD-10-CM

## 2019-07-07 LAB
ALBUMIN SERPL-MCNC: 3.4 G/DL (ref 3.2–4.6)
ALBUMIN/GLOB SERPL: 0.8 {RATIO} (ref 1.2–3.5)
ALP SERPL-CCNC: 136 U/L (ref 50–136)
ALT SERPL-CCNC: 20 U/L (ref 12–65)
ANION GAP SERPL CALC-SCNC: 7 MMOL/L (ref 7–16)
APPEARANCE UR: ABNORMAL
AST SERPL-CCNC: 24 U/L (ref 15–37)
BACTERIA URNS QL MICRO: ABNORMAL /HPF
BASOPHILS # BLD: 0 K/UL (ref 0–0.2)
BASOPHILS NFR BLD: 0 % (ref 0–2)
BILIRUB SERPL-MCNC: 0.8 MG/DL (ref 0.2–1.1)
BILIRUB UR QL: ABNORMAL
BUN SERPL-MCNC: 14 MG/DL (ref 8–23)
CALCIUM SERPL-MCNC: 10.3 MG/DL (ref 8.3–10.4)
CASTS URNS QL MICRO: ABNORMAL /LPF
CHLORIDE SERPL-SCNC: 110 MMOL/L (ref 98–107)
CO2 SERPL-SCNC: 26 MMOL/L (ref 21–32)
COLOR UR: ABNORMAL
CREAT SERPL-MCNC: 0.95 MG/DL (ref 0.8–1.5)
DIFFERENTIAL METHOD BLD: NORMAL
EOSINOPHIL # BLD: 0.1 K/UL (ref 0–0.8)
EOSINOPHIL NFR BLD: 1 % (ref 0.5–7.8)
EPI CELLS #/AREA URNS HPF: 0 /HPF
ERYTHROCYTE [DISTWIDTH] IN BLOOD BY AUTOMATED COUNT: 13.9 % (ref 11.9–14.6)
GLOBULIN SER CALC-MCNC: 4.2 G/DL (ref 2.3–3.5)
GLUCOSE SERPL-MCNC: 94 MG/DL (ref 65–100)
GLUCOSE UR STRIP.AUTO-MCNC: NEGATIVE MG/DL
HCT VFR BLD AUTO: 44.4 % (ref 41.1–50.3)
HGB BLD-MCNC: 14.7 G/DL (ref 13.6–17.2)
HGB UR QL STRIP: ABNORMAL
IMM GRANULOCYTES # BLD AUTO: 0 K/UL (ref 0–0.5)
IMM GRANULOCYTES NFR BLD AUTO: 0 % (ref 0–5)
KETONES UR QL STRIP.AUTO: NEGATIVE MG/DL
LEUKOCYTE ESTERASE UR QL STRIP.AUTO: ABNORMAL
LYMPHOCYTES # BLD: 1 K/UL (ref 0.5–4.6)
LYMPHOCYTES NFR BLD: 16 % (ref 13–44)
MCH RBC QN AUTO: 30.4 PG (ref 26.1–32.9)
MCHC RBC AUTO-ENTMCNC: 33.1 G/DL (ref 31.4–35)
MCV RBC AUTO: 91.9 FL (ref 79.6–97.8)
MONOCYTES # BLD: 0.6 K/UL (ref 0.1–1.3)
MONOCYTES NFR BLD: 9 % (ref 4–12)
NEUTS SEG # BLD: 4.7 K/UL (ref 1.7–8.2)
NEUTS SEG NFR BLD: 73 % (ref 43–78)
NITRITE UR QL STRIP.AUTO: POSITIVE
NRBC # BLD: 0 K/UL (ref 0–0.2)
PH UR STRIP: 5 [PH] (ref 5–9)
PLATELET # BLD AUTO: 172 K/UL (ref 150–450)
PMV BLD AUTO: 10.3 FL (ref 9.4–12.3)
POTASSIUM SERPL-SCNC: 3.6 MMOL/L (ref 3.5–5.1)
PROT SERPL-MCNC: 7.6 G/DL (ref 6.3–8.2)
PROT UR STRIP-MCNC: 30 MG/DL
RBC # BLD AUTO: 4.83 M/UL (ref 4.23–5.6)
RBC #/AREA URNS HPF: ABNORMAL /HPF
SODIUM SERPL-SCNC: 143 MMOL/L (ref 136–145)
SP GR UR REFRACTOMETRY: 1.02 (ref 1–1.02)
UROBILINOGEN UR QL STRIP.AUTO: 1 EU/DL (ref 0.2–1)
WBC # BLD AUTO: 6.4 K/UL (ref 4.3–11.1)
WBC URNS QL MICRO: >100 /HPF

## 2019-07-07 PROCEDURE — 74011250636 HC RX REV CODE- 250/636: Performed by: EMERGENCY MEDICINE

## 2019-07-07 PROCEDURE — 99285 EMERGENCY DEPT VISIT HI MDM: CPT | Performed by: EMERGENCY MEDICINE

## 2019-07-07 PROCEDURE — 81001 URINALYSIS AUTO W/SCOPE: CPT

## 2019-07-07 PROCEDURE — 80053 COMPREHEN METABOLIC PANEL: CPT

## 2019-07-07 PROCEDURE — 87086 URINE CULTURE/COLONY COUNT: CPT

## 2019-07-07 PROCEDURE — 85025 COMPLETE CBC W/AUTO DIFF WBC: CPT

## 2019-07-07 PROCEDURE — 87186 SC STD MICRODIL/AGAR DIL: CPT

## 2019-07-07 PROCEDURE — 87088 URINE BACTERIA CULTURE: CPT

## 2019-07-07 PROCEDURE — 74011000258 HC RX REV CODE- 258: Performed by: EMERGENCY MEDICINE

## 2019-07-07 PROCEDURE — 96365 THER/PROPH/DIAG IV INF INIT: CPT | Performed by: EMERGENCY MEDICINE

## 2019-07-07 PROCEDURE — 93005 ELECTROCARDIOGRAM TRACING: CPT | Performed by: EMERGENCY MEDICINE

## 2019-07-07 RX ORDER — CEFPODOXIME PROXETIL 100 MG/1
100 TABLET, FILM COATED ORAL 2 TIMES DAILY
Qty: 20 TAB | Refills: 0 | Status: SHIPPED | OUTPATIENT
Start: 2019-07-07 | End: 2019-08-01

## 2019-07-07 RX ADMIN — CEFTRIAXONE 1 G: 1 INJECTION, POWDER, FOR SOLUTION INTRAMUSCULAR; INTRAVENOUS at 23:40

## 2019-07-08 ENCOUNTER — PATIENT OUTREACH (OUTPATIENT)
Dept: CASE MANAGEMENT | Age: 79
End: 2019-07-08

## 2019-07-08 VITALS
WEIGHT: 257 LBS | TEMPERATURE: 98.7 F | BODY MASS INDEX: 32.98 KG/M2 | RESPIRATION RATE: 18 BRPM | SYSTOLIC BLOOD PRESSURE: 155 MMHG | OXYGEN SATURATION: 98 % | HEIGHT: 74 IN | HEART RATE: 60 BPM | DIASTOLIC BLOOD PRESSURE: 66 MMHG

## 2019-07-08 LAB
ATRIAL RATE: 416 BPM
CALCULATED R AXIS, ECG10: -99 DEGREES
CALCULATED T AXIS, ECG11: 80 DEGREES
DIAGNOSIS, 93000: NORMAL
Q-T INTERVAL, ECG07: 468 MS
QRS DURATION, ECG06: 148 MS
QTC CALCULATION (BEZET), ECG08: 490 MS
VENTRICULAR RATE, ECG03: 66 BPM

## 2019-07-08 NOTE — PROGRESS NOTES
This note will not be viewable in [address] Transition of Care Discharge Follow-up Questionnaire Date/Time of Call: 7/8/19    324 What was the patient hospitalized for? Dc 6/10 to [facility] s/p fall w/ rhabdo, hx HF Does the patient understand his/her diagnosis and/or treatment and what happened during the hospitalization? RNCM called pt and dgtr, no answer. RNCM left vm for dgtr w/ request to return call. Will continue attempts to reach pt Did the patient receive discharge instructions? CM Assessed Risk for Readmission:  
 
 
Patient stated Risk for Readmission:    
Review any discharge instructions (see discharge instructions/AVS in [facility]). Ask patient if they understand these. Do they have any questions? Were home services ordered (nursing, PT, OT, ST, etc.)? If so, has the first visit occurred? If not, why? (Assist with coordination of services if necessary.) Was any DME ordered? If so, has it been received? If not, why?  (Assist patient in obtaining DME orders &/or equipment if necessary.) Complete a review of all medications (new, continued and discontinued meds per the D/C instructions and medication tab in [address] [place] [address]). Were all new prescriptions filled? If not, why?  (Assist patient in obtaining medications if necessary  escalate for CCM &/or SW if ongoing issues are verbalized by pt or anticipated) Does the patient understand the purpose and dosing instructions for all medications? (If patient has questions, provide explanation and education.) Does the patient have any problems in performing ADLs? (If patient is unable to perform ADLs  what is the limiting factor(s)? Do they have a support system that can assist? If no support system is present, discuss possible assistance that they may be able to obtain. Escalate for CCM/SW if ongoing issues are verbalized by pt or anticipated) Does the patient have all follow-up appointments scheduled? 7 day f/up with PCP?  
(f/up with PCP may be w/in 14 days if patient has a f/up with their specialist w/in 7 days) 7-14 day f/up with specialist?  
(or per discharge instructions) If f/up has not been made  what actions has the care coordinator made to accomplish this? Has transportation been arranged? Any other questions or concerns expressed by the patient? Schedule next appointment with TOBI GUEVARA Coordinator or refer to RN Case Manager/ per the workflow guidelines. When is care coordinators next follow-up call scheduled? If referred for CCM  what RN care manager was the referral assigned? PRASANNA Call Completed By: Marisol Purcell, RN, BSN Community Nurse Care Manager

## 2019-07-08 NOTE — ED NOTES
I have reviewed discharge instructions with the patient. The patient verbalized understanding. Patient left ED via Discharge Method: stretcher to Home with [facility]. Opportunity for questions and clarification provided. Patient given 1 scripts. To continue your aftercare when you leave the hospital, you may receive an automated call from our care team to check in on how you are doing. This is a free service and part of our promise to provide the best care and service to meet your aftercare needs.  If you have questions, or wish to unsubscribe from this service please call [phone]. Thank you for Choosing our [facility] Emergency Department.

## 2019-07-08 NOTE — DISCHARGE INSTRUCTIONS
Follow-up urine culture results with your doctor to ensure adequate antibiotic selection. First dose of antibiotic was given in the emergency department is good for 24 hours. Take next dose tomorrow. Follow up with urology for further evaluation of frequent bladder infections. Return for worsening or concerning symptoms.

## 2019-07-08 NOTE — ED TRIAGE NOTES
Pt in via EMS from home with complaints of increased weakness with history of UTIs. Pt has a pacemaker.

## 2019-07-08 NOTE — ED PROVIDER NOTES
64-year-old male presents from home with generalized weakness. He lives with his daughter. He has a history of UTI. He actually has no complaints. He denies any burning with urination, abdominal pain, chest pain, fever or vomiting. Fatigue Pertinent negatives include no shortness of breath, no chest pain, no vomiting, no confusion, no headaches and no nausea. Past Medical History:  
Diagnosis Date  Arrhythmia   
 pt takes pradaxa  Arthritis  Atrial fibrillation ([facility] [address] 1/27/2012  CAD (coronary artery disease)  Cancer [facility])   
 prostate  Dementia  GERD (gastroesophageal reflux disease)   
 controlled with medication  Heart failure ([address]   
 pt takes lasix as needed, reports SOB with exertion  Hypertension   
 controlled with medication  Morbid obesity ([facility] [address] Past Surgical History:  
Procedure Laterality Date  HX ORTHOPAEDIC  2010  
 left TKA  HX PACEMAKER  8/30/2012 [employer] pacemaker  HX PROSTATECTOMY Family History:  
Problem Relation Age of Onset  Cancer Father Social History Socioeconomic History  Marital status:  Spouse name: Not on file  Number of children: Not on file  Years of education: Not on file  Highest education level: Not on file Occupational History  Not on file Social Needs  Financial resource strain: Not on file  Food insecurity:  
  Worry: Not on file Inability: Not on file  Transportation needs:  
  Medical: Not on file Non-medical: Not on file Tobacco Use  Smoking status: Never Smoker  Smokeless tobacco: Never Used Substance and Sexual Activity  Alcohol use: No  
 Drug use: No  
 Sexual activity: Never Lifestyle  Physical activity:  
  Days per week: Not on file Minutes per session: Not on file  Stress: Not on file Relationships  Social connections:  
  Talks on phone: Not on file Gets together: Not on file Attends Oriental orthodox service: Not on file Active member of club or organization: Not on file Attends meetings of clubs or organizations: Not on file Relationship status: Not on file  Intimate partner violence:  
  Fear of current or ex partner: Not on file Emotionally abused: Not on file Physically abused: Not on file Forced sexual activity: Not on file Other Topics Concern  Not on file Social History Narrative  Not on file ALLERGIES: Patient has no known allergies. Review of Systems Constitutional: Positive for fatigue. Negative for chills and fever. HENT: Negative for hearing loss. Eyes: Negative for visual disturbance. Respiratory: Negative for cough and shortness of breath. Cardiovascular: Negative for chest pain and palpitations. Gastrointestinal: Negative for abdominal pain, diarrhea, nausea and vomiting. Genitourinary: Negative for dysuria. Musculoskeletal: Negative for back pain. Skin: Negative for rash. Neurological: Negative for weakness and headaches. Psychiatric/Behavioral: Negative for confusion. Vitals:  
 07/07/19 2230 07/07/19 2240 BP: 137/57 139/63 Pulse: 64 Resp: 18 Temp: 98.7 °F (37.1 °C) SpO2: 98% 97% Weight: 116.6 kg (257 lb) Height: 6' 2\" (1.88 m) Physical Exam  
Constitutional: He appears well-developed and well-nourished. HENT:  
Head: Normocephalic and atraumatic. Eyes: Pupils are equal, round, and reactive to light. EOM are normal.  
Neck: Normal range of motion. Neck supple. Cardiovascular: Regular rhythm and normal heart sounds. Pulmonary/Chest: Effort normal and breath sounds normal.  
Abdominal: Soft. There is no tenderness. Musculoskeletal: Normal range of motion. Neurological:  
Staring, flat affect. Answers all questions appropriately however. Oriented ×3 Skin: Skin is warm and dry. Psychiatric: His behavior is normal.  
Nursing note and vitals reviewed. MDM 
Number of Diagnoses or Management Options Fatigue, unspecified type:  
Urinary tract infection with hematuria, site unspecified:  
Diagnosis management comments: Parts of this document were created using dragon voice recognition software. The chart has been reviewed but errors may still be present. 11:46 PM 
frequent urinary tract infections. Advised follow-up with urology. Last culture showed Escherichia coli resistant to Bactrim and first generation cephalosporin. Given Rocephin and Vantin. I discussed the results of all labs, procedures, radiographs, and treatments with the patient and available family. Treatment plan is agreed upon and the patient is ready for discharge. Questions about treatment in the ED and differential diagnosis of presenting condition were answered. Patient was given verbal discharge instructions including, but not limited to, importance of returning to the emergency department for any concern of worsening or continued symptoms. Instructions were given to follow up with a primary care provider or specialist within 1-2 days. Adverse effects of medications, if prescribed, were discussed and patient was advised to refrain from significant physical activity until followed up by primary care physician and to not drive or operate heavy machinery after taking any sedating substances. Amount and/or Complexity of Data Reviewed Clinical lab tests: reviewed and ordered (Results for orders placed or performed during the hospital encounter of 07/07/19 
-URINALYSIS W/ RFLX MICROSCOPIC Result                      Value             Ref Range Color                       VIRGIL Appearance                  CLOUDY Specific gravity            1.016             1.001 - 1.02* pH (UA)                     5.0               5.0 - 9.0 Protein                     30 (A)            NEG mg/dL Glucose                     NEGATIVE          mg/dL Ketone                      NEGATIVE          NEG mg/dL Bilirubin                   SMALL (A)         NEG Blood                       MODERATE (A)      NEG Urobilinogen                1.0               0.2 - 1.0 EU* Nitrites                    POSITIVE (A)      NEG Leukocyte Esterase          LARGE (A)         NEG           
     WBC                         >100 (H)          0 /hpf        
     RBC                         20-50             0 /hpf Epithelial cells            0                 0 /hpf Bacteria                    4+ (H)            0 /hpf Casts                       0-3               0 /lpf        
-CBC WITH AUTOMATED DIFF Result                      Value             Ref Range WBC                         6.4               4.3 - 11.1 K* 
     RBC                         4.83              4.23 - 5.6 M* HGB                         14.7              13.6 - 17.2 * HCT                         44.4              41.1 - 50.3 % MCV                         91.9              79.6 - 97.8 * MCH                         30.4              26.1 - 32.9 * MCHC                        33.1              31.4 - 35.0 * RDW                         13.9              11.9 - 14.6 % PLATELET                    172               150 - 450 K/* MPV                         10.3              9.4 - 12.3 FL ABSOLUTE NRBC               0.00              0.0 - 0.2 K/* DF                          AUTOMATED NEUTROPHILS                 73                43 - 78 % LYMPHOCYTES                 16                13 - 44 %      MONOCYTES                   9                 4.0 - 12.0 %  
 EOSINOPHILS                 1                 0.5 - 7.8 % BASOPHILS                   0                 0.0 - 2.0 % IMMATURE GRANULOCYTES       0                 0.0 - 5.0 %   
     ABS. NEUTROPHILS            4.7               1.7 - 8.2 K/* ABS. LYMPHOCYTES            1.0               0.5 - 4.6 K/* ABS. MONOCYTES              0.6               0.1 - 1.3 K/* ABS. EOSINOPHILS            0.1               0.0 - 0.8 K/* ABS. BASOPHILS              0.0               0.0 - 0.2 K/* ABS. IMM. GRANS.            0.0               0.0 - 0.5 K/* 
-METABOLIC PANEL, COMPREHENSIVE Result                      Value             Ref Range Sodium                      143               136 - 145 mm* Potassium                   3.6               3.5 - 5.1 mm* Chloride                    110 (H)           98 - 107 mmo* CO2                         26                21 - 32 mmol* Anion gap                   7                 7 - 16 mmol/L Glucose                     94                65 - 100 mg/* BUN                         14                8 - 23 MG/DL Creatinine                  0.95              0.8 - 1.5 MG* 
     GFR est AA                  >60               >60 ml/min/1* GFR est non-AA              >60               >60 ml/min/1* Calcium                     10.3              8.3 - 10.4 M* Bilirubin, total            0.8               0.2 - 1.1 MG* ALT (SGPT)                  20                12 - 65 U/L   
     AST (SGOT)                  24                15 - 37 U/L Alk. phosphatase            136               50 - 136 U/L Protein, total              7.6               6.3 - 8.2 g/* Albumin                     3.4               3.2 - 4.6 g/* Globulin                    4.2 (H)           2.3 - 3.5 g/* A-G Ratio                   0.8 (L)           1.2 - 3.5     
) Tests in the medicine section of CPT®: ordered and reviewed Procedures

## 2019-07-11 LAB
BACTERIA SPEC CULT: ABNORMAL
BACTERIA SPEC CULT: ABNORMAL
SERVICE CMNT-IMP: ABNORMAL

## 2019-07-29 ENCOUNTER — APPOINTMENT (OUTPATIENT)
Dept: GENERAL RADIOLOGY | Age: 79
DRG: 689 | End: 2019-07-29
Attending: EMERGENCY MEDICINE
Payer: MEDICARE

## 2019-07-29 ENCOUNTER — HOSPITAL ENCOUNTER (INPATIENT)
Age: 79
LOS: 3 days | Discharge: HOME HEALTH | DRG: 689 | End: 2019-08-01
Attending: EMERGENCY MEDICINE | Admitting: FAMILY MEDICINE
Payer: MEDICARE

## 2019-07-29 DIAGNOSIS — R82.81 STERILE PYURIA: ICD-10-CM

## 2019-07-29 DIAGNOSIS — R31.29 OTHER MICROSCOPIC HEMATURIA: ICD-10-CM

## 2019-07-29 DIAGNOSIS — A41.9 SEPSIS, DUE TO UNSPECIFIED ORGANISM: ICD-10-CM

## 2019-07-29 DIAGNOSIS — R77.8 ELEVATED TROPONIN: ICD-10-CM

## 2019-07-29 DIAGNOSIS — L03.119 CELLULITIS OF LOWER EXTREMITY, UNSPECIFIED LATERALITY: Primary | ICD-10-CM

## 2019-07-29 PROBLEM — B96.20 E. COLI UTI (URINARY TRACT INFECTION): Status: RESOLVED | Noted: 2018-09-28 | Resolved: 2019-07-29

## 2019-07-29 PROBLEM — E87.5 HYPERKALEMIA: Status: RESOLVED | Noted: 2018-09-25 | Resolved: 2019-07-29

## 2019-07-29 PROBLEM — N39.0 E. COLI UTI (URINARY TRACT INFECTION): Status: RESOLVED | Noted: 2018-09-28 | Resolved: 2019-07-29

## 2019-07-29 PROBLEM — S05.10XA TRAUMATIC ORBITAL HEMATOMA: Status: RESOLVED | Noted: 2019-06-05 | Resolved: 2019-07-29

## 2019-07-29 PROBLEM — M62.82 RHABDOMYOLYSIS: Status: RESOLVED | Noted: 2019-06-04 | Resolved: 2019-07-29

## 2019-07-29 PROBLEM — G93.41 METABOLIC ENCEPHALOPATHY: Status: RESOLVED | Noted: 2019-06-04 | Resolved: 2019-07-29

## 2019-07-29 PROBLEM — G93.41 ACUTE METABOLIC ENCEPHALOPATHY: Status: RESOLVED | Noted: 2017-07-22 | Resolved: 2019-07-29

## 2019-07-29 PROBLEM — F33.42 RECURRENT MAJOR DEPRESSIVE DISORDER, IN FULL REMISSION (HCC): Status: RESOLVED | Noted: 2017-11-30 | Resolved: 2019-07-29

## 2019-07-29 PROBLEM — I48.92 ATRIAL FLUTTER WITH CONTROLLED RESPONSE (HCC): Status: RESOLVED | Noted: 2019-06-05 | Resolved: 2019-07-29

## 2019-07-29 PROBLEM — E53.8 VITAMIN B12 DEFICIENCY: Status: RESOLVED | Noted: 2017-07-23 | Resolved: 2019-07-29

## 2019-07-29 LAB
ALBUMIN SERPL-MCNC: 3.4 G/DL (ref 3.2–4.6)
ALBUMIN/GLOB SERPL: 0.8 {RATIO} (ref 1.2–3.5)
ALP SERPL-CCNC: 127 U/L (ref 50–136)
ALT SERPL-CCNC: 19 U/L (ref 12–65)
ANION GAP SERPL CALC-SCNC: 5 MMOL/L (ref 7–16)
APPEARANCE UR: ABNORMAL
AST SERPL-CCNC: 24 U/L (ref 15–37)
ATRIAL RATE: 80 BPM
BACTERIA URNS QL MICRO: 0 /HPF
BILIRUB SERPL-MCNC: 0.9 MG/DL (ref 0.2–1.1)
BILIRUB UR QL: NEGATIVE
BUN SERPL-MCNC: 10 MG/DL (ref 8–23)
CALCIUM SERPL-MCNC: 9.8 MG/DL (ref 8.3–10.4)
CALCULATED R AXIS, ECG10: -59 DEGREES
CALCULATED T AXIS, ECG11: -72 DEGREES
CASTS URNS QL MICRO: ABNORMAL /LPF
CHLORIDE SERPL-SCNC: 108 MMOL/L (ref 98–107)
CO2 SERPL-SCNC: 27 MMOL/L (ref 21–32)
COLOR UR: YELLOW
CREAT SERPL-MCNC: 1.03 MG/DL (ref 0.8–1.5)
DIAGNOSIS, 93000: NORMAL
EPI CELLS #/AREA URNS HPF: ABNORMAL /HPF
ERYTHROCYTE [DISTWIDTH] IN BLOOD BY AUTOMATED COUNT: 13.8 % (ref 11.9–14.6)
GLOBULIN SER CALC-MCNC: 4.2 G/DL (ref 2.3–3.5)
GLUCOSE SERPL-MCNC: 92 MG/DL (ref 65–100)
GLUCOSE UR STRIP.AUTO-MCNC: NEGATIVE MG/DL
HCT VFR BLD AUTO: 45.2 % (ref 41.1–50.3)
HGB BLD-MCNC: 15 G/DL (ref 13.6–17.2)
HGB UR QL STRIP: ABNORMAL
KETONES UR QL STRIP.AUTO: NEGATIVE MG/DL
LACTATE BLD-SCNC: 2.24 MMOL/L (ref 0.5–1.9)
LEUKOCYTE ESTERASE UR QL STRIP.AUTO: ABNORMAL
MCH RBC QN AUTO: 30.8 PG (ref 26.1–32.9)
MCHC RBC AUTO-ENTMCNC: 33.2 G/DL (ref 31.4–35)
MCV RBC AUTO: 92.8 FL (ref 79.6–97.8)
NITRITE UR QL STRIP.AUTO: NEGATIVE
NRBC # BLD: 0 K/UL (ref 0–0.2)
OTHER OBSERVATIONS,UCOM: ABNORMAL
P-R INTERVAL, ECG05: 80 MS
PH UR STRIP: 6.5 [PH] (ref 5–9)
PLATELET # BLD AUTO: 165 K/UL (ref 150–450)
PMV BLD AUTO: 10.4 FL (ref 9.4–12.3)
POTASSIUM SERPL-SCNC: 4 MMOL/L (ref 3.5–5.1)
PROT SERPL-MCNC: 7.6 G/DL (ref 6.3–8.2)
PROT UR STRIP-MCNC: 30 MG/DL
Q-T INTERVAL, ECG07: 372 MS
QRS DURATION, ECG06: 106 MS
QTC CALCULATION (BEZET), ECG08: 429 MS
RBC # BLD AUTO: 4.87 M/UL (ref 4.23–5.6)
RBC #/AREA URNS HPF: >100 /HPF
SODIUM SERPL-SCNC: 140 MMOL/L (ref 136–145)
SP GR UR REFRACTOMETRY: 1.01 (ref 1–1.02)
TROPONIN I SERPL-MCNC: 0.55 NG/ML (ref 0.02–0.05)
TROPONIN I SERPL-MCNC: 0.58 NG/ML (ref 0.02–0.05)
TROPONIN I SERPL-MCNC: 0.66 NG/ML (ref 0.02–0.05)
UROBILINOGEN UR QL STRIP.AUTO: 1 EU/DL (ref 0.2–1)
VENTRICULAR RATE, ECG03: 80 BPM
WBC # BLD AUTO: 8.5 K/UL (ref 4.3–11.1)
WBC URNS QL MICRO: >100 /HPF

## 2019-07-29 PROCEDURE — 84484 ASSAY OF TROPONIN QUANT: CPT

## 2019-07-29 PROCEDURE — 74011250636 HC RX REV CODE- 250/636: Performed by: EMERGENCY MEDICINE

## 2019-07-29 PROCEDURE — 87186 SC STD MICRODIL/AGAR DIL: CPT

## 2019-07-29 PROCEDURE — 80053 COMPREHEN METABOLIC PANEL: CPT

## 2019-07-29 PROCEDURE — 87086 URINE CULTURE/COLONY COUNT: CPT

## 2019-07-29 PROCEDURE — 65660000000 HC RM CCU STEPDOWN

## 2019-07-29 PROCEDURE — 81001 URINALYSIS AUTO W/SCOPE: CPT

## 2019-07-29 PROCEDURE — 74011000258 HC RX REV CODE- 258: Performed by: HOSPITALIST

## 2019-07-29 PROCEDURE — 74011250636 HC RX REV CODE- 250/636: Performed by: FAMILY MEDICINE

## 2019-07-29 PROCEDURE — 36415 COLL VENOUS BLD VENIPUNCTURE: CPT

## 2019-07-29 PROCEDURE — 74011000258 HC RX REV CODE- 258: Performed by: EMERGENCY MEDICINE

## 2019-07-29 PROCEDURE — 93005 ELECTROCARDIOGRAM TRACING: CPT | Performed by: EMERGENCY MEDICINE

## 2019-07-29 PROCEDURE — 85027 COMPLETE CBC AUTOMATED: CPT

## 2019-07-29 PROCEDURE — 87088 URINE BACTERIA CULTURE: CPT

## 2019-07-29 PROCEDURE — 99285 EMERGENCY DEPT VISIT HI MDM: CPT | Performed by: EMERGENCY MEDICINE

## 2019-07-29 PROCEDURE — 71045 X-RAY EXAM CHEST 1 VIEW: CPT

## 2019-07-29 PROCEDURE — 77030020263 HC SOL INJ SOD CL0.9% LFCR 1000ML

## 2019-07-29 PROCEDURE — 74011000302 HC RX REV CODE- 302: Performed by: HOSPITALIST

## 2019-07-29 PROCEDURE — 96374 THER/PROPH/DIAG INJ IV PUSH: CPT | Performed by: EMERGENCY MEDICINE

## 2019-07-29 PROCEDURE — 83605 ASSAY OF LACTIC ACID: CPT

## 2019-07-29 PROCEDURE — 74011250637 HC RX REV CODE- 250/637: Performed by: FAMILY MEDICINE

## 2019-07-29 PROCEDURE — 74011250636 HC RX REV CODE- 250/636: Performed by: HOSPITALIST

## 2019-07-29 PROCEDURE — 86580 TB INTRADERMAL TEST: CPT | Performed by: HOSPITALIST

## 2019-07-29 PROCEDURE — 87040 BLOOD CULTURE FOR BACTERIA: CPT

## 2019-07-29 RX ORDER — LORAZEPAM 0.5 MG/1
0.5 TABLET ORAL
Status: DISCONTINUED | OUTPATIENT
Start: 2019-07-29 | End: 2019-08-01 | Stop reason: HOSPADM

## 2019-07-29 RX ORDER — BISACODYL 5 MG
5 TABLET, DELAYED RELEASE (ENTERIC COATED) ORAL DAILY PRN
Status: DISCONTINUED | OUTPATIENT
Start: 2019-07-29 | End: 2019-08-01 | Stop reason: HOSPADM

## 2019-07-29 RX ORDER — FAMOTIDINE 20 MG/1
20 TABLET, FILM COATED ORAL 2 TIMES DAILY
Status: DISCONTINUED | OUTPATIENT
Start: 2019-07-29 | End: 2019-08-01 | Stop reason: HOSPADM

## 2019-07-29 RX ORDER — BICALUTAMIDE 50 MG/1
50 TABLET, FILM COATED ORAL DAILY
Status: DISCONTINUED | OUTPATIENT
Start: 2019-07-29 | End: 2019-08-01 | Stop reason: HOSPADM

## 2019-07-29 RX ORDER — HYDROCODONE BITARTRATE AND ACETAMINOPHEN 5; 325 MG/1; MG/1
1 TABLET ORAL
Status: DISCONTINUED | OUTPATIENT
Start: 2019-07-29 | End: 2019-08-01 | Stop reason: HOSPADM

## 2019-07-29 RX ORDER — SODIUM CHLORIDE 9 MG/ML
125 INJECTION, SOLUTION INTRAVENOUS CONTINUOUS
Status: DISPENSED | OUTPATIENT
Start: 2019-07-29 | End: 2019-07-31

## 2019-07-29 RX ORDER — SODIUM CHLORIDE 0.9 % (FLUSH) 0.9 %
5-40 SYRINGE (ML) INJECTION EVERY 8 HOURS
Status: DISCONTINUED | OUTPATIENT
Start: 2019-07-29 | End: 2019-08-01 | Stop reason: HOSPADM

## 2019-07-29 RX ORDER — METOPROLOL TARTRATE 25 MG/1
25 TABLET, FILM COATED ORAL 2 TIMES DAILY
Status: DISCONTINUED | OUTPATIENT
Start: 2019-07-29 | End: 2019-08-01 | Stop reason: HOSPADM

## 2019-07-29 RX ORDER — DONEPEZIL HYDROCHLORIDE 5 MG/1
5 TABLET, FILM COATED ORAL
Status: DISCONTINUED | OUTPATIENT
Start: 2019-07-29 | End: 2019-08-01 | Stop reason: HOSPADM

## 2019-07-29 RX ORDER — ACETAMINOPHEN 325 MG/1
650 TABLET ORAL
Status: DISCONTINUED | OUTPATIENT
Start: 2019-07-29 | End: 2019-08-01 | Stop reason: HOSPADM

## 2019-07-29 RX ORDER — POLYETHYLENE GLYCOL 3350 17 G/17G
17 POWDER, FOR SOLUTION ORAL DAILY
Status: DISCONTINUED | OUTPATIENT
Start: 2019-07-29 | End: 2019-07-29 | Stop reason: SDUPTHER

## 2019-07-29 RX ORDER — ARIPIPRAZOLE 5 MG/1
10 TABLET ORAL DAILY
Status: DISCONTINUED | OUTPATIENT
Start: 2019-07-29 | End: 2019-08-01 | Stop reason: HOSPADM

## 2019-07-29 RX ORDER — POLYETHYLENE GLYCOL 3350 17 G/17G
17 POWDER, FOR SOLUTION ORAL DAILY
Status: DISCONTINUED | OUTPATIENT
Start: 2019-07-29 | End: 2019-08-01 | Stop reason: HOSPADM

## 2019-07-29 RX ORDER — SODIUM CHLORIDE 0.9 % (FLUSH) 0.9 %
5-40 SYRINGE (ML) INJECTION AS NEEDED
Status: DISCONTINUED | OUTPATIENT
Start: 2019-07-29 | End: 2019-08-01 | Stop reason: HOSPADM

## 2019-07-29 RX ORDER — CEFAZOLIN SODIUM/WATER 2 G/20 ML
2 SYRINGE (ML) INTRAVENOUS EVERY 8 HOURS
Status: DISCONTINUED | OUTPATIENT
Start: 2019-07-29 | End: 2019-07-29 | Stop reason: SDUPTHER

## 2019-07-29 RX ORDER — GUAIFENESIN 100 MG/5ML
81 LIQUID (ML) ORAL DAILY
Status: DISCONTINUED | OUTPATIENT
Start: 2019-07-29 | End: 2019-08-01 | Stop reason: HOSPADM

## 2019-07-29 RX ORDER — TAMSULOSIN HYDROCHLORIDE 0.4 MG/1
0.4 CAPSULE ORAL
Status: DISCONTINUED | OUTPATIENT
Start: 2019-07-29 | End: 2019-08-01 | Stop reason: HOSPADM

## 2019-07-29 RX ORDER — ENOXAPARIN SODIUM 100 MG/ML
40 INJECTION SUBCUTANEOUS EVERY 24 HOURS
Status: DISCONTINUED | OUTPATIENT
Start: 2019-07-29 | End: 2019-07-30

## 2019-07-29 RX ADMIN — PIPERACILLIN, TAZOBACTAM 4.5 G: 4; .5 INJECTION, POWDER, LYOPHILIZED, FOR SOLUTION INTRAVENOUS at 03:48

## 2019-07-29 RX ADMIN — SODIUM CHLORIDE 125 ML/HR: 900 INJECTION, SOLUTION INTRAVENOUS at 17:34

## 2019-07-29 RX ADMIN — SODIUM CHLORIDE 125 ML/HR: 900 INJECTION, SOLUTION INTRAVENOUS at 05:40

## 2019-07-29 RX ADMIN — Medication 5 ML: at 06:00

## 2019-07-29 RX ADMIN — METOPROLOL TARTRATE 25 MG: 25 TABLET ORAL at 09:42

## 2019-07-29 RX ADMIN — CEFTRIAXONE 1 G: 1 INJECTION, POWDER, FOR SOLUTION INTRAMUSCULAR; INTRAVENOUS at 08:50

## 2019-07-29 RX ADMIN — FAMOTIDINE 20 MG: 20 TABLET, FILM COATED ORAL at 08:22

## 2019-07-29 RX ADMIN — POLYETHYLENE GLYCOL (3350) 17 G: 17 POWDER, FOR SOLUTION ORAL at 10:17

## 2019-07-29 RX ADMIN — FAMOTIDINE 20 MG: 20 TABLET, FILM COATED ORAL at 18:29

## 2019-07-29 RX ADMIN — ARIPIPRAZOLE 10 MG: 5 TABLET ORAL at 08:20

## 2019-07-29 RX ADMIN — ENOXAPARIN SODIUM 40 MG: 40 INJECTION SUBCUTANEOUS at 08:23

## 2019-07-29 RX ADMIN — TAMSULOSIN HYDROCHLORIDE 0.4 MG: 0.4 CAPSULE ORAL at 21:36

## 2019-07-29 RX ADMIN — TUBERCULIN PURIFIED PROTEIN DERIVATIVE 5 UNITS: 5 INJECTION, SOLUTION INTRADERMAL at 13:32

## 2019-07-29 RX ADMIN — ASPIRIN 81 MG 81 MG: 81 TABLET ORAL at 08:20

## 2019-07-29 RX ADMIN — TAMSULOSIN HYDROCHLORIDE 0.4 MG: 0.4 CAPSULE ORAL at 05:40

## 2019-07-29 RX ADMIN — Medication 2 G: at 02:07

## 2019-07-29 RX ADMIN — DONEPEZIL HYDROCHLORIDE 5 MG: 5 TABLET, FILM COATED ORAL at 21:36

## 2019-07-29 RX ADMIN — METOPROLOL TARTRATE 25 MG: 25 TABLET ORAL at 18:29

## 2019-07-29 RX ADMIN — BICALUTAMIDE 50 MG: 50 TABLET, FILM COATED ORAL at 09:00

## 2019-07-29 NOTE — PROGRESS NOTES
Received pt from [address] [place] [address] in stable condition. Pt is resting quietly. Resp even and unlabored; no acute signs of distress; bed low and locked and bed alarm on; call light within reach; no needs voiced.

## 2019-07-29 NOTE — PROGRESS NOTES
Admission assessment complete. Lungs clear on auscultation. Respirations present. Even and unlabored. No s/s of distress. No c/o pain at this time. Call light within reach. Encouraged patient to call for assistance. Patient verbalizes understanding. See Doc Flowsheet for assessment details. Patient resting in bed. Bed alarm in progress. Door open for observation.

## 2019-07-29 NOTE — PROGRESS NOTES
Interdisciplinary team rounds were held 7/29/2019 with the following team members:Care Management, Nursing, Nutrition, Pharmacy, Physical Therapy and Physician. Plan of care discussed. See clinical pathway and/or care plan for interventions and desired outcomes.

## 2019-07-29 NOTE — PROGRESS NOTES
Problem: Falls - Risk of  Goal: *Absence of Falls  Description  Document Candida Lightning Fall Risk and appropriate interventions in the flowsheet. Outcome: Progressing Towards Goal  Note:   Fall Risk Interventions:  Mobility Interventions: Bed/chair exit alarm, Patient to call before getting OOB    Mentation Interventions: Adequate sleep, hydration, pain control, Bed/chair exit alarm, Door open when patient unattended    Medication Interventions: Bed/chair exit alarm, Patient to call before getting OOB, Teach patient to arise slowly    Elimination Interventions: Bed/chair exit alarm, Call light in reach              Problem: Patient Education: Go to Patient Education Activity  Goal: Patient/Family Education  Outcome: Progressing Towards Goal     Problem: Pressure Injury - Risk of  Goal: *Prevention of pressure injury  Description  Document Maxwell Scale and appropriate interventions in the flowsheet. Outcome: Progressing Towards Goal  Note:   Pressure Injury Interventions:  Sensory Interventions: Assess changes in LOC    Moisture Interventions: Check for incontinence Q2 hours and as needed    No pressure injuries noted.      Activity Interventions: Increase time out of bed, PT/OT evaluation    Mobility Interventions: PT/OT evaluation    Nutrition Interventions: Document food/fluid/supplement intake, Offer support with meals,snacks and hydration    Friction and Shear Interventions: HOB 30 degrees or less, Minimize layers

## 2019-07-29 NOTE — H&P
HOSPITALIST H&P  NAME:  [facility]   Age:  [de-identified] y.o.  :   1940   MRN:   [id number]  PCP: Aide Bradley MD  Treatment Team: Attending Provider: Jose David Farfan MD; Charge Nurse: Serg Coleman RN    Prior     CC: Reason for admission is: UTI    HPI:   Patient history was obtained from the ER provider prior to seeing the patient. Patient is a [de-identified] y.o. male who presents to the ER due to family concerns of possible UTI. He has had some confusion and fatigue, which is typical of his UTIs. He has had a low grade fever. No known/reported n/v/d, chest pain, SOB, headaches. Pt has dementia and cannot provide history. No family present for my exam.       ROS:  Pt unable to answer due to dementia and/or altered mental status.       Past Medical History:   Diagnosis Date    Arrhythmia     pt takes pradaxa    Arthritis     Atrial fibrillation ([address] 2012    CAD (coronary artery disease)     Cancer (HCC)     prostate    Dementia     Depression 2012    GERD (gastroesophageal reflux disease)     controlled with medication    Heart failure ([address]     pt takes lasix as needed, reports SOB with exertion    History of prostate cancer 2014    Hypertension     controlled with medication    Morbid obesity ([address]     Osteoarthritis of right knee 2012    Recurrent major depressive disorder, in full remission ([address] 2017    Total knee replacement status 2012    Vitamin B12 deficiency 2017      Past Surgical History:   Procedure Laterality Date    HX ORTHOPAEDIC      left TKA    HX PACEMAKER  2012    [employer] pacemaker    HX PROSTATECTOMY        Social History     Tobacco Use    Smoking status: Never Smoker    Smokeless tobacco: Never Used   Substance Use Topics    Alcohol use: No      Family History   Problem Relation Age of Onset    Cancer Father        FH Reviewed and non-contributory to admitting diagnosis    No Known Allergies   Prior to Admission Medications   Prescriptions Last Dose Informant Patient Reported? Taking? ARIPiprazole (ABILIFY) 10 mg tablet   No No   Sig: Take 1 Tab by mouth daily. acetaminophen (TYLENOL ARTHRITIS PAIN) 650 mg CR tablet   Yes No   Sig: Take 650 mg by mouth every six (6) hours as needed for Pain. aspirin 81 mg chewable tablet   No No   Sig: Take 1 Tab by mouth daily. Indications: prevention of cerebrovascular accident   bicalutamide (CASODEX) 50 mg tablet   No No   Sig: Take 1 Tab by mouth daily. cefpodoxime (VANTIN) 100 mg tablet   No No   Sig: Take 1 Tab by mouth two (2) times a day. celecoxib (CELEBREX) 200 mg capsule   Yes No   Sig: Take  by mouth two (2) times a day. donepezil (ARICEPT) 5 mg tablet   No No   Sig: TAKE 1 TABLET BY MOUTH AT BEDTIME   famotidine (PEPCID) 20 mg tablet   No No   Sig: Take 1 Tab by mouth two (2) times a day. furosemide (LASIX) 40 mg tablet   No No   Sig: Take 40 mg po daily PRN ONLY for evidence of fluid retention   levomilnacipran (FETZIMA) 40 mg ER capsule   No No   Sig: TAKE 1 CAP BY MOUTH DAILY. metoprolol tartrate (LOPRESSOR) 25 mg tablet   No No   Sig: Take 1 Tab by mouth two (2) times a day. multivitamin, tx-iron-ca-min (THERA-M W/ IRON) 9 mg iron-400 mcg tab tablet   Yes No   Sig: Take 1 Tab by mouth daily. polyethylene glycol (MIRALAX) 17 gram/dose powder   Yes No   Sig: Take 17 g by mouth daily. Indications: constipation   senna-docusate (SENNA LAXATIVE-STOOL SOFTENER) 8.6-50 mg per tablet   Yes No   Sig: Take 1 Tab by mouth daily. tamsulosin (FLOMAX) 0.4 mg capsule   No No   Sig: Take 1 Cap by mouth nightly.       Facility-Administered Medications: None         Objective:     No intake or output data in the 24 hours ending 19 0329   Temp (24hrs), Av.1 °F (37.8 °C), Min:100.1 °F (37.8 °C), Max:100.1 °F (37.8 °C)    Oxygen Therapy  O2 Sat (%): 99 % (19)  O2 Device: Room air (19)   Body mass index is 33 kg/m². Patient Vitals for the past 24 hrs:   Temp Pulse Resp BP SpO2   07/29/19 0030 100.1 °F (37.8 °C) 77 20 114/63 99 %     Physical Exam:    General:    WD and WN, No apparent distress. Head:   Normocephalic, without obvious abnormality, atraumatic. Eyes:  PERRL; EOMI; sclera normal/non-icteric  ENT:  Hearing is normal.  Oropharynx is clear with tacky mucous membranes   Resp:    Clear to auscultation bilaterally. No Wheezing or Rhonchi. Resp are even and unlabored  Heart[de-identified]  Regular rate and rhythm,  no murmur,   No LE edema  Abdomen:   Soft, non-tender. Not distended. Bowel sounds normal.  hepato-splenomegaly-none  Musc/SK: Muscle strength is good and tone normal; No cyanosis. No clubbing  Skin:     Texture, turgor normal. No significant rashes or lesions.      Capillary refill < 2 sec  Neurologic: CN II - XII are grossly intact - Eye exam as noted above  Psych: somnolent and oriented x 2;  Judgement and insight are impaired     Data Review:   Recent Results (from the past 24 hour(s))   CBC W/O DIFF    Collection Time: 07/29/19  1:41 AM   Result Value Ref Range    WBC 8.5 4.3 - 11.1 K/uL    RBC 4.87 4.23 - 5.6 M/uL    HGB 15.0 13.6 - 17.2 g/dL    HCT 45.2 41.1 - 50.3 %    MCV 92.8 79.6 - 97.8 FL    MCH 30.8 26.1 - 32.9 PG    MCHC 33.2 31.4 - 35.0 g/dL    RDW 13.8 11.9 - 14.6 %    PLATELET 973 137 - 949 K/uL    MPV 10.4 9.4 - 12.3 FL    ABSOLUTE NRBC 0.00 0.0 - 0.2 K/uL   METABOLIC PANEL, COMPREHENSIVE    Collection Time: 07/29/19  1:41 AM   Result Value Ref Range    Sodium 140 136 - 145 mmol/L    Potassium 4.0 3.5 - 5.1 mmol/L    Chloride 108 (H) 98 - 107 mmol/L    CO2 27 21 - 32 mmol/L    Anion gap 5 (L) 7 - 16 mmol/L    Glucose 92 65 - 100 mg/dL    BUN 10 8 - 23 MG/DL    Creatinine 1.03 0.8 - 1.5 MG/DL    GFR est AA >60 >60 ml/min/1.73m2    GFR est non-AA >60 >60 ml/min/1.73m2    Calcium 9.8 8.3 - 10.4 MG/DL    Bilirubin, total 0.9 0.2 - 1.1 MG/DL    ALT (SGPT) 19 12 - 65 U/L    AST (SGOT) 24 15 - 37 U/L    Alk. phosphatase 127 50 - 136 U/L    Protein, total 7.6 6.3 - 8.2 g/dL    Albumin 3.4 3.2 - 4.6 g/dL    Globulin 4.2 (H) 2.3 - 3.5 g/dL    A-G Ratio 0.8 (L) 1.2 - 3.5     TROPONIN I    Collection Time: 07/29/19  1:41 AM   Result Value Ref Range    Troponin-I, Qt. 0.58 (HH) 0.02 - 0.05 NG/ML   CULTURE, BLOOD    Collection Time: 07/29/19  1:42 AM   Result Value Ref Range    Special Requests: LEFT ANTECUBITAL      Culture result: PENDING    POC LACTIC ACID    Collection Time: 07/29/19  1:50 AM   Result Value Ref Range    Lactic Acid (POC) 2.24 (H) 0.5 - 1.9 mmol/L   URINALYSIS W/ RFLX MICROSCOPIC    Collection Time: 07/29/19  2:37 AM   Result Value Ref Range    Color YELLOW      Appearance TURBID      Specific gravity 1.006 1.001 - 1.023      pH (UA) 6.5 5.0 - 9.0      Protein 30 (A) NEG mg/dL    Glucose NEGATIVE  mg/dL    Ketone NEGATIVE  NEG mg/dL    Bilirubin NEGATIVE  NEG      Blood LARGE (A) NEG      Urobilinogen 1.0 0.2 - 1.0 EU/dL    Nitrites NEGATIVE  NEG      Leukocyte Esterase LARGE (A) NEG      WBC >100 0 /hpf    RBC >100 0 /hpf    Epithelial cells 0-3 0 /hpf    Bacteria 0 0 /hpf    Casts 10-20 0 /lpf    Other observations RESULTS VERIFIED MANUALLY       CXR Results  (Last 48 hours)               07/29/19 0107  XR CHEST PORT Final result    Impression:  IMPRESSION:       1. Underinflated lungs. Mild left basilar opacity, likely atelectasis. No   definite pulmonary consolidation. 2. Stable cardiomegaly. Narrative:  Portable chest xray         COMPARISON: June 5, 2019       CLINICAL HISTORY: Weakness, fatigue and fever. Question pneumonia. FINDINGS:       Heart is enlarged. Mediastinal contour is within normal limits. No pneumothorax,   pulmonary edema or large pleural effusion. No definite pulmonary consolidation. Left-sided cardiac pacer and the surrounding bones are stable. CT Results  (Last 48 hours)    None              Assessment and Plan:      DONNA/[address] Problems    Diagnosis Date Noted    UTI (urinary tract infection) 06/04/2019    Moderate dementia without behavioral disturbance 07/26/2018    Atrial fibrillation ([facility] 01/27/2012    HTN (hypertension) 01/27/2012     Principal Problem:    UTI (urinary tract infection) (6/4/2019)    IV abx - Zosyn; IVF    Active Problems:    Atrial fibrillation ([facility] [address] (1/27/2012)    Chronic condition is stable, but may affect hospital stay; continue home medications with the following changes, if any:    Will continue to monitor and adjust treatment as needed. HTN (hypertension) (1/27/2012)    Continue home meds and add prn hydralazine, if needed. Moderate dementia without behavioral disturbance (7/26/2018)    Chronic condition is stable, but may affect hospital stay; continue home medications with the following changes, if any:    Will continue to monitor and adjust treatment as needed. · PLAN General  ·   · Cont appropriate home meds (see MAR)  · Control symptoms (pain, n/v, fever, etc)  · Monitor appropriate labs   · DVT prophylaxis:  Lovenox  · Code status: Full;  HCPOA:   · Risk: high  · Anticipated DC needs:  · Estimated LOS:  Greater than 2 midnights  · Plans discussed with patient and/or caregiver; questions answered.       Med records reviewed if applicable; findings:     Critical care time if applicable:      Signed By: Ana Cardozo MD     July 29, 2019

## 2019-07-29 NOTE — PROGRESS NOTES
Care Management Interventions  PCP Verified by CM: Yes  Transition of Care Consult (CM Consult): Discharge Planning, [address]: No  Reason Outside [place]: Patient already serviced by other home care/hospice agency  Physical Therapy Consult: Yes  Occupational Therapy Consult: Yes  Current Support Network: Lives with Caregiver, Own Home  [place] of Choice Offered: Yes  Discharge Location  Discharge Placement: Home with home health        Chart reviewed. SW familiar with pt from admission in June. Pt went to [facility] for STR then discharged home with his daughter Link Noss [phone] ) and [facility] ( RN, PT & aide). Pt was ambulating 50 ft at time of d/c from [facility]. EPI spoke with other daughter Ashley Pollock [phone] ) who states they hope as UTI clears pt will regain his strength & be able to return home with [facility]. PT and OT evaluations are pending. Will need to send resume orders to [facility] at d/c.

## 2019-07-29 NOTE — PROGRESS NOTES
Today is Day 0 of hospitalisation. I saw and evaluated pt at bedside. He was admitted for UTI POA. Urine culture pending. Pt is on Zosyn. Switched to Ceftriaxone based on previous senstivities.

## 2019-07-29 NOTE — ED NOTES
Pt presents to the ED for increased weakness and fatigue today. Family members report to EMS that the pt was recently seen and treated in the ED for a UTI.  They are now concerned that he has another UTI

## 2019-07-29 NOTE — PROGRESS NOTES
07/29/19 0421   Dual Skin Pressure Injury Assessment   Dual Skin Pressure Injury Assessment WDL   Second Care Provider (Based on [address] [place] [address]) Tyrell Reis RN   Skin Integumentary   Skin Integumentary (WDL) X    Pressure  Injury Documentation No Pressure Injury Noted-Pressure Ulcer Prevention Initiated   Skin Color Appropriate for ethnicity   Skin Condition/Temp Warm   Skin Integrity Scars (comment)  (Bilateral shins & knees)   Turgor Epidermis thin w/ loss of subcut tissue

## 2019-07-29 NOTE — ED TRIAGE NOTES
family concerned that the pt was not acting like himself tonight.   Concerned that the pt may a UTI and has been recently treated for the same c/o

## 2019-07-29 NOTE — ROUTINE PROCESS
Report called to Amaury Campos RN on 3rd floor #20 jelco via the left AC intact without redness or swelling.   Transported to  354 via the stretcher

## 2019-07-29 NOTE — PROGRESS NOTES
Received critical value for pt's Troponin I at 0.66. Notified Dr. Justin Stoll. Dr. Justin Stoll stated to order a one time Troponin I blood draw in 6 hours. Order placed.

## 2019-07-29 NOTE — PROGRESS NOTES
TRANSFER - IN REPORT:    Verbal report received from Umer Puri RN on Tylor Beverlytasia being received from ED for routine progression of care      Report consisted of patients Situation, Background, Assessment and   Recommendations(SBAR). Information from the following report(s) ED Summary was reviewed with the receiving nurse. Opportunity for questions and clarification was provided. Assessment completed upon patients arrival to unit and care assumed.

## 2019-07-30 LAB
ALBUMIN SERPL-MCNC: 2.6 G/DL (ref 3.2–4.6)
ALBUMIN/GLOB SERPL: 0.7 {RATIO} (ref 1.2–3.5)
ALP SERPL-CCNC: 97 U/L (ref 50–136)
ALT SERPL-CCNC: 12 U/L (ref 12–65)
ANION GAP SERPL CALC-SCNC: 9 MMOL/L (ref 7–16)
AST SERPL-CCNC: 20 U/L (ref 15–37)
BASOPHILS # BLD: 0 K/UL (ref 0–0.2)
BASOPHILS NFR BLD: 0 % (ref 0–2)
BILIRUB SERPL-MCNC: 1.1 MG/DL (ref 0.2–1.1)
BUN SERPL-MCNC: 11 MG/DL (ref 8–23)
CALCIUM SERPL-MCNC: 8.8 MG/DL (ref 8.3–10.4)
CHLORIDE SERPL-SCNC: 115 MMOL/L (ref 98–107)
CO2 SERPL-SCNC: 21 MMOL/L (ref 21–32)
CREAT SERPL-MCNC: 0.83 MG/DL (ref 0.8–1.5)
DIFFERENTIAL METHOD BLD: ABNORMAL
EOSINOPHIL # BLD: 0.2 K/UL (ref 0–0.8)
EOSINOPHIL NFR BLD: 4 % (ref 0.5–7.8)
ERYTHROCYTE [DISTWIDTH] IN BLOOD BY AUTOMATED COUNT: 13.9 % (ref 11.9–14.6)
GLOBULIN SER CALC-MCNC: 3.6 G/DL (ref 2.3–3.5)
GLUCOSE SERPL-MCNC: 75 MG/DL (ref 65–100)
HCT VFR BLD AUTO: 39 % (ref 41.1–50.3)
HGB BLD-MCNC: 12.9 G/DL (ref 13.6–17.2)
IMM GRANULOCYTES # BLD AUTO: 0 K/UL (ref 0–0.5)
IMM GRANULOCYTES NFR BLD AUTO: 0 % (ref 0–5)
LYMPHOCYTES # BLD: 1.6 K/UL (ref 0.5–4.6)
LYMPHOCYTES NFR BLD: 31 % (ref 13–44)
MCH RBC QN AUTO: 30.9 PG (ref 26.1–32.9)
MCHC RBC AUTO-ENTMCNC: 33.1 G/DL (ref 31.4–35)
MCV RBC AUTO: 93.3 FL (ref 79.6–97.8)
MM INDURATION POC: 0 MM (ref 0–5)
MONOCYTES # BLD: 0.5 K/UL (ref 0.1–1.3)
MONOCYTES NFR BLD: 10 % (ref 4–12)
NEUTS SEG # BLD: 2.8 K/UL (ref 1.7–8.2)
NEUTS SEG NFR BLD: 54 % (ref 43–78)
NRBC # BLD: 0 K/UL (ref 0–0.2)
PLATELET # BLD AUTO: 129 K/UL (ref 150–450)
PMV BLD AUTO: 10.4 FL (ref 9.4–12.3)
POTASSIUM SERPL-SCNC: 3.6 MMOL/L (ref 3.5–5.1)
PPD POC: NEGATIVE NEGATIVE
PROT SERPL-MCNC: 6.2 G/DL (ref 6.3–8.2)
RBC # BLD AUTO: 4.18 M/UL (ref 4.23–5.6)
SODIUM SERPL-SCNC: 145 MMOL/L (ref 136–145)
WBC # BLD AUTO: 5.1 K/UL (ref 4.3–11.1)

## 2019-07-30 PROCEDURE — 85025 COMPLETE CBC W/AUTO DIFF WBC: CPT

## 2019-07-30 PROCEDURE — 74011250636 HC RX REV CODE- 250/636: Performed by: FAMILY MEDICINE

## 2019-07-30 PROCEDURE — 74011250637 HC RX REV CODE- 250/637: Performed by: FAMILY MEDICINE

## 2019-07-30 PROCEDURE — 80053 COMPREHEN METABOLIC PANEL: CPT

## 2019-07-30 PROCEDURE — 97110 THERAPEUTIC EXERCISES: CPT

## 2019-07-30 PROCEDURE — 97161 PT EVAL LOW COMPLEX 20 MIN: CPT

## 2019-07-30 PROCEDURE — 97165 OT EVAL LOW COMPLEX 30 MIN: CPT

## 2019-07-30 PROCEDURE — 74011250636 HC RX REV CODE- 250/636: Performed by: HOSPITALIST

## 2019-07-30 PROCEDURE — 77030033269 HC SLV COMPR SCD KNE2 CARD -B

## 2019-07-30 PROCEDURE — 36415 COLL VENOUS BLD VENIPUNCTURE: CPT

## 2019-07-30 PROCEDURE — 65660000000 HC RM CCU STEPDOWN

## 2019-07-30 PROCEDURE — 74011250637 HC RX REV CODE- 250/637: Performed by: INTERNAL MEDICINE

## 2019-07-30 PROCEDURE — 77030020263 HC SOL INJ SOD CL0.9% LFCR 1000ML

## 2019-07-30 PROCEDURE — 74011000258 HC RX REV CODE- 258: Performed by: HOSPITALIST

## 2019-07-30 RX ORDER — DABIGATRAN ETEXILATE 150 MG/1
150 CAPSULE ORAL EVERY 12 HOURS
Status: DISCONTINUED | OUTPATIENT
Start: 2019-07-30 | End: 2019-08-01 | Stop reason: HOSPADM

## 2019-07-30 RX ADMIN — CEFTRIAXONE 1 G: 1 INJECTION, POWDER, FOR SOLUTION INTRAMUSCULAR; INTRAVENOUS at 09:29

## 2019-07-30 RX ADMIN — METOPROLOL TARTRATE 25 MG: 25 TABLET ORAL at 17:31

## 2019-07-30 RX ADMIN — ENOXAPARIN SODIUM 40 MG: 40 INJECTION SUBCUTANEOUS at 07:46

## 2019-07-30 RX ADMIN — ARIPIPRAZOLE 10 MG: 5 TABLET ORAL at 09:30

## 2019-07-30 RX ADMIN — ASPIRIN 81 MG 81 MG: 81 TABLET ORAL at 09:30

## 2019-07-30 RX ADMIN — Medication 5 ML: at 14:45

## 2019-07-30 RX ADMIN — FAMOTIDINE 20 MG: 20 TABLET, FILM COATED ORAL at 09:30

## 2019-07-30 RX ADMIN — BICALUTAMIDE 50 MG: 50 TABLET, FILM COATED ORAL at 11:46

## 2019-07-30 RX ADMIN — POLYETHYLENE GLYCOL (3350) 17 G: 17 POWDER, FOR SOLUTION ORAL at 09:26

## 2019-07-30 RX ADMIN — TAMSULOSIN HYDROCHLORIDE 0.4 MG: 0.4 CAPSULE ORAL at 21:15

## 2019-07-30 RX ADMIN — DABIGATRAN ETEXILATE MESYLATE 150 MG: 150 CAPSULE ORAL at 17:31

## 2019-07-30 RX ADMIN — SODIUM CHLORIDE 125 ML/HR: 900 INJECTION, SOLUTION INTRAVENOUS at 10:18

## 2019-07-30 RX ADMIN — FAMOTIDINE 20 MG: 20 TABLET, FILM COATED ORAL at 17:31

## 2019-07-30 RX ADMIN — SODIUM CHLORIDE 125 ML/HR: 900 INJECTION, SOLUTION INTRAVENOUS at 19:28

## 2019-07-30 RX ADMIN — DONEPEZIL HYDROCHLORIDE 5 MG: 5 TABLET, FILM COATED ORAL at 21:15

## 2019-07-30 RX ADMIN — SODIUM CHLORIDE 125 ML/HR: 900 INJECTION, SOLUTION INTRAVENOUS at 01:16

## 2019-07-30 RX ADMIN — METOPROLOL TARTRATE 25 MG: 25 TABLET ORAL at 09:30

## 2019-07-30 NOTE — PROGRESS NOTES
Hospitalist Note     Admit Date:  2019 12:23 AM   Name:  Richie Razo   Age:  [de-identified] y.o.  :  1940   MRN:  [id number]   PCP:  Angel Zheng MD  Treatment Team: Attending Provider: Oralia Mckeon MD; : Nancy Paulino; Utilization Review: Marcos Dumont RN    HPI/Subjective:   Mr. Lauren Denson is an [de-identified] y/o male who was admitted this past  for rhabdomyolsis, fall with orbital hematoma, UTI and hypernatremia. He was discharged to [facility] on 6/10. He then returned home where he lives with his daughter. Re-presented on  with acute confusion suspicious for recurrent UTI. Admitted and started on IV abx.     : Up to chair today, looks well, feels better. A/O x3. Tolerating diet. No pain. ROS neg otherwise. Objective:     Patient Vitals for the past 24 hrs:   Temp Pulse Resp BP SpO2   19 1236 96.4 °F (35.8 °C) 84 18 135/68 92 %   19 0822 97.5 °F (36.4 °C) 60 18 114/43 95 %   19 0338 97.1 °F (36.2 °C) 69 18 105/50    19 2318 97.6 °F (36.4 °C) 62 18 98/58    19 2023 97.4 °F (36.3 °C) 66 18 117/61 99 %   19 1829  61  119/57 95 %   19 1614 99.4 °F (37.4 °C) 69 20 98/56 97 %     Oxygen Therapy  O2 Sat (%): 92 % (19 1236)  Pulse via Oximetry: 73 beats per minute (19 0334)  O2 Device: Room air (19 0745)  ETCO2 (mmHg): 95 mmHg (19 0338)      Intake/Output Summary (Last 24 hours) at 2019 1238  Last data filed at 2019 0744  Gross per 24 hour   Intake 3486 ml   Output    Net 3486 ml       *Note that automatically entered I/Os may not be accurate; dependent on patient compliance with collection and accurate  by techs. General:    Well nourished. Alert. CV:   RRR, ectopy. No murmur, rub, or gallop. Lungs:   CTAB. No wheezing, rhonchi, or rales. Abdomen:   Soft, nontender, nondistended. Extremities: Warm and dry. No cyanosis or edema. Skin:     No rashes or jaundice. Neuro:  No gross focal deficits    Data Review:  I have reviewed all labs, meds, and studies from the last 24 hours:    Recent Results (from the past 24 hour(s))   METABOLIC PANEL, COMPREHENSIVE    Collection Time: 07/30/19  5:25 AM   Result Value Ref Range    Sodium 145 136 - 145 mmol/L    Potassium 3.6 3.5 - 5.1 mmol/L    Chloride 115 (H) 98 - 107 mmol/L    CO2 21 21 - 32 mmol/L    Anion gap 9 7 - 16 mmol/L    Glucose 75 65 - 100 mg/dL    BUN 11 8 - 23 MG/DL    Creatinine 0.83 0.8 - 1.5 MG/DL    GFR est AA >60 >60 ml/min/1.73m2    GFR est non-AA >60 >60 ml/min/1.73m2    Calcium 8.8 8.3 - 10.4 MG/DL    Bilirubin, total 1.1 0.2 - 1.1 MG/DL    ALT (SGPT) 12 12 - 65 U/L    AST (SGOT) 20 15 - 37 U/L    Alk. phosphatase 97 50 - 136 U/L    Protein, total 6.2 (L) 6.3 - 8.2 g/dL    Albumin 2.6 (L) 3.2 - 4.6 g/dL    Globulin 3.6 (H) 2.3 - 3.5 g/dL    A-G Ratio 0.7 (L) 1.2 - 3.5     CBC WITH AUTOMATED DIFF    Collection Time: 07/30/19  5:25 AM   Result Value Ref Range    WBC 5.1 4.3 - 11.1 K/uL    RBC 4.18 (L) 4.23 - 5.6 M/uL    HGB 12.9 (L) 13.6 - 17.2 g/dL    HCT 39.0 (L) 41.1 - 50.3 %    MCV 93.3 79.6 - 97.8 FL    MCH 30.9 26.1 - 32.9 PG    MCHC 33.1 31.4 - 35.0 g/dL    RDW 13.9 11.9 - 14.6 %    PLATELET 665 (L) 615 - 450 K/uL    MPV 10.4 9.4 - 12.3 FL    ABSOLUTE NRBC 0.00 0.0 - 0.2 K/uL    DF AUTOMATED      NEUTROPHILS 54 43 - 78 %    LYMPHOCYTES 31 13 - 44 %    MONOCYTES 10 4.0 - 12.0 %    EOSINOPHILS 4 0.5 - 7.8 %    BASOPHILS 0 0.0 - 2.0 %    IMMATURE GRANULOCYTES 0 0.0 - 5.0 %    ABS. NEUTROPHILS 2.8 1.7 - 8.2 K/UL    ABS. LYMPHOCYTES 1.6 0.5 - 4.6 K/UL    ABS. MONOCYTES 0.5 0.1 - 1.3 K/UL    ABS. EOSINOPHILS 0.2 0.0 - 0.8 K/UL    ABS. BASOPHILS 0.0 0.0 - 0.2 K/UL    ABS. IMM.  GRANS. 0.0 0.0 - 0.5 K/UL        All Micro Results     Procedure Component Value Units Date/Time    CULTURE, URINE [[id number]] Collected:  07/29/19 0237    Order Status:  Completed Specimen:  Cath Urine Updated:  07/30/19 6383     Special Requests: NO SPECIAL REQUESTS        Culture result: NO GROWTH 1 DAY       CULTURE, BLOOD [[id number]] Collected:  07/29/19 0141    Order Status:  Completed Specimen:  Blood Updated:  07/29/19 0158    CULTURE, BLOOD [[id number]] Collected:  07/29/19 0142    Order Status:  Completed Specimen:  Blood Updated:  07/29/19 0158     Special Requests: LEFT ANTECUBITAL        Culture result: PENDING          No results found for this visit on 07/29/19. Current Meds:  Current Facility-Administered Medications   Medication Dose Route Frequency    ARIPiprazole (ABILIFY) tablet 10 mg  10 mg Oral DAILY    aspirin chewable tablet 81 mg  81 mg Oral DAILY    bicalutamide (CASODEX) tablet 50 mg  50 mg Oral DAILY    donepezil (ARICEPT) tablet 5 mg  5 mg Oral QHS    famotidine (PEPCID) tablet 20 mg  20 mg Oral BID    levomilnacipran (FETZIMA) ER capsule 40 mg (Patient Supplied)  40 mg Oral DAILY    metoprolol tartrate (LOPRESSOR) tablet 25 mg  25 mg Oral BID    tamsulosin (FLOMAX) capsule 0.4 mg  0.4 mg Oral QHS    0.9% sodium chloride infusion  125 mL/hr IntraVENous CONTINUOUS    sodium chloride (NS) flush 5-40 mL  5-40 mL IntraVENous Q8H    sodium chloride (NS) flush 5-40 mL  5-40 mL IntraVENous PRN    acetaminophen (TYLENOL) tablet 650 mg  650 mg Oral Q4H PRN    bisacodyl (DULCOLAX) tablet 5 mg  5 mg Oral DAILY PRN    LORazepam (ATIVAN) tablet 0.5 mg  0.5 mg Oral BID PRN    enoxaparin (LOVENOX) injection 40 mg  40 mg SubCUTAneous Q24H    HYDROcodone-acetaminophen (NORCO) 5-325 mg per tablet 1 Tab  1 Tab Oral Q4H PRN    cefTRIAXone (ROCEPHIN) 1 g in 0.9% sodium chloride (MBP/ADV) 50 mL  1 g IntraVENous Q24H    polyethylene glycol (MIRALAX) packet 17 g  17 g Oral DAILY    tuberculin injection 5 Units  5 Units IntraDERMal ONCE       Other Studies (last 24 hours):  No results found.     Assessment and Plan:     Hospital Problems as of 7/30/2019 Date Reviewed: 3/20/2019          Codes Class Noted - Resolved POA    * (Principal) UTI (urinary tract infection) ICD-10-CM: N39.0  ICD-9-CM: 599.0  6/4/2019 - Present Yes        Moderate dementia without behavioral disturbance ICD-10-CM: F03.90  ICD-9-CM: 290.0  7/26/2018 - Present Yes        Atrial fibrillation (HCC) (Chronic) ICD-10-CM: I48.91  ICD-9-CM: 427.31  1/27/2012 - Present Yes        HTN (hypertension) (Chronic) ICD-10-CM: I10  ICD-9-CM: 401.9  1/27/2012 - Present Yes              Plan:  # UTI   - Urine cx neg to date. F/u ID/sens. Con't ceftriaxone.   - may need to consider abx ppx given h/o frequent UTIs requiring hospitalization. # Dementia   - Aricept    # AFib   - PPM; BB; restart Pradaxa (wasn't on home list)    # BPH/? Prostate cancer   - Casodex, flomax    # MDD/anxiety   - abilifphu, Reynazima    DC planning/Dispo: Home with HH when able.   Diet:  DIET REGULAR  DVT ppx: Change Lovenox to SCDs     Signed:  Princess Gamez MD

## 2019-07-30 NOTE — PROGRESS NOTES
Patient incontinent of urine. Pericare given, new brief placed. Patient repositioned in bed for comfort. Call bell and bed alarm in place for safety, will monitor.

## 2019-07-30 NOTE — PROGRESS NOTES
Patient incontinent of urine. Pericare given, new brief placed. Patient repositioned in bed for comfort. Call bell and bed alarm in place for safety. Will monitor.

## 2019-07-30 NOTE — PROGRESS NOTES
Problem: Self Care Deficits Care Plan (Adult)  Goal: *Acute Goals and Plan of Care (Insert Text)  Description  Patient will complete lower body dressing with minimal assist to increase self care independence. Patient will complete toileting with supervision to increase self care independence. Patient will complete bathing with contact guard assist to increase self care independence. Patient will tolerate 40 minutes of OT treatment with self incorporated rest breaks to increase activity tolerance to enhance participation in hobbies. Patient will complete all functional transfers with contact guard assist using adaptive equipment as needed. Patient will complete UE exercises with supervision to increase overall activity tolerance and strength. Timeframe: 7 visits        OCCUPATIONAL THERAPY: Initial Assessment and Daily Note 7/30/2019  INPATIENT: OT Visit Days: 1  [place] / [place] / Product Type: Medicare /      NAME/AGE/GENDER: Renetta Collier is a [de-identified] y.o. male   PRIMARY DIAGNOSIS:  UTI (urinary tract infection) [N39.0] UTI (urinary tract infection)   UTI (urinary tract infection)          ICD-10: Treatment Diagnosis:    Generalized Muscle Weakness (M62.81)  Other lack of cordination (R27.8)  Difficulty in walking, Not elsewhere classified (R26.2)   Precautions/Allergies:     Patient has no known allergies. ASSESSMENT:     Mr. Huang Tate presents with UTI and confusion, but also has a dementia dx. Recently home with HHPT after SNF stay from recent admission. Patient hoping to return home at d/c with daughter assist.   He is below baseline for ADL's, he needs assist with toileting and transfers, at baseline his dtr only assists with showers and IADL's. He has no pain and follows commands well. Initiate OT POC.      This section established at most recent assessment   PROBLEM LIST (Impairments causing functional limitations):  Decreased Strength  Decreased ADL/Functional Activities  Decreased Transfer Abilities  Decreased Balance  Decreased Activity Tolerance  Decreased Cognition   INTERVENTIONS PLANNED: (Benefits and precautions of occupational therapy have been discussed with the patient.)  Activities of daily living training  Adaptive equipment training  Therapeutic activity  Therapeutic exercise     TREATMENT PLAN: Frequency/Duration: Follow patient 1-2tx to address above goals. Rehabilitation Potential For Stated Goals: Excellent     REHAB RECOMMENDATIONS (at time of discharge pending progress):    Placement: It is my opinion, based on this patient's performance to date, that Mr. Lacho Del Rosario may benefit from [address] after discharge due to the functional deficits listed above that are likely to improve with skilled rehabilitation because he/she has multiple medical issues that affect his/her functional mobility in the community. Equipment:   None at this time              OCCUPATIONAL PROFILE AND HISTORY:   History of Present Injury/Illness (Reason for Referral):  See H&P  Past Medical History/Comorbidities:   Mr. Lacho Del Rosario  has a past medical history of Arrhythmia, Arthritis, Atrial fibrillation ([address] (1/27/2012), CAD (coronary artery disease), Cancer ([address] Dementia, Depression (8/28/2012), GERD (gastroesophageal reflux disease), Heart failure ([address] History of prostate cancer (8/4/2014), Hypertension, Morbid obesity ([address] Osteoarthritis of right knee (1/26/2012), Recurrent major depressive disorder, in full remission ([address] (11/30/2017), Total knee replacement status (1/26/2012), and Vitamin B12 deficiency (7/23/2017). Mr. Lacho Del Rosario  has a past surgical history that includes hx orthopaedic (2010); hx prostatectomy; and hx pacemaker (8/30/2012).   Social History/Living Environment:   Home Environment: Private residence  # Steps to Enter: 7  One/Two Story Residence: One story  Living Alone: No  Support Systems: Child(billy)  Patient Expects to be Discharged to[de-identified] Private residence  Current DME Used/Available at Home: chasity Garn[employer]  Prior Level of Function/Work/Activity:  Assist with Showers. Independent with walker and toileting. Number of Personal Factors/Comorbidities that affect the Plan of Care: Expanded review of therapy/medical records (1-2):  MODERATE COMPLEXITY   ASSESSMENT OF OCCUPATIONAL PERFORMANCE[de-identified]   Activities of Daily Living:   Basic ADLs (From Assessment) Complex ADLs (From Assessment)   Feeding: Independent  Oral Facial Hygiene/Grooming: Minimum assistance  Bathing: Moderate assistance  Upper Body Dressing: Minimum assistance  Lower Body Dressing: Moderate assistance  Toileting: Moderate assistance     Grooming/Bathing/Dressing Activities of Daily Living                             Bed/Mat Mobility  Supine to Sit: Contact guard assistance  Sit to Supine: (stayed up in chair)  Sit to Stand: Moderate assistance  Stand to Sit: Minimum assistance     Most Recent Physical Functioning:   Gross Assessment:                  Posture:  Posture (WDL): Exceptions to WDL  Posture Assessment: Forward head, Rounded shoulders  Balance:  Sitting: Intact  Standing: With support Bed Mobility:  Supine to Sit: Contact guard assistance  Sit to Supine: (stayed up in chair)  Wheelchair Mobility:     Transfers:  Sit to Stand: Moderate assistance  Stand to Sit: Minimum assistance            Patient Vitals for the past 6 hrs:   BP BP Patient Position SpO2 Pulse   07/30/19 0822 114/43 At rest 95 % 60   07/30/19 1236 135/68 At rest 92 % 84       Mental Status  Neurologic State: Alert, Confused  Orientation Level: Oriented to person, Oriented to place  Cognition: Follows commands                          Physical Skills Involved:  Range of Motion  Balance  Strength  Activity Tolerance Cognitive Skills Affected (resulting in the inability to perform in a timely and safe manner):   Short Term Recall Psychosocial Skills Affected:  Environmental Adaptation  Social Interaction Number of elements that affect the Plan of Care: 1-3:  LOW COMPLEXITY   CLINICAL DECISION MAKIN[address] [place] [id number] AM-PAC 6 Clicks   Daily Activity Inpatient Short Form  How much help from another person does the patient currently need. .. Total A Lot A Little None   1. Putting on and taking off regular lower body clothing? ? 1   ? 2   ? 3   ? 4   2. Bathing (including washing, rinsing, drying)? ? 1   ? 2   ? 3   ? 4   3. Toileting, which includes using toilet, bedpan or urinal?   ? 1   ? 2   ? 3   ? 4   4. Putting on and taking off regular upper body clothing? ? 1   ? 2   ? 3   ? 4   5. Taking care of personal grooming such as brushing teeth? ? 1   ? 2   ? 3   ? 4   6. Eating meals? ? 1   ? 2   ? 3   ? 4   © , Trustees of [address] [place] [address], under license to [employer]. All rights reserved      Score:  Initial: 15 Most Recent: X (Date: -- )    Interpretation of Tool:  Represents activities that are increasingly more difficult (i.e. Bed mobility, Transfers, Gait). Medical Necessity:     Skilled intervention continues to be required due to deficits listed above. Reason for Services/Other Comments:  Patient continues to require skilled intervention due to UTI and debility   . Use of outcome tool(s) and clinical judgement create a POC that gives a: MODERATE COMPLEXITY         TREATMENT:   (In addition to Assessment/Re-Assessment sessions the following treatments were rendered)     Pre-treatment Symptoms/Complaints:    Pain: Initial:   Pain Intensity 1: 0  Post Session:  0     Therapeutic Exercise: (15):  Exercises per grid below to improve strength and coordination. Required moderate verbal and tactile cues to promote proper body alignment and promote proper body posture. Progressed resistance, range and repetitions as indicated.      Date:  19 Date:   Date:     Activity/Exercise Parameters Parameters Parameters   Horizontal abduction (red band) 2 sets of 10     Chest anterior punch (red band) 2 sets of 10     Scapular retraction (red band) 2 sets of 10                                 Initial evaluation 10 minutes. Braces/Orthotics/Lines/Etc:   IV  O2 Device: Room air  Treatment/Session Assessment:    Response to Treatment:  Good, sitting up in recliner, posey active. Interdisciplinary Collaboration:   Physical Therapist  Occupational Therapist  Registered Nurse  After treatment position/precautions:   Up in chair  Bed alarm/tab alert on  Bed/Chair-wheels locked  Call light within reach   Compliance with Program/Exercises: Compliant all of the time, Will assess as treatment progresses. Recommendations/Intent for next treatment session: \"Next visit will focus on advancements to more challenging activities and reduction in assistance provided\".   Total Treatment Duration:  OT Patient Time In/Time Out  Time In: 1030  Time Out: [address]

## 2019-07-30 NOTE — PROGRESS NOTES
Problem: Mobility Impaired (Adult and Pediatric)  Goal: *Acute Goals and Plan of Care (Insert Text)  Description  STG:  (1.)Mr. Blossom Caceres will move from supine to sit and sit to supine  with STAND BY ASSIST within 4-7 treatment day(s). (2.)Mr. Blossom Caceres will transfer from bed to chair and chair to bed with STAND BY ASSIST using the least restrictive device within 4-7 treatment day(s). (3.)Mr. Blossom Caceres will ambulate with STAND BY ASSIST for 100 feet with the least restrictive device within 4-7 treatment day(s). (4.)Mr. Blossom Caceres will participate in lower extremity exercises to increased strength to improve mobility within 4-7 days. ________________________________________________________________________________________________     Outcome: Progressing Towards Goal     PHYSICAL THERAPY: Initial Assessment and AM 7/30/2019  INPATIENT: PT Visit Days : 1  [place] / Product Type: Medicare /       NAME/AGE/GENDER: Zaria Peck is a [de-identified] y.o. male   PRIMARY DIAGNOSIS: UTI (urinary tract infection) [N39.0] UTI (urinary tract infection)   UTI (urinary tract infection)          ICD-10: Treatment Diagnosis:    Generalized Muscle Weakness (M62.81)  Difficulty in walking, Not elsewhere classified (R26.2)   Precaution/Allergies:  Patient has no known allergies. ASSESSMENT:     Mr. Blossom Caceres presents supine on contact and agreeable to assessment. Pt was at this hospital in June of this year and on discharge went to [facility] for rehab. Discharged home and remains current with [facility] therapy. He was walking short distances with a walker and living with his daughter. He was admitted for UTI and per RN was having some confusion as well which RN feels is getting better. He presents with generalized weakness and decreased independence with functional mobility and will benefit from skilled PT interventions to maximize independence with mobility and strengthening.  He moved from bed to chair and stayed up in chair with needs in reach. Participated in lower extremity exercises with verbal cues. Hope to progress at next therapy session. This section established at most recent assessment   PROBLEM LIST (Impairments causing functional limitations):  Decreased Strength  Decreased ADL/Functional Activities  Decreased Transfer Abilities  Decreased Ambulation Ability/Technique  Decreased Rosie with Home Exercise Program   INTERVENTIONS PLANNED: (Benefits and precautions of physical therapy have been discussed with the patient.)  Bed Mobility  Gait Training  Therapeutic Activites  Therapeutic Exercise/Strengthening  Transfer Training     TREATMENT PLAN: Frequency/Duration: daily for duration of hospital stay  Rehabilitation Potential For Stated Goals: Good     REHAB RECOMMENDATIONS (at time of discharge pending progress):    Placement: It is my opinion, based on this patient's performance to date, that Mr. Herbert Varghese may benefit from [address] after discharge due to the functional deficits listed above that are likely to improve with skilled rehabilitation because he/she has multiple medical issues that affect his/her functional mobility in the community. Equipment:   None at this time              HISTORY:   History of Present Injury/Illness (Reason for Referral):  PER MD NOTE: Patient is a [de-identified] y.o. male who presents to the ER due to family concerns of possible UTI. He has had some confusion and fatigue, which is typical of his UTIs. He has had a low grade fever. No known/reported n/v/d, chest pain, SOB, headaches. Pt has dementia and cannot provide history.   No family present for my exam.   Past Medical History/Comorbidities:   Mr. Herbert Varghese  has a past medical history of Arrhythmia, Arthritis, Atrial fibrillation ([facility] [address] (1/27/2012), CAD (coronary artery disease), Cancer ([facility] [address] Dementia, Depression (8/28/2012), GERD (gastroesophageal reflux disease), Heart failure ([address] History of prostate cancer (8/4/2014), Hypertension, Morbid obesity ([facility] [address] Osteoarthritis of right knee (1/26/2012), Recurrent major depressive disorder, in full remission ([facility] [address] (11/30/2017), Total knee replacement status (1/26/2012), and Vitamin B12 deficiency (7/23/2017). Mr. Sarah Maurer  has a past surgical history that includes hx orthopaedic (2010); hx prostatectomy; and hx pacemaker (8/30/2012). Social History/Living Environment:   Home Environment: Private residence  # Steps to Enter: 7  One/Two Story Residence: One story  Living Alone: No  Support Systems: Child(billy)  Patient Expects to be Discharged to[de-identified] Private residence  Current DME Used/Available at Home: Com[employer], bedside, Hospital bed, Walker, rolling  Prior Level of Function/Work/Activity:  Pt living at home with daughter, was walking short distances with a walker, current with home health     Number of Personal Factors/Comorbidities that affect the Plan of Care: 1-2: MODERATE COMPLEXITY   EXAMINATION:   Most Recent Physical Functioning:   Gross Assessment:  AROM: Generally decreased, functional  Strength: Generally decreased, functional               Posture:  Posture (WDL): Exceptions to WDL  Posture Assessment: Forward head, Rounded shoulders  Balance:  Sitting: Intact  Standing: Pull to stand; With support Bed Mobility:  Supine to Sit: Contact guard assistance  Sit to Supine: (stayed up in chair)  Wheelchair Mobility:     Transfers:  Sit to Stand: Minimum assistance(bed elevated)  Stand to Sit: Minimum assistance  Gait:     Speed/Ángela: Pace decreased (<100 feet/min); Shuffled  Step Length: Left shortened;Right shortened  Gait Abnormalities: Decreased step clearance;Shuffling gait  Distance (ft): (bed to chair)  Assistive Device: Walker, rolling  Ambulation - Level of Assistance: Minimal assistance(of 1 to 2)      Body Structures Involved:  Muscles Body Functions Affected: Movement Related Activities and Participation Affected:   Mobility  Self Care   Number of elements that affect the Plan of Care: 4+: HIGH COMPLEXITY   CLINICAL PRESENTATION:   Presentation: Stable and uncomplicated: LOW COMPLEXITY   CLINICAL DECISION MAKIN[address] [place] [address] AM-PAC 6 Clicks   Basic Mobility Inpatient Short Form  How much difficulty does the patient currently have. .. Unable A Lot A Little None   1. Turning over in bed (including adjusting bedclothes, sheets and blankets)? ? 1   ? 2   ? 3   ? 4   2. Sitting down on and standing up from a chair with arms ( e.g., wheelchair, bedside commode, etc.)   ? 1   ? 2   ? 3   ? 4   3. Moving from lying on back to sitting on the side of the bed?   ? 1   ? 2   ? 3   ? 4   How much help from another person does the patient currently need. .. Total A Lot A Little None   4. Moving to and from a bed to a chair (including a wheelchair)? ? 1   ? 2   ? 3   ? 4   5. Need to walk in hospital room? ? 1   ? 2   ? 3   ? 4   6. Climbing 3-5 steps with a railing? ? 1   ? 2   ? 3   ? 4   © , Trustees of [address] [place] [address], under license to [employer]. All rights reserved      Score:  Initial: 16 Most Recent: X (Date: -- )    Interpretation of Tool:  Represents activities that are increasingly more difficult (i.e. Bed mobility, Transfers, Gait). Medical Necessity:     Patient is expected to demonstrate progress in strength, coordination and functional technique   to decrease assistance required with functional mobility   . Reason for Services/Other Comments:  Patient continues to require skilled intervention due to inability to complete functional mobility independently   .    Use of outcome tool(s) and clinical judgement create a POC that gives a: Clear prediction of patient's progress: LOW COMPLEXITY            TREATMENT:   (In addition to Assessment/Re-Assessment sessions the following treatments were rendered)   Pre-treatment Symptoms/Complaints:  none  Pain: Initial:   Pain Intensity 1: 0  Post Session:  0/10     Therapeutic Exercise: (8 Minutes):  Exercises per grid below to improve mobility and strength. Required minimal verbal and manual cues to promote proper body alignment and promote proper body posture. Progressed repetitions as indicated. Date:  7/30/19 Date:   Date:     Activity/Exercise Parameters Parameters Parameters   Long arc quads 10     Seated hip flexion 10     Ankle pumps 10     Hip ABD/ADD 10     Straight leg raises 10                     Braces/Orthotics/Lines/Etc:   IV  O2 Device: Room air  Treatment/Session Assessment:    Response to Treatment:  tolerated well  Interdisciplinary Collaboration:   Registered Nurse  Rehabilitation Attendant  After treatment position/precautions:   Up in chair  Bed alarm/tab alert on  Bed/Chair-wheels locked  Call light within reach  RN notified   Compliance with Program/Exercises: Compliant all of the time, Will assess as treatment progresses  Recommendations/Intent for next treatment session: \"Next visit will focus on advancements to more challenging activities and reduction in assistance provided\".   Total Treatment Duration:  PT Patient Time In/Time Out  Time In: 0946  Time Out: Baron Rojo PT

## 2019-07-30 NOTE — PROGRESS NOTES
Patient received asleep in bed, awakens with verbal cues. Offers no complaints. Shift assessment completed. Call bell and bed alarm in place for safety, will monitor.

## 2019-07-30 NOTE — PROGRESS NOTES
Patient asleep in bed this AM, slept well overnight. No s/s distress/discomfort noted. Call bell and bed alarm remain in place for safety, will monitor.

## 2019-07-30 NOTE — PROGRESS NOTES
Pt more alert this morning; appetite improved. Voiding large amounts in brief; IVF and antibiotic continue. Activity as tolerated; oob with PT to chair. Daughter to speak with CM later to discuss discharge options.

## 2019-07-30 NOTE — PROGRESS NOTES
Interdisciplinary team rounds were held 7/30/2019 with the following team members:Care Management, Nursing, Nutrition, Pharmacy and Physician. Plan of care discussed. See clinical pathway and/or care plan for interventions and desired outcomes.

## 2019-07-30 NOTE — PROGRESS NOTES
Patient received resting in bed, offers no complaints. Shift assessment completed. Call bell and bed alarm in place for safety, will monitor.

## 2019-07-31 LAB
MM INDURATION POC: 0 MM (ref 0–5)
PPD POC: NEGATIVE NEGATIVE

## 2019-07-31 PROCEDURE — 74011250636 HC RX REV CODE- 250/636: Performed by: HOSPITALIST

## 2019-07-31 PROCEDURE — 97530 THERAPEUTIC ACTIVITIES: CPT

## 2019-07-31 PROCEDURE — 65660000000 HC RM CCU STEPDOWN

## 2019-07-31 PROCEDURE — 74011250637 HC RX REV CODE- 250/637: Performed by: INTERNAL MEDICINE

## 2019-07-31 PROCEDURE — 74011000258 HC RX REV CODE- 258: Performed by: HOSPITALIST

## 2019-07-31 PROCEDURE — 74011250637 HC RX REV CODE- 250/637: Performed by: FAMILY MEDICINE

## 2019-07-31 PROCEDURE — 74011250636 HC RX REV CODE- 250/636: Performed by: FAMILY MEDICINE

## 2019-07-31 PROCEDURE — 97110 THERAPEUTIC EXERCISES: CPT

## 2019-07-31 RX ADMIN — ARIPIPRAZOLE 10 MG: 5 TABLET ORAL at 08:04

## 2019-07-31 RX ADMIN — BICALUTAMIDE 50 MG: 50 TABLET, FILM COATED ORAL at 08:06

## 2019-07-31 RX ADMIN — Medication 10 ML: at 21:45

## 2019-07-31 RX ADMIN — ASPIRIN 81 MG 81 MG: 81 TABLET ORAL at 08:04

## 2019-07-31 RX ADMIN — TAMSULOSIN HYDROCHLORIDE 0.4 MG: 0.4 CAPSULE ORAL at 21:45

## 2019-07-31 RX ADMIN — POLYETHYLENE GLYCOL (3350) 17 G: 17 POWDER, FOR SOLUTION ORAL at 07:52

## 2019-07-31 RX ADMIN — SODIUM CHLORIDE 125 ML/HR: 900 INJECTION, SOLUTION INTRAVENOUS at 03:50

## 2019-07-31 RX ADMIN — CEFTRIAXONE 1 G: 1 INJECTION, POWDER, FOR SOLUTION INTRAMUSCULAR; INTRAVENOUS at 08:01

## 2019-07-31 RX ADMIN — DABIGATRAN ETEXILATE MESYLATE 150 MG: 150 CAPSULE ORAL at 05:28

## 2019-07-31 RX ADMIN — FAMOTIDINE 20 MG: 20 TABLET, FILM COATED ORAL at 17:13

## 2019-07-31 RX ADMIN — METOPROLOL TARTRATE 25 MG: 25 TABLET ORAL at 17:13

## 2019-07-31 RX ADMIN — METOPROLOL TARTRATE 25 MG: 25 TABLET ORAL at 08:04

## 2019-07-31 RX ADMIN — DONEPEZIL HYDROCHLORIDE 5 MG: 5 TABLET, FILM COATED ORAL at 21:45

## 2019-07-31 RX ADMIN — Medication 5 ML: at 13:52

## 2019-07-31 RX ADMIN — FAMOTIDINE 20 MG: 20 TABLET, FILM COATED ORAL at 08:04

## 2019-07-31 RX ADMIN — DABIGATRAN ETEXILATE MESYLATE 150 MG: 150 CAPSULE ORAL at 17:13

## 2019-07-31 NOTE — PROGRESS NOTES
Patient sitting up in bed awake this AM, slept for short intervals overnight. Incontinent of urine. Pericare given, new brief placed. Repositioned in bed for comfort. Call bell and bed alarm remain in place for safety, will monitor.

## 2019-07-31 NOTE — PROGRESS NOTES
Problem: Mobility Impaired (Adult and Pediatric) Goal: *Acute Goals and Plan of Care (Insert Text) Description STG: 
(1.)Mr. Zee Harrison will move from supine to sit and sit to supine  with STAND BY ASSIST within 4-7 treatment day(s). (2.)Mr. Zee Harrison will transfer from bed to chair and chair to bed with STAND BY ASSIST using the least restrictive device within 4-7 treatment day(s). (3.)Mr. Zee Harrison will ambulate with STAND BY ASSIST for 100 feet with the least restrictive device within 4-7 treatment day(s). (4.)Mr. Zee Harrison will participate in lower extremity exercises to increased strength to improve mobility within 4-7 days. ________________________________________________________________________________________________ Outcome: Progressing Towards Goal 
  
PHYSICAL THERAPY: Daily Note and PM 7/31/2019 INPATIENT: PT Visit Days : 1 [place] / Product Type: Medicare /   
  
NAME/AGE/GENDER: Sav Sosa is a [de-identified] y.o. male PRIMARY DIAGNOSIS: UTI (urinary tract infection) [N39.0] UTI (urinary tract infection) UTI (urinary tract infection) ICD-10: Treatment Diagnosis:  
 · Generalized Muscle Weakness (M62.81) · Difficulty in walking, Not elsewhere classified (R26.2) Precaution/Allergies: 
Patient has no known allergies. ASSESSMENT:  
 
Mr. Zee Harrison is sitting up in chair on arrival. He states he just finished working with OT. Sit to stand with min assist. He ambulated out into the hallway with RW and min assist for walker management. He required increased time with mobility. Pt left sitting up in chair with needs in reach and chair alarm on. This section established at most recent assessment PROBLEM LIST (Impairments causing functional limitations): 1. Decreased Strength 2. Decreased ADL/Functional Activities 3. Decreased Transfer Abilities 4. Decreased Ambulation Ability/Technique 5. Decreased Butler with Home Exercise Program 
 INTERVENTIONS PLANNED: (Benefits and precautions of physical therapy have been discussed with the patient.) 1. Bed Mobility 2. Gait Training 3. Therapeutic Activites 4. Therapeutic Exercise/Strengthening 5. Transfer Training TREATMENT PLAN: Frequency/Duration: daily for duration of hospital stay Rehabilitation Potential For Stated Goals: Good REHAB RECOMMENDATIONS (at time of discharge pending progress):   
Placement: It is my opinion, based on this patient's performance to date, that Mr. Jagdeep Schroeder may benefit from [address] after discharge due to the functional deficits listed above that are likely to improve with skilled rehabilitation because he/she has multiple medical issues that affect his/her functional mobility in the community. Equipment:  
? None at this time HISTORY:  
History of Present Injury/Illness (Reason for Referral): PER MD NOTE: Patient is a [de-identified] y.o. male who presents to the ER due to family concerns of possible UTI. He has had some confusion and fatigue, which is typical of his UTIs. He has had a low grade fever. No known/reported n/v/d, chest pain, SOB, headaches. Pt has dementia and cannot provide history. No family present for my exam.  
Past Medical History/Comorbidities:  
Mr. Jagdeep Schroeder  has a past medical history of Arrhythmia, Arthritis, Atrial fibrillation ([address] (1/27/2012), CAD (coronary artery disease), Cancer ([address] Dementia, Depression (8/28/2012), GERD (gastroesophageal reflux disease), Heart failure ([address] History of prostate cancer (8/4/2014), Hypertension, Morbid obesity ([address] Osteoarthritis of right knee (1/26/2012), Recurrent major depressive disorder, in full remission ([address] (11/30/2017), Total knee replacement status (1/26/2012), and Vitamin B12 deficiency (7/23/2017).   Mr. Jagdeep Schroeder  has a past surgical history that includes hx orthopaedic (2010); hx prostatectomy; and hx pacemaker (8/30/2012). Social History/Living Environment:  
Home Environment: Private residence # Steps to Enter: 7 One/Two Story Residence: One story Living Alone: No 
Support Systems: Child(billy) Patient Expects to be Discharged to[de-identified] Private residence Current DME Used/Available at Home: Walker, rolling Prior Level of Function/Work/Activity: 
Pt living at home with daughter, was walking short distances with a walker, current with home health Number of Personal Factors/Comorbidities that affect the Plan of Care: 1-2: MODERATE COMPLEXITY EXAMINATION:  
Most Recent Physical Functioning:  
Gross Assessment: 
  
         
  
Posture: 
  
Balance: 
  Bed Mobility: 
  
Wheelchair Mobility: 
  
Transfers: 
Sit to Stand: Minimum assistance Stand to Sit: Minimum assistance Bed to Chair: Minimum assistance Gait: 
  
Speed/Ángela: Delayed;Pace decreased (<100 feet/min) Step Length: Left shortened;Right shortened Gait Abnormalities: Decreased step clearance;Shuffling gait Distance (ft): 75 Feet (ft) Assistive Device: Walker, rolling Ambulation - Level of Assistance: Minimal assistance Body Structures Involved: 1. Muscles Body Functions Affected: 1. Movement Related Activities and Participation Affected: 1. Mobility 2. Self Care Number of elements that affect the Plan of Care: 4+: HIGH COMPLEXITY CLINICAL PRESENTATION:  
Presentation: Stable and uncomplicated: LOW COMPLEXITY CLINICAL DECISION MAKING:  
[employer] AM-PAC 6 Clicks Basic Mobility Inpatient Short Form How much difficulty does the patient currently have. .. Unable A Lot A Little None 1. Turning over in bed (including adjusting bedclothes, sheets and blankets)? ? 1   ? 2   ? 3   ? 4  
2. Sitting down on and standing up from a chair with arms ( e.g., wheelchair, bedside commode, etc.)   ? 1   ? 2   ? 3   ? 4  
3.   Moving from lying on back to sitting on the side of the bed?   ? 1   ? 2   ? 3   ? 4  
 How much help from another person does the patient currently need. .. Total A Lot A Little None 4. Moving to and from a bed to a chair (including a wheelchair)? ? 1   ? 2   ? 3   ? 4  
5. Need to walk in hospital room? ? 1   ? 2   ? 3   ? 4  
6. Climbing 3-5 steps with a railing? ? 1   ? 2   ? 3   ? 4  
© 2007, Trustees of [address] [place] [address] [address] [address], under license to [employer]. All rights reserved Score:  Initial: 16 Most Recent: X (Date: -- ) Interpretation of Tool:  Represents activities that are increasingly more difficult (i.e. Bed mobility, Transfers, Gait). Medical Necessity:    
· Patient is expected to demonstrate progress in strength, coordination and functional technique ·  to decrease assistance required with functional mobility · . Reason for Services/Other Comments: 
· Patient continues to require skilled intervention due to inability to complete functional mobility independently · . Use of outcome tool(s) and clinical judgement create a POC that gives a: Clear prediction of patient's progress: LOW COMPLEXITY  
  
 
 
 
TREATMENT:  
(In addition to Assessment/Re-Assessment sessions the following treatments were rendered) Pre-treatment Symptoms/Complaints:  none Pain: Initial:  
   Post Session:  0/10 Therapeutic Activity: (    25 minutes): Therapeutic activities including Chair transfers and Ambulation on level ground to improve mobility, strength and balance. Required minimal   to promote dynamic balance in standing. Date: 
7/30/19 Date: 
 Date: Activity/Exercise Parameters Parameters Parameters Long arc quads 10 Seated hip flexion 10 Ankle pumps 10 Hip ABD/ADD 10 Straight leg raises 10 Braces/Orthotics/Lines/Etc:  
· O2 Device: Room air Treatment/Session Assessment:   
· Response to Treatment:   RN states pt is more alert today. · Interdisciplinary Collaboration:  
o Registered Nurse 
o Rehabilitation Attendant · After treatment position/precautions:  
o Up in chair 
o Bed alarm/tab alert on 
o Bed/Chair-wheels locked 
o Call light within reach 
o RN notified · Compliance with Program/Exercises: Compliant all of the time, Will assess as treatment progresses · Recommendations/Intent for next treatment session: \"Next visit will focus on advancements to more challenging activities and reduction in assistance provided\". Total Treatment Duration: PT Patient Time In/Time Out Time In: 7973 Time Out: 1515 Christina Kirkland, PTA

## 2019-07-31 NOTE — PROGRESS NOTES
Hospitalist Note Admit Date:  2019 12:23 AM  
Name:  Miguel Cervantes Age:  [de-identified] y.o. 
:  1940 MRN:  [id number] PCP:  Delilah Suarez MD 
Treatment Team: Attending Provider: Bisi Reeder MD; : Gertrudis Natarajan; Utilization Review: Dav Moon RN; Care Manager: Christy Serra LMSW HPI/Subjective:  
Mr. Francisca Valero is an [de-identified] y/o male who was admitted this past  for rhabdomyolsis, fall with orbital hematoma, UTI and hypernatremia. He was discharged to [facility] on 6/10. He then returned home where he lives with his daughter. Re-presented on  with acute confusion suspicious for recurrent UTI. Admitted and started on IV abx.  
 
: Urine cx growing 14k staph aureus, did previously have álvarez catheter. No symptoms currently. Alert and talkative, oriented to self and place but not to time. No pain or other complaints. ROS neg otherwise. Objective:  
 
Patient Vitals for the past 24 hrs: 
 Temp Pulse Resp BP SpO2  
19 1132 98.3 °F (36.8 °C) 60 16 109/56 99 % 19 0804  65     
19 0723 97.9 °F (36.6 °C) 66 16 144/83 90 % 19 0328 97.6 °F (36.4 °C) 65 18 118/61 95 % 19 2328 98.5 °F (36.9 °C) 60 18 131/70 99 % 19 2011 97.9 °F (36.6 °C) 66 18 116/66 99 % 19 1728 99.2 °F (37.3 °C) 63 18 111/68 99 % Oxygen Therapy O2 Sat (%): 99 % (19 1132) Pulse via Oximetry: 73 beats per minute (19 0334) O2 Device: Room air (19 0756) ETCO2 (mmHg): 95 mmHg (19 0338) Intake/Output Summary (Last 24 hours) at 2019 1333 Last data filed at 2019 1302 Gross per 24 hour Intake 3040 ml Output  Net 3040 ml  
   
*Note that automatically entered I/Os may not be accurate; dependent on patient compliance with collection and accurate  by techs. General:    Well nourished. Alert. Obese. CV:   RRR. No murmur, rub, or gallop. Lungs:   CTAB. No wheezing, rhonchi, or rales. Abdomen:   Soft, nontender, nondistended. Extremities: Warm and dry. No cyanosis or edema. Skin:     No rashes or jaundice. Neuro:  No gross focal deficits Data Review: 
I have reviewed all labs, meds, and studies from the last 24 hours: 
 
Recent Results (from the past 24 hour(s)) PLEASE READ & DOCUMENT PPD TEST IN 24 HRS Collection Time: 07/30/19  2:00 PM  
Result Value Ref Range PPD Negative Negative  
 mm Induration 0 0 - 5 mm All Micro Results Procedure Component Value Units Date/Time CULTURE, BLOOD [[id number]] Collected:  07/29/19 0141 Order Status:  Completed Specimen:  Blood Updated:  07/31/19 1119 Special Requests: LEFT HAND Culture result: NO GROWTH 2 DAYS     
 CULTURE, BLOOD [[id number]] Collected:  07/29/19 0142 Order Status:  Completed Specimen:  Blood Updated:  07/31/19 1119 Special Requests: LEFT ANTECUBITAL Culture result: NO GROWTH 2 DAYS     
 CULTURE, URINE [[id number]]  (Abnormal) Collected:  07/29/19 9493 Order Status:  Completed Specimen:  Cath Urine Updated:  07/31/19 8986 Special Requests: NO SPECIAL REQUESTS Culture result:    
  [id number] COLONIES/mL STAPHYLOCOCCUS AUREUS  
     
   SENSITIVITY TO FOLLOW No results found for this visit on 07/29/19. Current Meds: 
Current Facility-Administered Medications Medication Dose Route Frequency  dabigatran etexilate (PRADAXA) capsule 150 mg  150 mg Oral Q12H  
 ARIPiprazole (ABILIFY) tablet 10 mg  10 mg Oral DAILY  aspirin chewable tablet 81 mg  81 mg Oral DAILY  bicalutamide (CASODEX) tablet 50 mg  50 mg Oral DAILY  donepezil (ARICEPT) tablet 5 mg  5 mg Oral QHS  famotidine (PEPCID) tablet 20 mg  20 mg Oral BID  levomilnacipran (FETZIMA) ER capsule 40 mg (Patient Supplied)  40 mg Oral DAILY  metoprolol tartrate (LOPRESSOR) tablet 25 mg  25 mg Oral BID  
  tamsulosin (FLOMAX) capsule 0.4 mg  0.4 mg Oral QHS  sodium chloride (NS) flush 5-40 mL  5-40 mL IntraVENous Q8H  
 sodium chloride (NS) flush 5-40 mL  5-40 mL IntraVENous PRN  
 acetaminophen (TYLENOL) tablet 650 mg  650 mg Oral Q4H PRN  
 bisacodyl (DULCOLAX) tablet 5 mg  5 mg Oral DAILY PRN  
 LORazepam (ATIVAN) tablet 0.5 mg  0.5 mg Oral BID PRN  
 HYDROcodone-acetaminophen (NORCO) 5-325 mg per tablet 1 Tab  1 Tab Oral Q4H PRN  polyethylene glycol (MIRALAX) packet 17 g  17 g Oral DAILY Other Studies (last 24 hours): No results found. Assessment and Plan:  
 
Hospital Problems as of 7/31/2019 Date Reviewed: 3/20/2019 Codes Class Noted - Resolved POA * (Principal) UTI (urinary tract infection) ICD-10-CM: N39.0 ICD-9-CM: 599.0  6/4/2019 - Present Yes Moderate dementia without behavioral disturbance ICD-10-CM: F03.90 ICD-9-CM: 290.0  7/26/2018 - Present Yes Atrial fibrillation (HCC) (Chronic) ICD-10-CM: I48.91 
ICD-9-CM: 427.31  1/27/2012 - Present Yes HTN (hypertension) (Chronic) ICD-10-CM: I10 
ICD-9-CM: 401.9  1/27/2012 - Present Yes Plan: # UTI/acute metabolic encephalopathy - Mentation improved. Urine cx growing staph. Will stop rocephin and monitor off abx. I don't think he had an acute cystitis, but may still benefit from abx suppression given frequent UTIs in the past. Consider uro follow up at discharge. I wonder if he gets intermittently confused due to underlying dementia. # Dementia - Aricept 
  
# AFib - PPM; BB; restart Pradaxa 
  
# BPH/? Prostate cancer - Casodex, flomax 
  
# MDD/anxiety 
            - Zada Riddles DC planning/Dispo: Re-admission following rehab after discharge last month. Weak, unsure if he will thrive at home. May be PC appropriate. Diet:  DIET REGULAR 
DVT ppx:  SCDs Signed: 
Janeth Golden MD

## 2019-07-31 NOTE — PROGRESS NOTES
Interdisciplinary team rounds were held 7/31/2019 with the following team members:Care Management, Nursing, Nutrition, Pharmacy, Physical Therapy and Physician. Plan of care discussed. See clinical pathway and/or care plan for interventions and desired outcomes.

## 2019-07-31 NOTE — PROGRESS NOTES
Patient asleep in bed, no s/s distress/discomfort noted. Shift assessment completed. Call light and bed alarm in place for safety, will monitor.

## 2019-07-31 NOTE — PROGRESS NOTES
Care assumed. Continues to be more alert and appropriate in conversation; good appetite. Was able to work with PT yesterday; be OOB to chair and use commode. Daughter hopes to take pt home again with home health services he has been receiving.

## 2019-07-31 NOTE — PROGRESS NOTES
Problem: Self Care Deficits Care Plan (Adult) Goal: *Acute Goals and Plan of Care (Insert Text) Description Patient will complete lower body dressing with minimal assist to increase self care independence. Patient will complete toileting with supervision to increase self care independence. Patient will complete bathing with contact guard assist to increase self care independence. Patient will tolerate 40 minutes of OT treatment with self incorporated rest breaks to increase activity tolerance to enhance participation in hobbies. Patient will complete all functional transfers with contact guard assist using adaptive equipment as needed. Patient will complete UE exercises with supervision to increase overall activity tolerance and strength. Timeframe: 7 visits OCCUPATIONAL THERAPY: Initial Assessment and Daily Note 7/31/2019 INPATIENT: OT Visit Days: 1 Pay[place] / Plan[place] PART A [place] B / Product Type: Medicare /  
  
NAME/AGE/GENDER: Gera Huff is a [de-identified] y.o. male PRIMARY DIAGNOSIS:  UTI (urinary tract infection) [N39.0] UTI (urinary tract infection) UTI (urinary tract infection) ICD-10: Treatment Diagnosis:  
 · Generalized Muscle Weakness (M62.81) · Other lack of cordination (R27.8) · Difficulty in walking, Not elsewhere classified (R26.2) Precautions/Allergies: 
   Patient has no known allergies. ASSESSMENT:  
 
Mr. Delilah Alejandre presents with UTI and confusion, but also has a dementia dx. Recently home with HHPT after SNF stay from recent admission. Patient hoping to return home at d/c with daughter assist.  
He is below baseline for ADL's, he needs assist with toileting and transfers, at baseline his dtr only assists with showers and IADL's. He has no pain and follows commands well. Initiate OT POC.  
 
7/31/19: Pt found in supine and agreeable to treatment.  Pt eager to sit in chair. Pt performed bed mobility and transfer to recliner using RW with level of assist listed below. Pt performed UE exercises, all joints and planes to increase endurance. Pt left with LEs elevated, alarm intact and needs in reach. Pt making good progress towards goals. Cont POC. This section established at most recent assessment PROBLEM LIST (Impairments causing functional limitations): 1. Decreased Strength 2. Decreased ADL/Functional Activities 3. Decreased Transfer Abilities 4. Decreased Balance 5. Decreased Activity Tolerance 6. Decreased Cognition INTERVENTIONS PLANNED: (Benefits and precautions of occupational therapy have been discussed with the patient.) 1. Activities of daily living training 2. Adaptive equipment training 3. Therapeutic activity 4. Therapeutic exercise TREATMENT PLAN: Frequency/Duration: Follow patient 1-2tx to address above goals. Rehabilitation Potential For Stated Goals: Excellent REHAB RECOMMENDATIONS (at time of discharge pending progress):   
Placement: It is my opinion, based on this patient's performance to date, that Mr. Diana Milian may benefit from [address] after discharge due to the functional deficits listed above that are likely to improve with skilled rehabilitation because he/she has multiple medical issues that affect his/her functional mobility in the community. Equipment:  
? None at this time OCCUPATIONAL PROFILE AND HISTORY:  
History of Present Injury/Illness (Reason for Referral): 
See H&P Past Medical History/Comorbidities:  
Mr. Diana Milian  has a past medical history of Arrhythmia, Arthritis, Atrial fibrillation ([address] (1/27/2012), CAD (coronary artery disease), Cancer ([address] Dementia, Depression (8/28/2012), GERD (gastroesophageal reflux disease), Heart failure ([address] History of prostate cancer (8/4/2014), Hypertension, Morbid obesity ([address] Osteoarthritis of right knee (1/26/2012), Recurrent major depressive disorder, in full remission ([facility] (11/30/2017), Total knee replacement status (1/26/2012), and Vitamin B12 deficiency (7/23/2017). Mr. Hugh Potts  has a past surgical history that includes hx orthopaedic (2010); hx prostatectomy; and hx pacemaker (8/30/2012). Social History/Living Environment:  
Home Environment: Private residence # Steps to Enter: 7 One/Two Story Residence: One story Living Alone: No 
Support Systems: Child(billy) Patient Expects to be Discharged to[de-identified] Private residence Current DME Used/Available at Home: Walker, rolling Prior Level of Function/Work/Activity: 
Assist with Showers. Independent with walker and toileting. Number of Personal Factors/Comorbidities that affect the Plan of Care: Expanded review of therapy/medical records (1-2):  MODERATE COMPLEXITY ASSESSMENT OF OCCUPATIONAL PERFORMANCE[de-identified]  
Activities of Daily Living:  
Basic ADLs (From Assessment) Complex ADLs (From Assessment) Feeding: Independent Oral Facial Hygiene/Grooming: Minimum assistance Bathing: Moderate assistance Upper Body Dressing: Minimum assistance Lower Body Dressing: Moderate assistance Toileting: Moderate assistance Grooming/Bathing/Dressing Activities of Daily Living Bed/Mat Mobility Supine to Sit: Minimum assistance Sit to Stand: Moderate assistance Stand to Sit: Minimum assistance Bed to Chair: Minimum assistance Most Recent Physical Functioning:  
Gross Assessment: 
  
         
  
Posture: 
Posture (WDL): Exceptions to [facility] Posture Assessment: Forward head, Rounded shoulders Balance: 
  Bed Mobility: 
Supine to Sit: Minimum assistance Wheelchair Mobility: 
  
Transfers: 
Sit to Stand: Moderate assistance Stand to Sit: Minimum assistance Bed to Chair: Minimum assistance Patient Vitals for the past 6 hrs: 
 BP BP Patient Position SpO2 Pulse 07/31/19 1132 109/56 At rest 99 % 60 Mental Status Neurologic State: Alert Orientation Level: Oriented to person, Oriented to place Cognition: Follows commands Physical Skills Involved: 1. Range of Motion 2. Balance 3. Strength 4. Activity Tolerance Cognitive Skills Affected (resulting in the inability to perform in a timely and safe manner): 1. Short Term Recall Psychosocial Skills Affected: 1. Environmental Adaptation 2. Social Interaction Number of elements that affect the Plan of Care: 1-3:  LOW COMPLEXITY CLINICAL DECISION MAKIN[address] [place] [address] AM-PAC 6 Clicks Daily Activity Inpatient Short Form How much help from another person does the patient currently need. .. Total A Lot A Little None 1. Putting on and taking off regular lower body clothing? ? 1   ? 2   ? 3   ? 4  
2. Bathing (including washing, rinsing, drying)? ? 1   ? 2   ? 3   ? 4  
3. Toileting, which includes using toilet, bedpan or urinal?   ? 1   ? 2   ? 3   ? 4  
4. Putting on and taking off regular upper body clothing? ? 1   ? 2   ? 3   ? 4  
5. Taking care of personal grooming such as brushing teeth? ? 1   ? 2   ? 3   ? 4  
6. Eating meals? ? 1   ? 2   ? 3   ? 4  
© 2007, Trustees of [address] [place] [address], under license to [employer]. All rights reserved Score:  Initial: 15 Most Recent: X (Date: -- ) Interpretation of Tool:  Represents activities that are increasingly more difficult (i.e. Bed mobility, Transfers, Gait). Medical Necessity:    
· Skilled intervention continues to be required due to deficits listed above. Reason for Services/Other Comments: 
· Patient continues to require skilled intervention due to UTI and debility · . Use of outcome tool(s) and clinical judgement create a POC that gives a: MODERATE COMPLEXITY  
 
 
 
TREATMENT:  
(In addition to Assessment/Re-Assessment sessions the following treatments were rendered) Pre-treatment Symptoms/Complaints:   
Pain: Initial: Pain Intensity 1: 0  Post Session:  0 Therapeutic Exercise: (15):  Exercises per grid below to improve strength and coordination. Required moderate verbal and tactile cues to promote proper body alignment and promote proper body posture. Progressed resistance, range and repetitions as indicated. Date: 
7/30/19 Date: 
 Date: Activity/Exercise Parameters Parameters Parameters Horizontal abduction (red band) 2 sets of 10 Chest anterior punch (red band) 2 sets of 10 Scapular retraction (red band) 2 sets of 10 Therapeutic Activity: ( 10  ): Therapeutic activities including Bed transfers and Chair transfers to improve mobility, strength and balance. Required moderate   to promote dynamic balance in standing. Braces/Orthotics/Lines/Etc:  
· IV 
· O2 Device: Room air Treatment/Session Assessment:   
· Response to Treatment:  Good, sitting up in recliner, posey active. · Interdisciplinary Collaboration:  
o Physical Therapist 
o Occupational Therapist 
o Registered Nurse · After treatment position/precautions:  
o Up in chair 
o Bed alarm/tab alert on 
o Bed/Chair-wheels locked 
o Call light within reach · Compliance with Program/Exercises: Compliant all of the time, Will assess as treatment progresses. · Recommendations/Intent for next treatment session: \"Next visit will focus on advancements to more challenging activities and reduction in assistance provided\". Total Treatment Duration: OT Patient Time In/Time Out Time In: 4325 Time Out: 1430 There Ex 15 There act 10 Yasmani Sands OT

## 2019-08-01 VITALS
WEIGHT: 257 LBS | TEMPERATURE: 98 F | RESPIRATION RATE: 16 BRPM | DIASTOLIC BLOOD PRESSURE: 69 MMHG | SYSTOLIC BLOOD PRESSURE: 120 MMHG | OXYGEN SATURATION: 98 % | BODY MASS INDEX: 32.98 KG/M2 | HEART RATE: 62 BPM | HEIGHT: 74 IN

## 2019-08-01 PROBLEM — N39.0 UTI (URINARY TRACT INFECTION): Status: RESOLVED | Noted: 2019-06-04 | Resolved: 2019-08-01

## 2019-08-01 LAB
BACTERIA SPEC CULT: ABNORMAL
BACTERIA SPEC CULT: ABNORMAL
BASOPHILS # BLD: 0 K/UL (ref 0–0.2)
BASOPHILS NFR BLD: 0 % (ref 0–2)
DIFFERENTIAL METHOD BLD: ABNORMAL
EOSINOPHIL # BLD: 0.2 K/UL (ref 0–0.8)
EOSINOPHIL NFR BLD: 3 % (ref 0.5–7.8)
ERYTHROCYTE [DISTWIDTH] IN BLOOD BY AUTOMATED COUNT: 13.4 % (ref 11.9–14.6)
HCT VFR BLD AUTO: 39.5 % (ref 41.1–50.3)
HGB BLD-MCNC: 13.2 G/DL (ref 13.6–17.2)
IMM GRANULOCYTES # BLD AUTO: 0 K/UL (ref 0–0.5)
IMM GRANULOCYTES NFR BLD AUTO: 0 % (ref 0–5)
LYMPHOCYTES # BLD: 1.6 K/UL (ref 0.5–4.6)
LYMPHOCYTES NFR BLD: 30 % (ref 13–44)
MCH RBC QN AUTO: 30.6 PG (ref 26.1–32.9)
MCHC RBC AUTO-ENTMCNC: 33.4 G/DL (ref 31.4–35)
MCV RBC AUTO: 91.6 FL (ref 79.6–97.8)
MONOCYTES # BLD: 0.4 K/UL (ref 0.1–1.3)
MONOCYTES NFR BLD: 8 % (ref 4–12)
NEUTS SEG # BLD: 3.1 K/UL (ref 1.7–8.2)
NEUTS SEG NFR BLD: 58 % (ref 43–78)
NRBC # BLD: 0 K/UL (ref 0–0.2)
PLATELET # BLD AUTO: 147 K/UL (ref 150–450)
PMV BLD AUTO: 10.2 FL (ref 9.4–12.3)
RBC # BLD AUTO: 4.31 M/UL (ref 4.23–5.6)
SERVICE CMNT-IMP: ABNORMAL
WBC # BLD AUTO: 5.3 K/UL (ref 4.3–11.1)

## 2019-08-01 PROCEDURE — 74011250637 HC RX REV CODE- 250/637: Performed by: INTERNAL MEDICINE

## 2019-08-01 PROCEDURE — 74011250637 HC RX REV CODE- 250/637: Performed by: FAMILY MEDICINE

## 2019-08-01 PROCEDURE — 36415 COLL VENOUS BLD VENIPUNCTURE: CPT

## 2019-08-01 PROCEDURE — 85025 COMPLETE CBC W/AUTO DIFF WBC: CPT

## 2019-08-01 PROCEDURE — 97110 THERAPEUTIC EXERCISES: CPT

## 2019-08-01 PROCEDURE — 97530 THERAPEUTIC ACTIVITIES: CPT

## 2019-08-01 RX ORDER — SULFAMETHOXAZOLE AND TRIMETHOPRIM 800; 160 MG/1; MG/1
1 TABLET ORAL 2 TIMES DAILY
Qty: 14 TAB | Refills: 0 | Status: SHIPPED | OUTPATIENT
Start: 2019-08-01 | End: 2019-08-08

## 2019-08-01 RX ADMIN — METOPROLOL TARTRATE 25 MG: 25 TABLET ORAL at 11:03

## 2019-08-01 RX ADMIN — Medication 10 ML: at 05:23

## 2019-08-01 RX ADMIN — ARIPIPRAZOLE 10 MG: 5 TABLET ORAL at 11:03

## 2019-08-01 RX ADMIN — POLYETHYLENE GLYCOL (3350) 17 G: 17 POWDER, FOR SOLUTION ORAL at 11:03

## 2019-08-01 RX ADMIN — FAMOTIDINE 20 MG: 20 TABLET, FILM COATED ORAL at 11:03

## 2019-08-01 RX ADMIN — ASPIRIN 81 MG 81 MG: 81 TABLET ORAL at 11:03

## 2019-08-01 RX ADMIN — DABIGATRAN ETEXILATE MESYLATE 150 MG: 150 CAPSULE ORAL at 05:24

## 2019-08-01 RX ADMIN — BICALUTAMIDE 50 MG: 50 TABLET, FILM COATED ORAL at 11:04

## 2019-08-01 NOTE — DISCHARGE INSTRUCTIONS
DISCHARGE SUMMARY from Nurse    PATIENT INSTRUCTIONS:    After general anesthesia or intravenous sedation, for 24 hours or while taking prescription Narcotics:  · Limit your activities  · Do not drive and operate hazardous machinery  · Do not make important personal or business decisions  · Do  not drink alcoholic beverages  · If you have not urinated within 8 hours after discharge, please contact your surgeon on call. Report the following to your surgeon:  · Excessive pain, swelling, redness or odor of or around the surgical area  · Temperature over 100.5  · Nausea and vomiting lasting longer than 4 hours or if unable to take medications  · Any signs of decreased circulation or nerve impairment to extremity: change in color, persistent  numbness, tingling, coldness or increase pain  · Any questions    What to do at Home:  Recommended activity: Activity as tolerated (home health), f/u with your PCP in ~ 1 week for a recheck and labwork, complete your antibiotic,     If you experience any of the following symptoms: fever, chills, s/s of UTI, shortness of breath/chest pain, other concerns, return to ER and please follow up with PCP. *  Please give a list of your current medications to your Primary Care Provider. *  Please update this list whenever your medications are discontinued, doses are      changed, or new medications (including over-the-counter products) are added. *  Please carry medication information at all times in case of emergency situations. These are general instructions for a healthy lifestyle:    No smoking/ No tobacco products/ Avoid exposure to second hand smoke  Surgeon General's Warning:  Quitting smoking now greatly reduces serious risk to your health.     Obesity, smoking, and sedentary lifestyle greatly increases your risk for illness    A healthy diet, regular physical exercise & weight monitoring are important for maintaining a healthy lifestyle    You may be retaining fluid if you have a history of heart failure or if you experience any of the following symptoms:  Weight gain of 3 pounds or more overnight or 5 pounds in a week, increased swelling in our hands or feet or shortness of breath while lying flat in bed. Please call your doctor as soon as you notice any of these symptoms; do not wait until your next office visit. The discharge information has been reviewed with the patient. The patient verbalized understanding. Discharge medications reviewed with the patient and appropriate educational materials and side effects teaching were provided. ___________________________________________________________________________________________________________________________________  Patient Education        Urinary Tract Infections in Men: Care Instructions  Your Care Instructions    A urinary tract infection, or UTI, is a general term for an infection anywhere between the kidneys and the tip of the penis. UTIs can also be a result of a prostate problem. Most cause pain or burning when you urinate. Most UTIs are caused by bacteria and can be cured with antibiotics. It is important to complete your treatment so that the infection does not get worse. Follow-up care is a key part of your treatment and safety. Be sure to make and go to all appointments, and call your doctor if you are having problems. It's also a good idea to know your test results and keep a list of the medicines you take. How can you care for yourself at home? · Take your antibiotics as prescribed. Do not stop taking them just because you feel better. You need to take the full course of antibiotics. · Take your medicines exactly as prescribed. Your doctor may have prescribed a medicine, such as phenazopyridine (Pyridium), to help relieve pain when you urinate. This turns your urine orange. You may stop taking it when your symptoms get better.  But be sure to take all of your antibiotics, which treat the infection. · Drink extra water for the next day or two. This will help make the urine less concentrated and help wash out the bacteria causing the infection. (If you have kidney, heart, or liver disease and have to limit your fluids, talk with your doctor before you increase your fluid intake.)  · Avoid drinks that are carbonated or have caffeine. They can irritate the bladder. · Urinate often. Try to empty your bladder each time. · To relieve pain, take a hot bath or lay a heating pad (set on low) over your lower belly or genital area. Never go to sleep with a heating pad in place. To help prevent UTIs  · Drink plenty of fluids, enough so that your urine is light yellow or clear like water. If you have kidney, heart, or liver disease and have to limit fluids, talk with your doctor before you increase the amount of fluids you drink. · Urinate when you have the urge. Do not hold your urine for a long time. Urinate before you go to sleep. · Keep your penis clean. Catheter care  If you have a drainage tube (catheter) in place, the following steps will help you care for it. · Always wash your hands before and after touching your catheter. · Check the area around the urethra for inflammation or signs of infection. Signs of infection include irritated, swollen, red, or tender skin, or pus around the catheter. · Clean the area around the catheter with soap and water two times a day. Dry with a clean towel afterward. · Do not apply powder or lotion to the skin around the catheter. To empty the urine collection bag  · Wash your hands with soap and water. · Without touching the drain spout, remove the spout from its sleeve at the bottom of the collection bag. Open the valve on the spout. · Let the urine flow out of the bag and into the toilet or a container. Do not let the tubing or drain spout touch anything. · After you empty the bag, clean the end of the drain spout with tissue and water.  Close the valve and put the drain spout back into its sleeve at the bottom of the collection bag. · Wash your hands with soap and water. When should you call for help? Call your doctor now or seek immediate medical care if:    · Symptoms such as a fever, chills, nausea, or vomiting get worse or happen for the first time.     · You have new pain in your back just below your rib cage. This is called flank pain.     · There is new blood or pus in your urine.     · You are not able to take or keep down your antibiotics.    Watch closely for changes in your health, and be sure to contact your doctor if:    · You are not getting better after taking an antibiotic for 2 days.     · Your symptoms go away but then come back. Where can you learn more? Go to http://virgil-nancy.info/. Enter D648 in the search box to learn more about \"Urinary Tract Infections in Men: Care Instructions. \"  Current as of: December 19, 2018  Content Version: 12.1  © 7811-4659 [employer]. Care instructions adapted under license by [employer] (which disclaims liability or warranty for this information). If you have questions about a medical condition or this instruction, always ask your healthcare professional. [place] [address] any warranty or liability for your use of this information.

## 2019-08-01 NOTE — PROGRESS NOTES
PIV removed. All pt's belongings packed. Waiting on pt's daughter Ada George) to call back then SW will arrange EMS transport home.

## 2019-08-01 NOTE — PROGRESS NOTES
D/c instructions reviewed with pt's daughter at the bedside. Rx for Bactrim given. Pt's daughter verbalized understanding. She took pt's belongings with her. EMS transport arranged by SW and daughter will meet pt at home.

## 2019-08-01 NOTE — PROGRESS NOTES
Problem: Mobility Impaired (Adult and Pediatric) Goal: *Acute Goals and Plan of Care (Insert Text) Description STG: 
(1.)Mr. Johnny Brito will move from supine to sit and sit to supine  with STAND BY ASSIST within 4-7 treatment day(s). (2.)Mr. Johnny Brito will transfer from bed to chair and chair to bed with STAND BY ASSIST using the least restrictive device within 4-7 treatment day(s). (3.)Mr. Johnny Brito will ambulate with STAND BY ASSIST for 100 feet with the least restrictive device within 4-7 treatment day(s). (4.)Mr. Johnny Brito will participate in lower extremity exercises to increased strength to improve mobility within 4-7 days. ________________________________________________________________________________________________ Outcome: Progressing Towards Goal 
  
PHYSICAL THERAPY: Daily Note and AM 8/1/2019 INPATIENT: PT Visit Days : 1 [place] / [place] / Product Type: Medicare /   
  
NAME/AGE/GENDER: Yuniel Strickland is a [de-identified] y.o. male PRIMARY DIAGNOSIS: UTI (urinary tract infection) [N39.0] UTI (urinary tract infection) UTI (urinary tract infection) ICD-10: Treatment Diagnosis:  
 · Generalized Muscle Weakness (M62.81) · Difficulty in walking, Not elsewhere classified (R26.2) Precaution/Allergies: 
Patient has no known allergies. ASSESSMENT:  
 
Mr. Johnny Brito is supine on contact. Breakfast untouched off to side. He states he was hungry and agreeable to get up with therapy. Worked on bed mobility with increased time and verbal cues. Sat on side of bed and then worked on sit to stand with bed elevated. Walked around bed with a very slow but steady gait with walker, a little help to navigate walker. In chair worked on some lower extremity exercises with verbal cues. His lunch arrived, helped set up his lunch tray, needs in reach. Overall he was moving very slow today, continues with generalized weakness.  MD came in during therapy and talked with pt about possibility of going home today, SW note states HH will be arranged on discharge. This section established at most recent assessment PROBLEM LIST (Impairments causing functional limitations): 1. Decreased Strength 2. Decreased ADL/Functional Activities 3. Decreased Transfer Abilities 4. Decreased Ambulation Ability/Technique 5. Decreased [place] with Home Exercise Program 
 INTERVENTIONS PLANNED: (Benefits and precautions of physical therapy have been discussed with the patient.) 1. Bed Mobility 2. Gait Training 3. Therapeutic Activites 4. Therapeutic Exercise/Strengthening 5. Transfer Training TREATMENT PLAN: Frequency/Duration: daily for duration of hospital stay Rehabilitation Potential For Stated Goals: Good REHAB RECOMMENDATIONS (at time of discharge pending progress):   
Placement: It is my opinion, based on this patient's performance to date, that Mr. Sylwia Rodriguez may benefit from [address] after discharge due to the functional deficits listed above that are likely to improve with skilled rehabilitation because he/she has multiple medical issues that affect his/her functional mobility in the community. Equipment:  
? None at this time HISTORY:  
History of Present Injury/Illness (Reason for Referral): PER MD NOTE: Patient is a [de-identified] y.o. male who presents to the ER due to family concerns of possible UTI. He has had some confusion and fatigue, which is typical of his UTIs. He has had a low grade fever. No known/reported n/v/d, chest pain, SOB, headaches. Pt has dementia and cannot provide history.   No family present for my exam.  
Past Medical History/Comorbidities:  
Mr. Sylwia Rodriguez  has a past medical history of Arrhythmia, Arthritis, Atrial fibrillation ([facility] [address] (1/27/2012), CAD (coronary artery disease), Cancer ([facility] [address] Dementia, Depression (8/28/2012), GERD (gastroesophageal reflux disease), Heart failure ([facility] [address] History of prostate cancer (8/4/2014), Hypertension, Morbid obesity ([facility] [address] Osteoarthritis of right knee (1/26/2012), Recurrent major depressive disorder, in full remission ([facility] [address] (11/30/2017), Total knee replacement status (1/26/2012), and Vitamin B12 deficiency (7/23/2017). Mr. See Yanez  has a past surgical history that includes hx orthopaedic (2010); hx prostatectomy; and hx pacemaker (8/30/2012). Social History/Living Environment:  
Home Environment: Private residence # Steps to Enter: 7 One/Two Story Residence: One story Living Alone: No 
Support Systems: Child(billy) Patient Expects to be Discharged to[de-identified] Private residence Current DME Used/Available at Home: Walker, rolling Prior Level of Function/Work/Activity: 
Pt living at home with daughter, was walking short distances with a walker, current with home health Number of Personal Factors/Comorbidities that affect the Plan of Care: 1-2: MODERATE COMPLEXITY EXAMINATION:  
Most Recent Physical Functioning:  
Gross Assessment: 
AROM: Generally decreased, functional 
Strength: Generally decreased, functional 
         
  
Posture: 
Posture (WDL): Exceptions to [facility] Posture Assessment: Forward head, Rounded shoulders Balance: 
Sitting: Intact Standing: Intact; With support Bed Mobility: 
Supine to Sit: Stand-by assistance; Additional time Sit to Supine: (stayed up in chair) Scooting: Minimum assistance Wheelchair Mobility: 
  
Transfers: 
Sit to Stand: Minimum assistance(with bed elevated) Stand to Sit: Minimum assistance Gait: 
  
Speed/Ángela: Shuffled; Slow Step Length: Left shortened;Right shortened Gait Abnormalities: Decreased step clearance;Shuffling gait Distance (ft): 20 Feet (ft) Assistive Device: Walker, rolling Ambulation - Level of Assistance: Minimal assistance Body Structures Involved: 1. Muscles Body Functions Affected: 1. Movement Related Activities and Participation Affected: 1. Mobility 2. Self Care Number of elements that affect the Plan of Care: 4+: HIGH COMPLEXITY CLINICAL PRESENTATION:  
Presentation: Stable and uncomplicated: LOW COMPLEXITY CLINICAL DECISION MA[address] [place] [address] AM-PAC 6 Clicks Basic Mobility Inpatient Short Form How much difficulty does the patient currently have. .. Unable A Lot A Little None 1. Turning over in bed (including adjusting bedclothes, sheets and blankets)? ? 1   ? 2   ? 3   ? 4  
2. Sitting down on and standing up from a chair with arms ( e.g., wheelchair, bedside commode, etc.)   ? 1   ? 2   ? 3   ? 4  
3. Moving from lying on back to sitting on the side of the bed?   ? 1   ? 2   ? 3   ? 4 How much help from another person does the patient currently need. .. Total A Lot A Little None 4. Moving to and from a bed to a chair (including a wheelchair)? ? 1   ? 2   ? 3   ? 4  
5. Need to walk in hospital room? ? 1   ? 2   ? 3   ? 4  
6. Climbing 3-5 steps with a railing? ? 1   ? 2   ? 3   ? 4  
© , Trustees of [address] [place] [address], under license to [employer]. All rights reserved Score:  Initial: 16 Most Recent: X (Date: -- ) Interpretation of Tool:  Represents activities that are increasingly more difficult (i.e. Bed mobility, Transfers, Gait). Medical Necessity:    
· Patient is expected to demonstrate progress in strength, coordination and functional technique ·  to decrease assistance required with functional mobility · . Reason for Services/Other Comments: 
· Patient continues to require skilled intervention due to inability to complete functional mobility independently · . Use of outcome tool(s) and clinical judgement create a POC that gives a: Clear prediction of patient's progress: LOW COMPLEXITY  
  
 
 
 
TREATMENT:  
(In addition to Assessment/Re-Assessment sessions the following treatments were rendered) Pre-treatment Symptoms/Complaints:  none Pain: Initial: Pain Intensity 1: 0  Post Session:  0/10 Therapeutic Activity: (    15 minutes): Therapeutic activities including bed mobility, scooting sit to stand and chair transfers along with Ambulation on level ground to improve mobility, strength and balance. Required minimal assist and verbal cues   to promote static and dynamic balance in standing and promote coordination of bilateral, lower extremity(s). Therapeutic Exercise: (8 Minutes):  Exercises per grid below to improve mobility, strength and coordination. Required minimal verbal cues to promote proper body alignment and promote proper body posture. Progressed repetitions as indicated. Date: 
7/30/19 Date: 
8/1/19 Date: Activity/Exercise Parameters Parameters Parameters Long arc quads 10 15 Seated hip flexion 10 15 Ankle pumps 10 15 Hip ABD/ADD 10 Straight leg raises 10 15 Braces/Orthotics/Lines/Etc:  
· O2 Device: Room air Treatment/Session Assessment:   
· Response to Treatment:   Pt tolerated fair, generalized weakness · Interdisciplinary Collaboration:  
o Registered Nurse 
o Rehabilitation Attendant · After treatment position/precautions:  
o Up in chair 
o Bed alarm/tab alert on 
o Bed/Chair-wheels locked 
o Call light within reach · Compliance with Program/Exercises: Compliant all of the time, Will assess as treatment progresses · Recommendations/Intent for next treatment session: \"Next visit will focus on advancements to more challenging activities and reduction in assistance provided\". Total Treatment Duration: PT Patient Time In/Time Out Time In: 1120 Time Out: 1143 William Rushing PT

## 2019-08-01 NOTE — PROGRESS NOTES
Pt in bed this AM. Care delayed due to pt care. Assessment complete. Pt alert and disoriented. RR even, unlabored, no noted distess. Pt denies needs. Bed alarm on for safety.

## 2019-08-01 NOTE — PROGRESS NOTES
Pt discharged via [address] [place] [address] at this time. Pts daughter previously took pts belongings.

## 2019-08-01 NOTE — PROGRESS NOTES
Care Management Interventions PCP Verified by CM: Yes Transition of Care Consult (CM Consult): Discharge Planning, Home Health [address] No 
Reason Outside [place]: Patient already serviced by other home care/hospice agency Physical Therapy Consult: Yes Occupational Therapy Consult: Yes Current Support Network: Lives with Caregiver, Own Home [place] of Choice Offered: Yes Discharge Location Discharge Placement: Home with home health Maricarmen reviewed chart and talked with Md about pt's longer term plans and appropriate ref. Maricarmen talked with pt's daughter Deneen Benedict this am as well. Reviewed pt's progress with therapy. She was wondering about pt coming home today. Pt is current with [facility] and daughter wants this to continue. Maricarmen also offered [facility]. Daughter very interested in this. Called office and was told Alicia Edgar was off today and tomorrow. Faxed clinical info to office and asked them to follow up with pt's daughter. Daughter requesting ambulance transport home due to steps and pt easily fatigues. Resumption orders need to be placed with [facility] once pt d/c. Lola Hernadez called [facility] to verify the services pt was receiving at home. Pt rec PT/OT/RN/Aide. Sw will resume at d/c. Lola Christianson

## 2019-08-01 NOTE — PROGRESS NOTES
Patient asleep in bed, slept well overnight. Incontinent of urine, pericare given, new brief placed. Repositioned in bed for comfort. Call bell and bed alarm in place for safety, will monitor.

## 2019-08-02 ENCOUNTER — PATIENT OUTREACH (OUTPATIENT)
Dept: CASE MANAGEMENT | Age: 79
End: 2019-08-02

## 2019-08-02 NOTE — PROGRESS NOTES
This note will not be viewable in [address] Transition of Care Discharge Follow-up Questionnaire Date/Time of Call: 8/2/19    120p What was the patient hospitalized for? Dc 6/10 to [facility] s/p fall w/ rhabdo, hx HF 
ED 7/7  UTI IP DC 8/1 UTI Does the patient understand his/her diagnosis and/or treatment and what happened during the hospitalization? RNCM attempoted to reach pt/family regarding dc, left vm message requesting return call. Did the patient receive discharge instructions? CM Assessed Risk for Readmission:  
 
Patient stated Risk for Readmission:  
 
  
Review any discharge instructions (see discharge instructions/AVS in [facility]). Ask patient if they understand these. Do they have any questions? Were home services ordered (nursing, PT, OT, ST, etc.)? [facility]  PT/OT/RN/aide If so, has the first visit occurred? If not, why? (Assist with coordination of services if necessary.) Was any DME ordered? If so, has it been received? If not, why?  (Assist patient in obtaining DME orders &/or equipment if necessary.) Complete a review of all medications (new, continued and discontinued meds per the D/C instructions and medication tab in [address] [place] [address]). Were all new prescriptions filled? If not, why?  (Assist patient in obtaining medications if necessary  escalate for CCM &/or SW if ongoing issues are verbalized by pt or anticipated) Does the patient understand the purpose and dosing instructions for all medications? (If patient has questions, provide explanation and education.) Does the patient have any problems in performing ADLs? (If patient is unable to perform ADLs  what is the limiting factor(s)? Do they have a support system that can assist? If no support system is present, discuss possible assistance that they may be able to obtain. Escalate for CCM/SW if ongoing issues are verbalized by pt or anticipated) Does the patient have all follow-up appointments scheduled? 7 day f/up with PCP?  
(f/up with PCP may be w/in 14 days if patient has a f/up with their specialist w/in 7 days) 7-14 day f/up with specialist?  
(or per discharge instructions) If f/up has not been made  what actions has the care coordinator made to accomplish this? Has transportation been arranged? Any other questions or concerns expressed by the patient? Schedule next appointment with TOBI GUEVARA Coordinator or refer to RN Case Manager/ per the workflow guidelines. When is care coordinators next follow-up call scheduled? If referred for CCM  what RN care manager was the referral assigned? RNCM will continue attempts to reach pt. PRASANNA Call Completed By: Lj Guajardo, RN, BSN Community Nurse Care Manager

## 2019-08-05 ENCOUNTER — PATIENT OUTREACH (OUTPATIENT)
Dept: CASE MANAGEMENT | Age: 79
End: 2019-08-05

## 2019-08-07 NOTE — PROGRESS NOTES
RNCM calls to pt for PRASANNA, messages left, no return calls. CCM A Evatt will attempt again next week. This note will not be viewable in [address]

## 2019-08-14 ENCOUNTER — PATIENT OUTREACH (OUTPATIENT)
Dept: CASE MANAGEMENT | Age: 79
End: 2019-08-14

## 2019-08-14 NOTE — PROGRESS NOTES
This note will not be viewable in [address] RNCM attempted to reach pt/family, unsuccessful. Pt  W/ multiple encounters and HRRA. IP Adm 7/29/2019 - 8/1/2019 w/ UTI.  
6/4/2019 - 6/10/2019  UTI w/ fall then to [facility] 7/3 w/ [facility]. Will close Episode as pt UTR.

## 2019-10-30 ENCOUNTER — HOSPITAL ENCOUNTER (INPATIENT)
Age: 79
LOS: 6 days | Discharge: NURSING, REHAB OR LONG-TERM CARE | DRG: 690 | End: 2019-11-05
Attending: EMERGENCY MEDICINE | Admitting: INTERNAL MEDICINE
Payer: MEDICARE

## 2019-10-30 ENCOUNTER — APPOINTMENT (OUTPATIENT)
Dept: GENERAL RADIOLOGY | Age: 79
DRG: 690 | End: 2019-10-30
Attending: EMERGENCY MEDICINE
Payer: MEDICARE

## 2019-10-30 DIAGNOSIS — M62.82 NON-TRAUMATIC RHABDOMYOLYSIS: ICD-10-CM

## 2019-10-30 DIAGNOSIS — F03.90 DEMENTIA WITHOUT BEHAVIORAL DISTURBANCE, UNSPECIFIED DEMENTIA TYPE: ICD-10-CM

## 2019-10-30 DIAGNOSIS — N30.01 ACUTE CYSTITIS WITH HEMATURIA: Primary | ICD-10-CM

## 2019-10-30 DIAGNOSIS — R77.8 ELEVATED TROPONIN: ICD-10-CM

## 2019-10-30 PROBLEM — N39.0 UTI (URINARY TRACT INFECTION): Status: ACTIVE | Noted: 2019-10-30

## 2019-10-30 LAB
ALBUMIN SERPL-MCNC: 3.5 G/DL (ref 3.2–4.6)
ALBUMIN/GLOB SERPL: 0.9 {RATIO} (ref 1.2–3.5)
ALP SERPL-CCNC: 123 U/L (ref 50–136)
ALT SERPL-CCNC: 32 U/L (ref 12–65)
ANION GAP SERPL CALC-SCNC: 6 MMOL/L (ref 7–16)
AST SERPL-CCNC: 100 U/L (ref 15–37)
ATRIAL RATE: 315 BPM
ATRIAL RATE: 80 BPM
ATRIAL RATE: 83 BPM
BACTERIA URNS QL MICRO: ABNORMAL /HPF
BASOPHILS # BLD: 0 K/UL (ref 0–0.2)
BASOPHILS NFR BLD: 0 % (ref 0–2)
BILIRUB SERPL-MCNC: 0.7 MG/DL (ref 0.2–1.1)
BUN SERPL-MCNC: 10 MG/DL (ref 8–23)
CALCIUM SERPL-MCNC: 10.3 MG/DL (ref 8.3–10.4)
CALCULATED P AXIS, ECG09: 70 DEGREES
CALCULATED P AXIS, ECG09: 84 DEGREES
CALCULATED P AXIS, ECG09: 90 DEGREES
CALCULATED R AXIS, ECG10: -48 DEGREES
CALCULATED R AXIS, ECG10: -52 DEGREES
CALCULATED R AXIS, ECG10: -59 DEGREES
CALCULATED T AXIS, ECG11: -13 DEGREES
CALCULATED T AXIS, ECG11: 23 DEGREES
CALCULATED T AXIS, ECG11: 46 DEGREES
CASTS URNS QL MICRO: ABNORMAL /LPF
CHLORIDE SERPL-SCNC: 108 MMOL/L (ref 98–107)
CK SERPL-CCNC: 2360 U/L (ref 35–232)
CO2 SERPL-SCNC: 26 MMOL/L (ref 21–32)
CREAT SERPL-MCNC: 0.98 MG/DL (ref 0.8–1.5)
DIAGNOSIS, 93000: NORMAL
DIFFERENTIAL METHOD BLD: NORMAL
EOSINOPHIL # BLD: 0.1 K/UL (ref 0–0.8)
EOSINOPHIL NFR BLD: 1 % (ref 0.5–7.8)
EPI CELLS #/AREA URNS HPF: ABNORMAL /HPF
ERYTHROCYTE [DISTWIDTH] IN BLOOD BY AUTOMATED COUNT: 13.2 % (ref 11.9–14.6)
GLOBULIN SER CALC-MCNC: 3.7 G/DL (ref 2.3–3.5)
GLUCOSE SERPL-MCNC: 94 MG/DL (ref 65–100)
HCT VFR BLD AUTO: 48 % (ref 41.1–50.3)
HGB BLD-MCNC: 15.9 G/DL (ref 13.6–17.2)
IMM GRANULOCYTES # BLD AUTO: 0 K/UL (ref 0–0.5)
IMM GRANULOCYTES NFR BLD AUTO: 1 % (ref 0–5)
LACTATE BLD-SCNC: 2.31 MMOL/L (ref 0.5–1.9)
LACTATE SERPL-SCNC: 1.3 MMOL/L (ref 0.4–2)
LACTATE SERPL-SCNC: 3.5 MMOL/L (ref 0.4–2)
LYMPHOCYTES # BLD: 1.2 K/UL (ref 0.5–4.6)
LYMPHOCYTES NFR BLD: 19 % (ref 13–44)
MCH RBC QN AUTO: 30.5 PG (ref 26.1–32.9)
MCHC RBC AUTO-ENTMCNC: 33.1 G/DL (ref 31.4–35)
MCV RBC AUTO: 92 FL (ref 79.6–97.8)
MONOCYTES # BLD: 0.5 K/UL (ref 0.1–1.3)
MONOCYTES NFR BLD: 8 % (ref 4–12)
NEUTS SEG # BLD: 4.5 K/UL (ref 1.7–8.2)
NEUTS SEG NFR BLD: 72 % (ref 43–78)
NRBC # BLD: 0 K/UL (ref 0–0.2)
P-R INTERVAL, ECG05: 200 MS
P-R INTERVAL, ECG05: 230 MS
PLATELET # BLD AUTO: 185 K/UL (ref 150–450)
PMV BLD AUTO: 9.8 FL (ref 9.4–12.3)
POTASSIUM SERPL-SCNC: 4.4 MMOL/L (ref 3.5–5.1)
PROT SERPL-MCNC: 7.2 G/DL (ref 6.3–8.2)
Q-T INTERVAL, ECG07: 402 MS
Q-T INTERVAL, ECG07: 432 MS
Q-T INTERVAL, ECG07: 472 MS
QRS DURATION, ECG06: 102 MS
QRS DURATION, ECG06: 114 MS
QRS DURATION, ECG06: 138 MS
QTC CALCULATION (BEZET), ECG08: 463 MS
QTC CALCULATION (BEZET), ECG08: 479 MS
QTC CALCULATION (BEZET), ECG08: 507 MS
RBC # BLD AUTO: 5.22 M/UL (ref 4.23–5.6)
RBC #/AREA URNS HPF: >100 /HPF
SODIUM SERPL-SCNC: 140 MMOL/L (ref 136–145)
TROPONIN I SERPL-MCNC: 0.69 NG/ML (ref 0.02–0.05)
TROPONIN I SERPL-MCNC: 0.82 NG/ML (ref 0.02–0.05)
TROPONIN I SERPL-MCNC: 0.89 NG/ML (ref 0.02–0.05)
VENTRICULAR RATE, ECG03: 62 BPM
VENTRICULAR RATE, ECG03: 80 BPM
VENTRICULAR RATE, ECG03: 83 BPM
WBC # BLD AUTO: 6.3 K/UL (ref 4.3–11.1)
WBC URNS QL MICRO: >100 /HPF

## 2019-10-30 PROCEDURE — 65660000000 HC RM CCU STEPDOWN

## 2019-10-30 PROCEDURE — 51701 INSERT BLADDER CATHETER: CPT | Performed by: EMERGENCY MEDICINE

## 2019-10-30 PROCEDURE — 87088 URINE BACTERIA CULTURE: CPT

## 2019-10-30 PROCEDURE — 74011250637 HC RX REV CODE- 250/637: Performed by: INTERNAL MEDICINE

## 2019-10-30 PROCEDURE — 93005 ELECTROCARDIOGRAM TRACING: CPT | Performed by: EMERGENCY MEDICINE

## 2019-10-30 PROCEDURE — 74011000258 HC RX REV CODE- 258: Performed by: EMERGENCY MEDICINE

## 2019-10-30 PROCEDURE — 99285 EMERGENCY DEPT VISIT HI MDM: CPT | Performed by: EMERGENCY MEDICINE

## 2019-10-30 PROCEDURE — 71045 X-RAY EXAM CHEST 1 VIEW: CPT

## 2019-10-30 PROCEDURE — 74011250637 HC RX REV CODE- 250/637: Performed by: EMERGENCY MEDICINE

## 2019-10-30 PROCEDURE — 94762 N-INVAS EAR/PLS OXIMTRY CONT: CPT | Performed by: EMERGENCY MEDICINE

## 2019-10-30 PROCEDURE — 87086 URINE CULTURE/COLONY COUNT: CPT

## 2019-10-30 PROCEDURE — 80053 COMPREHEN METABOLIC PANEL: CPT

## 2019-10-30 PROCEDURE — 81015 MICROSCOPIC EXAM OF URINE: CPT

## 2019-10-30 PROCEDURE — 77030011943

## 2019-10-30 PROCEDURE — 51798 US URINE CAPACITY MEASURE: CPT | Performed by: EMERGENCY MEDICINE

## 2019-10-30 PROCEDURE — 74011250636 HC RX REV CODE- 250/636: Performed by: INTERNAL MEDICINE

## 2019-10-30 PROCEDURE — 82550 ASSAY OF CK (CPK): CPT

## 2019-10-30 PROCEDURE — 83605 ASSAY OF LACTIC ACID: CPT

## 2019-10-30 PROCEDURE — 51701 INSERT BLADDER CATHETER: CPT

## 2019-10-30 PROCEDURE — 77030020255 HC SOL INJ LR 1000ML BG

## 2019-10-30 PROCEDURE — 77030020186 HC BOOT HL PROTCT SAGE -B

## 2019-10-30 PROCEDURE — 36415 COLL VENOUS BLD VENIPUNCTURE: CPT

## 2019-10-30 PROCEDURE — 81003 URINALYSIS AUTO W/O SCOPE: CPT | Performed by: EMERGENCY MEDICINE

## 2019-10-30 PROCEDURE — 85025 COMPLETE CBC W/AUTO DIFF WBC: CPT

## 2019-10-30 PROCEDURE — 94760 N-INVAS EAR/PLS OXIMETRY 1: CPT

## 2019-10-30 PROCEDURE — 96360 HYDRATION IV INFUSION INIT: CPT | Performed by: EMERGENCY MEDICINE

## 2019-10-30 PROCEDURE — 87186 SC STD MICRODIL/AGAR DIL: CPT

## 2019-10-30 PROCEDURE — 74011250636 HC RX REV CODE- 250/636: Performed by: EMERGENCY MEDICINE

## 2019-10-30 PROCEDURE — 84484 ASSAY OF TROPONIN QUANT: CPT

## 2019-10-30 PROCEDURE — 77030020256 HC SOL INJ NACL 0.9%  500ML

## 2019-10-30 RX ORDER — AMOXICILLIN 250 MG
1 CAPSULE ORAL DAILY
Status: DISCONTINUED | OUTPATIENT
Start: 2019-10-31 | End: 2019-11-05 | Stop reason: HOSPADM

## 2019-10-30 RX ORDER — ONDANSETRON 2 MG/ML
4 INJECTION INTRAMUSCULAR; INTRAVENOUS
Status: DISCONTINUED | OUTPATIENT
Start: 2019-10-30 | End: 2019-11-05 | Stop reason: HOSPADM

## 2019-10-30 RX ORDER — ENOXAPARIN SODIUM 100 MG/ML
40 INJECTION SUBCUTANEOUS EVERY 24 HOURS
Status: DISCONTINUED | OUTPATIENT
Start: 2019-10-30 | End: 2019-10-30

## 2019-10-30 RX ORDER — SODIUM CHLORIDE 0.9 % (FLUSH) 0.9 %
5-40 SYRINGE (ML) INJECTION EVERY 8 HOURS
Status: DISCONTINUED | OUTPATIENT
Start: 2019-10-30 | End: 2019-11-05 | Stop reason: HOSPADM

## 2019-10-30 RX ORDER — METOPROLOL TARTRATE 25 MG/1
25 TABLET, FILM COATED ORAL 2 TIMES DAILY
Status: DISCONTINUED | OUTPATIENT
Start: 2019-10-30 | End: 2019-10-30

## 2019-10-30 RX ORDER — DABIGATRAN ETEXILATE 150 MG/1
150 CAPSULE ORAL 2 TIMES DAILY
Status: DISCONTINUED | OUTPATIENT
Start: 2019-10-31 | End: 2019-11-05 | Stop reason: HOSPADM

## 2019-10-30 RX ORDER — POLYETHYLENE GLYCOL 3350 17 G/17G
17 POWDER, FOR SOLUTION ORAL DAILY
Status: DISCONTINUED | OUTPATIENT
Start: 2019-10-31 | End: 2019-10-31

## 2019-10-30 RX ORDER — SODIUM CHLORIDE 9 MG/ML
500 INJECTION, SOLUTION INTRAVENOUS ONCE
Status: COMPLETED | OUTPATIENT
Start: 2019-10-30 | End: 2019-10-30

## 2019-10-30 RX ORDER — HYDROCODONE BITARTRATE AND ACETAMINOPHEN 5; 325 MG/1; MG/1
1 TABLET ORAL
Status: DISCONTINUED | OUTPATIENT
Start: 2019-10-30 | End: 2019-11-05 | Stop reason: HOSPADM

## 2019-10-30 RX ORDER — GUAIFENESIN 100 MG/5ML
324 LIQUID (ML) ORAL
Status: COMPLETED | OUTPATIENT
Start: 2019-10-30 | End: 2019-10-30

## 2019-10-30 RX ORDER — FAMOTIDINE 20 MG/1
20 TABLET, FILM COATED ORAL 2 TIMES DAILY
Status: DISCONTINUED | OUTPATIENT
Start: 2019-10-30 | End: 2019-11-05 | Stop reason: HOSPADM

## 2019-10-30 RX ORDER — DIPHENHYDRAMINE HYDROCHLORIDE 50 MG/ML
12.5 INJECTION, SOLUTION INTRAMUSCULAR; INTRAVENOUS
Status: DISCONTINUED | OUTPATIENT
Start: 2019-10-30 | End: 2019-11-05 | Stop reason: HOSPADM

## 2019-10-30 RX ORDER — NALOXONE HYDROCHLORIDE 0.4 MG/ML
0.4 INJECTION, SOLUTION INTRAMUSCULAR; INTRAVENOUS; SUBCUTANEOUS AS NEEDED
Status: DISCONTINUED | OUTPATIENT
Start: 2019-10-30 | End: 2019-11-05 | Stop reason: HOSPADM

## 2019-10-30 RX ORDER — DONEPEZIL HYDROCHLORIDE 5 MG/1
5 TABLET, FILM COATED ORAL
Status: DISCONTINUED | OUTPATIENT
Start: 2019-10-30 | End: 2019-11-05 | Stop reason: HOSPADM

## 2019-10-30 RX ORDER — BICALUTAMIDE 50 MG/1
50 TABLET, FILM COATED ORAL DAILY
Status: DISCONTINUED | OUTPATIENT
Start: 2019-10-31 | End: 2019-11-05 | Stop reason: HOSPADM

## 2019-10-30 RX ORDER — TAMSULOSIN HYDROCHLORIDE 0.4 MG/1
0.4 CAPSULE ORAL
Status: DISCONTINUED | OUTPATIENT
Start: 2019-10-30 | End: 2019-11-05 | Stop reason: HOSPADM

## 2019-10-30 RX ORDER — DABIGATRAN ETEXILATE 150 MG/1
150 CAPSULE ORAL 2 TIMES DAILY
Status: DISCONTINUED | OUTPATIENT
Start: 2019-10-30 | End: 2019-10-30

## 2019-10-30 RX ORDER — ARIPIPRAZOLE 5 MG/1
10 TABLET ORAL DAILY
Status: DISCONTINUED | OUTPATIENT
Start: 2019-10-31 | End: 2019-11-05 | Stop reason: HOSPADM

## 2019-10-30 RX ORDER — SODIUM CHLORIDE 0.9 % (FLUSH) 0.9 %
5-40 SYRINGE (ML) INJECTION AS NEEDED
Status: DISCONTINUED | OUTPATIENT
Start: 2019-10-30 | End: 2019-11-05 | Stop reason: HOSPADM

## 2019-10-30 RX ORDER — ACETAMINOPHEN 325 MG/1
650 TABLET ORAL
Status: DISCONTINUED | OUTPATIENT
Start: 2019-10-30 | End: 2019-11-05 | Stop reason: HOSPADM

## 2019-10-30 RX ORDER — SODIUM CHLORIDE, SODIUM LACTATE, POTASSIUM CHLORIDE, CALCIUM CHLORIDE 600; 310; 30; 20 MG/100ML; MG/100ML; MG/100ML; MG/100ML
100 INJECTION, SOLUTION INTRAVENOUS CONTINUOUS
Status: DISCONTINUED | OUTPATIENT
Start: 2019-10-30 | End: 2019-11-05 | Stop reason: HOSPADM

## 2019-10-30 RX ADMIN — ASPIRIN 81 MG 324 MG: 81 TABLET ORAL at 11:22

## 2019-10-30 RX ADMIN — TAMSULOSIN HYDROCHLORIDE 0.4 MG: 0.4 CAPSULE ORAL at 21:00

## 2019-10-30 RX ADMIN — SODIUM CHLORIDE 500 ML: 900 INJECTION, SOLUTION INTRAVENOUS at 11:05

## 2019-10-30 RX ADMIN — FAMOTIDINE 20 MG: 20 TABLET ORAL at 17:32

## 2019-10-30 RX ADMIN — ENOXAPARIN SODIUM 40 MG: 40 INJECTION SUBCUTANEOUS at 14:23

## 2019-10-30 RX ADMIN — DONEPEZIL HYDROCHLORIDE 5 MG: 5 TABLET, FILM COATED ORAL at 21:00

## 2019-10-30 RX ADMIN — Medication 5 ML: at 15:10

## 2019-10-30 RX ADMIN — SODIUM CHLORIDE, SODIUM LACTATE, POTASSIUM CHLORIDE, AND CALCIUM CHLORIDE 175 ML/HR: 600; 310; 30; 20 INJECTION, SOLUTION INTRAVENOUS at 23:35

## 2019-10-30 RX ADMIN — SODIUM CHLORIDE, SODIUM LACTATE, POTASSIUM CHLORIDE, AND CALCIUM CHLORIDE 175 ML/HR: 600; 310; 30; 20 INJECTION, SOLUTION INTRAVENOUS at 14:24

## 2019-10-30 RX ADMIN — CEFTRIAXONE 1 G: 1 INJECTION, POWDER, FOR SOLUTION INTRAMUSCULAR; INTRAVENOUS at 12:38

## 2019-10-30 RX ADMIN — SODIUM CHLORIDE 500 ML: 900 INJECTION, SOLUTION INTRAVENOUS at 17:34

## 2019-10-30 RX ADMIN — NITROGLYCERIN 1 INCH: 20 OINTMENT TOPICAL at 11:19

## 2019-10-30 NOTE — PROGRESS NOTES
Critical lab values called to dr. Merline Shingles. Trop 0.82. Lactic acid 3.5. New orders placed for cardiology consult in the a.m & to recheck Lactic acid @2100 & 0400 in the a.m. Pt in bed resting quietly. No acute signs of distress noted.

## 2019-10-30 NOTE — ED NOTES
Brought by EMS secondary to generalized weakness and strong odor to urine per EMS. Confusion per EMS however was advised normal per family on scene.

## 2019-10-30 NOTE — H&P
HOSPITALIST H&P/CONSULT 
NAME:  Osvaldo Davis Age:  78 y.o. 
:   1940 MRN:   [id number] PCP: Mara Fry MD 
Consulting MD: Treatment Team: Attending Provider: Rahul Reynoso MD; : Lindsay Soto 
HPI:  
78years old male with past medical history of chronic A. fib on Pradaxa, hypertension, dementia without behavioral disturbance, prostate cancer was BIBEMS due to generalized weakness and confusion raising concern for UTI. He was recently admitted with acute rhabdomyolysis, UTI and elevated troponin, was discharged home with home health PT. No falls reported and patient denies any fever chills, nausea, vomiting, diarrhea, abdominal pain, chest pain, SOB, cough or urinary symptoms. In ER patient was slightly confused and labs showed elevated CK at 2360, lactic acid at 2.3, troponin 0.69 (prior value was 0.66 which had trended down from 5.2. He was seen by cardiology during prior admission and it was deemed secondary to demand ischemia. UA was positive for infection and he was given Rocephin with IV fluids in the ER. Hospitalist consulted for admission. Patient appears comfortable, laying in bed. He is AAO x2 to place and person but not to time. Knows name and date of birth. States that he lives with his daughter. Detailed history could not be obtained due to dementia. Case was discussed with ER MD. 
10 point ROS done and is negative except as noted in HPI. Past Medical History:  
Diagnosis Date  Arrhythmia   
 pt takes pradaxa  Arthritis  Atrial fibrillation ([facility] [address] 2012  CAD (coronary artery disease)  Cancer [facility])   
 prostate  Dementia  Depression 2012  GERD (gastroesophageal reflux disease)   
 controlled with medication  Heart failure ([facility] [address]   
 pt takes lasix as needed, reports SOB with exertion  History of prostate cancer 2014  Hypertension   
 controlled with medication  Morbid obesity ([facility] [address]  Osteoarthritis of right knee 1/26/2012  Recurrent major depressive disorder, in full remission ([facility] [address] 11/30/2017  Total knee replacement status 1/26/2012  Vitamin B12 deficiency 7/23/2017 Past Surgical History:  
Procedure Laterality Date  HX ORTHOPAEDIC  2010  
 left TKA  HX PACEMAKER  8/30/2012 [employer] pacemaker  HX PROSTATECTOMY Prior to Admission Medications Prescriptions Last Dose Informant Patient Reported? Taking? ARIPiprazole (ABILIFY) 10 mg tablet   No No  
Sig: TAKE 1 TABLET BY MOUTH EVERY DAY  
acetaminophen (TYLENOL ARTHRITIS PAIN) 650 mg CR tablet   Yes No  
Sig: Take 650 mg by mouth every six (6) hours as needed for Pain. aspirin 81 mg chewable tablet   No No  
Sig: Take 1 Tab by mouth daily. Indications: prevention of cerebrovascular accident  
bicalutamide (CASODEX) 50 mg tablet   No No  
Sig: TAKE 1 TABLET BY MOUTH EVERY DAY  
donepezil (ARICEPT) 5 mg tablet   No No  
Sig: TAKE 1 TABLET BY MOUTH AT BEDTIME  
famotidine (PEPCID) 20 mg tablet   No No  
Sig: Take 1 Tab by mouth two (2) times a day. famotidine (PEPCID) 20 mg tablet   No No  
Sig: TAKE 1 TAB BY MOUTH TWO (2) TIMES A DAY. furosemide (LASIX) 40 mg tablet   No No  
Sig: Take 40 mg po daily PRN ONLY for evidence of fluid retention  
levomilnacipran (FETZIMA) 40 mg ER capsule   No No  
Sig: TAKE 1 CAP BY MOUTH DAILY. metoprolol tartrate (LOPRESSOR) 25 mg tablet   No No  
Sig: Take 1 Tab by mouth two (2) times a day. multivitamin, tx-iron-ca-min (THERA-M W/ IRON) 9 mg iron-400 mcg tab tablet   Yes No  
Sig: Take 1 Tab by mouth daily. polyethylene glycol (MIRALAX) 17 gram/dose powder   Yes No  
Sig: Take 17 g by mouth daily. Indications: constipation  
senna-docusate (SENNA LAXATIVE-STOOL SOFTENER) 8.6-50 mg per tablet   Yes No  
Sig: Take 1 Tab by mouth daily. tamsulosin (FLOMAX) 0.4 mg capsule   No No  
Sig: Take 1 Cap by mouth nightly. Facility-Administered Medications: None Home meds reconciled. No Known Allergies Social History Tobacco Use  Smoking status: Never Smoker  Smokeless tobacco: Never Used Substance Use Topics  Alcohol use: No  
  
Family History Problem Relation Age of Onset  Cancer Father Immunization History Administered Date(s) Administered  Influenza Vaccine 2012  Influenza Vaccine (Quad) Mdck Pf 2017  Influenza Vaccine (Quad) PF 10/24/2016, 2018  Pneumococcal Vaccine (Unspecified Type) 2013  TB Skin Test (PPD) Intradermal 2017, 2018, 2018, 2019, 2019  Tdap 2016 Objective:  
 
Visit Vitals /78 (BP 1 Location: Left arm, BP Patient Position: At rest) Pulse 60 Temp 97.8 °F (36.6 °C) Resp 18 Ht 6' (1.829 m) Wt 116.1 kg (256 lb) SpO2 98% BMI 34.72 kg/m² Temp (24hrs), Av.3 °F (36.8 °C), Min:97.8 °F (36.6 °C), Max:98.8 °F (37.1 °C) Oxygen Therapy O2 Sat (%): 98 % (10/30/19 1614) Pulse via Oximetry: 60 beats per minute (10/30/19 1614) O2 Device: Room air (10/30/19 1614) Physical Exam: 
General:    Alert, cooperative, no distress Head:   NCAT. No obvious deformity Nose:  Nares normal. No drainage Lungs:   CTABL. No wheezing/rhonchi/rales Heart:   RRR. No m/r/g. Abdomen:   S/nt/nd. Bowel sounds normal.  Obese. Extremities: No cyanosis. Skin:     No rashes or lesions. Not Jaundiced Neurologic: Moves all extremities. no gross focal deficits Data Review:  
Recent Results (from the past 24 hour(s)) CBC WITH AUTOMATED DIFF Collection Time: 10/30/19 10:14 AM  
Result Value Ref Range WBC 6.3 4.3 - 11.1 K/uL  
 RBC 5.22 4.23 - 5.6 M/uL  
 HGB 15.9 13.6 - 17.2 g/dL HCT 48.0 41.1 - 50.3 % MCV 92.0 79.6 - 97.8 FL  
 MCH 30.5 26.1 - 32.9 PG  
 MCHC 33.1 31.4 - 35.0 g/dL  
 RDW 13.2 11.9 - 14.6 % PLATELET 243 077 - 730 K/uL  MPV 9.8 9.4 - 12.3 FL  
 ABSOLUTE NRBC 0.00 0.0 - 0.2 K/uL  
 DF AUTOMATED NEUTROPHILS 72 43 - 78 % LYMPHOCYTES 19 13 - 44 % MONOCYTES 8 4.0 - 12.0 % EOSINOPHILS 1 0.5 - 7.8 % BASOPHILS 0 0.0 - 2.0 % IMMATURE GRANULOCYTES 1 0.0 - 5.0 %  
 ABS. NEUTROPHILS 4.5 1.7 - 8.2 K/UL  
 ABS. LYMPHOCYTES 1.2 0.5 - 4.6 K/UL  
 ABS. MONOCYTES 0.5 0.1 - 1.3 K/UL  
 ABS. EOSINOPHILS 0.1 0.0 - 0.8 K/UL  
 ABS. BASOPHILS 0.0 0.0 - 0.2 K/UL  
 ABS. IMM. GRANS. 0.0 0.0 - 0.5 K/UL METABOLIC PANEL, COMPREHENSIVE Collection Time: 10/30/19 10:14 AM  
Result Value Ref Range Sodium 140 136 - 145 mmol/L Potassium 4.4 3.5 - 5.1 mmol/L Chloride 108 (H) 98 - 107 mmol/L  
 CO2 26 21 - 32 mmol/L Anion gap 6 (L) 7 - 16 mmol/L Glucose 94 65 - 100 mg/dL BUN 10 8 - 23 MG/DL Creatinine 0.98 0.8 - 1.5 MG/DL  
 GFR est AA >60 >60 ml/min/1.73m2 GFR est non-AA >60 >60 ml/min/1.73m2 Calcium 10.3 8.3 - 10.4 MG/DL Bilirubin, total 0.7 0.2 - 1.1 MG/DL  
 ALT (SGPT) 32 12 - 65 U/L  
 AST (SGOT) 100 (H) 15 - 37 U/L Alk. phosphatase 123 50 - 136 U/L Protein, total 7.2 6.3 - 8.2 g/dL Albumin 3.5 3.2 - 4.6 g/dL Globulin 3.7 (H) 2.3 - 3.5 g/dL A-G Ratio 0.9 (L) 1.2 - 3.5    
TROPONIN I Collection Time: 10/30/19 10:14 AM  
Result Value Ref Range Troponin-I, Qt. 0.69 (HH) 0.02 - 0.05 NG/ML  
CK Collection Time: 10/30/19 10:14 AM  
Result Value Ref Range CK 2,360 (H) 35 - 232 U/L  
POC LACTIC ACID Collection Time: 10/30/19 10:24 AM  
Result Value Ref Range Lactic Acid (POC) 2.31 (H) 0.5 - 1.9 mmol/L  
EKG, 12 LEAD, INITIAL Collection Time: 10/30/19 10:40 AM  
Result Value Ref Range Ventricular Rate 62 BPM  
 Atrial Rate 315 BPM  
 QRS Duration 102 ms Q-T Interval 472 ms QTC Calculation (Bezet) 479 ms Calculated P Axis 90 degrees Calculated R Axis -48 degrees Calculated T Axis 46 degrees Diagnosis Atrial flutter with variable A-V block with intermittent pacing Left axis deviation Low voltage QRS Inferior-posterior infarct (cited on or before 30-OCT-2019) Anterolateral infarct (cited on or before 30-OCT-2019) Abnormal ECG When compared with ECG of 29-JUL-2019 02:05, 
Previous ECG has undetermined rhythm, needs review Serial changes of Anterior infarct Present Confirmed by Kiara Alfredo MD ()RENU ([id number]) on 10/30/2019 1:39:55 PM 
  
EKG, 12 LEAD, SUBSEQUENT Collection Time: 10/30/19 11:12 AM  
Result Value Ref Range Ventricular Rate 80 BPM  
 Atrial Rate 80 BPM  
 P-R Interval 200 ms QRS Duration 138 ms Q-T Interval 402 ms QTC Calculation (Bezet) 463 ms Calculated P Axis 70 degrees Calculated R Axis -52 degrees Calculated T Axis 23 degrees Diagnosis    
  atrial flutter Right bundle branch block Left anterior fascicular block 
!!! Bifascicular block !!! Abnormal ECG When compared with ECG of 30-OCT-2019 10:40, Sinus rhythm has replaced Atrial flutter (RBBB and left anterior fascicular block) is now Present Criteria for Inferior-posterior infarct are no longer Present Questionable change in initial forces of Anterolateral leads Confirmed by Kiara Alfredo MD ()RENU ([id number]) on 10/30/2019 1:41:55 PM 
  
EKG, 12 LEAD, SUBSEQUENT Collection Time: 10/30/19 11:22 AM  
Result Value Ref Range Ventricular Rate 83 BPM  
 Atrial Rate 83 BPM  
 P-R Interval 230 ms QRS Duration 114 ms Q-T Interval 432 ms QTC Calculation (Bezet) 507 ms Calculated P Axis 84 degrees Calculated R Axis -59 degrees Calculated T Axis -13 degrees Diagnosis Atrial flutter Left axis deviation Low voltage QRS Right bundle branch block Inferior infarct , age undetermined Prolonged QT Abnormal ECG When compared with ECG of 30-OCT-2019 11:12, No significant change was found Confirmed by JUSTIN CERVANTES ()Betsy ([id number]) on 10/30/2019 4:07:05 PM 
  
URINE MICROSCOPIC  Collection Time: 10/30/19 11:30 AM  
 Result Value Ref Range WBC >100 (H) 0 /hpf  
 RBC >100 (H) 0 /hpf Epithelial cells 0-3 0 /hpf Bacteria 4+ (H) 0 /hpf Casts 5-10 0 /lpf  
TROPONIN I Collection Time: 10/30/19  4:20 PM  
Result Value Ref Range Troponin-I, Qt. 0.82 (HH) 0.02 - 0.05 NG/ML  
LACTIC ACID Collection Time: 10/30/19  4:20 PM  
Result Value Ref Range Lactic acid 3.5 (HH) 0.4 - 2.0 MMOL/L Imaging /Procedures /Studies: 
I personally reviewed all labs, imaging, and other studies this admission: CXR reviewed by me. EKG reviewed by me. CXR Results  (Last 48 hours) 10/30/19 1053  XR CHEST PORT Final result Impression:  IMPRESSION:  Cardiomegaly without acute abnormality. Narrative:  CHEST X-RAY, one view. HISTORY:  Weakness. TECHNIQUE:  AP upright portable view. COMPARISON: July 2019. FINDINGS:   The lungs are clear. The heart size is enlarged. The costophrenic  
angles are sharp. The pulmonary vasculature is unremarkable. Included portion of  
the upper abdomen is unremarkable. Left-sided cardiac pacemaker is present. CT Results  (Last 48 hours) None Assessment and Plan: Active Hospital Problems Diagnosis Date Noted  UTI (urinary tract infection) 10/30/2019  Troponin I above reference range 06/04/2019  Non-traumatic rhabdomyolysis 06/04/2019  Moderate dementia without behavioral disturbance ([facility] [address] 07/26/2018  Generalized weakness 12/23/2016  
 HTN (hypertension) 01/27/2012 PLAN 
· Admit to inpatient remote telemetry. · UTI: Started on Rocephin, will continue and follow urine culture. Mild confusion also likely secondary to UTI. · Elevated troponin: Chronic issue, will trend troponin and consider cardiology consult tomorrow if remain elevated. · Rhabdomyolysis: Increase IV fluids to 175 cc/hr, trend CK. Most recent EF was normal. 
· Hypertension: Resume home meds and monitor BP. · Chronic A. fib: Resume Pradaxa. Monitor for any hematuria. · Lactic acidosis: LA trending up slowly. Likely due to dehydration versus possible sepsis. Will continue to trend. Give another bolus of 500 cc LR and continue maintenance fluid. · Resume other pertinent home meds. FEN: Cardiac diet and IV fluids. DVT ppx: On Pradaxa. Code status: Full code. Estimated LOS: 3 to 4 days. Risk assessment: High risk Plan of care discussed with: patient, no family at bedside. Signed By: Darlyn Soto MD   
 October 30, 2019

## 2019-10-30 NOTE — PROGRESS NOTES
Problem: Urinary Tract Infection - Adult Goal: *Absence of infection signs and symptoms Outcome: Progressing Towards Goal 
  
Problem: Pressure Injury - Risk of 
Goal: *Prevention of pressure injury Description Document Maxwell Scale and appropriate interventions in the flowsheet. Outcome: Progressing Towards Goal 
Note:  
Pressure Injury Interventions: 
Sensory Interventions: Assess changes in LOC, Check visual cues for pain, Minimize linen layers, Float heels Moisture Interventions: Absorbent underpads, Assess need for specialty bed, Check for incontinence Q2 hours and as needed Activity Interventions: Assess need for specialty bed, Increase time out of bed, PT/OT evaluation Mobility Interventions: Assess need for specialty bed, Float heels, HOB 30 degrees or less, PT/OT evaluation Nutrition Interventions: Document food/fluid/supplement intake, Offer support with meals,snacks and hydration Friction and Shear Interventions: HOB 30 degrees or less, Lift sheet Problem: Falls - Risk of 
Goal: *Absence of Falls Description Document Karo Leon Fall Risk and appropriate interventions in the flowsheet. Outcome: Progressing Towards Goal 
Note:  
Fall Risk Interventions: 
Mobility Interventions: Bed/chair exit alarm, OT consult for ADLs, Patient to call before getting OOB, PT Consult for mobility concerns, Utilize walker, cane, or other assistive device Medication Interventions: Bed/chair exit alarm, Patient to call before getting OOB, Teach patient to arise slowly Elimination Interventions: Bed/chair exit alarm, Call light in reach, Patient to call for help with toileting needs, Toileting schedule/hourly rounds

## 2019-10-30 NOTE — ED NOTES
TRANSFER - OUT REPORT: 
 
Verbal report given to Adele(name) on Tylor Castillo  being transferred to [facility](unit) for routine progression of care Report consisted of patients Situation, Background, Assessment and  
Recommendations(SBAR). Information from the following report(s) SBAR was reviewed with the receiving nurse. Lines:  
Peripheral IV 10/30/19 Right Antecubital (Active) Site Assessment Clean, dry, & intact 10/30/2019 11:38 AM  
Phlebitis Assessment 0 10/30/2019 11:38 AM  
Infiltration Assessment 0 10/30/2019 11:38 AM  
Dressing Status Clean, dry, & intact 10/30/2019 11:38 AM  
  
 
Opportunity for questions and clarification was provided.

## 2019-10-30 NOTE — PROGRESS NOTES
Location of Assessment: ER RM6 Socioevironmental: 
SW met with patient who states that he is , lives with his daughter, and is generally independent at home. Ambulation/ADLs: 
Patient states that he does not require ADL assistance, uses a walker to ambulate, and denies any falls within the last twelve months. Primary Care: 
Patient sees Dr. Concepción Courtney for primary care, last appointment was a month ago. Needs: No needs voiced by patient and none identified by SW at this time. Case management will continue to follow until discharge to help accommodate any that should arise. SHANNAN Mcneal  [email or web address]

## 2019-10-30 NOTE — PROGRESS NOTES
Received pt from the ER in stable condition. Pt in bed. Alert & oriented to name & ; pt knew where he was but did not know the current year or president. Resp even & unlabored on room air; no acute signs of distress noted. Oriented to room & call light; bed low & locked.

## 2019-10-30 NOTE — PROGRESS NOTES
10/30/19 1340 Dual Skin Pressure Injury Assessment Dual Skin Pressure Injury Assessment WDL Second Care Provider (Based on [address] [place] [address]) Julio Cesar Clifford. RN   
Skin Integumentary Skin Integumentary (WDL) X Skin Color Appropriate for ethnicity;Pink 
(red & tight over BLE, pink healed scar areas. ) Skin Condition/Temp Dry;Flaky; Warm;Fragile Skin Integrity Intact

## 2019-10-30 NOTE — ED NOTES
Patient has been placed in gown at this time, 12 lead EKG was performed and signed placed on chart. Patient with Xray personnel at bedside. Patient with no further complaints or request at this time.

## 2019-10-30 NOTE — ED PROVIDER NOTES
The history is provided by the patient. Fatigue This is a new problem. The current episode started yesterday. The problem has not changed since onset. There was no focality noted. Primary symptoms include disorientation. There has been no fever. Pertinent negatives include no shortness of breath, no chest pain, no vomiting, no headaches and no nausea. Past Medical History:  
Diagnosis Date  Arrhythmia   
 pt takes pradaxa  Arthritis  Atrial fibrillation ([facility] [address] 1/27/2012  CAD (coronary artery disease)  Cancer [facility])   
 prostate  Dementia  Depression 8/28/2012  GERD (gastroesophageal reflux disease)   
 controlled with medication  Heart failure ([address]   
 pt takes lasix as needed, reports SOB with exertion  History of prostate cancer 8/4/2014  Hypertension   
 controlled with medication  Morbid obesity ([address]  Osteoarthritis of right knee 1/26/2012  Recurrent major depressive disorder, in full remission ([address] 11/30/2017  Total knee replacement status 1/26/2012  Vitamin B12 deficiency 7/23/2017 Past Surgical History:  
Procedure Laterality Date  HX ORTHOPAEDIC  2010  
 left TKA  HX PACEMAKER  8/30/2012 [employer] pacemaker  HX PROSTATECTOMY Family History:  
Problem Relation Age of Onset  Cancer Father Social History Socioeconomic History  Marital status:  Spouse name: Not on file  Number of children: Not on file  Years of education: Not on file  Highest education level: Not on file Occupational History  Not on file Social Needs  Financial resource strain: Not on file  Food insecurity:  
  Worry: Not on file Inability: Not on file  Transportation needs:  
  Medical: Not on file Non-medical: Not on file Tobacco Use  Smoking status: Never Smoker  Smokeless tobacco: Never Used Substance and Sexual Activity  Alcohol use: No  
 Drug use:  No  
  Sexual activity: Never Lifestyle  Physical activity:  
  Days per week: Not on file Minutes per session: Not on file  Stress: Not on file Relationships  Social connections:  
  Talks on phone: Not on file Gets together: Not on file Attends Latter day service: Not on file Active member of club or organization: Not on file Attends meetings of clubs or organizations: Not on file Relationship status: Not on file  Intimate partner violence:  
  Fear of current or ex partner: Not on file Emotionally abused: Not on file Physically abused: Not on file Forced sexual activity: Not on file Other Topics Concern  Not on file Social History Narrative  Not on file ALLERGIES: Patient has no known allergies. Review of Systems Unable to perform ROS: Dementia Constitutional: Positive for fatigue. Negative for chills and fever. HENT: Negative for congestion and rhinorrhea. Respiratory: Negative for cough and shortness of breath. Cardiovascular: Negative for chest pain. Gastrointestinal: Negative for abdominal pain, nausea and vomiting. Endocrine: Negative for polyuria. Genitourinary: Negative for dysuria. Musculoskeletal: Negative for back pain and neck pain. Neurological: Negative for weakness, numbness and headaches. Vitals:  
 10/30/19 0957 10/30/19 1119 10/30/19 1139 BP: 147/66 150/69 Pulse: 68 78 Resp: 16 Temp: 98.8 °F (37.1 °C) SpO2: 100%  99% Weight: 116.1 kg (256 lb) Height: 6' (1.829 m) Physical Exam  
Constitutional: He is oriented to person, place, and time. He appears well-developed and well-nourished. HENT:  
Mouth/Throat: Oropharynx is clear and moist. No oropharyngeal exudate. Eyes: Pupils are equal, round, and reactive to light. Conjunctivae are normal.  
Neck: Neck supple. Cardiovascular: Normal rate, regular rhythm and normal heart sounds. Pulmonary/Chest: Effort normal and breath sounds normal.  
Abdominal: Soft. Bowel sounds are normal. He exhibits no distension. There is no tenderness. There is no rebound and no guarding. Musculoskeletal: Normal range of motion. He exhibits no edema or tenderness. Lymphadenopathy:  
  He has no cervical adenopathy. Neurological: He is alert and oriented to person, place, and time. No sensory deficit. He exhibits normal muscle tone. Coordination normal.  
Skin: Skin is warm and dry. Nursing note and vitals reviewed. MDM Number of Diagnoses or Management Options Diagnosis management comments: No signs of sepsis but lactic done initially in triage is mildly elevated. Urine is infected. Chest x-ray is clear. Troponin elevated but is been elevated in the past due to rhabdo and another time due to urinary tract infection. Cardiology did not recommend any intervention at that time. I do not see any acute ST elevation or infarct on EKG despite the computer is reading. Subsequent EKG has shown no acute ischemia or infarction. Patient continues to deny chest pain or trouble breathing. Admit for IV antibiotics and monitoring of troponin and lactic acid. Family still not available for history. Amount and/or Complexity of Data Reviewed Clinical lab tests: ordered and reviewed (Results for orders placed or performed during the hospital encounter of 10/30/19 
-CBC WITH AUTOMATED DIFF Result                      Value             Ref Range WBC                         6.3               4.3 - 11.1 K* 
     RBC                         5.22              4.23 - 5.6 M* HGB                         15.9              13.6 - 17.2 * HCT                         48.0              41.1 - 50.3 % MCV                         92.0              79.6 - 97.8 * MCH                         30.5              26.1 - 32.9 * MCHC                        33.1              31.4 - 35.0 * RDW                         13.2              11.9 - 14.6 % PLATELET                    185               150 - 450 K/* MPV                         9.8               9.4 - 12.3 FL ABSOLUTE NRBC               0.00              0.0 - 0.2 K/* DF                          AUTOMATED NEUTROPHILS                 72                43 - 78 % LYMPHOCYTES                 19                13 - 44 % MONOCYTES                   8                 4.0 - 12.0 % EOSINOPHILS                 1                 0.5 - 7.8 % BASOPHILS                   0                 0.0 - 2.0 % IMMATURE GRANULOCYTES       1                 0.0 - 5.0 %   
     ABS. NEUTROPHILS            4.5               1.7 - 8.2 K/* ABS. LYMPHOCYTES            1.2               0.5 - 4.6 K/* ABS. MONOCYTES              0.5               0.1 - 1.3 K/* ABS. EOSINOPHILS            0.1               0.0 - 0.8 K/* ABS. BASOPHILS              0.0               0.0 - 0.2 K/* ABS. IMM. GRANS.            0.0               0.0 - 0.5 K/* 
-METABOLIC PANEL, COMPREHENSIVE Result                      Value             Ref Range Sodium                      140               136 - 145 mm* Potassium                   4.4               3.5 - 5.1 mm* Chloride                    108 (H)           98 - 107 mmo* CO2                         26                21 - 32 mmol* Anion gap                   6 (L)             7 - 16 mmol/L Glucose                     94                65 - 100 mg/* BUN                         10                8 - 23 MG/DL Creatinine                  0.98              0.8 - 1.5 MG* 
     GFR est AA                  >60               >60 ml/min/1* GFR est non-AA              >60               >60 ml/min/1* Calcium                     10.3              8.3 - 10.4 M* Bilirubin, total            0.7               0.2 - 1.1 MG* ALT (SGPT)                  32                12 - 65 U/L   
     AST (SGOT)                  100 (H)           15 - 37 U/L Alk. phosphatase            123               50 - 136 U/L Protein, total              7.2               6.3 - 8.2 g/* Albumin                     3.5               3.2 - 4.6 g/* Globulin                    3.7 (H)           2.3 - 3.5 g/* A-G Ratio                   0.9 (L)           1.2 - 3.5     
-TROPONIN I Result                      Value             Ref Range Troponin-I, Qt.             0.69 (HH)         0.02 - 0.05 * 
-URINE MICROSCOPIC Result                      Value             Ref Range WBC                         >100 (H)          0 /hpf        
     RBC                         >100 (H)          0 /hpf Epithelial cells            0-3               0 /hpf Bacteria                    4+ (H)            0 /hpf Casts                       5-10              0 /lpf        
-POC LACTIC ACID Result                      Value             Ref Range Lactic Acid (POC)           2.31 (H)          0.5 - 1.9 mm* 
-EKG, 12 LEAD, INITIAL Result                      Value             Ref Range Ventricular Rate            62                BPM           
     Atrial Rate                 315               BPM           
     QRS Duration                102               ms Q-T Interval                472               ms            
     QTC Calculation (Bezet)     479               ms            
     Calculated P Axis           90                degrees Calculated R Axis           -48               degrees Calculated T Axis           46                degrees Diagnosis Atrial flutter with variable A-V block with premature ventricular or  
 aberrantly conducted complexes Left axis deviation Low voltage QRS Inferior-posterior infarct (cited on or before 30-OCT-2019) Anterolateral infarct (cited on or before 30-OCT-2019) 
 ! !!! !!!! ACUTE MI !!!! !!!! 
 Abnormal ECG When compared with ECG of 29-JUL-2019 02:05, 
 Previous ECG has undetermined rhythm, needs review Serial changes of Anterior infarct Present 
  
-EKG, 12 LEAD, SUBSEQUENT Result                      Value             Ref Range Ventricular Rate            80                BPM           
     Atrial Rate                 80                BPM           
     P-R Interval                200               ms            
     QRS Duration                138               ms Q-T Interval                402               ms            
     QTC Calculation (Bezet)     463               ms            
     Calculated P Axis           70                degrees Calculated R Axis           -52               degrees Calculated T Axis           23                degrees Diagnosis Normal sinus rhythm Right bundle branch block Left anterior fascicular block 
 !!! Bifascicular block !!! Possible Lateral infarct (cited on or before 30-OCT-2019) T wave abnormality, consider inferior ischemia Abnormal ECG When compared with ECG of 30-OCT-2019 10:40, Sinus rhythm has replaced Atrial flutter (RBBB and left anterior fascicular block) is now Present Criteria for Inferior-posterior infarct are no longer Present Questionable change in initial forces of Anterolateral leads ) Tests in the radiology section of CPT®: ordered and reviewed ( Chest [facility] Result Date: 10/30/2019 CHEST X-RAY, one view. HISTORY:  Weakness. TECHNIQUE:  AP upright portable view. COMPARISON: July 2019. FINDINGS:   The lungs are clear. The heart size is enlarged. The costophrenic angles are sharp. The pulmonary vasculature is unremarkable. Included portion of the upper abdomen is unremarkable. Left-sided cardiac pacemaker is present. IMPRESSION:  Cardiomegaly without acute abnormality. ) Procedures

## 2019-10-30 NOTE — PROGRESS NOTES
TRANSFER - IN REPORT: 
 
Verbal report received from [facility] (name) on Tylor Trejo  being received from ER (unit) for routine progression of care Report consisted of patients Situation, Background, Assessment and  
Recommendations(SBAR). Information from the following report(s) SBAR, Kardex, Intake/Output, MAR and Recent Results was reviewed with the receiving nurse. Opportunity for questions and clarification was provided. Assessment completed upon patients arrival to unit and care assumed.

## 2019-10-30 NOTE — ED NOTES
Attempted to call patient's daughter by number in chart for update of patient being admitted. No answer left voice mail to call ED.

## 2019-10-30 NOTE — PROGRESS NOTES
Monitor room called; pt had 7 beats of V-tach. MD Dr. Rosina Paul called & informed; Cardiology consult for tomorrow a.m. Pt in bed resting quietly. No acute signs of distress noted.

## 2019-10-31 LAB
ANION GAP SERPL CALC-SCNC: 6 MMOL/L (ref 7–16)
ATRIAL RATE: 64 BPM
BUN SERPL-MCNC: 9 MG/DL (ref 8–23)
CALCIUM SERPL-MCNC: 9.8 MG/DL (ref 8.3–10.4)
CALCULATED R AXIS, ECG10: -81 DEGREES
CALCULATED T AXIS, ECG11: 75 DEGREES
CHLORIDE SERPL-SCNC: 112 MMOL/L (ref 98–107)
CK SERPL-CCNC: 2144 U/L (ref 21–215)
CO2 SERPL-SCNC: 26 MMOL/L (ref 21–32)
CREAT SERPL-MCNC: 0.88 MG/DL (ref 0.8–1.5)
DIAGNOSIS, 93000: NORMAL
GLUCOSE SERPL-MCNC: 105 MG/DL (ref 65–100)
LACTATE SERPL-SCNC: 1.2 MMOL/L (ref 0.4–2)
MAGNESIUM SERPL-MCNC: 2.4 MG/DL (ref 1.8–2.4)
POTASSIUM SERPL-SCNC: 3.9 MMOL/L (ref 3.5–5.1)
Q-T INTERVAL, ECG07: 464 MS
QRS DURATION, ECG06: 144 MS
QTC CALCULATION (BEZET), ECG08: 464 MS
SODIUM SERPL-SCNC: 144 MMOL/L (ref 136–145)
TROPONIN I SERPL-MCNC: 0.89 NG/ML (ref 0.02–0.05)
VENTRICULAR RATE, ECG03: 60 BPM

## 2019-10-31 PROCEDURE — 74011250637 HC RX REV CODE- 250/637: Performed by: INTERNAL MEDICINE

## 2019-10-31 PROCEDURE — 82550 ASSAY OF CK (CPK): CPT

## 2019-10-31 PROCEDURE — 77030020255 HC SOL INJ LR 1000ML BG

## 2019-10-31 PROCEDURE — 65660000000 HC RM CCU STEPDOWN

## 2019-10-31 PROCEDURE — 84484 ASSAY OF TROPONIN QUANT: CPT

## 2019-10-31 PROCEDURE — 80048 BASIC METABOLIC PNL TOTAL CA: CPT

## 2019-10-31 PROCEDURE — 74011250636 HC RX REV CODE- 250/636: Performed by: INTERNAL MEDICINE

## 2019-10-31 PROCEDURE — 74011000258 HC RX REV CODE- 258: Performed by: INTERNAL MEDICINE

## 2019-10-31 PROCEDURE — 93005 ELECTROCARDIOGRAM TRACING: CPT | Performed by: INTERNAL MEDICINE

## 2019-10-31 PROCEDURE — 83735 ASSAY OF MAGNESIUM: CPT

## 2019-10-31 PROCEDURE — 83605 ASSAY OF LACTIC ACID: CPT

## 2019-10-31 PROCEDURE — 36415 COLL VENOUS BLD VENIPUNCTURE: CPT

## 2019-10-31 RX ORDER — POLYETHYLENE GLYCOL 3350 17 G/17G
17 POWDER, FOR SOLUTION ORAL DAILY
Status: DISCONTINUED | OUTPATIENT
Start: 2019-10-31 | End: 2019-11-05 | Stop reason: HOSPADM

## 2019-10-31 RX ORDER — METOPROLOL TARTRATE 25 MG/1
25 TABLET, FILM COATED ORAL 2 TIMES DAILY
Status: DISCONTINUED | OUTPATIENT
Start: 2019-10-31 | End: 2019-10-31

## 2019-10-31 RX ORDER — METOPROLOL TARTRATE 25 MG/1
25 TABLET, FILM COATED ORAL 2 TIMES DAILY
Status: DISCONTINUED | OUTPATIENT
Start: 2019-10-31 | End: 2019-11-05 | Stop reason: HOSPADM

## 2019-10-31 RX ORDER — METOPROLOL TARTRATE 5 MG/5ML
2.5 INJECTION INTRAVENOUS ONCE
Status: DISCONTINUED | OUTPATIENT
Start: 2019-10-31 | End: 2019-10-31

## 2019-10-31 RX ADMIN — ARIPIPRAZOLE 10 MG: 5 TABLET ORAL at 08:34

## 2019-10-31 RX ADMIN — POLYETHYLENE GLYCOL (3350) 17 G: 17 POWDER, FOR SOLUTION ORAL at 12:52

## 2019-10-31 RX ADMIN — DABIGATRAN ETEXILATE MESYLATE 150 MG: 150 CAPSULE ORAL at 16:58

## 2019-10-31 RX ADMIN — DABIGATRAN ETEXILATE MESYLATE 150 MG: 150 CAPSULE ORAL at 08:34

## 2019-10-31 RX ADMIN — METOPROLOL TARTRATE 25 MG: 25 TABLET ORAL at 20:54

## 2019-10-31 RX ADMIN — FAMOTIDINE 20 MG: 20 TABLET ORAL at 08:34

## 2019-10-31 RX ADMIN — SODIUM CHLORIDE, SODIUM LACTATE, POTASSIUM CHLORIDE, AND CALCIUM CHLORIDE 150 ML/HR: 600; 310; 30; 20 INJECTION, SOLUTION INTRAVENOUS at 23:00

## 2019-10-31 RX ADMIN — CEFTRIAXONE 1 G: 1 INJECTION, POWDER, FOR SOLUTION INTRAMUSCULAR; INTRAVENOUS at 12:53

## 2019-10-31 RX ADMIN — METOPROLOL TARTRATE 25 MG: 25 TABLET ORAL at 12:52

## 2019-10-31 RX ADMIN — Medication 10 ML: at 22:16

## 2019-10-31 RX ADMIN — SENNOSIDES AND DOCUSATE SODIUM 1 TABLET: 8.6; 5 TABLET ORAL at 08:33

## 2019-10-31 RX ADMIN — DONEPEZIL HYDROCHLORIDE 5 MG: 5 TABLET, FILM COATED ORAL at 21:57

## 2019-10-31 RX ADMIN — SODIUM CHLORIDE, SODIUM LACTATE, POTASSIUM CHLORIDE, AND CALCIUM CHLORIDE 150 ML/HR: 600; 310; 30; 20 INJECTION, SOLUTION INTRAVENOUS at 12:59

## 2019-10-31 RX ADMIN — BICALUTAMIDE 50 MG: 50 TABLET, FILM COATED ORAL at 09:00

## 2019-10-31 RX ADMIN — TAMSULOSIN HYDROCHLORIDE 0.4 MG: 0.4 CAPSULE ORAL at 21:57

## 2019-10-31 RX ADMIN — SODIUM CHLORIDE, SODIUM LACTATE, POTASSIUM CHLORIDE, AND CALCIUM CHLORIDE 175 ML/HR: 600; 310; 30; 20 INJECTION, SOLUTION INTRAVENOUS at 05:19

## 2019-10-31 RX ADMIN — FAMOTIDINE 20 MG: 20 TABLET ORAL at 16:58

## 2019-10-31 NOTE — PROGRESS NOTES
PM assessment complete. Alert, oriented to name and place. HOB elevated. Respirations even and unlabored, no distress noted. Heel medix boots in place. Bed alarm on. Door open, call light in reach. Will continue to monitor through hourly rounding.

## 2019-10-31 NOTE — PROGRESS NOTES
Hospitalist Progress Note 10/31/2019 Admit Date: 10/30/2019  9:55 AM  
NAME: Brianna Ch :  1940 MRN:  [id number] Attending: Dominique Arnett MD 
PCP:  Darlene Santana MD 
 
SUBJECTIVE:  
78years old male with past medical history of chronic A. fib on Pradaxa s/p PPM, hypertension, dementia without behavioral disturbance, prostate cancer was BIBEMS due to generalized weakness and confusion raising concern for UTI, and admitted with acute rhabdomyolysis, UTI and elevated troponin. 10/31: Feels better, AAO X 2-3, seems to be at baseline. HR in the 150s on tele and Has had a few episodes of NSVT. No CP. EKG showed bradycardia at 60bpm.  
 
Nursing notes and chart reviewed. Review of Systems negative with exception of pertinent positives noted above. PHYSICAL EXAM  
 
Visit Vitals /70 (BP 1 Location: Left arm, BP Patient Position: At rest) Pulse 70 Temp 98.4 °F (36.9 °C) Resp 18 Ht 6' (1.829 m) Wt 116.1 kg (256 lb) SpO2 97% BMI 34.72 kg/m² Temp (24hrs), Av.1 °F (36.7 °C), Min:97.4 °F (36.3 °C), Max:98.6 °F (37 °C) Oxygen Therapy O2 Sat (%): 97 % (10/31/19 0814) Pulse via Oximetry: 60 beats per minute (10/30/19 1614) O2 Device: Room air (10/30/19 1614) Intake/Output Summary (Last 24 hours) at 10/31/2019 1043 Last data filed at 10/31/2019 7791 Gross per 24 hour Intake 3823 ml Output 1200 ml Net 2623 ml General: No acute distress. Alert.   
Head:  AT/NC Lungs:  CTABL. Heart:  IRregular, no murmur, rub, or gallop Abdomen: Soft, non-distended, non-tender, +bs Extremities: No cyanosis or clubbing. Neurologic:  No focal deficits. Moves all extremities. Skin:  No Obvious Rash Psych:  Normal affect. LABS AND STUDIES: 
Personally reviewed all labs, meds, and studies for past 24hrs. ASSESSMENT Active Hospital Problems Diagnosis Date Noted  UTI (urinary tract infection) 10/30/2019  Troponin I above reference range 06/04/2019  Non-traumatic rhabdomyolysis 06/04/2019  Moderate dementia without behavioral disturbance ([facility] [address] 07/26/2018  Generalized weakness 12/23/2016  
 HTN (hypertension) 01/27/2012 PLAN: 
· UTI: Started on Rocephin, will continue and follow urine culture. Mild confusion also likely secondary to UTI. · Elevated troponin: Chronic issue, cardiology consulted. · Rhabdomyolysis: CK trending down, decrease IV fluids to 150 cc/hr, trend CK. Most recent EF was normal. 
· Hypertension: Resumed home meds and monitor BP. · Chronic A. fib: Resumed Pradaxa. Monitor for any hematuria. · Lactic acidosis: Resolved, was Likely due to dehydration versus possible sepsis. · NSVT: Cards to see, will start on Metoprolol. High risk pt. Dispo: pending improvement, PT OT in am. 
 
DVT ppx:  pradaxa Discussed plan with pt who is in agreement. All questions answered. Signed By: Reid Bazan MD   
 October 31, 2019

## 2019-10-31 NOTE — PROGRESS NOTES
Per , troponin 0.89 and per monitor room, patient with a 6-beat run of vtach. Patient resting quietly, no distress noted. Dr. Dre Ziegler called and informed, no orders received.

## 2019-10-31 NOTE — PROGRESS NOTES
Interdisciplinary team rounds were held 10/31/2019 with the following team members:Care Management, Nursing, Nutrition, Pharmacy, Physical Therapy and Physician. Plan of care discussed. See clinical pathway and/or care plan for interventions and desired outcomes.

## 2019-10-31 NOTE — PROGRESS NOTES
Monitor room called, patient had 8 beat run v tach. Dr. Pauline Marie notified and MD to put in orders.

## 2019-10-31 NOTE — PROGRESS NOTES
Pt resting in bed and is alert and oriented x 2. he denies pain and is on room air. RR even and unlabored. IVF infusing. Cardiac monitor in place. Heel medix boots in place. Call light in reach and pt instructed to call for assistance if needed. Will monitor. Bed alarm on.

## 2019-10-31 NOTE — PROGRESS NOTES
Monitor room called, patient had 7 beat run v tach. HR 83 now, patient asymptomatic and watching tv. Dr. Lorelei Oshea notified, no orders received.

## 2019-10-31 NOTE — CONSULTS
[address] Cardiology Consult Date of  Admission: 10/30/2019  9:55 AM  
 
Primary Care Physician: Louie Trivedi MD 
Primary Cardiologist: Dr Dot Elias Referring Physician: Dr Jus Salazar Consulting Physician: Dr Margarita Suggs CC/Reason for consult: Elevated Trop I Addi Campbell is a 78 y.o. male with hx of chronic a fib on [address] and s/p PPM, HTN, dementia, prostate CA, GERD, medical non compliance with PPM checks and follow ups, morbid obesity, HTN, prostate CA, major depressive disorder, total knee replacement, and vitamin B12 deficiency. Patient states he had issues with weakness and slid down from the side of the bed and subsequently on the floor for over an hour. On presentation to the ER, noted confusion/weakness and patient was diagnosed with a UTI. He was admitted with acute rhabdomyolysis, UTI, and elevated Trop I. He was noted to have positive UA, Lactic acid of 2.3, and elevated Trop. Noted prior admission in 6/2019 when patient had presentation with elevated troponins and rhabdo mild. Had no symptoms and echo unremarkable. Difficult historian but denies any cardiac symptoms. States can do all his ADLs with no significant issues. Denies any chest pain/dyspnea on exertion. Attributes generalized weakness to his fall. Recent Cardiac Synopsis w/ Labs Echo: 6/4/2019 -  Left ventricle: Systolic function was normal. Ejection fraction was 
estimated in the range of 60 % to 65 %. There were no regional wall motion 
abnormalities. There was mild to moderate concentric hypertrophy. -  Left atrium: The atrium was moderately dilated. -  Aortic valve: There was mild regurgitation. -  Tricuspid valve: There was mild to moderate regurgitation. EKG: Paced with what appears to be underlying A Flutter PPM Interrogation:  
 
Lab Results Component Value Date  10/31/2019  
 K 3.9 10/31/2019 MG 2.4 10/31/2019 BUN 9 10/31/2019 CREA 0.88 10/31/2019 WBC 6.3 10/30/2019 HGB 15.9 10/30/2019  10/30/2019 INR 1.2 08/28/2012 TROIQ 0.89 ([place]) 10/31/2019 TROIQ 0.89 ([place]) 10/30/2019 TROIQ 0.82 ([place]) 10/30/2019 TSH 0.587 05/13/2018  
 T4 7.6 05/12/2016 Patient Active Problem List  
Diagnosis Code  Atrial fibrillation ([facility]) I48.91  
 HTN (hypertension) I10  
 Morbid obesity ([facility]) E66.01  
 Generalized weakness R53.1  Moderate dementia without behavioral disturbance ([facility]) F03.90  
 Non-traumatic rhabdomyolysis M62.82  
 Troponin I above reference range R79.89  
 SSS (sick sinus syndrome) ([facility]) I49.5  UTI (urinary tract infection) N39.0 Past Medical History:  
Diagnosis Date  Arrhythmia   
 pt takes pradaxa  Arthritis  Atrial fibrillation ([facility] 1/27/2012  CAD (coronary artery disease)  Cancer [facility])   
 prostate  Dementia  Depression 8/28/2012  GERD (gastroesophageal reflux disease)   
 controlled with medication  Heart failure ([address]   
 pt takes lasix as needed, reports SOB with exertion  History of prostate cancer 8/4/2014  Hypertension   
 controlled with medication  Morbid obesity ([facility]  Osteoarthritis of right knee 1/26/2012  Recurrent major depressive disorder, in full remission ([facility] 11/30/2017  Total knee replacement status 1/26/2012  Vitamin B12 deficiency 7/23/2017 Past Surgical History:  
Procedure Laterality Date  HX ORTHOPAEDIC  2010  
 left TKA  HX PACEMAKER  8/30/2012 [employer] pacemaker  HX PROSTATECTOMY No Known Allergies Family History Problem Relation Age of Onset  Cancer Father Social History Socioeconomic History  Marital status:  Spouse name: Not on file  Number of children: Not on file  Years of education: Not on file  Highest education level: Not on file Occupational History  Not on file Social Needs  Financial resource strain: Not on file  Food insecurity: Worry: Not on file Inability: Not on file  Transportation needs:  
  Medical: Not on file Non-medical: Not on file Tobacco Use  Smoking status: Never Smoker  Smokeless tobacco: Never Used Substance and Sexual Activity  Alcohol use: No  
 Drug use: No  
 Sexual activity: Never Lifestyle  Physical activity:  
  Days per week: Not on file Minutes per session: Not on file  Stress: Not on file Relationships  Social connections:  
  Talks on phone: Not on file Gets together: Not on file Attends [employer] service: Not on file Active member of club or organization: Not on file Attends meetings of clubs or organizations: Not on file Relationship status: Not on file  Intimate partner violence:  
  Fear of current or ex partner: Not on file Emotionally abused: Not on file Physically abused: Not on file Forced sexual activity: Not on file Other Topics Concern  Not on file Social History Narrative  Not on file Current Facility-Administered Medications Medication Dose Route Frequency  polyethylene glycol (MIRALAX) packet 17 g  17 g Oral DAILY  cefTRIAXone (ROCEPHIN) 1 g in 0.9% sodium chloride (MBP/ADV) 50 mL  1 g IntraVENous Q24H  
 metoprolol tartrate (LOPRESSOR) tablet 25 mg  25 mg Oral BID  lactated Ringers infusion  150 mL/hr IntraVENous CONTINUOUS  
 sodium chloride (NS) flush 5-40 mL  5-40 mL IntraVENous Q8H  
 sodium chloride (NS) flush 5-40 mL  5-40 mL IntraVENous PRN  
 acetaminophen (TYLENOL) tablet 650 mg  650 mg Oral Q4H PRN  
 HYDROcodone-acetaminophen (NORCO) 5-325 mg per tablet 1 Tab  1 Tab Oral Q4H PRN  
 naloxone (NARCAN) injection 0.4 mg  0.4 mg IntraVENous PRN  
 diphenhydrAMINE (BENADRYL) injection 12.5 mg  12.5 mg IntraVENous Q4H PRN  
 ondansetron (ZOFRAN) injection 4 mg  4 mg IntraVENous Q4H PRN  
 ARIPiprazole (ABILIFY) tablet 10 mg  10 mg Oral DAILY  bicalutamide (CASODEX) tablet 50 mg  50 mg Oral DAILY  donepezil (ARICEPT) tablet 5 mg  5 mg Oral QHS  famotidine (PEPCID) tablet 20 mg  20 mg Oral BID  tamsulosin (FLOMAX) capsule 0.4 mg  0.4 mg Oral QHS  senna-docusate (PERICOLACE) 8.6-50 mg per tablet 1 Tab  1 Tab Oral DAILY  dabigatran etexilate (PRADAXA) capsule 150 mg  150 mg Oral BID Review of Systems Constitution: Negative. HENT: Negative. Eyes: Negative. Cardiovascular: Negative. Respiratory: Negative. Musculoskeletal: Positive for arthritis. Gastrointestinal: Negative. Neurological: Positive for weakness. Rest per HPI. All other systems reviewed and are negative Physical Exam 
Vitals:  
 10/30/19 2336 10/31/19 0334 10/31/19 7488 10/31/19 1252 BP: 139/67 129/60 148/70 129/73 Pulse: 66 78 70 96 Resp: 18 18 18 16 Temp: 97.4 °F (36.3 °C) 98.4 °F (36.9 °C) 98.4 °F (36.9 °C) 98 °F (36.7 °C) SpO2: 92% 96% 97% 99% Weight:      
Height:      
 
 
Physical Exam: 
General: Well Developed, Well Nourished, No Acute Distress HEENT: pupils equal and round, no abnormalities noted Neck: supple, no JVD, no carotid bruits Heart: S1S2 with RRR without murmurs or gallops Lungs: Clear throughout auscultation bilaterally without adventitious sounds Abd: soft, nontender, nondistended, with good bowel sounds Ext: warm, no edema, calves supple/nontender, pulses 2+ bilaterally Skin: warm and dry Psychiatric: Normal mood and affect Neurologic: Alert and oriented X 3 Labs:  
Recent Labs 10/31/19 
0414 10/30/19 
1014  140  
K 3.9 4.4 MG 2.4  --   
BUN 9 10 CREA 0.88 0.98  
* 94 WBC  --  6.3 HGB  --  15.9 HCT  --  48.0 PLT  --  185 Echo Results  (Last 48 hours) None Xr Chest [facility] Result Date: 10/30/2019 IMPRESSION:  Cardiomegaly without acute abnormality. Assessment/Plan: 
 
 Assessment:  
 
Principal Problem: UTI (urinary tract infection) (10/30/2019) Active Problems: 
  HTN (hypertension) (1/27/2012) Generalized weakness (12/23/2016) Moderate dementia without behavioral disturbance ([address] (7/26/2018) Non-traumatic rhabdomyolysis (6/4/2019) Troponin I above reference range (6/4/2019) Elevated troponin likely secondary to cross-reactivity with assay in the setting of rhabdomyolysis. Similar to prior presentation in June 2019. Has no symptoms. Prior noted echo from June 2019 unremarkable. Would not pursue any further cardiac work-up. No significant trend in cardiac enzymes. Currently being treated with antibiotics and fluids per primary. Needs follow-up in the outpatient setting and compliance stressed. Note: Last Device check is under the media tab on 6/11/2019 which showed greater than 5 years of life left on his battery. Will need a follow up appointment scheduled with our device clinic at discharge. No further cardiac recs at this time. Will sign off. Please call with questions. Thank you very much for this referral. We appreciate the opportunity to participate in this patient's care. Ulisses Zamora MD 
10/31/2019

## 2019-10-31 NOTE — PROGRESS NOTES
Problem: Falls - Risk of 
Goal: *Absence of Falls Description Document Reyes Solange Fall Risk and appropriate interventions in the flowsheet. 10/30/2019 2026 by Hetal Kidd RN Outcome: Progressing Towards Goal 
Note:  
Fall Risk Interventions: 
Mobility Interventions: Bed/chair exit alarm Mentation Interventions: Bed/chair exit alarm Medication Interventions: Bed/chair exit alarm Elimination Interventions: Bed/chair exit alarm 10/30/2019 2025 by Hetal Kidd RN Outcome: Progressing Towards Goal 
Note:  
Fall Risk Interventions: 
Mobility Interventions: Bed/chair exit alarm Mentation Interventions: Bed/chair exit alarm Medication Interventions: Bed/chair exit alarm Elimination Interventions: Bed/chair exit alarm

## 2019-11-01 LAB
ANION GAP SERPL CALC-SCNC: 5 MMOL/L (ref 7–16)
BUN SERPL-MCNC: 7 MG/DL (ref 8–23)
CALCIUM SERPL-MCNC: 9.7 MG/DL (ref 8.3–10.4)
CHLORIDE SERPL-SCNC: 107 MMOL/L (ref 98–107)
CK SERPL-CCNC: 1727 U/L (ref 21–215)
CO2 SERPL-SCNC: 30 MMOL/L (ref 21–32)
CREAT SERPL-MCNC: 0.95 MG/DL (ref 0.8–1.5)
ERYTHROCYTE [DISTWIDTH] IN BLOOD BY AUTOMATED COUNT: 13.3 % (ref 11.9–14.6)
GLUCOSE SERPL-MCNC: 93 MG/DL (ref 65–100)
HCT VFR BLD AUTO: 45.7 % (ref 41.1–50.3)
HGB BLD-MCNC: 15.3 G/DL (ref 13.6–17.2)
MAGNESIUM SERPL-MCNC: 2 MG/DL (ref 1.8–2.4)
MCH RBC QN AUTO: 30.8 PG (ref 26.1–32.9)
MCHC RBC AUTO-ENTMCNC: 33.5 G/DL (ref 31.4–35)
MCV RBC AUTO: 92 FL (ref 79.6–97.8)
NRBC # BLD: 0 K/UL (ref 0–0.2)
PLATELET # BLD AUTO: 176 K/UL (ref 150–450)
PMV BLD AUTO: 9.9 FL (ref 9.4–12.3)
POTASSIUM SERPL-SCNC: 3.8 MMOL/L (ref 3.5–5.1)
RBC # BLD AUTO: 4.97 M/UL (ref 4.23–5.6)
SODIUM SERPL-SCNC: 142 MMOL/L (ref 136–145)
WBC # BLD AUTO: 6.6 K/UL (ref 4.3–11.1)

## 2019-11-01 PROCEDURE — 97161 PT EVAL LOW COMPLEX 20 MIN: CPT

## 2019-11-01 PROCEDURE — 97165 OT EVAL LOW COMPLEX 30 MIN: CPT

## 2019-11-01 PROCEDURE — 65660000000 HC RM CCU STEPDOWN

## 2019-11-01 PROCEDURE — 74011250637 HC RX REV CODE- 250/637: Performed by: INTERNAL MEDICINE

## 2019-11-01 PROCEDURE — 74011250636 HC RX REV CODE- 250/636: Performed by: INTERNAL MEDICINE

## 2019-11-01 PROCEDURE — 83735 ASSAY OF MAGNESIUM: CPT

## 2019-11-01 PROCEDURE — 74011000258 HC RX REV CODE- 258: Performed by: INTERNAL MEDICINE

## 2019-11-01 PROCEDURE — 82550 ASSAY OF CK (CPK): CPT

## 2019-11-01 PROCEDURE — 85027 COMPLETE CBC AUTOMATED: CPT

## 2019-11-01 PROCEDURE — 36415 COLL VENOUS BLD VENIPUNCTURE: CPT

## 2019-11-01 PROCEDURE — 77030020255 HC SOL INJ LR 1000ML BG

## 2019-11-01 PROCEDURE — 80048 BASIC METABOLIC PNL TOTAL CA: CPT

## 2019-11-01 PROCEDURE — 74011000302 HC RX REV CODE- 302: Performed by: INTERNAL MEDICINE

## 2019-11-01 PROCEDURE — 86580 TB INTRADERMAL TEST: CPT | Performed by: INTERNAL MEDICINE

## 2019-11-01 RX ADMIN — Medication 10 ML: at 05:24

## 2019-11-01 RX ADMIN — TAMSULOSIN HYDROCHLORIDE 0.4 MG: 0.4 CAPSULE ORAL at 21:08

## 2019-11-01 RX ADMIN — SODIUM CHLORIDE, SODIUM LACTATE, POTASSIUM CHLORIDE, AND CALCIUM CHLORIDE 150 ML/HR: 600; 310; 30; 20 INJECTION, SOLUTION INTRAVENOUS at 12:49

## 2019-11-01 RX ADMIN — SODIUM CHLORIDE, SODIUM LACTATE, POTASSIUM CHLORIDE, AND CALCIUM CHLORIDE 150 ML/HR: 600; 310; 30; 20 INJECTION, SOLUTION INTRAVENOUS at 05:24

## 2019-11-01 RX ADMIN — SENNOSIDES AND DOCUSATE SODIUM 1 TABLET: 8.6; 5 TABLET ORAL at 08:12

## 2019-11-01 RX ADMIN — FAMOTIDINE 20 MG: 20 TABLET ORAL at 17:10

## 2019-11-01 RX ADMIN — DABIGATRAN ETEXILATE MESYLATE 150 MG: 150 CAPSULE ORAL at 21:08

## 2019-11-01 RX ADMIN — CEFTRIAXONE 1 G: 1 INJECTION, POWDER, FOR SOLUTION INTRAMUSCULAR; INTRAVENOUS at 12:49

## 2019-11-01 RX ADMIN — METOPROLOL TARTRATE 25 MG: 25 TABLET ORAL at 17:11

## 2019-11-01 RX ADMIN — BICALUTAMIDE 50 MG: 50 TABLET, FILM COATED ORAL at 09:00

## 2019-11-01 RX ADMIN — FAMOTIDINE 20 MG: 20 TABLET ORAL at 08:12

## 2019-11-01 RX ADMIN — Medication 10 ML: at 21:11

## 2019-11-01 RX ADMIN — METOPROLOL TARTRATE 25 MG: 25 TABLET ORAL at 08:12

## 2019-11-01 RX ADMIN — DONEPEZIL HYDROCHLORIDE 5 MG: 5 TABLET, FILM COATED ORAL at 21:08

## 2019-11-01 RX ADMIN — DABIGATRAN ETEXILATE MESYLATE 150 MG: 150 CAPSULE ORAL at 08:12

## 2019-11-01 RX ADMIN — TUBERCULIN PURIFIED PROTEIN DERIVATIVE 5 UNITS: 5 INJECTION INTRADERMAL at 18:19

## 2019-11-01 RX ADMIN — POLYETHYLENE GLYCOL (3350) 17 G: 17 POWDER, FOR SOLUTION ORAL at 08:12

## 2019-11-01 RX ADMIN — ARIPIPRAZOLE 10 MG: 5 TABLET ORAL at 08:12

## 2019-11-01 NOTE — PROGRESS NOTES
Shift assessment complete. A&OX2- to self & place. Lungs clear on auscultation. Respirations present. Even and unlabored. No s/s of distress. Zero c/o pain at this time. Call light within reach. Encouraged patient to call for assistance. Patient verbalizes understanding. See Doc Flowsheet for assessment details. Patient resting in bed. Bed alarm in progress. Door open for observation.

## 2019-11-01 NOTE — PROGRESS NOTES
Problem: Mobility Impaired (Adult and Pediatric) Goal: *Acute Goals and Plan of Care (Insert Text) Description LTG: 
(1.)Mr. Ben Kelsey will move from supine to sit and sit to supine  in bed with MODERATE ASSIST within 7 treatment day(s). (2.)Mr. Ben Kelsey will transfer from bed to chair and chair to bed with MODERATE ASSIST using the least restrictive device within 7 treatment day(s). (3.)Mr. Ben Kelsey will ambulate with MODERATE ASSIST for 3 feet with the least restrictive device within 7 treatment day(s). (4)Mr. Ben Kelsey will perform HEP with cues and assistance to increase functional mobility in 7 days. ________________________________________________________________________________________________ Outcome: Progressing Towards Goal 
  
PHYSICAL THERAPY: Initial Assessment and PM 11/1/2019 INPATIENT: PT Visit Days : 1 [place] / [place] / Product Type: Medicare /   
  
NAME/AGE/GENDER: Corbin Aquino is a 78 y.o. male PRIMARY DIAGNOSIS: UTI (urinary tract infection) [N39.0] UTI (urinary tract infection) UTI (urinary tract infection) ICD-10: Treatment Diagnosis:  
 Generalized Muscle Weakness (M62.81) Other abnormalities of gait and mobility (R26.89) Precaution/Allergies: 
Patient has no known allergies. ASSESSMENT:  
 
Mr. Ben Kelsey presents with significant debility. He is quite drowsy and confused, not sure of his baseline. He did not know where he was this afternoon, did not know the year. He reports walks with a walker at baseline and gets assistance from his daughter for adl's. Pt. Was total assistance for bed mobility. He stood with walker and mod assist x 2 but unable to stand fully upright. He was unable to take any steps. Returned to supine. Will follow. This section established at most recent assessment PROBLEM LIST (Impairments causing functional limitations): 
Decreased Strength Decreased ADL/Functional Activities Decreased Transfer Abilities Decreased Ambulation Ability/Technique Decreased Balance Decreased Activity Tolerance Increased Fatigue Decreased Flexibility/Joint Mobility Decreased [place] with Home Exercise Program 
Decreased Cognition INTERVENTIONS PLANNED: (Benefits and precautions of physical therapy have been discussed with the patient.) Balance Exercise Bed Mobility Family Education Gait Training Home Exercise Program (HEP) Range of Motion (ROM) Therapeutic Activites Therapeutic Exercise/Strengthening Transfer Training TREATMENT PLAN: Frequency/Duration: daily for duration of hospital stay Rehabilitation Potential For Stated Goals: Fair REHAB RECOMMENDATIONS (at time of discharge pending progress):   
Placement: 
Rehab ? Equipment:  
None at this time HISTORY:  
History of Present Injury/Illness (Reason for Referral): 
78years old male with past medical history of chronic A. fib on Pradaxa, hypertension, dementia without behavioral disturbance, prostate cancer was BIBEMS due to generalized weakness and confusion raising concern for UTI. He was recently admitted with acute rhabdomyolysis, UTI and elevated troponin, was discharged home with home health PT. No falls reported and patient denies any fever chills, nausea, vomiting, diarrhea, abdominal pain, chest pain, SOB, cough or urinary symptoms. In ER patient was slightly confused and labs showed elevated CK at 2360, lactic acid at 2.3, troponin 0.69 (prior value was 0.66 which had trended down from 5.2. He was seen by cardiology during prior admission and it was deemed secondary to demand ischemia. UA was positive for infection and he was given Rocephin with IV fluids in the ER. Hospitalist consulted for admission. Patient appears comfortable, laying in bed. He is AAO x2 to place and person but not to time. Knows name and date of birth.   States that he lives with his daughter. Detailed history could not be obtained due to dementia. Past Medical History/Comorbidities:  
Mr. Du Townsend  has a past medical history of Arrhythmia, Arthritis, Atrial fibrillation ([facility] [address] (1/27/2012), CAD (coronary artery disease), Cancer ([facility] [address] Dementia, Depression (8/28/2012), GERD (gastroesophageal reflux disease), Heart failure ([facility] [address] [facility] History of prostate cancer (8/4/2014), Hypertension, Morbid obesity ([facility] [address] Osteoarthritis of right knee (1/26/2012), Recurrent major depressive disorder, in full remission ([facility] (11/30/2017), Total knee replacement status (1/26/2012), and Vitamin B12 deficiency (7/23/2017). Mr. Du Townsend  has a past surgical history that includes hx orthopaedic (2010); hx prostatectomy; and hx pacemaker (8/30/2012). Social History/Living Environment:  
Home Environment: Private residence # Steps to Enter: 7 Rails to Enter: Yes Hand Rails : Bilateral 
One/Two Story Residence: One story Living Alone: No 
Support Systems: Child(billy) Patient Expects to be Discharged to[de-identified] Unknown Current DME Used/Available at Home: Lisa Cortes Prior Level of Function/Work/Activity: 
Walker at baseline? Number of Personal Factors/Comorbidities that affect the Plan of Care: 3+: HIGH COMPLEXITY EXAMINATION:  
Most Recent Physical Functioning:  
Gross Assessment: 
AROM: Grossly decreased, non-functional(LE's) Strength: Grossly decreased, non-functional(LE's) Sensation: Intact Posture: 
Posture (WDL): Exceptions to [facility] Posture Assessment: Rounded shoulders Balance: 
Sitting: With support; Impaired Sitting - Static: Poor (constant support); Fair (occasional) Sitting - Dynamic: Poor (constant support); Fair (occasional) Standing: Impaired;Pull to stand; With support Standing - Static: Constant support Standing - Dynamic : Constant support Bed Mobility: 
Supine to Sit: Total assistance Sit to Supine: Total assistance Wheelchair Mobility: 
  
Transfers: Sit to Stand: Moderate assistance;Assist x2 Stand to Sit: Moderate assistance;Assist x2 Gait: unable Body Structures Involved: 
Bones Joints Muscles Ligaments Body Functions Affected: Movement Related Activities and Participation Affected: Mobility Number of elements that affect the Plan of Care: 3: MODERATE COMPLEXITY CLINICAL PRESENTATION:  
Presentation: Stable and uncomplicated: LOW COMPLEXITY CLINICAL DECISION MAKIN[address] [place] [address] AM-PAC 6 Clicks Basic Mobility Inpatient Short Form How much difficulty does the patient currently have. .. Unable A Lot A Little None 1. Turning over in bed (including adjusting bedclothes, sheets and blankets)? ? 1   ? 2   ? 3   ? 4  
2. Sitting down on and standing up from a chair with arms ( e.g., wheelchair, bedside commode, etc.)   ? 1   ? 2   ? 3   ? 4  
3. Moving from lying on back to sitting on the side of the bed?   ? 1   ? 2   ? 3   ? 4 How much help from another person does the patient currently need. .. Total A Lot A Little None 4. Moving to and from a bed to a chair (including a wheelchair)? ? 1   ? 2   ? 3   ? 4  
5. Need to walk in hospital room? ? 1   ? 2   ? 3   ? 4  
6. Climbing 3-5 steps with a railing? ? 1   ? 2   ? 3   ? 4  
© , Trustees of [address] [place] [address], under license to [employer]. All rights reserved Score:  Initial: 9 Most Recent: X (Date: -- ) Interpretation of Tool:  Represents activities that are increasingly more difficult (i.e. Bed mobility, Transfers, Gait). Medical Necessity:    
Patient is expected to demonstrate progress in strength, range of motion, balance, coordination and functional technique 
 to decrease assistance required with exercises and functional mobility Cristina Franks Reason for Services/Other Comments: 
Patient continues to require present interventions due to patient's inability to perform exercises and functional mobility independently Cristina Franks Use of outcome tool(s) and clinical judgement create a POC that gives a: Questionable prediction of patient's progress: MODERATE COMPLEXITY  
  
 
 
 
TREATMENT:  
(In addition to Assessment/Re-Assessment sessions the following treatments were rendered) Pre-treatment Symptoms/Complaints:  fatigue Pain: Initial:  
   Post Session:  0 Assessment/Reassessment only, no treatment provided today Braces/Orthotics/Lines/Etc:  
IV 
O2 Device: Room air Treatment/Session Assessment:   
Response to Treatment:  debility Interdisciplinary Collaboration: Occupational Therapist 
Registered Nurse After treatment position/precautions:  
Supine in bed Bed/Chair-wheels locked Bed in low position Call light within reach Compliance with Program/Exercises: Will assess as treatment progresses Recommendations/Intent for next treatment session: \"Next visit will focus on reduction in assistance provided\". Total Treatment Duration: PT Patient Time In/Time Out Time In: 1350 Time Out: 1400 Socorro Zhu PT

## 2019-11-01 NOTE — PROGRESS NOTES
Assessment done via doc flow sheet. Pt resting in bed, head of bed elevated, resp easy & regular, lung sounds CTA. Denies pain. Remote tele in place. Call light within reach, pt instructed to call for assistance as needed.

## 2019-11-01 NOTE — PROGRESS NOTES
Hospitalist reported in rounds today that pt's daughter requested [facility] for STR. SW did not speak with pt or family but did send cc link referral to [facility]. SW will follow up with family Sat. PT/OT evals ordered today. SW asked RN, Byron Byers, to place a PPD. Gretta De La Torre

## 2019-11-01 NOTE — PROGRESS NOTES
Problem: Urinary Tract Infection - Adult Goal: *Absence of infection signs and symptoms Outcome: Progressing Towards Goal 
  
Problem: Falls - Risk of 
Goal: *Absence of Falls Description Document Parker Velez Fall Risk and appropriate interventions in the flowsheet. Outcome: Progressing Towards Goal 
Note:  
Fall Risk Interventions: 
Mobility Interventions: Bed/chair exit alarm, Patient to call before getting OOB, PT Consult for mobility concerns Mentation Interventions: Bed/chair exit alarm, Door open when patient unattended, Room close to nurse's station Medication Interventions: Bed/chair exit alarm, Patient to call before getting OOB Elimination Interventions: Bed/chair exit alarm, Call light in reach, Patient to call for help with toileting needs

## 2019-11-01 NOTE — PROGRESS NOTES
Care Management Interventions PCP Verified by CM: Yes Mode of Transport at Discharge: Other (see comment) Transition of Care Consult (CM Consult): Other Current Support Network: Relative's Home Confirm Follow Up Transport: Family Plan discussed with Pt/Family/Caregiver: Yes Freedom of Choice Offered: Yes Discharge Location Discharge Placement: Home with family assistance Saw pt in interdisciplinarily rounds with the following disciplines: Physician, Case Management, Nursing, Dietary,Therapy and Pharmacy. The plan of care was discussed along with discharge date/ location. Pt may d/c in sat or sun, back home with dtr. [facility].

## 2019-11-01 NOTE — PROGRESS NOTES
Problem: Self Care Deficits Care Plan (Adult) Goal: *Acute Goals and Plan of Care (Insert Text) Description 1. Patient will perform grooming with supervision. 2. Patient will perform Upper body dressing with min assist. 
3. Patient will perform lower body dressing with mod assist. 
4. Patient will perform upper and lower body bathing with mod assist. 
5. Patient will perform toilet transfers with mod assist 
6. Patient will perform shower transfer with mod assist. 
7. Patient will participate in 30 + minutes of ADL/ therapeutic exercise/therapeutic activity with mod rest breaks to increase activity tolerance for self care. Goals to be achieved in 7 days. Outcome: Progressing Towards Goal 
  
 
OCCUPATIONAL THERAPY: Initial Assessment and PM 11/1/2019 INPATIENT: OT Visit Days: 1 [place] / [place] / Product Type: Medicare /  
  
NAME/AGE/GENDER: Jonnie Eisenmenger is a 78 y.o. male PRIMARY DIAGNOSIS:  UTI (urinary tract infection) [N39.0] UTI (urinary tract infection) UTI (urinary tract infection) ICD-10: Treatment Diagnosis:  
 Generalized Muscle Weakness (M62.81) Other lack of cordination (R27.8) Difficulty in walking, Not elsewhere classified (R26.2) Precautions/Allergies: 
   Patient has no known allergies. ASSESSMENT:  
 
Mr. Sera Marlow presents supine in bed sleeping. He was awakened easily but very flat and drowsy. He is total assist for supine to sit and sit to supine. He stood with max x 2 but was unable to take steps. He is mod/max for UE ADLS and Total assist for LE ADLS. HE lives with his daughter and he reported she assists him with his ADLS and walking. No family present. He would benefit from skilled OT services. Will follow. This section established at most recent assessment PROBLEM LIST (Impairments causing functional limitations): 
Decreased Strength Decreased ADL/Functional Activities Decreased Transfer Abilities Decreased Ambulation Ability/Technique Decreased Balance Decreased Activity Tolerance Decreased Flexibility/Joint Mobility Decreased Skin Integrity/Hygeine INTERVENTIONS PLANNED: (Benefits and precautions of occupational therapy have been discussed with the patient.) Activities of daily living training Adaptive equipment training Balance training Clothing management Cognitive training Donning&doffing training Hygiene training Neuromuscular re-eduation Therapeutic activity Therapeutic exercise TREATMENT PLAN: Frequency/Duration: Follow patient 3 timesto address above goals. Rehabilitation Potential For Stated Goals: Good REHAB RECOMMENDATIONS (at time of discharge pending progress):   
Placement: It is my opinion, based on this patient's performance to date, that Mr. Lynette Mo may benefit from participating in 1-2 additional therapy sessions in order to continue to assess for rehab potential and then make recommendation for disposition at discharge. Equipment:  
None at this time OCCUPATIONAL PROFILE AND HISTORY:  
History of Present Injury/Illness (Reason for Referral): 
See H and p Past Medical History/Comorbidities:  
Mr. Lynette Mo  has a past medical history of Arrhythmia, Arthritis, Atrial fibrillation ([address] (1/27/2012), CAD (coronary artery disease), Cancer ([address] Dementia, Depression (8/28/2012), GERD (gastroesophageal reflux disease), Heart failure ([address] History of prostate cancer (8/4/2014), Hypertension, Morbid obesity ([address] Osteoarthritis of right knee (1/26/2012), Recurrent major depressive disorder, in full remission ([address] (11/30/2017), Total knee replacement status (1/26/2012), and Vitamin B12 deficiency (7/23/2017). Mr. Lynette Mo  has a past surgical history that includes hx orthopaedic (2010); hx prostatectomy; and hx pacemaker (8/30/2012). Social History/Living Environment:  
Home Environment: Private residence # Steps to Enter: 7 Rails to Enter:  Yes 
 Hand Rails : Bilateral 
One/Two Story Residence: One story Living Alone: No 
Support Systems: Child(billy) Patient Expects to be Discharged to[de-identified] Unknown Current DME Used/Available at Home: Pete Okeefe Prior Level of Function/Work/Activity: 
Daughter assisted him with ADLS Number of Personal Factors/Comorbidities that affect the Plan of Care: Brief history (0):  LOW COMPLEXITY ASSESSMENT OF OCCUPATIONAL PERFORMANCE[de-identified]  
Activities of Daily Living:  
Basic ADLs (From Assessment) Complex ADLs (From Assessment) Feeding: Minimum assistance Oral Facial Hygiene/Grooming: Moderate assistance Bathing: Moderate assistance, Maximum assistance Upper Body Dressing: Moderate assistance, Maximum assistance Lower Body Dressing: Total assistance Toileting: Total assistance(brief in place) Grooming/Bathing/Dressing Activities of Daily Living Cognitive Retraining Safety/Judgement: Fall prevention Bed/Mat Mobility Supine to Sit: Total assistance Sit to Supine: Total assistance Sit to Stand: Moderate assistance;Assist x2 Stand to Sit: Moderate assistance;Assist x2 Most Recent Physical Functioning:  
Gross Assessment: 
  
         
  
Posture: 
Posture (WDL): Exceptions to [facility] Posture Assessment: Rounded shoulders Balance: 
Sitting: With support; Impaired Sitting - Static: Poor (constant support); Fair (occasional) Sitting - Dynamic: Poor (constant support); Fair (occasional) Standing: Impaired;Pull to stand; With support Standing - Static: Constant support Standing - Dynamic : Constant support Bed Mobility: 
Supine to Sit: Total assistance Sit to Supine: Total assistance Wheelchair Mobility: 
  
Transfers: 
Sit to Stand: Moderate assistance;Assist x2 Stand to Sit: Moderate assistance;Assist x2 Patient Vitals for the past 6 hrs: 
 BP BP Patient Position SpO2 Pulse 11/01/19 1242 155/82 At rest 99 % 62 Mental Status Neurologic State: Drowsy Orientation Level: Oriented to place Cognition: Impaired decision making Perception: Cues to maintain midline in sitting, Cues to maintain midline in standing Perseveration: No perseveration noted Safety/Judgement: Fall prevention Physical Skills Involved: 
Range of Motion Balance Strength Activity Tolerance Fine Motor Control Gross Motor Control Skin Integrity Cognitive Skills Affected (resulting in the inability to perform in a timely and safe manner): 
Perception Comprehension Psychosocial Skills Affected: 
Habits/Routines Environmental Adaptation Self-Awareness Number of elements that affect the Plan of Care: 5+:  HIGH COMPLEXITY CLINICAL DECISION MAKING:  
[facility] AM-PAC 6 Clicks Daily Activity Inpatient Short Form How much help from another person does the patient currently need. .. Total A Lot A Little None 1. Putting on and taking off regular lower body clothing? ? 1   ? 2   ? 3   ? 4  
2. Bathing (including washing, rinsing, drying)? ? 1   ? 2   ? 3   ? 4  
3. Toileting, which includes using toilet, bedpan or urinal?   ? 1   ? 2   ? 3   ? 4  
4. Putting on and taking off regular upper body clothing? ? 1   ? 2   ? 3   ? 4  
5. Taking care of personal grooming such as brushing teeth? ? 1   ? 2   ? 3   ? 4  
6. Eating meals? ? 1   ? 2   ? 3   ? 4  
© 2007, Trustees of [facility], under license to [employer]. All rights reserved Score:  Initial: 11 Most Recent: X (Date: -- ) Interpretation of Tool:  Represents activities that are increasingly more difficult (i.e. Bed mobility, Transfers, Gait). Medical Necessity:    
Patient is expected to demonstrate progress in balance, coordination and functional technique 
 to self care skills and transfers Jerson Rodriguez Reason for Services/Other Comments: 
Patient continues to require skilled intervention due to above listed deficits Jerson Rodriguez Use of outcome tool(s) and clinical judgement create a POC that gives a: MODERATE COMPLEXITY  
 
 
 
TREATMENT:  
(In addition to Assessment/Re-Assessment sessions the following treatments were rendered) Pre-treatment Symptoms/Complaints:   
Pain: Initial:  
Pain Intensity 1: 0  Post Session:  0/10 Assessment/Reassessment only, no treatment provided today Braces/Orthotics/Lines/Etc:  
IV 
O2 Device: Room air Treatment/Session Assessment:   
Response to Treatment:  tolerated fair, very drowsy Interdisciplinary Collaboration:  
Physical Therapist 
Occupational Therapist 
 After treatment position/precautions:  
Supine in bed Bed/Chair-wheels locked Bed in low position Call light within reach Nurse at bedside Side rails x 3 Compliance with Program/Exercises: Compliant all of the time. Recommendations/Intent for next treatment session: \"Next visit will focus on advancements to more challenging activities and reduction in assistance provided\". Total Treatment Duration: OT Patient Time In/Time Out Time In: 1400 Time Out: 1410 Isela Bartlett OT

## 2019-11-02 LAB
BACTERIA SPEC CULT: ABNORMAL
BACTERIA SPEC CULT: ABNORMAL
CK SERPL-CCNC: 1303 U/L (ref 21–215)
MM INDURATION POC: 0 MM (ref 0–5)
PPD POC: NEGATIVE NEGATIVE
SERVICE CMNT-IMP: ABNORMAL

## 2019-11-02 PROCEDURE — 65660000000 HC RM CCU STEPDOWN

## 2019-11-02 PROCEDURE — 82550 ASSAY OF CK (CPK): CPT

## 2019-11-02 PROCEDURE — 74011250636 HC RX REV CODE- 250/636: Performed by: INTERNAL MEDICINE

## 2019-11-02 PROCEDURE — 36415 COLL VENOUS BLD VENIPUNCTURE: CPT

## 2019-11-02 PROCEDURE — 74011250637 HC RX REV CODE- 250/637: Performed by: INTERNAL MEDICINE

## 2019-11-02 PROCEDURE — 74011000258 HC RX REV CODE- 258: Performed by: INTERNAL MEDICINE

## 2019-11-02 PROCEDURE — 77030020255 HC SOL INJ LR 1000ML BG

## 2019-11-02 RX ADMIN — FAMOTIDINE 20 MG: 20 TABLET ORAL at 08:53

## 2019-11-02 RX ADMIN — DABIGATRAN ETEXILATE MESYLATE 150 MG: 150 CAPSULE ORAL at 22:31

## 2019-11-02 RX ADMIN — METOPROLOL TARTRATE 25 MG: 25 TABLET ORAL at 17:24

## 2019-11-02 RX ADMIN — FAMOTIDINE 20 MG: 20 TABLET ORAL at 17:24

## 2019-11-02 RX ADMIN — SENNOSIDES AND DOCUSATE SODIUM 1 TABLET: 8.6; 5 TABLET ORAL at 08:53

## 2019-11-02 RX ADMIN — Medication 5 ML: at 13:04

## 2019-11-02 RX ADMIN — CEFTRIAXONE 1 G: 1 INJECTION, POWDER, FOR SOLUTION INTRAMUSCULAR; INTRAVENOUS at 11:12

## 2019-11-02 RX ADMIN — SODIUM CHLORIDE, SODIUM LACTATE, POTASSIUM CHLORIDE, AND CALCIUM CHLORIDE 150 ML/HR: 600; 310; 30; 20 INJECTION, SOLUTION INTRAVENOUS at 08:52

## 2019-11-02 RX ADMIN — BICALUTAMIDE 50 MG: 50 TABLET, FILM COATED ORAL at 11:10

## 2019-11-02 RX ADMIN — POLYETHYLENE GLYCOL (3350) 17 G: 17 POWDER, FOR SOLUTION ORAL at 08:53

## 2019-11-02 RX ADMIN — METOPROLOL TARTRATE 25 MG: 25 TABLET ORAL at 08:53

## 2019-11-02 RX ADMIN — TAMSULOSIN HYDROCHLORIDE 0.4 MG: 0.4 CAPSULE ORAL at 22:32

## 2019-11-02 RX ADMIN — Medication 10 ML: at 22:33

## 2019-11-02 RX ADMIN — DABIGATRAN ETEXILATE MESYLATE 150 MG: 150 CAPSULE ORAL at 08:53

## 2019-11-02 RX ADMIN — DONEPEZIL HYDROCHLORIDE 5 MG: 5 TABLET, FILM COATED ORAL at 22:31

## 2019-11-02 RX ADMIN — ARIPIPRAZOLE 10 MG: 5 TABLET ORAL at 08:53

## 2019-11-02 RX ADMIN — SODIUM CHLORIDE, SODIUM LACTATE, POTASSIUM CHLORIDE, AND CALCIUM CHLORIDE 150 ML/HR: 600; 310; 30; 20 INJECTION, SOLUTION INTRAVENOUS at 18:32

## 2019-11-02 NOTE — PROGRESS NOTES
A.M assessment complete; pt in bed resting quietly. Oriented to name & . Resp even & unlabored on room air; lungs clear. HR irregular. Abdomen semi-soft with active bowel sounds. Bilat foot boots in place. IVF infusing with no difficulty. No c/o pain. Bed low & locked; call light in reach.

## 2019-11-02 NOTE — PROGRESS NOTES
Report received from [place], [facility]. PM assessment completed. PT is AAO to person and place with normal unlabored respirations present on RA. IV site is CDI and infusing. PT denies pain or other needs at this time. Bed is low and locked with call light in reach and visitor at bedside. Will continue to monitor.

## 2019-11-02 NOTE — PROGRESS NOTES
Problem: Urinary Tract Infection - Adult Goal: *Absence of infection signs and symptoms Outcome: Progressing Towards Goal 
  
Problem: Pressure Injury - Risk of 
Goal: *Prevention of pressure injury Description Document Maxwell Scale and appropriate interventions in the flowsheet. Outcome: Progressing Towards Goal 
Note:  
Pressure Injury Interventions: 
Sensory Interventions: Assess changes in LOC, Check visual cues for pain, Keep linens dry and wrinkle-free, Maintain/enhance activity level, Minimize linen layers Moisture Interventions: Absorbent underpads, Check for incontinence Q2 hours and as needed Activity Interventions: Assess need for specialty bed, Increase time out of bed, PT/OT evaluation Mobility Interventions: Assess need for specialty bed, Float heels, HOB 30 degrees or less Nutrition Interventions: Document food/fluid/supplement intake, Offer support with meals,snacks and hydration Friction and Shear Interventions: HOB 30 degrees or less, Lift sheet Problem: Falls - Risk of 
Goal: *Absence of Falls Description Document [place] Fall Risk and appropriate interventions in the flowsheet. Outcome: Progressing Towards Goal 
Note:  
Fall Risk Interventions: 
Mobility Interventions: Bed/chair exit alarm, Patient to call before getting OOB, PT Consult for mobility concerns, Utilize walker, cane, or other assistive device Mentation Interventions: Adequate sleep, hydration, pain control, Bed/chair exit alarm, Door open when patient unattended, Increase mobility, More frequent rounding, Reorient patient, Room close to nurse's station, Toileting rounds, Update white board Medication Interventions: Bed/chair exit alarm, Patient to call before getting OOB, Teach patient to arise slowly Elimination Interventions: Bed/chair exit alarm, Call light in reach, Patient to call for help with toileting needs, Toileting schedule/hourly rounds

## 2019-11-02 NOTE — PROGRESS NOTES
Edna Shearer pt in interdisciplinarily rounds with the following disciplines: Physician, Case Management, Nursing, Dietary,Therapy and Pharmacy. The plan of care was discussed along with discharge date/ location. Family prefers [facility], referral sent, I called and was not able to speak with any of the admissions coordinators where available to discuss pt's possible admission. PLAN: 
· E. Coli UTI: c/w Rocephin till 11/3 for 5 days of Rx, Mild confusion also likely secondary to UTI. · Elevated troponin: Chronic issue, cardiology consulted. No plan for intervention. · Rhabdomyolysis: CK trending down, decrease IV fluids to 150 cc/hr, trend CK.  Most recent EF was normal. 
· Hypertension: Resumed home meds and monitor BP. · Chronic A. fib: Resumed Pradaxa.  Monitor for any hematuria. · Lactic acidosis: Resolved, was Likely due to dehydration versus possible sepsis. · NSVT: has PPM, on Metoprolol. · Replete and monitor electrolytes. 
  
High risk pt. Dispo: pending improvement, PT OT started. Plan of care d/w daughter Jake Jones ([phone]) she prefers [facility] if STR recommended by PT/OT. 
  
DVT ppx:  pradaxa Discussed plan with pt who is in agreement.   All questions answered. 
  
Signed By: Ector Washington MD   
  November 2, 2019

## 2019-11-02 NOTE — PROGRESS NOTES
Problem: Urinary Tract Infection - Adult Goal: *Absence of infection signs and symptoms Outcome: Progressing Towards Goal 
  
Problem: Patient Education: Go to Patient Education Activity Goal: Patient/Family Education Outcome: Progressing Towards Goal 
  
Problem: Pressure Injury - Risk of 
Goal: *Prevention of pressure injury Description Document Maxwell Scale and appropriate interventions in the flowsheet. Outcome: Progressing Towards Goal 
Note:  
Pressure Injury Interventions: 
Sensory Interventions: Assess changes in LOC Moisture Interventions: Absorbent underpads Activity Interventions: PT/OT evaluation Mobility Interventions: Float heels, HOB 30 degrees or less Nutrition Interventions: Document food/fluid/supplement intake Friction and Shear Interventions: Foam dressings/transparent film/skin sealants Problem: Patient Education: Go to Patient Education Activity Goal: Patient/Family Education Outcome: Progressing Towards Goal 
  
Problem: Falls - Risk of 
Goal: *Absence of Falls Description Document Tressia Bullion Fall Risk and appropriate interventions in the flowsheet. Outcome: Progressing Towards Goal 
Note:  
Fall Risk Interventions: 
Mobility Interventions: Bed/chair exit alarm Mentation Interventions: Bed/chair exit alarm Medication Interventions: Bed/chair exit alarm Elimination Interventions: Bed/chair exit alarm Problem: Patient Education: Go to Patient Education Activity Goal: Patient/Family Education Outcome: Progressing Towards Goal 
  
Problem: Urinary Tract Infection - Adult Goal: *Absence of infection signs and symptoms Outcome: Progressing Towards Goal 
  
Problem: Patient Education: Go to Patient Education Activity Goal: Patient/Family Education Outcome: Progressing Towards Goal 
  
Problem: Patient Education: Go to Patient Education Activity Goal: Patient/Family Education Outcome: Progressing Towards Goal 
  
 Problem: Patient Education: Go to Patient Education Activity Goal: Patient/Family Education Outcome: Progressing Towards Goal

## 2019-11-02 NOTE — PROGRESS NOTES
Hospitalist Progress Note   
2019 Admit Date: 10/30/2019  9:55 AM  
NAME: Sole Dong :  1940 MRN:  [id number] Attending: Edwardo Luciano MD 
PCP:  Wes Carranza MD 
 
SUBJECTIVE:  
78years old male with past medical history of chronic A. fib on Pradaxa s/p PPM, hypertension, dementia without behavioral disturbance, prostate cancer was BIBEMS due to generalized weakness and confusion raising concern for UTI, and admitted with acute rhabdomyolysis, UTI and elevated troponin. Seen by Cardiology. : Feels better, AAO X 2-3, seems to be at baseline. HR stable overnight. Nursing notes and chart reviewed. Review of Systems negative with exception of pertinent positives noted above. PHYSICAL EXAM  
 
Visit Vitals /83 (BP 1 Location: Left arm, BP Patient Position: At rest) Pulse (!) 59 Comment: MIREYA GREENE NOTIFIED Temp 97.9 °F (36.6 °C) Resp 16 Ht 6' (1.829 m) Wt 116.1 kg (256 lb) SpO2 98% BMI 34.72 kg/m² Temp (24hrs), Av.3 °F (36.8 °C), Min:97.8 °F (36.6 °C), Max:98.8 °F (37.1 °C) Oxygen Therapy O2 Sat (%): 98 % (19 194) Pulse via Oximetry: 60 beats per minute (10/30/19 1614) O2 Device: Room air (19 0813) Intake/Output Summary (Last 24 hours) at 2019 2257 Last data filed at 2019 1249 Gross per 24 hour Intake 3361 ml Output  Net 3361 ml General: No acute distress. Alert.   
Head:  AT/NC Lungs:  CTABL. Heart:  IRregular, no murmur, rub, or gallop Abdomen: Soft, non-distended, non-tender, +bs Extremities: No cyanosis or clubbing. Neurologic:  No focal deficits. Moves all extremities. Skin:  No Obvious Rash Psych:  Normal affect. LABS AND STUDIES: 
Personally reviewed all labs, meds, and studies for past 24hrs. ASSESSMENT Active Hospital Problems Diagnosis Date Noted  UTI (urinary tract infection) 10/30/2019  Troponin I above reference range 2019  Non-traumatic rhabdomyolysis 06/04/2019  Moderate dementia without behavioral disturbance ([facility] 07/26/2018  Generalized weakness 12/23/2016  
 HTN (hypertension) 01/27/2012 PLAN: 
· UTI: Started on Rocephin, will continue and follow urine culture. Mild confusion also likely secondary to UTI. · Elevated troponin: Chronic issue, cardiology consulted. No plan for intervention. · Rhabdomyolysis: CK trending down, decrease IV fluids to 150 cc/hr, trend CK. Most recent EF was normal. 
· Hypertension: Resumed home meds and monitor BP. · Chronic A. fib: Resumed Pradaxa. Monitor for any hematuria. · Lactic acidosis: Resolved, was Likely due to dehydration versus possible sepsis. · NSVT: has PPM, on Metoprolol. High risk pt. Dispo: pending improvement, PT OT started. Plan of care d/w daughter on phone. DVT ppx:  pradaxa Discussed plan with pt who is in agreement. All questions answered. Signed By: Viraj Gaines MD   
 November 1, 2019

## 2019-11-02 NOTE — PROGRESS NOTES
Hospitalist Progress Note   
2019 Admit Date: 10/30/2019  9:55 AM  
NAME: Neisha Quintana :  1940 MRN:  [id number] Attending: Nicki Cardenas MD 
PCP:  Cookie Goel MD 
 
SUBJECTIVE:  
78years old male with past medical history of chronic A. fib on Pradaxa s/p PPM, hypertension, dementia without behavioral disturbance, prostate cancer was BIBEMS due to generalized weakness and confusion raising concern for UTI, and admitted with acute rhabdomyolysis, UTI and elevated troponin. Seen by Cardiology. : Pt Feels better, AAO X 2-3, seems to be at baseline. No tachycardia overnight. Nursing notes and chart reviewed. Review of Systems negative with exception of pertinent positives noted above. PHYSICAL EXAM  
 
Visit Vitals /71 (BP 1 Location: Left arm, BP Patient Position: At rest) Pulse (!) 59 Temp 98.4 °F (36.9 °C) Resp 18 Ht 6' (1.829 m) Wt 116.1 kg (256 lb) SpO2 97% BMI 34.72 kg/m² Temp (24hrs), Av.5 °F (36.9 °C), Min:97.8 °F (36.6 °C), Max:99.2 °F (37.3 °C) Oxygen Therapy O2 Sat (%): 97 % (19 0752) Pulse via Oximetry: 60 beats per minute (10/30/19 1614) O2 Device: Room air (19 0813) Intake/Output Summary (Last 24 hours) at 2019 1018 Last data filed at 2019 1249 Gross per 24 hour Intake 550 ml Output  Net 550 ml General: No acute distress. Alert.   
Head:  AT/NC Lungs:  CTABL. Heart:  IRregular, no murmur, rub, or gallop Abdomen: Soft, non-distended, non-tender, +bs Extremities: No cyanosis or clubbing. Neurologic:  No focal deficits. Moves all extremities. Skin:  No Obvious Rash Psych:  Normal affect. LABS AND STUDIES: 
Personally reviewed all labs, meds, and studies for past 24hrs. ASSESSMENT Active Hospital Problems Diagnosis Date Noted  UTI (urinary tract infection) 10/30/2019  Troponin I above reference range 2019  Non-traumatic rhabdomyolysis 06/04/2019  Moderate dementia without behavioral disturbance ([facility] [address] 07/26/2018  Generalized weakness 12/23/2016  
 HTN (hypertension) 01/27/2012 PLAN: 
· E. Coli UTI: c/w Rocephin till 11/3 for 5 days of Rx, Mild confusion also likely secondary to UTI. · Elevated troponin: Chronic issue, cardiology consulted. No plan for intervention. · Rhabdomyolysis: CK trending down, decrease IV fluids to 150 cc/hr, trend CK. Most recent EF was normal. 
· Hypertension: Resumed home meds and monitor BP. · Chronic A. fib: Resumed Pradaxa. Monitor for any hematuria. · Lactic acidosis: Resolved, was Likely due to dehydration versus possible sepsis. · NSVT: has PPM, on Metoprolol. · Replete and monitor electrolytes. High risk pt. Dispo: pending improvement, PT OT started. Plan of care d/w daughter Wilma Pierce ([phone]) she prefers [facility]- if STR recommended by PT/OT. DVT ppx:  pradaxa Discussed plan with pt who is in agreement. All questions answered. Signed By: Derrick Potts MD   
 November 2, 2019

## 2019-11-03 LAB
ANION GAP SERPL CALC-SCNC: 6 MMOL/L (ref 7–16)
BUN SERPL-MCNC: 11 MG/DL (ref 8–23)
CALCIUM SERPL-MCNC: 9.3 MG/DL (ref 8.3–10.4)
CHLORIDE SERPL-SCNC: 111 MMOL/L (ref 98–107)
CK SERPL-CCNC: 750 U/L (ref 21–215)
CO2 SERPL-SCNC: 26 MMOL/L (ref 21–32)
CREAT SERPL-MCNC: 0.84 MG/DL (ref 0.8–1.5)
ERYTHROCYTE [DISTWIDTH] IN BLOOD BY AUTOMATED COUNT: 13.1 % (ref 11.9–14.6)
GLUCOSE SERPL-MCNC: 85 MG/DL (ref 65–100)
HCT VFR BLD AUTO: 44.3 % (ref 41.1–50.3)
HGB BLD-MCNC: 14.9 G/DL (ref 13.6–17.2)
MCH RBC QN AUTO: 30.4 PG (ref 26.1–32.9)
MCHC RBC AUTO-ENTMCNC: 33.6 G/DL (ref 31.4–35)
MCV RBC AUTO: 90.4 FL (ref 79.6–97.8)
MM INDURATION POC: 0 MM (ref 0–5)
NRBC # BLD: 0 K/UL (ref 0–0.2)
PLATELET # BLD AUTO: 164 K/UL (ref 150–450)
PMV BLD AUTO: 9.8 FL (ref 9.4–12.3)
POTASSIUM SERPL-SCNC: 3.7 MMOL/L (ref 3.5–5.1)
PPD POC: NEGATIVE NEGATIVE
RBC # BLD AUTO: 4.9 M/UL (ref 4.23–5.6)
SODIUM SERPL-SCNC: 143 MMOL/L (ref 136–145)
WBC # BLD AUTO: 6 K/UL (ref 4.3–11.1)

## 2019-11-03 PROCEDURE — 65660000000 HC RM CCU STEPDOWN

## 2019-11-03 PROCEDURE — 85027 COMPLETE CBC AUTOMATED: CPT

## 2019-11-03 PROCEDURE — 97535 SELF CARE MNGMENT TRAINING: CPT

## 2019-11-03 PROCEDURE — 36415 COLL VENOUS BLD VENIPUNCTURE: CPT

## 2019-11-03 PROCEDURE — 74011250637 HC RX REV CODE- 250/637: Performed by: INTERNAL MEDICINE

## 2019-11-03 PROCEDURE — 74011250636 HC RX REV CODE- 250/636: Performed by: INTERNAL MEDICINE

## 2019-11-03 PROCEDURE — 97530 THERAPEUTIC ACTIVITIES: CPT

## 2019-11-03 PROCEDURE — 80048 BASIC METABOLIC PNL TOTAL CA: CPT

## 2019-11-03 PROCEDURE — 77030020255 HC SOL INJ LR 1000ML BG

## 2019-11-03 PROCEDURE — 82550 ASSAY OF CK (CPK): CPT

## 2019-11-03 RX ADMIN — POLYETHYLENE GLYCOL (3350) 17 G: 17 POWDER, FOR SOLUTION ORAL at 08:41

## 2019-11-03 RX ADMIN — SODIUM CHLORIDE, SODIUM LACTATE, POTASSIUM CHLORIDE, AND CALCIUM CHLORIDE 150 ML/HR: 600; 310; 30; 20 INJECTION, SOLUTION INTRAVENOUS at 19:41

## 2019-11-03 RX ADMIN — FAMOTIDINE 20 MG: 20 TABLET ORAL at 08:41

## 2019-11-03 RX ADMIN — BICALUTAMIDE 50 MG: 50 TABLET, FILM COATED ORAL at 09:00

## 2019-11-03 RX ADMIN — SODIUM CHLORIDE, SODIUM LACTATE, POTASSIUM CHLORIDE, AND CALCIUM CHLORIDE 150 ML/HR: 600; 310; 30; 20 INJECTION, SOLUTION INTRAVENOUS at 06:40

## 2019-11-03 RX ADMIN — SENNOSIDES AND DOCUSATE SODIUM 1 TABLET: 8.6; 5 TABLET ORAL at 08:41

## 2019-11-03 RX ADMIN — TAMSULOSIN HYDROCHLORIDE 0.4 MG: 0.4 CAPSULE ORAL at 21:16

## 2019-11-03 RX ADMIN — METOPROLOL TARTRATE 25 MG: 25 TABLET ORAL at 17:25

## 2019-11-03 RX ADMIN — ARIPIPRAZOLE 10 MG: 5 TABLET ORAL at 08:41

## 2019-11-03 RX ADMIN — Medication 10 ML: at 06:40

## 2019-11-03 RX ADMIN — DONEPEZIL HYDROCHLORIDE 5 MG: 5 TABLET, FILM COATED ORAL at 21:16

## 2019-11-03 RX ADMIN — Medication 5 ML: at 21:17

## 2019-11-03 RX ADMIN — DABIGATRAN ETEXILATE MESYLATE 150 MG: 150 CAPSULE ORAL at 21:16

## 2019-11-03 RX ADMIN — FAMOTIDINE 20 MG: 20 TABLET ORAL at 17:25

## 2019-11-03 RX ADMIN — METOPROLOL TARTRATE 25 MG: 25 TABLET ORAL at 08:41

## 2019-11-03 RX ADMIN — SODIUM CHLORIDE, SODIUM LACTATE, POTASSIUM CHLORIDE, AND CALCIUM CHLORIDE 150 ML/HR: 600; 310; 30; 20 INJECTION, SOLUTION INTRAVENOUS at 00:35

## 2019-11-03 RX ADMIN — DABIGATRAN ETEXILATE MESYLATE 150 MG: 150 CAPSULE ORAL at 08:41

## 2019-11-03 NOTE — PROGRESS NOTES
Received pt from [place] in stable condition. Pt in bed resting quietly. Resp even & unlabored on room air; no acute signs of distress noted. Bed low & locked; call light in reach; no needs voiced.

## 2019-11-03 NOTE — PROGRESS NOTES
Problem: Pressure Injury - Risk of 
Goal: *Prevention of pressure injury Description Document Maxwell Scale and appropriate interventions in the flowsheet. Outcome: Progressing Towards Goal 
Note:  
Pressure Injury Interventions: 
Sensory Interventions: Assess changes in LOC, Check visual cues for pain, Keep linens dry and wrinkle-free Moisture Interventions: Absorbent underpads, Check for incontinence Q2 hours and as needed Activity Interventions: Assess need for specialty bed, Increase time out of bed, PT/OT evaluation Mobility Interventions: Assess need for specialty bed, HOB 30 degrees or less, PT/OT evaluation Nutrition Interventions: Document food/fluid/supplement intake, Offer support with meals,snacks and hydration Friction and Shear Interventions: Lift sheet, Apply protective barrier, creams and emollients Problem: Falls - Risk of 
Goal: *Absence of Falls Description Document [place] Fall Risk and appropriate interventions in the flowsheet. Outcome: Progressing Towards Goal 
Note:  
Fall Risk Interventions: 
Mobility Interventions: Bed/chair exit alarm, Patient to call before getting OOB, PT Consult for mobility concerns, Utilize walker, cane, or other assistive device Mentation Interventions: Adequate sleep, hydration, pain control, Bed/chair exit alarm, Door open when patient unattended, More frequent rounding, Reorient patient, Toileting rounds, Update white board Medication Interventions: Bed/chair exit alarm, Patient to call before getting OOB, Teach patient to arise slowly Elimination Interventions: Bed/chair exit alarm, Call light in reach, Patient to call for help with toileting needs, Toileting schedule/hourly rounds

## 2019-11-03 NOTE — PROGRESS NOTES
Monitor room called stating pt had a 12 beat run of [facility], PT is asymptomatic, notified Dr Keith Mendes. MD stated to make sure PT had BMP and CBC labs ordered for the morning, no other orders were given. Will continue to monitor.

## 2019-11-03 NOTE — PROGRESS NOTES
Problem: Self Care Deficits Care Plan (Adult) Goal: *Acute Goals and Plan of Care (Insert Text) Description 1. Patient will perform grooming with supervision. 2. Patient will perform Upper body dressing with min assist. 
3. Patient will perform lower body dressing with mod assist. 
4. Patient will perform upper and lower body bathing with mod assist. 
5. Patient will perform toilet transfers with mod assist 
6. Patient will perform shower transfer with mod assist. 
7. Patient will participate in 30 + minutes of ADL/ therapeutic exercise/therapeutic activity with mod rest breaks to increase activity tolerance for self care. Goals to be achieved in 7 days. Outcome: Progressing Towards Goal 
  
 
OCCUPATIONAL THERAPY: Daily Note and AM 11/3/2019 INPATIENT: OT Visit Days: 1 [place] / [place] / Product Type: Medicare /  
  
NAME/AGE/GENDER: Dolly West is a 78 y.o. male PRIMARY DIAGNOSIS:  UTI (urinary tract infection) [N39.0] UTI (urinary tract infection) UTI (urinary tract infection) ICD-10: Treatment Diagnosis:  
 · Generalized Muscle Weakness (M62.81) · Other lack of cordination (R27.8) · Difficulty in walking, Not elsewhere classified (R26.2) Precautions/Allergies: 
   Patient has no known allergies. ASSESSMENT:  
 
Mr. Fouzia Barker presents supine in bed sleeping. He was awakened easily but very flat and drowsy. He is total assist for supine to sit and sit to supine. He stood with max x 2 but was unable to take steps. He is mod/max for UE ADLS and Total assist for LE ADLS. HE lives with his daughter and he reported she assists him with his ADLS and walking. No family present. He would benefit from skilled OT services. Will follow. 11-3-19 Pt supine on arrival. Pt with wet brief. Pt min-mod assist for rolling. Pt total assist for don/doff brief and hygiene. Pt progressing slowly. Continue OT POC. This section established at most recent assessment PROBLEM LIST (Impairments causing functional limitations): 1. Decreased Strength 2. Decreased ADL/Functional Activities 3. Decreased Transfer Abilities 4. Decreased Ambulation Ability/Technique 5. Decreased Balance 6. Decreased Activity Tolerance 7. Decreased Flexibility/Joint Mobility 8. Decreased Skin Integrity/Hygeine INTERVENTIONS PLANNED: (Benefits and precautions of occupational therapy have been discussed with the patient.) 1. Activities of daily living training 2. Adaptive equipment training 3. Balance training 4. Clothing management 5. Cognitive training 6. Donning&doffing training 7. Hygiene training 8. Neuromuscular re-eduation 9. Therapeutic activity 10. Therapeutic exercise TREATMENT PLAN: Frequency/Duration: Follow patient 3 timesto address above goals. Rehabilitation Potential For Stated Goals: Good REHAB RECOMMENDATIONS (at time of discharge pending progress):   
Placement: It is my opinion, based on this patient's performance to date, that Mr. Jewel Merchant may benefit from participating in 1-2 additional therapy sessions in order to continue to assess for rehab potential and then make recommendation for disposition at discharge. Equipment:  
? None at this time OCCUPATIONAL PROFILE AND HISTORY:  
History of Present Injury/Illness (Reason for Referral): 
See H and p Past Medical History/Comorbidities:  
Mr. Jewel Merchant  has a past medical history of Arrhythmia, Arthritis, Atrial fibrillation ([address] (1/27/2012), CAD (coronary artery disease), Cancer ([address] Dementia, Depression (8/28/2012), GERD (gastroesophageal reflux disease), Heart failure ([address] History of prostate cancer (8/4/2014), Hypertension, Morbid obesity ([address] Osteoarthritis of right knee (1/26/2012), Recurrent major depressive disorder, in full remission ([address] (11/30/2017), Total knee replacement status (1/26/2012), and Vitamin B12 deficiency (7/23/2017). Mr. Joslyn Hamman  has a past surgical history that includes hx orthopaedic (2010); hx prostatectomy; and hx pacemaker (8/30/2012). Social History/Living Environment:  
Home Environment: Private residence # Steps to Enter: 7 Rails to Enter: Yes Hand Rails : Bilateral 
One/Two Story Residence: One story Living Alone: No 
Support Systems: Child(billy) Patient Expects to be Discharged to[de-identified] Unknown Current DME Used/Available at Home: Vincent Woodward Prior Level of Function/Work/Activity: 
Daughter assisted him with ADLS Number of Personal Factors/Comorbidities that affect the Plan of Care: Brief history (0):  LOW COMPLEXITY ASSESSMENT OF OCCUPATIONAL PERFORMANCE[de-identified]  
Activities of Daily Living:  
Basic ADLs (From Assessment) Complex ADLs (From Assessment) Feeding: Minimum assistance Oral Facial Hygiene/Grooming: Moderate assistance Bathing: Moderate assistance, Maximum assistance Upper Body Dressing: Moderate assistance, Maximum assistance Lower Body Dressing: Total assistance Toileting: Total assistance(brief in place) Grooming/Bathing/Dressing Activities of Daily Living Cognitive Retraining Safety/Judgement: Fall prevention Toileting Toileting Assistance: Total assistance(dependent) Bladder Hygiene: Total assistance (dependent) Clothing Management: Total assistance (dependent)(brief) Bed/Mat Mobility Rolling: Minimum assistance; Moderate assistance Most Recent Physical Functioning:  
Gross Assessment: 
  
         
  
Posture: 
Posture (WDL): Exceptions to [facility] Posture Assessment: Rounded shoulders Balance: 
  Bed Mobility: 
Rolling: Minimum assistance; Moderate assistance Wheelchair Mobility: 
  
Transfers: 
   
 
    
 
Patient Vitals for the past 6 hrs: 
 BP BP Patient Position SpO2 Pulse 11/03/19 0416 127/74 At rest;Supine; Head of bed elevated (Comment degrees) 99 % 67  
11/03/19 0812 122/65 At rest;Supine 96 % 60 Mental Status Neurologic State: Alert Orientation Level: Oriented to person Cognition: Follows commands Perception: Appears intact Perseveration: No perseveration noted Safety/Judgement: Fall prevention Physical Skills Involved: 1. Range of Motion 2. Balance 3. Strength 4. Activity Tolerance 5. Fine Motor Control 6. Gross Motor Control 7. Skin Integrity Cognitive Skills Affected (resulting in the inability to perform in a timely and safe manner): 1. Perception 2. Comprehension Psychosocial Skills Affected: 1. Habits/Routines 2. Environmental Adaptation 3. Self-Awareness Number of elements that affect the Plan of Care: 5+:  HIGH COMPLEXITY CLINICAL DECISION MAKING:  
[facility] AM-PAC 6 Clicks Daily Activity Inpatient Short Form How much help from another person does the patient currently need. .. Total A Lot A Little None 1. Putting on and taking off regular lower body clothing? ? 1   ? 2   ? 3   ? 4  
2. Bathing (including washing, rinsing, drying)? ? 1   ? 2   ? 3   ? 4  
3. Toileting, which includes using toilet, bedpan or urinal?   ? 1   ? 2   ? 3   ? 4  
4. Putting on and taking off regular upper body clothing? ? 1   ? 2   ? 3   ? 4  
5. Taking care of personal grooming such as brushing teeth? ? 1   ? 2   ? 3   ? 4  
6. Eating meals? ? 1   ? 2   ? 3   ? 4  
© 2007, Trustees of [facility], under license to [employer]. All rights reserved Score:  Initial: 11 Most Recent: X (Date: -- ) Interpretation of Tool:  Represents activities that are increasingly more difficult (i.e. Bed mobility, Transfers, Gait). Medical Necessity:    
· Patient is expected to demonstrate progress in balance, coordination and functional technique ·  to self care skills and transfers · . Reason for Services/Other Comments: 
· Patient continues to require skilled intervention due to above listed deficits · . Use of outcome tool(s) and clinical judgement create a POC that gives a: MODERATE COMPLEXITY  
 
 
 
TREATMENT:  
(In addition to Assessment/Re-Assessment sessions the following treatments were rendered) Pre-treatment Symptoms/Complaints: Tolerated treatment well Pain: Initial:  
Pain Intensity 1: 0  Post Session:  0/10 Self Care: (15): Procedure(s) (per grid) utilized to improve and/or restore self-care/home management as related to toileting and hygeine, don/doff brief. Required minimal verbal and   cueing to facilitate activities of daily living skills and compensatory activities. Braces/Orthotics/Lines/Etc:  
· IV 
· O2 Device: Room air Treatment/Session Assessment:   
· Response to Treatment:  tolerated well · Interdisciplinary Collaboration:  
o Physical Therapist 
o Occupational Therapist 
o  · After treatment position/precautions:  
o Supine in bed 
o Bed/Chair-wheels locked 
o Bed in low position 
o Call light within reach 
o pt left with P.T.   
· Compliance with Program/Exercises: Compliant all of the time. · Recommendations/Intent for next treatment session: \"Next visit will focus on advancements to more challenging activities and reduction in assistance provided\". Total Treatment Duration: OT Patient Time In/Time Out Time In: 0820 Time Out: 0819 Edil Queen OT

## 2019-11-03 NOTE — PROGRESS NOTES
Problem: Mobility Impaired (Adult and Pediatric) Goal: *Acute Goals and Plan of Care (Insert Text) Description LTG: 
(1.)Mr. Merle Vick will move from supine to sit and sit to supine  in bed with MODERATE ASSIST within 7 treatment day(s). (2.)Mr. Merle Vick will transfer from bed to chair and chair to bed with MODERATE ASSIST using the least restrictive device within 7 treatment day(s). (3.)Mr. Merle Vick will ambulate with MODERATE ASSIST for 3 feet with the least restrictive device within 7 treatment day(s). (4)Mr. Merle Vick will perform HEP with cues and assistance to increase functional mobility in 7 days. ________________________________________________________________________________________________ Outcome: Progressing Towards Goal 
  
PHYSICAL THERAPY: Daily Note and AM 11/3/2019 INPATIENT: PT Visit Days : 2 [place] / Product Type: Medicare /   
  
NAME/AGE/GENDER: Makenna Ellis is a 78 y.o. male PRIMARY DIAGNOSIS: UTI (urinary tract infection) [N39.0] UTI (urinary tract infection) UTI (urinary tract infection) ICD-10: Treatment Diagnosis:  
 · Generalized Muscle Weakness (M62.81) · Other abnormalities of gait and mobility (R26.89) Precaution/Allergies: 
Patient has no known allergies. ASSESSMENT:  
 
Mr. Merle Vick presents with significant debility. He is quite drowsy and confused, not sure of his baseline. He did not know where he was this afternoon, did not know the year. He reports walks with a walker at baseline and gets assistance from his daughter for adl's. Pt. Was total assistance for bed mobility. He stood with walker and mod assist x 2 but unable to stand fully upright. He was unable to take any steps. Returned to supine. Will follow. 11/3/19:  Patient moving better today. Needing most assist with bed mobility but does better once on his feet. This section established at most recent assessment PROBLEM LIST (Impairments causing functional limitations): 1. Decreased Strength 2. Decreased ADL/Functional Activities 3. Decreased Transfer Abilities 4. Decreased Ambulation Ability/Technique 5. Decreased Balance 6. Decreased Activity Tolerance 7. Increased Fatigue 8. Decreased Flexibility/Joint Mobility 9. Decreased Adrian with Home Exercise Program 
10. Decreased Cognition INTERVENTIONS PLANNED: (Benefits and precautions of physical therapy have been discussed with the patient.) 1. Balance Exercise 2. Bed Mobility 3. Family Education 4. Gait Training 5. Home Exercise Program (HEP) 6. Range of Motion (ROM) 7. Therapeutic Activites 8. Therapeutic Exercise/Strengthening 9. Transfer Training TREATMENT PLAN: Frequency/Duration: daily for duration of hospital stay Rehabilitation Potential For Stated Goals: Fair REHAB RECOMMENDATIONS (at time of discharge pending progress):   
Placement: 
Rehab ? Equipment:  
? None at this time HISTORY:  
History of Present Injury/Illness (Reason for Referral): 
78years old male with past medical history of chronic A. fib on Pradaxa, hypertension, dementia without behavioral disturbance, prostate cancer was BIBEMS due to generalized weakness and confusion raising concern for UTI. He was recently admitted with acute rhabdomyolysis, UTI and elevated troponin, was discharged home with home health PT. No falls reported and patient denies any fever chills, nausea, vomiting, diarrhea, abdominal pain, chest pain, SOB, cough or urinary symptoms. In ER patient was slightly confused and labs showed elevated CK at 2360, lactic acid at 2.3, troponin 0.69 (prior value was 0.66 which had trended down from 5.2. He was seen by cardiology during prior admission and it was deemed secondary to demand ischemia. UA was positive for infection and he was given Rocephin with IV fluids in the ER. Hospitalist consulted for admission. Patient appears comfortable, laying in bed. He is AAO x2 to place and person but not to time. Knows name and date of birth. States that he lives with his daughter. Detailed history could not be obtained due to dementia. Past Medical History/Comorbidities:  
Mr. Eduarda Edgar  has a past medical history of Arrhythmia, Arthritis, Atrial fibrillation ([facility] (1/27/2012), CAD (coronary artery disease), Cancer ([facility] Dementia, Depression (8/28/2012), GERD (gastroesophageal reflux disease), Heart failure ([facility] History of prostate cancer (8/4/2014), Hypertension, Morbid obesity ([facility] Osteoarthritis of right knee (1/26/2012), Recurrent major depressive disorder, in full remission ([facility] (11/30/2017), Total knee replacement status (1/26/2012), and Vitamin B12 deficiency (7/23/2017). Mr. Eduarda Edgar  has a past surgical history that includes hx orthopaedic (2010); hx prostatectomy; and hx pacemaker (8/30/2012). Social History/Living Environment:  
Home Environment: Private residence # Steps to Enter: 7 Rails to Enter: Yes Hand Rails : Bilateral 
One/Two Story Residence: One story Living Alone: No 
Support Systems: Child(billy) Patient Expects to be Discharged to[de-identified] Unknown Current DME Used/Available at Home: Tello Lechuga Prior Level of Function/Work/Activity: 
Walker at baseline Number of Personal Factors/Comorbidities that affect the Plan of Care: 3+: HIGH COMPLEXITY EXAMINATION:  
Most Recent Physical Functioning:  
Gross Assessment: 
AROM: Generally decreased, functional 
Strength: Generally decreased, functional 
         
  
Posture: 
  
Balance: 
Sitting - Static: Good (unsupported) Sitting - Dynamic: Fair (occasional) Standing - Static: Constant support;Good Standing - Dynamic : Constant support; Fair Bed Mobility: 
Rolling: Minimum assistance; Moderate assistance Supine to Sit: Maximum assistance Scooting: Maximum assistance Wheelchair Mobility: 
  
Transfers: 
Sit to Stand: Minimum assistance; Moderate assistance Stand to Sit: Contact guard assistance Bed to Chair: Contact guard assistance Gait: unable Base of Support: Center of gravity altered Speed/Ángela: Delayed Step Length: Left shortened;Right shortened Gait Abnormalities: Decreased step clearance Distance (ft): 3 Feet (ft) Assistive Device: Walker, rolling Ambulation - Level of Assistance: Contact guard assistance Interventions: Safety awareness training;Verbal cues Body Structures Involved: 1. Bones 2. Joints 3. Muscles 4. Ligaments Body Functions Affected: 1. Movement Related Activities and Participation Affected: 1. Mobility Number of elements that affect the Plan of Care: 3: MODERATE COMPLEXITY CLINICAL PRESENTATION:  
Presentation: Stable and uncomplicated: LOW COMPLEXITY CLINICAL DECISION MAKIN[address] [place] [address] AM-PAC 6 Clicks Basic Mobility Inpatient Short Form How much difficulty does the patient currently have. .. Unable A Lot A Little None 1. Turning over in bed (including adjusting bedclothes, sheets and blankets)? ? 1   ? 2   ? 3   ? 4  
2. Sitting down on and standing up from a chair with arms ( e.g., wheelchair, bedside commode, etc.)   ? 1   ? 2   ? 3   ? 4  
3. Moving from lying on back to sitting on the side of the bed?   ? 1   ? 2   ? 3   ? 4 How much help from another person does the patient currently need. .. Total A Lot A Little None 4. Moving to and from a bed to a chair (including a wheelchair)? ? 1   ? 2   ? 3   ? 4  
5. Need to walk in hospital room? ? 1   ? 2   ? 3   ? 4  
6. Climbing 3-5 steps with a railing? ? 1   ? 2   ? 3   ? 4  
© , Trustees of [address] [place] [address], under license to [employer]. All rights reserved Score:  Initial: 9 Most Recent: X (Date: -- ) Interpretation of Tool:  Represents activities that are increasingly more difficult (i.e. Bed mobility, Transfers, Gait). Medical Necessity: · Patient is expected to demonstrate progress in strength, range of motion, balance, coordination and functional technique ·  to decrease assistance required with exercises and functional mobility · . Reason for Services/Other Comments: 
· Patient continues to require present interventions due to patient's inability to perform exercises and functional mobility independently · . Use of outcome tool(s) and clinical judgement create a POC that gives a: Questionable prediction of patient's progress: MODERATE COMPLEXITY  
  
 
 
 
TREATMENT:  
(In addition to Assessment/Re-Assessment sessions the following treatments were rendered) Pre-treatment Symptoms/Complaints:  Patient agreeable to getting to the chair. Pain: Initial:  
Pain Intensity 1: 0  Post Session:  0 Therapeutic Activity: (    15 minutes): Therapeutic activities including Bed transfers, Chair transfers and Ambulation on level ground to improve mobility, strength, balance and coordination. Required minimal Safety awareness training;Verbal cues to promote static and dynamic balance in standing and promote coordination of bilateral, upper extremity(s), lower extremity(s). Braces/Orthotics/Lines/Etc:  
· IV 
· O2 Device: Room air Treatment/Session Assessment:   
· Response to Treatment:  Patient participated well. Moving better today. · Interdisciplinary Collaboration:  
o Physical Therapist 
o Occupational Therapist 
o Registered Nurse · After treatment position/precautions:  
o Up in chair 
o Bed alarm/tab alert on 
o Bed/Chair-wheels locked 
o Bed in low position 
o Call light within reach · Compliance with Program/Exercises: Will assess as treatment progresses · Recommendations/Intent for next treatment session: \"Next visit will focus on reduction in assistance provided\". Total Treatment Duration: PT Patient Time In/Time Out Time In: 0805 Time Out: 0820 Luciana Birch, PT

## 2019-11-03 NOTE — PROGRESS NOTES
Hospitalist Progress Note   
11/3/2019 Admit Date: 10/30/2019  9:55 AM  
NAME: Lea Client :  [id number] MRN:  [id number] Attending: Soraida Mireles MD 
PCP:  Horace Vickers MD 
 
SUBJECTIVE:  
78years old male with past medical history of chronic A. fib on Pradaxa s/p PPM, hypertension, dementia without behavioral disturbance, prostate cancer was BIBEMS due to generalized weakness and confusion raising concern for UTI, and admitted with acute rhabdomyolysis, UTI and elevated troponin. Seen by Cardiology. 11/3: Pt sitting in chair, Feels better, AAO X 3, seems to be at baseline. No tachycardia overnight. Nursing notes and chart reviewed. Review of Systems negative with exception of pertinent positives noted above. PHYSICAL EXAM  
 
Visit Vitals /67 (BP 1 Location: Right arm, BP Patient Position: At rest;Sitting) Pulse 60 Temp 96.1 °F (35.6 °C) Resp 16 Ht 6' (1.829 m) Wt 116.1 kg (256 lb) SpO2 95% BMI 34.72 kg/m² Temp (24hrs), Av.9 °F (36.6 °C), Min:96.1 °F (35.6 °C), Max:98.7 °F (37.1 °C) Oxygen Therapy O2 Sat (%): 95 % (19 1212) Pulse via Oximetry: 60 beats per minute (10/30/19 1614) O2 Device: Room air (19 0813) Intake/Output Summary (Last 24 hours) at 11/3/2019 1345 Last data filed at 2019 1759 Gross per 24 hour Intake 2250 ml Output  Net 2250 ml General: No acute distress. Alert.   
Head:  AT/NC Lungs:  CTABL. Heart:  IRregular, no murmur, rub, or gallop Abdomen: Soft, non-distended, non-tender, +bs Extremities: No cyanosis or clubbing. Neurologic:  No focal deficits. Moves all extremities. Skin:  No Obvious Rash Psych:  Normal affect. LABS AND STUDIES: 
Personally reviewed all labs, meds, and studies for past 24hrs. ASSESSMENT Active Hospital Problems Diagnosis Date Noted  UTI (urinary tract infection) 10/30/2019  Troponin I above reference range 2019  Non-traumatic rhabdomyolysis 06/04/2019  Moderate dementia without behavioral disturbance ([facility] 07/26/2018  Generalized weakness 12/23/2016  
 HTN (hypertension) 01/27/2012 PLAN: 
· E. Coli UTI: s/p Rocephin 5 days of Rx. · Elevated troponin: Chronic issue, cardiology consulted. No plan for intervention. · Rhabdomyolysis: CK trending down, decrease IV fluids to 100 cc/hr, trend CK. Most recent EF was normal. 
· Hypertension: Resumed home meds and monitor BP. · Chronic A. fib: Resumed Pradaxa. Monitor for any hematuria. · Lactic acidosis: Resolved, was Likely due to dehydration versus possible sepsis. · NSVT: has PPM, on Metoprolol. · Replete and monitor electrolytes. Dispo: pending improvement, PT OT started. Likely DC in am. 
Plan of care d/w daughter Becky Hua ([phone]) she prefers [facility]- if STR recommended by PT/OT. DVT ppx:  pradaxa Discussed plan with pt who is in agreement. All questions answered. Signed By: Phillip Burgess MD   
 November 3, 2019

## 2019-11-04 LAB
CK SERPL-CCNC: 502 U/L (ref 21–215)
MAGNESIUM SERPL-MCNC: 2.1 MG/DL (ref 1.8–2.4)
POTASSIUM SERPL-SCNC: 3.9 MMOL/L (ref 3.5–5.1)

## 2019-11-04 PROCEDURE — 77030020255 HC SOL INJ LR 1000ML BG

## 2019-11-04 PROCEDURE — 82550 ASSAY OF CK (CPK): CPT

## 2019-11-04 PROCEDURE — 97110 THERAPEUTIC EXERCISES: CPT

## 2019-11-04 PROCEDURE — 97530 THERAPEUTIC ACTIVITIES: CPT

## 2019-11-04 PROCEDURE — 65660000000 HC RM CCU STEPDOWN

## 2019-11-04 PROCEDURE — 74011250636 HC RX REV CODE- 250/636: Performed by: INTERNAL MEDICINE

## 2019-11-04 PROCEDURE — 74011250637 HC RX REV CODE- 250/637: Performed by: INTERNAL MEDICINE

## 2019-11-04 PROCEDURE — 84132 ASSAY OF SERUM POTASSIUM: CPT

## 2019-11-04 PROCEDURE — 36415 COLL VENOUS BLD VENIPUNCTURE: CPT

## 2019-11-04 PROCEDURE — 83735 ASSAY OF MAGNESIUM: CPT

## 2019-11-04 RX ADMIN — DONEPEZIL HYDROCHLORIDE 5 MG: 5 TABLET, FILM COATED ORAL at 21:13

## 2019-11-04 RX ADMIN — FAMOTIDINE 20 MG: 20 TABLET ORAL at 08:21

## 2019-11-04 RX ADMIN — SODIUM CHLORIDE, SODIUM LACTATE, POTASSIUM CHLORIDE, AND CALCIUM CHLORIDE 150 ML/HR: 600; 310; 30; 20 INJECTION, SOLUTION INTRAVENOUS at 02:22

## 2019-11-04 RX ADMIN — METOPROLOL TARTRATE 25 MG: 25 TABLET ORAL at 17:32

## 2019-11-04 RX ADMIN — SENNOSIDES AND DOCUSATE SODIUM 1 TABLET: 8.6; 5 TABLET ORAL at 08:22

## 2019-11-04 RX ADMIN — DABIGATRAN ETEXILATE MESYLATE 150 MG: 150 CAPSULE ORAL at 08:21

## 2019-11-04 RX ADMIN — SODIUM CHLORIDE, SODIUM LACTATE, POTASSIUM CHLORIDE, AND CALCIUM CHLORIDE 100 ML/HR: 600; 310; 30; 20 INJECTION, SOLUTION INTRAVENOUS at 20:28

## 2019-11-04 RX ADMIN — DABIGATRAN ETEXILATE MESYLATE 150 MG: 150 CAPSULE ORAL at 21:13

## 2019-11-04 RX ADMIN — FAMOTIDINE 20 MG: 20 TABLET ORAL at 17:32

## 2019-11-04 RX ADMIN — TAMSULOSIN HYDROCHLORIDE 0.4 MG: 0.4 CAPSULE ORAL at 21:13

## 2019-11-04 RX ADMIN — METOPROLOL TARTRATE 25 MG: 25 TABLET ORAL at 08:21

## 2019-11-04 RX ADMIN — BICALUTAMIDE 50 MG: 50 TABLET, FILM COATED ORAL at 08:22

## 2019-11-04 RX ADMIN — ARIPIPRAZOLE 10 MG: 5 TABLET ORAL at 08:21

## 2019-11-04 RX ADMIN — POLYETHYLENE GLYCOL (3350) 17 G: 17 POWDER, FOR SOLUTION ORAL at 08:20

## 2019-11-04 NOTE — PROGRESS NOTES
Shift assessment done. Pt in bed, alert and oriented to person and place but not with time. Reoriented to time. Respirations even and unlabored. No acute signs of distress noted. Denies needs and pain this time. Instructed to call for assistance when needed. Call light in reach. Safety measures provided. Will continue to monitor.

## 2019-11-04 NOTE — PROGRESS NOTES
Bedside and Verbal shift change report given to Rachel Ramirez RN  (oncoming nurse) by [facility] Hightower RN  (offgoing nurse). Report included the following information SBAR, Kardex, Intake/Output, MAR and Recent Results.

## 2019-11-04 NOTE — PROGRESS NOTES
Received pt from [address] [place] [address] in stable condition. Pt in bed resting quietly. Resp even & unlabored on room air; no acute signs of distress noted. Bed low & locked; call light in reach; bed alarm active & audible no needs voiced.

## 2019-11-04 NOTE — PROGRESS NOTES
Interdisciplinary team rounds were held 11/4/2019 with the following team members:Care Management, Nursing, Nutrition, Pharmacy, Physical Therapy and Physician. Plan of care discussed. See clinical pathway and/or care plan for interventions and desired outcomes.

## 2019-11-04 NOTE — PROGRESS NOTES
Problem: Self Care Deficits Care Plan (Adult) Goal: *Acute Goals and Plan of Care (Insert Text) Description 1. Patient will perform grooming with supervision. 2. Patient will perform Upper body dressing with min assist. 
3. Patient will perform lower body dressing with mod assist. 
4. Patient will perform upper and lower body bathing with mod assist. 
5. Patient will perform toilet transfers with mod assist 
6. Patient will perform shower transfer with mod assist. 
7. Patient will participate in 30 + minutes of ADL/ therapeutic exercise/therapeutic activity with mod rest breaks to increase activity tolerance for self care. Goals to be achieved in 7 days. Outcome: Progressing Towards Goal 
  
 
OCCUPATIONAL THERAPY: Daily Note 11/4/2019 INPATIENT: OT Visit Days: 3 [place] / [place] / Product Type: Medicare /  
  
NAME/AGE/GENDER: Estelita Woodruff is a 78 y.o. male PRIMARY DIAGNOSIS:  UTI (urinary tract infection) [N39.0] UTI (urinary tract infection) UTI (urinary tract infection) ICD-10: Treatment Diagnosis:  
 · Generalized Muscle Weakness (M62.81) · Other lack of cordination (R27.8) · Difficulty in walking, Not elsewhere classified (R26.2) Precautions/Allergies: 
   Patient has no known allergies. ASSESSMENT:  
 
Mr. Jewel Merchant presents supine in bed sleeping. He was awakened easily but very flat and drowsy. He is total assist for supine to sit and sit to supine. He stood with max x 2 but was unable to take steps. He is mod/max for UE ADLS and Total assist for LE ADLS. HE lives with his daughter and he reported she assists him with his ADLS and walking. No family present. He would benefit from skilled OT services. Will follow. 11-3-19 Pt supine on arrival. Pt with wet brief. Pt min-mod assist for rolling. Pt total assist for don/doff brief and hygiene. Pt progressing slowly. Continue OT POC. 11/4/19 Patient up in chair on entrance, may d/c today? Did not yet have clothes, but agreeable to UE strengthen and increasing activity tolerance using red band in recliner. Continue OT POC. This section established at most recent assessment PROBLEM LIST (Impairments causing functional limitations): 1. Decreased Strength 2. Decreased ADL/Functional Activities 3. Decreased Transfer Abilities 4. Decreased Ambulation Ability/Technique 5. Decreased Balance 6. Decreased Activity Tolerance 7. Decreased Flexibility/Joint Mobility 8. Decreased Skin Integrity/Hygeine INTERVENTIONS PLANNED: (Benefits and precautions of occupational therapy have been discussed with the patient.) 1. Activities of daily living training 2. Adaptive equipment training 3. Balance training 4. Clothing management 5. Cognitive training 6. Donning&doffing training 7. Hygiene training 8. Neuromuscular re-eduation 9. Therapeutic activity 10. Therapeutic exercise TREATMENT PLAN: Frequency/Duration: Follow patient 3 timesto address above goals. Rehabilitation Potential For Stated Goals: Good REHAB RECOMMENDATIONS (at time of discharge pending progress):   
Placement: It is my opinion, based on this patient's performance to date, that Mr. Anastasia Rodas may benefit from participating in 1-2 additional therapy sessions in order to continue to assess for rehab potential and then make recommendation for disposition at discharge. Equipment:  
? None at this time OCCUPATIONAL PROFILE AND HISTORY:  
History of Present Injury/Illness (Reason for Referral): 
See H and p Past Medical History/Comorbidities:  
Mr. Anastasia Rodas  has a past medical history of Arrhythmia, Arthritis, Atrial fibrillation ([facility] [address] (1/27/2012), CAD (coronary artery disease), Cancer ([address] Dementia, Depression (8/28/2012), GERD (gastroesophageal reflux disease), Heart failure ([address] History of prostate cancer (8/4/2014), Hypertension, Morbid obesity ([facility] [address] Osteoarthritis of right knee (1/26/2012), Recurrent major depressive disorder, in full remission ([facility] [address] (11/30/2017), Total knee replacement status (1/26/2012), and Vitamin B12 deficiency (7/23/2017). Mr. Cosimo Romberg  has a past surgical history that includes hx orthopaedic (2010); hx prostatectomy; and hx pacemaker (8/30/2012). Social History/Living Environment:  
Home Environment: Private residence # Steps to Enter: 7 Rails to Enter: Yes Hand Rails : Bilateral 
One/Two Story Residence: One story Living Alone: No 
Support Systems: Child(billy) Patient Expects to be Discharged to[de-identified] Unknown Current DME Used/Available at Home: Donta Navarro Prior Level of Function/Work/Activity: 
Daughter assisted him with ADLS Number of Personal Factors/Comorbidities that affect the Plan of Care: Brief history (0):  LOW COMPLEXITY ASSESSMENT OF OCCUPATIONAL PERFORMANCE[de-identified]  
Activities of Daily Living:  
Basic ADLs (From Assessment) Complex ADLs (From Assessment) Feeding: Minimum assistance Oral Facial Hygiene/Grooming: Moderate assistance Bathing: Moderate assistance, Maximum assistance Upper Body Dressing: Moderate assistance, Maximum assistance Lower Body Dressing: Total assistance Toileting: Total assistance(brief in place) Grooming/Bathing/Dressing Activities of Daily Living Most Recent Physical Functioning:  
Gross Assessment: 
  
         
  
Posture: 
Posture (WDL): Exceptions to [facility] Posture Assessment: Rounded shoulders Balance: 
  Bed Mobility: 
  
Wheelchair Mobility: 
  
Transfers: 
   
 
    
 
Patient Vitals for the past 6 hrs: 
 BP BP Patient Position SpO2 Pulse 11/04/19 0801 129/69 Sitting 97 % 71 Mental Status Neurologic State: Alert Orientation Level: Disoriented to time, Oriented to person, Oriented to place Cognition: Follows commands Perception: Appears intact Perseveration: No perseveration noted Safety/Judgement: Fall prevention Physical Skills Involved: 1. Range of Motion 2. Balance 3. Strength 4. Activity Tolerance 5. Fine Motor Control 6. Gross Motor Control 7. Skin Integrity Cognitive Skills Affected (resulting in the inability to perform in a timely and safe manner): 1. Perception 2. Comprehension Psychosocial Skills Affected: 1. Habits/Routines 2. Environmental Adaptation 3. Self-Awareness Number of elements that affect the Plan of Care: 5+:  HIGH COMPLEXITY CLINICAL DECISION MAKING:  
[facility] AM-PAC 6 Clicks Daily Activity Inpatient Short Form How much help from another person does the patient currently need. .. Total A Lot A Little None 1. Putting on and taking off regular lower body clothing? ? 1   ? 2   ? 3   ? 4  
2. Bathing (including washing, rinsing, drying)? ? 1   ? 2   ? 3   ? 4  
3. Toileting, which includes using toilet, bedpan or urinal?   ? 1   ? 2   ? 3   ? 4  
4. Putting on and taking off regular upper body clothing? ? 1   ? 2   ? 3   ? 4  
5. Taking care of personal grooming such as brushing teeth? ? 1   ? 2   ? 3   ? 4  
6. Eating meals? ? 1   ? 2   ? 3   ? 4  
© 2007, Trustees of [facility], under license to [employer]. All rights reserved Score:  Initial: 11 Most Recent: X (Date: -- ) Interpretation of Tool:  Represents activities that are increasingly more difficult (i.e. Bed mobility, Transfers, Gait). Medical Necessity:    
· Patient is expected to demonstrate progress in balance, coordination and functional technique ·  to self care skills and transfers · . Reason for Services/Other Comments: 
· Patient continues to require skilled intervention due to above listed deficits · . Use of outcome tool(s) and clinical judgement create a POC that gives a: MODERATE COMPLEXITY  
 
 
 
TREATMENT:  
(In addition to Assessment/Re-Assessment sessions the following treatments were rendered) Pre-treatment Symptoms/Complaints: Tolerated treatment well Pain: Initial:  
Pain Intensity 1: 0  Post Session:  0/10 Therapeutic Exercise: (23):  Exercises per grid below to improve strength and coordination. Required minimal verbal and tactile cues to promote proper body alignment, promote proper body posture and promote proper body mechanics. Progressed resistance, range and repetitions as indicated. Date: 
11/4/19 Date: 
 Date: Activity/Exercise Parameters Parameters Parameters Horizontal abduction 2 sets of 15 Scapular rows 2 sets of 15 Anterior chest punches 2 sets of 15 Shoulder flexion 1 set of 8 Braces/Orthotics/Lines/Etc:  
· IV 
· O2 Device: Room air Treatment/Session Assessment:   
· Response to Treatment:  tolerated well · Interdisciplinary Collaboration:  
o Physical Therapist 
o Occupational Therapist 
o  · After treatment position/precautions:  
o Up in chair 
o Supine in bed 
o Bed/Chair-wheels locked 
o Bed in low position 
o Call light within reach 
o 0  
· Compliance with Program/Exercises: Compliant all of the time. · Recommendations/Intent for next treatment session: \"Next visit will focus on advancements to more challenging activities and reduction in assistance provided\". Total Treatment Duration: OT Patient Time In/Time Out Time In: 0827 Time Out: [id number] Marlene Snellen, OT

## 2019-11-04 NOTE — PROGRESS NOTES
Problem: Urinary Tract Infection - Adult Goal: *Absence of infection signs and symptoms Outcome: Progressing Towards Goal 
  
Problem: Patient Education: Go to Patient Education Activity Goal: Patient/Family Education Outcome: Progressing Towards Goal 
  
Problem: Pressure Injury - Risk of 
Goal: *Prevention of pressure injury Description Document Maxwell Scale and appropriate interventions in the flowsheet. Outcome: Progressing Towards Goal 
Note:  
Pressure Injury Interventions: 
Sensory Interventions: Assess changes in LOC, Check visual cues for pain Moisture Interventions: Absorbent underpads, Check for incontinence Q2 hours and as needed Activity Interventions: Assess need for specialty bed, Increase time out of bed, PT/OT evaluation Mobility Interventions: Assess need for specialty bed, HOB 30 degrees or less, PT/OT evaluation Nutrition Interventions: Document food/fluid/supplement intake, Offer support with meals,snacks and hydration Friction and Shear Interventions: Lift sheet, Apply protective barrier, creams and emollients Problem: Falls - Risk of 
Goal: *Absence of Falls Description Document Winsome Astorga Fall Risk and appropriate interventions in the flowsheet. Outcome: Progressing Towards Goal 
Note:  
Fall Risk Interventions: 
Mobility Interventions: Bed/chair exit alarm, Communicate number of staff needed for ambulation/transfer, Patient to call before getting OOB, PT Consult for mobility concerns Mentation Interventions: Adequate sleep, hydration, pain control, Bed/chair exit alarm, Door open when patient unattended Medication Interventions: Bed/chair exit alarm, Patient to call before getting OOB, Teach patient to arise slowly Elimination Interventions: Bed/chair exit alarm, Call light in reach, Patient to call for help with toileting needs, Stay With Me (per policy), Toileting schedule/hourly rounds Problem: Patient Education: Go to Patient Education Activity Goal: Patient/Family Education Outcome: Progressing Towards Goal 
  
Problem: Patient Education: Go to Patient Education Activity Goal: Patient/Family Education Outcome: Progressing Towards Goal 
  
Problem: Patient Education: Go to Patient Education Activity Goal: Patient/Family Education Outcome: Progressing Towards Goal

## 2019-11-04 NOTE — PROGRESS NOTES
Problem: Urinary Tract Infection - Adult Goal: *Absence of infection signs and symptoms Outcome: Progressing Towards Goal 
  
Problem: Pressure Injury - Risk of 
Goal: *Prevention of pressure injury Description Document Maxwell Scale and appropriate interventions in the flowsheet. Outcome: Progressing Towards Goal 
Note:  
Pressure Injury Interventions: 
Sensory Interventions: Assess changes in LOC, Check visual cues for pain, Float heels, Keep linens dry and wrinkle-free, Minimize linen layers Moisture Interventions: Absorbent underpads, Check for incontinence Q2 hours and as needed, Maintain skin hydration (lotion/cream) Activity Interventions: Increase time out of bed, PT/OT evaluation Mobility Interventions: Float heels, PT/OT evaluation, Suspension boots Nutrition Interventions: Document food/fluid/supplement intake, Offer support with meals,snacks and hydration Friction and Shear Interventions: Apply protective barrier, creams and emollients, Lift sheet, HOB 30 degrees or less, Minimize layers Problem: Falls - Risk of 
Goal: *Absence of Falls Description Document Estuardo Gregorio Fall Risk and appropriate interventions in the flowsheet. Outcome: Progressing Towards Goal 
Note:  
Fall Risk Interventions: 
Mobility Interventions: Bed/chair exit alarm, OT consult for ADLs, Patient to call before getting OOB, PT Consult for mobility concerns, Utilize walker, cane, or other assistive device Mentation Interventions: Adequate sleep, hydration, pain control, Bed/chair exit alarm, Door open when patient unattended, Eyeglasses and hearing aids, More frequent rounding, Reorient patient, Room close to nurse's station, Toileting rounds, Update white board Medication Interventions: Bed/chair exit alarm, Patient to call before getting OOB, Teach patient to arise slowly Elimination Interventions: Bed/chair exit alarm, Call light in reach, Patient to call for help with toileting needs, Toileting schedule/hourly rounds

## 2019-11-04 NOTE — PROGRESS NOTES
Nutrition Reason for assessment:  Length of stay Assessment:  
Diet: DIET CARDIAC Regular Food/Nutrition Patient History:  Pt presented with generalized weakness and confusion; admitted with UTI. Past medical history notable for chronic A Fib, HTN, dementia. Pt lives with his daughter and endorses good po intake at home; does not consume a nutrition supplement at home. No nutrition risk factors identified on nursing admission malnutrition screening tool. Pt states he has no issues with chewing or swallowing. Anthropometrics:   Height: 6'2\"  Weight: 116.1 kg (256 lb), Weight Source: Patient stated, BMI: 32.9  BMI class of overweight for Older American Male > 65 years. Yan Sharpe Macronutrient needs:(using stated weight of 116.1 kg) EER:  8685-3795 kcal /day  (15-20 kcal/kg/BW) 
EPR:   grams protein/day  (1-1.2 gm/kg IBW) Intake/Comparative Standards:  Average intake for past 5 days/ 12 recorded intake with average intake of 87%. Pt verbalizes eating 100% breakfast this am.  Pt potentially meets ~% of kcal and protein needs Nutrition Diagnosis:  No nutrition diagnosis at this time. Intervention: 
Meals and snacks: Continue current diet. Assisted with menu selection. Discharge nutrition plan: Continue current diet. Coordination of Nutrition Care:  Interdisciplinary Rounds. Vera Rodriguez, MPH, [address] [place] [address],  
[phone]

## 2019-11-04 NOTE — PROGRESS NOTES
Pt receives a bed offer to [facility]. SW spoke with pt's daughter who accepts bed. Bed available on Tues. EPI spoke with Estefanía Cherry at [facility] who states he does not have any male beds and does not anticipate any till next week. EPI sent referral to [facility]. SW called and left daughter Mio Arriaga ) a message regarding above & also asking her to call with other choices of SNF.

## 2019-11-04 NOTE — PROGRESS NOTES
Problem: Mobility Impaired (Adult and Pediatric) Goal: *Acute Goals and Plan of Care (Insert Text) Description LTG: 
(1.)Mr. Concepción Xiao will move from supine to sit and sit to supine  in bed with MODERATE ASSIST within 7 treatment day(s). (2.)Mr. Concepción Xiao will transfer from bed to chair and chair to bed with MODERATE ASSIST using the least restrictive device within 7 treatment day(s). (3.)Mr. Concepción Xiao will ambulate with MODERATE ASSIST for 3 feet with the least restrictive device within 7 treatment day(s). (4)Mr. Concepción Xiao will perform HEP with cues and assistance to increase functional mobility in 7 days. ________________________________________________________________________________________________ Outcome: Progressing Towards Goal 
  
PHYSICAL THERAPY: Daily Note and AM 11/4/2019 INPATIENT: PT Visit Days : 3 [place] / [place] B / Product Type: Medicare /   
  
NAME/AGE/GENDER: Champ You is a 78 y.o. male PRIMARY DIAGNOSIS: UTI (urinary tract infection) [N39.0] UTI (urinary tract infection) UTI (urinary tract infection) ICD-10: Treatment Diagnosis:  
 · Generalized Muscle Weakness (M62.81) · Other abnormalities of gait and mobility (R26.89) Precaution/Allergies: 
Patient has no known allergies. ASSESSMENT:  
 
Mr. Concepción Xiao presents with significant debility. He is quite drowsy and confused, not sure of his baseline. He did not know where he was this afternoon, did not know the year. He reports walks with a walker at baseline and gets assistance from his daughter for adl's. Pt. Was total assistance for bed mobility. He stood with walker and mod assist x 2 but unable to stand fully upright. He was unable to take any steps. Returned to supine. Will follow. 11/3/19:  Patient moving better today. Needing most assist with bed mobility but does better once on his feet. 11/4 sitting in the chair upon arrival.  Therapist help pt performs exercises, then sit<>stand with Mod A x 2, amb 6 ft using RW with Mod A x 2 and then return to chair with needs in reach. This section established at most recent assessment PROBLEM LIST (Impairments causing functional limitations): 1. Decreased Strength 2. Decreased ADL/Functional Activities 3. Decreased Transfer Abilities 4. Decreased Ambulation Ability/Technique 5. Decreased Balance 6. Decreased Activity Tolerance 7. Increased Fatigue 8. Decreased Flexibility/Joint Mobility 9. Decreased [place] with Home Exercise Program 
10. Decreased Cognition INTERVENTIONS PLANNED: (Benefits and precautions of physical therapy have been discussed with the patient.) 1. Balance Exercise 2. Bed Mobility 3. Family Education 4. Gait Training 5. Home Exercise Program (HEP) 6. Range of Motion (ROM) 7. Therapeutic Activites 8. Therapeutic Exercise/Strengthening 9. Transfer Training TREATMENT PLAN: Frequency/Duration: daily for duration of hospital stay Rehabilitation Potential For Stated Goals: Fair REHAB RECOMMENDATIONS (at time of discharge pending progress):   
Placement: 
Rehab ? Equipment:  
? None at this time HISTORY:  
History of Present Injury/Illness (Reason for Referral): 
78years old male with past medical history of chronic A. fib on Pradaxa, hypertension, dementia without behavioral disturbance, prostate cancer was BIBEMS due to generalized weakness and confusion raising concern for UTI. He was recently admitted with acute rhabdomyolysis, UTI and elevated troponin, was discharged home with home health PT. No falls reported and patient denies any fever chills, nausea, vomiting, diarrhea, abdominal pain, chest pain, SOB, cough or urinary symptoms.   In ER patient was slightly confused and labs showed elevated CK at 2360, lactic acid at 2.3, troponin 0.69 (prior value was 0.66 which had trended down from 5.2. He was seen by cardiology during prior admission and it was deemed secondary to demand ischemia. UA was positive for infection and he was given Rocephin with IV fluids in the ER. Hospitalist consulted for admission. Patient appears comfortable, laying in bed. He is AAO x2 to place and person but not to time. Knows name and date of birth. States that he lives with his daughter. Detailed history could not be obtained due to dementia. Past Medical History/Comorbidities:  
Mr. Varun Graham  has a past medical history of Arrhythmia, Arthritis, Atrial fibrillation ([facility] [address] (1/27/2012), CAD (coronary artery disease), Cancer ([facility] [address] Dementia, Depression (8/28/2012), GERD (gastroesophageal reflux disease), Heart failure ([facility] [address] [facility] History of prostate cancer (8/4/2014), Hypertension, Morbid obesity ([facility] Osteoarthritis of right knee (1/26/2012), Recurrent major depressive disorder, in full remission ([facility] (11/30/2017), Total knee replacement status (1/26/2012), and Vitamin B12 deficiency (7/23/2017). Mr. Varun Graham  has a past surgical history that includes hx orthopaedic (2010); hx prostatectomy; and hx pacemaker (8/30/2012). Social History/Living Environment:  
Home Environment: Private residence # Steps to Enter: 7 Rails to Enter: Yes Hand Rails : Bilateral 
One/Two Story Residence: One story Living Alone: No 
Support Systems: Child(billy) Patient Expects to be Discharged to[de-identified] Unknown Current DME Used/Available at Home: Brittani Loo Prior Level of Function/Work/Activity: 
Walker at baseline Number of Personal Factors/Comorbidities that affect the Plan of Care: 3+: HIGH COMPLEXITY EXAMINATION:  
Most Recent Physical Functioning:  
Gross Assessment: 
  
         
  
Posture: 
  
Balance: 
Sitting - Static: Good (unsupported) Sitting - Dynamic: Fair (occasional) Standing - Static: Constant support;Good Standing - Dynamic : Constant support; Fair Bed Mobility: 
  
Wheelchair Mobility: 
  
Transfers: 
Sit to Stand: Moderate assistance;Assist x2 Stand to Sit: Moderate assistance;Assist x2 Bed to Chair: Moderate assistance;Assist x2 Duration: 15 Minutes Gait: unable Base of Support: Center of gravity altered Speed/Ángela: Delayed Step Length: Left shortened;Right shortened Gait Abnormalities: Decreased step clearance Distance (ft): 6 Feet (ft) Assistive Device: Walker, rolling Ambulation - Level of Assistance: Contact guard assistance Interventions: Safety awareness training;Verbal cues Body Structures Involved: 1. Bones 2. Joints 3. Muscles 4. Ligaments Body Functions Affected: 1. Movement Related Activities and Participation Affected: 1. Mobility Number of elements that affect the Plan of Care: 3: MODERATE COMPLEXITY CLINICAL PRESENTATION:  
Presentation: Stable and uncomplicated: LOW COMPLEXITY CLINICAL DECISION MAKING:  
[facility] AM-PAC 6 Clicks Basic Mobility Inpatient Short Form How much difficulty does the patient currently have. .. Unable A Lot A Little None 1. Turning over in bed (including adjusting bedclothes, sheets and blankets)? ? 1   ? 2   ? 3   ? 4  
2. Sitting down on and standing up from a chair with arms ( e.g., wheelchair, bedside commode, etc.)   ? 1   ? 2   ? 3   ? 4  
3. Moving from lying on back to sitting on the side of the bed?   ? 1   ? 2   ? 3   ? 4 How much help from another person does the patient currently need. .. Total A Lot A Little None 4. Moving to and from a bed to a chair (including a wheelchair)? ? 1   ? 2   ? 3   ? 4  
5. Need to walk in hospital room? ? 1   ? 2   ? 3   ? 4  
6. Climbing 3-5 steps with a railing? ? 1   ? 2   ? 3   ? 4  
© 2007, Trustees of [facility], under license to [employer]. All rights reserved Score:  Initial: 9 Most Recent: X (Date: -- ) Interpretation of Tool:  Represents activities that are increasingly more difficult (i.e. Bed mobility, Transfers, Gait). Medical Necessity:    
· Patient is expected to demonstrate progress in strength, range of motion, balance, coordination and functional technique ·  to decrease assistance required with exercises and functional mobility · . Reason for Services/Other Comments: 
· Patient continues to require present interventions due to patient's inability to perform exercises and functional mobility independently · . Use of outcome tool(s) and clinical judgement create a POC that gives a: Questionable prediction of patient's progress: MODERATE COMPLEXITY  
  
 
 
 
TREATMENT:  
(In addition to Assessment/Re-Assessment sessions the following treatments were rendered) Pre-treatment Symptoms/Complaints:  Patient agreeable for therapy Pain: Initial:  
Pain Intensity 1: 0  Post Session:   
 
Therapeutic Activity: (  15 Minutes 15 minutes): Therapeutic activities including Bed transfers, Chair transfers and Ambulation on level ground to improve mobility, strength, balance and coordination. Required minimal Safety awareness training;Verbal cues to promote static and dynamic balance in standing and promote coordination of bilateral, upper extremity(s), lower extremity(s). Braces/Orthotics/Lines/Etc:  
· IV 
· O2 Device: Room air Treatment/Session Assessment:   
· Response to Treatment:  Patient participated well. · Interdisciplinary Collaboration:  
o Physical Therapist 
o Occupational Therapist 
o Registered Nurse · After treatment position/precautions:  
o Up in chair 
o Bed alarm/tab alert on 
o Bed/Chair-wheels locked 
o Bed in low position 
o Call light within reach · Compliance with Program/Exercises: Will assess as treatment progresses · Recommendations/Intent for next treatment session: \"Next visit will focus on reduction in assistance provided\". Total Treatment Duration: PT Patient Time In/Time Out Time In: 0900 Time Out: 0930 Beatris Moncada, PTA

## 2019-11-04 NOTE — PROGRESS NOTES
Hospitalist Progress Note   
2019 Admit Date: 10/30/2019  9:55 AM  
NAME: Antonieta Jeffrey :  1940 MRN:  [id number] Attending: Melissa Trejo MD 
PCP:  Rubina George MD 
 
SUBJECTIVE:  
78years old male with past medical history of chronic A. fib on Pradaxa s/p PPM, hypertension, dementia without behavioral disturbance, prostate cancer was BIBEMS due to generalized weakness and confusion raising concern for UTI, and admitted with acute rhabdomyolysis, UTI and elevated troponin. Seen by Cardiology. : Pt seen, sitting in chair, Feels better, AAO X 3, seems to be at baseline. No tachycardia overnight. Nursing notes and chart reviewed. Review of Systems negative with exception of pertinent positives noted above. PHYSICAL EXAM  
 
Visit Vitals /69 (BP 1 Location: Right arm, BP Patient Position: Sitting) Pulse 71 Temp 98.7 °F (37.1 °C) Resp 18 Ht 6' (1.829 m) Wt 116.1 kg (256 lb) SpO2 97% BMI 34.72 kg/m² Temp (24hrs), Av.2 °F (36.8 °C), Min:96.1 °F (35.6 °C), Max:98.9 °F (37.2 °C) Oxygen Therapy O2 Sat (%): 97 % (19 0801) Pulse via Oximetry: 60 beats per minute (10/30/19 1614) O2 Device: Room air (19 0813) Intake/Output Summary (Last 24 hours) at 2019 5880 Last data filed at 11/3/2019 1700 Gross per 24 hour Intake 825 ml Output  Net 825 ml General: No acute distress. Alert.   
Head:  AT/NC Lungs:  CTABL. Heart:  IRregular, no murmur, rub, or gallop Abdomen: Soft, non-distended, non-tender, +bs Extremities: No cyanosis or clubbing. Neurologic:  No focal deficits. Moves all extremities. Skin:  No Obvious Rash Psych:  Normal affect. LABS AND STUDIES: 
Personally reviewed all labs, meds, and studies for past 24hrs. ASSESSMENT Active Hospital Problems Diagnosis Date Noted  UTI (urinary tract infection) 10/30/2019  Troponin I above reference range 2019  Non-traumatic rhabdomyolysis 06/04/2019  Moderate dementia without behavioral disturbance ([facility] [address] 07/26/2018  Generalized weakness 12/23/2016  
 HTN (hypertension) 01/27/2012 PLAN: 
· E. Coli UTI: s/p Rocephin 5 days of Rx done. · Elevated troponin: Chronic issue, cardiology consulted. No plan for intervention. · Rhabdomyolysis: CK trending down, decrease IV fluids to 100 cc/hr, trend CK. Most recent EF was normal. 
· Hypertension: Resumed home meds and monitor BP. · Chronic A. fib: Resumed Pradaxa. Monitor for any hematuria. · Lactic acidosis: Resolved, was Likely due to dehydration versus possible sepsis. · NSVT: has PPM, on Metoprolol. · Replete and monitor electrolytes. Dispo: PT OT started. Likely DC in am to STR, has bed. Plan of care d/w daughter Jake Jones ([phone]) DVT ppx:  pradaxa Discussed plan with pt who is in agreement. All questions answered. Signed By: Ector Washington MD   
 November 4, 2019

## 2019-11-05 VITALS
WEIGHT: 256 LBS | OXYGEN SATURATION: 95 % | RESPIRATION RATE: 16 BRPM | DIASTOLIC BLOOD PRESSURE: 82 MMHG | SYSTOLIC BLOOD PRESSURE: 116 MMHG | HEIGHT: 74 IN | HEART RATE: 66 BPM | TEMPERATURE: 97.7 F | BODY MASS INDEX: 32.85 KG/M2

## 2019-11-05 PROCEDURE — 74011250637 HC RX REV CODE- 250/637: Performed by: INTERNAL MEDICINE

## 2019-11-05 PROCEDURE — 97110 THERAPEUTIC EXERCISES: CPT

## 2019-11-05 PROCEDURE — 97530 THERAPEUTIC ACTIVITIES: CPT

## 2019-11-05 RX ORDER — DABIGATRAN ETEXILATE 150 MG/1
150 CAPSULE ORAL 2 TIMES DAILY
Qty: 60 CAP | Refills: 1 | Status: SHIPPED
Start: 2019-11-05 | End: 2019-12-05

## 2019-11-05 RX ADMIN — FAMOTIDINE 20 MG: 20 TABLET ORAL at 07:59

## 2019-11-05 RX ADMIN — DABIGATRAN ETEXILATE MESYLATE 150 MG: 150 CAPSULE ORAL at 07:58

## 2019-11-05 RX ADMIN — SENNOSIDES AND DOCUSATE SODIUM 1 TABLET: 8.6; 5 TABLET ORAL at 07:58

## 2019-11-05 RX ADMIN — METOPROLOL TARTRATE 25 MG: 25 TABLET ORAL at 07:58

## 2019-11-05 RX ADMIN — ARIPIPRAZOLE 10 MG: 5 TABLET ORAL at 07:58

## 2019-11-05 RX ADMIN — BICALUTAMIDE 50 MG: 50 TABLET, FILM COATED ORAL at 09:00

## 2019-11-05 RX ADMIN — POLYETHYLENE GLYCOL (3350) 17 G: 17 POWDER, FOR SOLUTION ORAL at 07:58

## 2019-11-05 NOTE — PROGRESS NOTES
Shift assessment done. Pt in bed, sleeping. Respirations even and and unlabored. No acute signs of distress noted. Denies needs and pain this time. Call light in reach. Bed alarm on. Will continue to monitor.

## 2019-11-05 NOTE — PROGRESS NOTES
Pt resting in bed and is alert and oriented x 2. he denies pain and is on room air. RR even and unlabored. IVF infusing. Call light in reach and pt instructed to call for assistance if needed. Will monitor. Bed alarm on.

## 2019-11-05 NOTE — DISCHARGE SUMMARY
Discharge Summary Patient: Fernando Cortes MRN: [id number]  SSN: [id number] YOB: 1940  Age: 78 y.o. Sex: male Admit Date: 10/30/2019 Discharge Date: 11/5/2019 Admission Diagnoses: UTI (urinary tract infection) [N39.0] Discharge Diagnoses:  
Problem List as of 11/5/2019 Date Reviewed: 3/20/2019 Codes Class Noted - Resolved * (Principal) UTI (urinary tract infection) ICD-10-CM: N39.0 ICD-9-CM: 599.0  10/30/2019 - Present SSS (sick sinus syndrome) (HCC) ICD-10-CM: I49.5 ICD-9-CM: 427.81  6/5/2019 - Present Non-traumatic rhabdomyolysis ICD-10-CM: M62.82 ICD-9-CM: 728.88  6/4/2019 - Present Troponin I above reference range ICD-10-CM: R79.89 ICD-9-CM: 790.6  6/4/2019 - Present Moderate dementia without behavioral disturbance (HCC) ICD-10-CM: F03.90 ICD-9-CM: 290.0  7/26/2018 - Present Generalized weakness ICD-10-CM: R53.1 ICD-9-CM: 780.79  12/23/2016 - Present Morbid obesity ([facility] [address] (Chronic) ICD-10-CM: E66.01 
ICD-9-CM: 278.01  8/28/2012 - Present Atrial fibrillation (HCC) (Chronic) ICD-10-CM: I48.91 
ICD-9-CM: 427.31  1/27/2012 - Present HTN (hypertension) (Chronic) ICD-10-CM: I10 
ICD-9-CM: 401.9  1/27/2012 - Present RESOLVED: Atrial flutter with controlled response ([facility] [address] ICD-10-CM: I48.92 
ICD-9-CM: 427.32  6/5/2019 - 7/29/2019 RESOLVED: Traumatic orbital hematoma ICD-10-CM: S05. 10XA ICD-9-CM: 921.2  6/5/2019 - 7/29/2019 RESOLVED: Metabolic encephalopathy ZVS-51-YN: G93.41 
ICD-9-CM: 348.31  6/4/2019 - 7/29/2019 RESOLVED: UTI (urinary tract infection) ICD-10-CM: N39.0 ICD-9-CM: 599.0  6/4/2019 - 8/1/2019 RESOLVED: E. coli UTI (urinary tract infection) ICD-10-CM: N39.0, B96.20 ICD-9-CM: 599.0, 041.49  9/28/2018 - 7/29/2019 RESOLVED: Hyperkalemia ICD-10-CM: E87.5 ICD-9-CM: 276.7  9/25/2018 - 7/29/2019 RESOLVED: Altered mental status ICD-10-CM: R41.82 
ICD-9-CM: 780.97  5/12/2018 - 9/25/2018 RESOLVED: Acute UTI ICD-10-CM: N39.0 ICD-9-CM: 599.0  5/12/2018 - 9/25/2018 RESOLVED: Recurrent major depressive disorder, in full remission ([facility] ICD-10-CM: F33.42 
ICD-9-CM: 296.36  11/30/2017 - 7/29/2019 RESOLVED: CAP (community acquired pneumonia) ICD-10-CM: J18.9 ICD-9-CM: 947  7/26/2017 - 9/25/2018 RESOLVED: Vitamin B12 deficiency ICD-10-CM: E53.8 ICD-9-CM: 266.2  7/23/2017 - 7/29/2019 RESOLVED: Acute encephalopathy ICD-10-CM: G93.40 ICD-9-CM: 348.30  7/23/2017 - 9/25/2018 RESOLVED: Febrile illness, acute ICD-10-CM: R50.9 ICD-9-CM: 780.60  7/22/2017 - 9/25/2018 RESOLVED: Acute metabolic encephalopathy QSW-80-WJ: G93.41 
ICD-9-CM: 348.31  7/22/2017 - 7/29/2019 RESOLVED: Fall ICD-10-CM: W19. Lucinda Spire ICD-9-CM: J507.7  7/22/2017 - 9/25/2018 RESOLVED: CA of prostate ([facility] ICD-10-CM: J24 ICD-9-CM: 185  4/17/2017 - 9/25/2018 RESOLVED: Elevated troponin I level ICD-10-CM: R79.89 ICD-9-CM: 790.6  12/23/2016 - 9/25/2018 RESOLVED: Lactic acid acidosis ICD-10-CM: E87.2 ICD-9-CM: 276.2  12/23/2016 - 9/25/2018 RESOLVED: Hypernatremia ICD-10-CM: E87.0 ICD-9-CM: 276.0  12/23/2016 - 9/25/2018 RESOLVED: Age-related physical debility ICD-10-CM: R54 
ICD-9-CM: 900  2/6/2015 - 9/25/2018 RESOLVED: History of prostate cancer ICD-10-CM: Z85.46 
ICD-9-CM: V10.46  8/4/2014 - 7/29/2019 RESOLVED: Syncope and collapse ICD-10-CM: R55 
ICD-9-CM: 780.2  8/28/2012 - 9/25/2018 RESOLVED: Symptomatic sinus bradycardia ICD-10-CM: R00.1 ICD-9-CM: 427.89  8/28/2012 - 9/25/2018 RESOLVED: Depression (Chronic) ICD-10-CM: F32.9 ICD-9-CM: 980  8/28/2012 - 7/29/2019 RESOLVED: Osteoarthritis of right knee ICD-10-CM: M17.11 ICD-9-CM: 715.96  1/26/2012 - 7/29/2019 RESOLVED: Total knee replacement status ICD-10-CM: A66.254 ICD-9-CM: V43.65  1/26/2012 - 7/29/2019 Discharge Condition: Stable Hospital Course: 78years old male with past medical history of chronic A. fib on Pradaxa s/p PPM, hypertension, dementia without behavioral disturbance, prostate cancer was BIBEMS due to generalized weakness and confusion raising concern for UTI, and admitted with acute rhabdomyolysis, UTI and elevated troponin. Seen by Cardiology. Patient treated with a 5-day course of ceftriaxone. Patient had resolution of his presenting symptoms. Physical therapy recommended that the patient go to short-term rehab. Case management orchestrated for the patient to be discharged to rehab. On day of discharge, the patient had no questions or concerns. Patient discharged from the hospital in stable condition. 
  
 
Physical Exam: 
General: No acute distress HEENT: NCAT, dry mucous membranes Skin: No rash noted Cardio: RRR, normal S1/S2, no rubs, no gallops, no murmurs Pulm: Non labored respirations on room air, LCAB, no wheezing, no rales, no rhonchi GI: Soft, Nt, Nd, Nml bowel sounds, no masses noted Extremity: Atraumatic, no deformities, no edema Neuro: Alert, oriented, moving all extremities, no focal deficits noted Psych: Pleasant, cooperative, normal range of affect Consults: Cardiology Significant Diagnostic Studies:  
 
Special Requests:      Final  
NO SPECIAL REQUESTS Culture result: Abnormal       Final  
>100,000 COLONIES/mL ESCHERICHIA COLI Culture result:      Final  
10,000 to 50,000 COLONIES/mL MIXED SKIN GRIFFIN ISOLATED Susceptibility Escherichia coli Antibiotic Interpretation Value Method Comment Trimeth-Sulfamethoxa Resistant >2/38 ug/mL LEODAN Nitrofurantoin Susceptible <=16 ug/mL LEODAN Ampicillin ($) Resistant >16 ug/mL LEODAN Gentamicin ($) Susceptible <=1 ug/mL LEODAN Tobramycin ($) Susceptible 1 ug/mL LEODAN Ampicillin/sulbactam ($) Susceptible 8/4 ug/mL LEODAN   
 Cefazolin ($) Susceptible <=1 ug/mL LEODAN Ceftriaxone ($) Susceptible <=0.5 ug/mL LEODAN Cefepime ($$) Susceptible <=0.5 ug/mL LEODAN Piperacillin/Tazobac ($) Susceptible <=2/4 ug/mL LEODAN Aztreonam ($$$$) Susceptible <=1 ug/mL LEODAN Cefoxitin Susceptible <=4 ug/mL LEODAN Susceptibility Escherichia coli (1) Antibiotic Interpretation Method Status Trimeth-Sulfamethoxa Resistant LEODAN Final  
Nitrofurantoin Susceptible LEODAN Final  
Ampicillin ($) Resistant LEODAN Final  
Gentamicin ($) Susceptible LEODAN Final  
Tobramycin ($) Susceptible LEODAN Final  
Ampicillin/sulbactam ($) Susceptible LEODAN Final  
Cefazolin ($) Susceptible LEODAN Final  
Ceftriaxone ($) Susceptible LEODAN Final  
Cefepime ($$) Susceptible LEODAN Final  
Piperacillin/Tazobac ($) Susceptible LEODAN Final  
Aztreonam ($$$$) Susceptible LEODAN Final  
Cefoxitin Susceptible LEODAN Final  
 
 
 
Disposition: Short-term rehab Discharge Medications:  
Current Discharge Medication List  
  
Continue taking this medications Details  
dabigatran etexilate (PRADAXA) 150 mg capsule Take 1 Cap by mouth two (2) times a day for 30 days. Indications: Treatment to Prevent Blood Clots in Chronic Atrial Fibrillation 
Qty: 60 Cap, Refills: 1 CONTINUE these medications which have NOT CHANGED Details  
!! famotidine (PEPCID) 20 mg tablet TAKE 1 TAB BY MOUTH TWO (2) TIMES A DAY. Qty: 180 Tab, Refills: 1 Associated Diagnoses: Gastroesophageal reflux disease without esophagitis  
  
bicalutamide (CASODEX) 50 mg tablet TAKE 1 TABLET BY MOUTH EVERY DAY Qty: 90 Tab, Refills: 1 Associated Diagnoses: CA of prostate ([facility] ARIPiprazole (ABILIFY) 10 mg tablet TAKE 1 TABLET BY MOUTH EVERY DAY Qty: 90 Tab, Refills: 1 Associated Diagnoses: Major depressive disorder with single episode, in remission ([facility] donepezil (ARICEPT) 5 mg tablet TAKE 1 TABLET BY MOUTH AT BEDTIME Qty: 90 Tab, Refills: 0 Associated Diagnoses: Mild dementia ([facility] tamsulosin (FLOMAX) 0.4 mg capsule Take 1 Cap by mouth nightly. Qty: 30 Cap, Refills: 0  
  
metoprolol tartrate (LOPRESSOR) 25 mg tablet Take 1 Tab by mouth two (2) times a day. Qty: 60 Tab, Refills: 0  
  
furosemide (LASIX) 40 mg tablet Take 40 mg po daily PRN ONLY for evidence of fluid retention 
Qty: 15 Tab, Refills: 0  
  
polyethylene glycol (MIRALAX) 17 gram/dose powder Take 17 g by mouth daily. Indications: constipation  
  
multivitamin, tx-iron-ca-min (THERA-M W/ IRON) 9 mg iron-400 mcg tab tablet Take 1 Tab by mouth daily. levomilnacipran (FETZIMA) 40 mg ER capsule TAKE 1 CAP BY MOUTH DAILY. Qty: 90 Cap, Refills: 3 Associated Diagnoses: Other depression  
  
!! famotidine (PEPCID) 20 mg tablet Take 1 Tab by mouth two (2) times a day. Qty: 180 Tab, Refills: 3 Associated Diagnoses: Gastroesophageal reflux disease without esophagitis  
  
senna-docusate (SENNA LAXATIVE-STOOL SOFTENER) 8.6-50 mg per tablet Take 1 Tab by mouth daily. aspirin 81 mg chewable tablet Take 1 Tab by mouth daily. Indications: prevention of cerebrovascular accident Qty: 30 Tab, Refills: 0  
  
acetaminophen (TYLENOL ARTHRITIS PAIN) 650 mg CR tablet Take 650 mg by mouth every six (6) hours as needed for Pain. !! - Potential duplicate medications found. Please discuss with provider. Activity: Activity as tolerated Diet: Cardiac Diet Wound Care: None needed Follow-up Appointments Procedures  FOLLOW UP VISIT Appointment in: One Week PCP after STR  
  PCP after STR Standing Status:   Standing Number of Occurrences:   1 Order Specific Question:   Appointment in Answer: One Week Signed By: Chung Han MD   
 November 5, 2019

## 2019-11-05 NOTE — PROGRESS NOTES
Pt transferring to [facility]) at 1130. Primary RN called report and is removing PIV.

## 2019-11-05 NOTE — PROGRESS NOTES
Care Management Interventions PCP Verified by CM: Yes Mode of Transport at Discharge: Other (see comment) Transition of Care Consult (CM Consult): Other Current Support Network: Relative's Home Confirm Follow Up Transport: Family Plan discussed with Pt/Family/Caregiver: Yes Freedom of Choice Offered: Yes Discharge Location Discharge Placement: Skilled nursing facility Per MD pt stable for d/c.  SW spoke with pt's daughter Girish Villagran ) who states is on her way to the hospital.  Daughter informed of transport time. Chart copied, nsg called report, transport arranged to [place] [facility].

## 2019-11-05 NOTE — PROGRESS NOTES
Problem: Mobility Impaired (Adult and Pediatric) Goal: *Acute Goals and Plan of Care (Insert Text) Description LTG: 
(1.)Mr. Mónica Combs will move from supine to sit and sit to supine  in bed with MODERATE ASSIST within 7 treatment day(s). (2.)Mr. Mónica Combs will transfer from bed to chair and chair to bed with MODERATE ASSIST using the least restrictive device within 7 treatment day(s). (3.)Mr. Mónica Combs will ambulate with MODERATE ASSIST for 3 feet with the least restrictive device within 7 treatment day(s). (4)Mr. Mónica Combs will perform HEP with cues and assistance to increase functional mobility in 7 days. ________________________________________________________________________________________________ Outcome: Progressing Towards Goal 
  
PHYSICAL THERAPY: Daily Note and AM 11/5/2019 INPATIENT: PT Visit Days : 4 [place] / [place] B / Product Type: Medicare /   
  
NAME/AGE/GENDER: Natali Boothe is a 78 y.o. male PRIMARY DIAGNOSIS: UTI (urinary tract infection) [N39.0] UTI (urinary tract infection) UTI (urinary tract infection) ICD-10: Treatment Diagnosis:  
 · Generalized Muscle Weakness (M62.81) · Other abnormalities of gait and mobility (R26.89) Precaution/Allergies: 
Patient has no known allergies. ASSESSMENT:  
 
Mr. Mónica Combs presents with significant debility. He is quite drowsy and confused, not sure of his baseline. He did not know where he was this afternoon, did not know the year. He reports walks with a walker at baseline and gets assistance from his daughter for adl's. Pt supine on contact and agreeable to work with therapy. He might be going to SNF today. Worked on supine to sit, pt eager to help himself and move this am stating \"I am ready to go\". Worked on supine to sit and sat at edge of bed. Worked on sit to stand and some standing tolerance-about 1 minute. Then side steps to head of bed.  Sat back down and worked on some leg exercises. Worked on standing again and sat back down. Then sit to stand and took steps to chair. Stayed up in chair with needs in reach. This section established at most recent assessment PROBLEM LIST (Impairments causing functional limitations): 1. Decreased Strength 2. Decreased ADL/Functional Activities 3. Decreased Transfer Abilities 4. Decreased Ambulation Ability/Technique 5. Decreased Balance 6. Decreased Activity Tolerance 7. Increased Fatigue 8. Decreased Flexibility/Joint Mobility 9. Decreased [place] with Home Exercise Program 
10. Decreased Cognition INTERVENTIONS PLANNED: (Benefits and precautions of physical therapy have been discussed with the patient.) 1. Balance Exercise 2. Bed Mobility 3. Family Education 4. Gait Training 5. Home Exercise Program (HEP) 6. Range of Motion (ROM) 7. Therapeutic Activites 8. Therapeutic Exercise/Strengthening 9. Transfer Training TREATMENT PLAN: Frequency/Duration: daily for duration of hospital stay Rehabilitation Potential For Stated Goals: Fair REHAB RECOMMENDATIONS (at time of discharge pending progress):   
Placement: 
Rehab ? Equipment:  
? None at this time HISTORY:  
History of Present Injury/Illness (Reason for Referral): 
78years old male with past medical history of chronic A. fib on Pradaxa, hypertension, dementia without behavioral disturbance, prostate cancer was BIBEMS due to generalized weakness and confusion raising concern for UTI. He was recently admitted with acute rhabdomyolysis, UTI and elevated troponin, was discharged home with home health PT. No falls reported and patient denies any fever chills, nausea, vomiting, diarrhea, abdominal pain, chest pain, SOB, cough or urinary symptoms. In ER patient was slightly confused and labs showed elevated CK at 2360, lactic acid at 2.3, troponin 0.69 (prior value was 0.66 which had trended down from 5.2.   He was seen by cardiology during prior admission and it was deemed secondary to demand ischemia. UA was positive for infection and he was given Rocephin with IV fluids in the ER. Hospitalist consulted for admission. Patient appears comfortable, laying in bed. He is AAO x2 to place and person but not to time. Knows name and date of birth. States that he lives with his daughter. Detailed history could not be obtained due to dementia. Past Medical History/Comorbidities:  
Mr. Kelly Roger  has a past medical history of Arrhythmia, Arthritis, Atrial fibrillation ([facility] (1/27/2012), CAD (coronary artery disease), Cancer ([facility] Dementia, Depression (8/28/2012), GERD (gastroesophageal reflux disease), Heart failure ([facility] History of prostate cancer (8/4/2014), Hypertension, Morbid obesity ([facility] Osteoarthritis of right knee (1/26/2012), Recurrent major depressive disorder, in full remission ([facility] (11/30/2017), Total knee replacement status (1/26/2012), and Vitamin B12 deficiency (7/23/2017). Mr. Kelly Roger  has a past surgical history that includes hx orthopaedic (2010); hx prostatectomy; and hx pacemaker (8/30/2012). Social History/Living Environment:  
Home Environment: Private residence # Steps to Enter: 7 Rails to Enter: Yes Hand Rails : Bilateral 
One/Two Story Residence: One story Living Alone: No 
Support Systems: Child(billy) Patient Expects to be Discharged to[de-identified] Unknown Current DME Used/Available at Home: [employer]ell Coral Prior Level of Function/Work/Activity: 
Walker at baseline Number of Personal Factors/Comorbidities that affect the Plan of Care: 3+: HIGH COMPLEXITY EXAMINATION:  
Most Recent Physical Functioning:  
Gross Assessment: 
  
         
  
Posture: 
  
Balance: 
Sitting - Static: Good (unsupported) Sitting - Dynamic: Fair (occasional) Standing: Impaired;Pull to stand; With support Standing - Static: Constant support;Good Standing - Dynamic : Constant support; Fair Bed Mobility: Supine to Sit: Minimum assistance; Moderate assistance;Assist x2 Wheelchair Mobility: 
  
Transfers: 
Sit to Stand: Minimum assistance; Moderate assistance;Assist x2; Other (comment)(bed elevated) Stand to Sit: Minimum assistance; Moderate assistance;Assist x2 Bed to Chair: Minimum assistance; Moderate assistance;Assist x2 Gait: unable Speed/Ángela: Pace decreased (<100 feet/min); Shuffled Step Length: Left shortened;Right shortened Gait Abnormalities: Decreased step clearance Distance (ft): 5 Feet (ft) Assistive Device: Walker, rolling Ambulation - Level of Assistance: Contact guard assistance;Minimal assistance Interventions: Safety awareness training;Verbal cues Body Structures Involved: 1. Bones 2. Joints 3. Muscles 4. Ligaments Body Functions Affected: 1. Movement Related Activities and Participation Affected: 1. Mobility Number of elements that affect the Plan of Care: 3: MODERATE COMPLEXITY CLINICAL PRESENTATION:  
Presentation: Stable and uncomplicated: LOW COMPLEXITY CLINICAL DECISION MAKIN[address] [place] [id number] AM-PAC 6 Clicks Basic Mobility Inpatient Short Form How much difficulty does the patient currently have. .. Unable A Lot A Little None 1. Turning over in bed (including adjusting bedclothes, sheets and blankets)? ? 1   ? 2   ? 3   ? 4  
2. Sitting down on and standing up from a chair with arms ( e.g., wheelchair, bedside commode, etc.)   ? 1   ? 2   ? 3   ? 4  
3. Moving from lying on back to sitting on the side of the bed?   ? 1   ? 2   ? 3   ? 4 How much help from another person does the patient currently need. .. Total A Lot A Little None 4. Moving to and from a bed to a chair (including a wheelchair)? ? 1   ? 2   ? 3   ? 4  
5. Need to walk in hospital room? ? 1   ? 2   ? 3   ? 4  
6. Climbing 3-5 steps with a railing? ? 1   ? 2   ? 3   ? 4  
© 2007, Trustees of [address] [place] [address] [address] [id number], under license to [employer]. All rights reserved Score:  Initial: 9 Most Recent: X (Date: -- ) Interpretation of Tool:  Represents activities that are increasingly more difficult (i.e. Bed mobility, Transfers, Gait). Medical Necessity:    
· Patient is expected to demonstrate progress in strength, range of motion, balance, coordination and functional technique ·  to decrease assistance required with exercises and functional mobility · . Reason for Services/Other Comments: 
· Patient continues to require present interventions due to patient's inability to perform exercises and functional mobility independently · . Use of outcome tool(s) and clinical judgement create a POC that gives a: Questionable prediction of patient's progress: MODERATE COMPLEXITY  
  
 
 
 
TREATMENT:  
(In addition to Assessment/Re-Assessment sessions the following treatments were rendered) Pre-treatment Symptoms/Complaints:  Patient agreeable for therapy Pain: Initial:  
Pain Intensity 1: 0  Post Session: 0/10 Therapeutic Activity: (    17 minutes): Therapeutic activities including Bed transfers, Chair transfers and Ambulation on level ground to improve mobility, strength, balance and coordination. Required minimal Safety awareness training;Verbal cues to promote static and dynamic balance in standing and promote coordination of bilateral, upper extremity(s), lower extremity(s). Therapeutic Exercise: (8 Minutes):  Exercises per grid below to improve mobility and strength. Required minimal verbal and manual cues to promote proper body alignment and promote proper body posture. Progressed repetitions as indicated. Date: 
11/5/19 Date: 
 Date: Activity/Exercise Parameters Parameters Parameters Ankle pumps 20 Long arc quads 10 Seated hip flexion 10 Hip ABD/ADD 10 Braces/Orthotics/Lines/Etc:  
· IV 
· O2 Device: Room air Treatment/Session Assessment: · Response to Treatment:  Patient participated well, eager to get moving · Interdisciplinary Collaboration:  
o Registered Nurse 
o Rehabilitation Attendant · After treatment position/precautions:  
o Up in chair 
o Bed alarm/tab alert on 
o Bed/Chair-wheels locked 
o Bed in low position 
o Call light within reach · Compliance with Program/Exercises: Compliant all of the time, Will assess as treatment progresses · Recommendations/Intent for next treatment session: \"Next visit will focus on reduction in assistance provided\". Total Treatment Duration: PT Patient Time In/Time Out Time In: 0275 Time Out: 1100 Aneta Zamudio, PT

## 2019-11-05 NOTE — PROGRESS NOTES
Problem: Urinary Tract Infection - Adult Goal: *Absence of infection signs and symptoms Outcome: Progressing Towards Goal 
  
Problem: Patient Education: Go to Patient Education Activity Goal: Patient/Family Education Outcome: Progressing Towards Goal 
  
Problem: Pressure Injury - Risk of 
Goal: *Prevention of pressure injury Description Document Maxwell Scale and appropriate interventions in the flowsheet. Outcome: Progressing Towards Goal 
Note:  
Pressure Injury Interventions: 
Sensory Interventions: Assess changes in LOC, Check visual cues for pain, Float heels, Keep linens dry and wrinkle-free, Minimize linen layers Moisture Interventions: Absorbent underpads, Check for incontinence Q2 hours and as needed, Maintain skin hydration (lotion/cream), Minimize layers Activity Interventions: Increase time out of bed, PT/OT evaluation Mobility Interventions: Float heels, PT/OT evaluation, Suspension boots Nutrition Interventions: Document food/fluid/supplement intake Friction and Shear Interventions: Apply protective barrier, creams and emollients, Lift sheet, HOB 30 degrees or less Problem: Patient Education: Go to Patient Education Activity Goal: Patient/Family Education Outcome: Progressing Towards Goal 
  
Problem: Falls - Risk of 
Goal: *Absence of Falls Description Document Lisa Melvin Fall Risk and appropriate interventions in the flowsheet. Outcome: Progressing Towards Goal 
Note:  
Fall Risk Interventions: 
Mobility Interventions: Bed/chair exit alarm Mentation Interventions: Adequate sleep, hydration, pain control, Bed/chair exit alarm, Door open when patient unattended, Eyeglasses and hearing aids, Reorient patient, Room close to nurse's station Medication Interventions: Assess postural VS orthostatic hypotension, Bed/chair exit alarm, Patient to call before getting OOB, Teach patient to arise slowly Elimination Interventions: Bed/chair exit alarm, Call light in reach, Patient to call for help with toileting needs, Toileting schedule/hourly rounds Problem: Patient Education: Go to Patient Education Activity Goal: Patient/Family Education Outcome: Progressing Towards Goal 
  
Problem: Patient Education: Go to Patient Education Activity Goal: Patient/Family Education Outcome: Progressing Towards Goal 
  
Problem: Nutrition Deficit Goal: *Optimize nutritional status Outcome: Progressing Towards Goal

## 2019-11-06 ENCOUNTER — PATIENT OUTREACH (OUTPATIENT)
Dept: CASE MANAGEMENT | Age: 79
End: 2019-11-06

## 2019-11-06 NOTE — PROGRESS NOTES
This note will not be viewable in [address] Patient discharged to a SNF [facility] facility. Patient will be included in weekly care coordination calls. Message sent to Cyndi Heller RN SNF [facility] [address] 10/30/UTI/11/5/Dian Herrmann

## 2019-11-11 ENCOUNTER — HOSPITAL ENCOUNTER (OUTPATIENT)
Dept: LAB | Age: 79
Discharge: HOME | End: 2019-11-11

## 2019-11-11 LAB
ANION GAP SERPL CALC-SCNC: 5 MMOL/L (ref 7–16)
BASOPHILS # BLD: 0 K/UL (ref 0–0.2)
BASOPHILS NFR BLD: 1 % (ref 0–2)
BUN SERPL-MCNC: 13 MG/DL (ref 8–23)
CALCIUM SERPL-MCNC: 9.5 MG/DL (ref 8.3–10.4)
CHLORIDE SERPL-SCNC: 111 MMOL/L (ref 98–107)
CO2 SERPL-SCNC: 27 MMOL/L (ref 21–32)
CREAT SERPL-MCNC: 1.02 MG/DL (ref 0.8–1.5)
DIFFERENTIAL METHOD BLD: NORMAL
EOSINOPHIL # BLD: 0.1 K/UL (ref 0–0.8)
EOSINOPHIL NFR BLD: 2 % (ref 0.5–7.8)
ERYTHROCYTE [DISTWIDTH] IN BLOOD BY AUTOMATED COUNT: 13.6 % (ref 11.9–14.6)
GLUCOSE SERPL-MCNC: 74 MG/DL (ref 65–100)
HCT VFR BLD AUTO: 45.1 % (ref 41.1–50.3)
HGB BLD-MCNC: 14.8 G/DL (ref 13.6–17.2)
IMM GRANULOCYTES # BLD AUTO: 0 K/UL (ref 0–0.5)
IMM GRANULOCYTES NFR BLD AUTO: 1 % (ref 0–5)
LYMPHOCYTES # BLD: 1.7 K/UL (ref 0.5–4.6)
LYMPHOCYTES NFR BLD: 31 % (ref 13–44)
MCH RBC QN AUTO: 31.2 PG (ref 26.1–32.9)
MCHC RBC AUTO-ENTMCNC: 32.8 G/DL (ref 31.4–35)
MCV RBC AUTO: 94.9 FL (ref 79.6–97.8)
MONOCYTES # BLD: 0.5 K/UL (ref 0.1–1.3)
MONOCYTES NFR BLD: 8 % (ref 4–12)
NEUTS SEG # BLD: 3.3 K/UL (ref 1.7–8.2)
NEUTS SEG NFR BLD: 58 % (ref 43–78)
NRBC # BLD: 0 K/UL (ref 0–0.2)
PLATELET # BLD AUTO: 232 K/UL (ref 150–450)
PMV BLD AUTO: 11 FL (ref 9.4–12.3)
POTASSIUM SERPL-SCNC: 4.2 MMOL/L (ref 3.5–5.1)
RBC # BLD AUTO: 4.75 M/UL (ref 4.23–5.6)
SODIUM SERPL-SCNC: 143 MMOL/L (ref 136–145)
WBC # BLD AUTO: 5.7 K/UL (ref 4.3–11.1)

## 2019-11-11 PROCEDURE — 80048 BASIC METABOLIC PNL TOTAL CA: CPT

## 2019-11-11 PROCEDURE — 85025 COMPLETE CBC W/AUTO DIFF WBC: CPT

## 2019-11-12 ENCOUNTER — PATIENT OUTREACH (OUTPATIENT)
Dept: CASE MANAGEMENT | Age: 79
End: 2019-11-12

## 2019-11-12 NOTE — PROGRESS NOTES
Community Care Team documentation for patient in [facility] The information below provided by: DESHAUN 
 
PT Update: PT/OT. Mod to max A for transfers. Limited ambulaiton, Fatigue. Nursing Update:stable Discharge Date:TBD Assign to [address]

## 2019-11-19 ENCOUNTER — PATIENT OUTREACH (OUTPATIENT)
Dept: CASE MANAGEMENT | Age: 79
End: 2019-11-19

## 2019-11-19 NOTE — PROGRESS NOTES
Community Care Team documentation for patient in [facility] The information below provided by:DESHAUN 
 
PT Update:  
PT/OT. Mod A for transfers. Gait limited by fatigue. Showing increased confusion. Projected DC on 11/19, home with daughetr. Nursing Update: One fall this week. Discharge Date:11/19/19 Assign to [address] [place] [address]

## 2019-11-20 ENCOUNTER — PATIENT OUTREACH (OUTPATIENT)
Dept: CASE MANAGEMENT | Age: 79
End: 2019-11-20

## 2019-11-20 NOTE — PROGRESS NOTES
This note will not be viewable in [address] Transition of Care Discharge Follow-up Questionnaire Date/Time of Call: 11/20/19    11a What was the patient hospitalized for? IP 10/30/2019 - 11/5/2019 UTI Rehab dc 11/19/19 Dc 6/10 to [facility] s/p fall w/ rhabdo, hx HF 
ED 7/7  UTI IP DC 8/1 UTI Does the patient understand his/her diagnosis and/or treatment and what happened during the hospitalization? Pt verbalizes understanding of dx and treatment. Did the patient receive discharge instructions? Yes  
CM Assessed Risk for Readmission:  
 
Patient stated Risk for Readmission: Age, frequent UTIs, fall w/ increasing confusion per SNF note 
none Review any discharge instructions (see discharge instructions/AVS in [facility]). Ask patient if they understand these. Do they have any questions? DC Instructions reviewed w/ pts  dgtr Ms. Acuna. Discussed s/s urinary infection, no questions, understanding verbalized. Were home services ordered (nursing, PT, OT, ST, etc.)? [facility]im [facility]  dgtr has not received call. If so, has the first visit occurred? If not, why? (Assist with coordination of services if necessary.) NO, RNCM called [facility] and spoke w/  who states referral was not sent yesterday as intended, but will send in today. Was any DME ordered? No  
If so, has it been received? If not, why?  (Assist patient in obtaining DME orders &/or equipment if necessary. ) NA Complete a review of all medications (new, continued and discontinued meds per the D/C instructions and medication tab in [facility]). Medications reviewed w/ dgtr Mrs. Acuna. Were all new prescriptions filled? If not, why?  (Assist patient in obtaining medications if necessary  escalate for CCM &/or SW if ongoing issues are verbalized by pt or anticipated) Tamsulosin no refills Has rx for Pradaxa, metoprolol lo, aricept Does the patient understand the purpose and dosing instructions for all medications? (If patient has questions, provide explanation and education.) Yes Does the patient have any problems in performing ADLs? (If patient is unable to perform ADLs  what is the limiting factor(s)? Do they have a support system that can assist? If no support system is present, discuss possible assistance that they may be able to obtain. Escalate for CCM/SW if ongoing issues are verbalized by pt or anticipated) Pt able to use walker, dependent for all needs. Pt living w/ dgtr Mrs. Acuna until Dec 2nd, then will be moving back in with dgtr [address]. Does the patient have all follow-up appointments scheduled? 7 day f/up with PCP?  
(f/up with PCP may be w/in 14 days if patient has a f/up with their specialist w/in 7 days) 7-14 day f/up with specialist?  
(or per discharge instructions) If f/up has not been made  what actions has the care coordinator made to accomplish this? Has transportation been arranged? [facility] to schedule appt w/ Dr. Kaycee Lamb and Dr. Sailaja Wu (urol) RNCM called PCP, left  requesting refill for tamsulosin. Any other questions or concerns expressed by the patient?  none Schedule next appointment with TOBI GUEVARA Coordinator or refer to RN Case Manager/ per the workflow guidelines. When is care coordinators next follow-up call scheduled? If referred for CCM  what RN care manager was the referral assigned? RNCM scheduled f/u w/ in 7-14d. PRASANNA Call Completed By: Prakash Alfredo, RN, BSN Community Nurse Care Manager

## 2019-11-27 ENCOUNTER — APPOINTMENT (OUTPATIENT)
Dept: CT IMAGING | Age: 79
End: 2019-11-27
Payer: MEDICARE

## 2019-11-27 ENCOUNTER — HOSPITAL ENCOUNTER (EMERGENCY)
Age: 79
Discharge: HOME | End: 2019-11-27
Attending: EMERGENCY MEDICINE
Payer: MEDICARE

## 2019-11-27 ENCOUNTER — APPOINTMENT (OUTPATIENT)
Dept: GENERAL RADIOLOGY | Age: 79
End: 2019-11-27
Payer: MEDICARE

## 2019-11-27 VITALS
TEMPERATURE: 98.1 F | DIASTOLIC BLOOD PRESSURE: 70 MMHG | RESPIRATION RATE: 23 BRPM | HEART RATE: 60 BPM | OXYGEN SATURATION: 100 % | SYSTOLIC BLOOD PRESSURE: 157 MMHG

## 2019-11-27 DIAGNOSIS — N30.00 ACUTE CYSTITIS WITHOUT HEMATURIA: Primary | ICD-10-CM

## 2019-11-27 LAB
ALBUMIN SERPL-MCNC: 3.3 G/DL (ref 3.2–4.6)
ALBUMIN/GLOB SERPL: 1 {RATIO} (ref 1.2–3.5)
ALP SERPL-CCNC: 105 U/L (ref 50–136)
ALT SERPL-CCNC: 16 U/L (ref 12–65)
ANION GAP SERPL CALC-SCNC: 4 MMOL/L (ref 7–16)
APPEARANCE UR: ABNORMAL
AST SERPL-CCNC: 20 U/L (ref 15–37)
BACTERIA URNS QL MICRO: ABNORMAL /HPF
BASOPHILS # BLD: 0 K/UL (ref 0–0.2)
BASOPHILS NFR BLD: 1 % (ref 0–2)
BILIRUB SERPL-MCNC: 1.2 MG/DL (ref 0.2–1.1)
BILIRUB UR QL: NEGATIVE
BNP SERPL-MCNC: 1093 PG/ML
BUN SERPL-MCNC: 10 MG/DL (ref 8–23)
CALCIUM SERPL-MCNC: 9.7 MG/DL (ref 8.3–10.4)
CHLORIDE SERPL-SCNC: 111 MMOL/L (ref 98–107)
CO2 SERPL-SCNC: 28 MMOL/L (ref 21–32)
COLOR UR: YELLOW
CREAT SERPL-MCNC: 0.92 MG/DL (ref 0.8–1.5)
DIFFERENTIAL METHOD BLD: ABNORMAL
EOSINOPHIL # BLD: 0.1 K/UL (ref 0–0.8)
EOSINOPHIL NFR BLD: 2 % (ref 0.5–7.8)
ERYTHROCYTE [DISTWIDTH] IN BLOOD BY AUTOMATED COUNT: 14 % (ref 11.9–14.6)
GLOBULIN SER CALC-MCNC: 3.2 G/DL (ref 2.3–3.5)
GLUCOSE SERPL-MCNC: 84 MG/DL (ref 65–100)
GLUCOSE UR STRIP.AUTO-MCNC: NEGATIVE MG/DL
HCT VFR BLD AUTO: 41.6 % (ref 41.1–50.3)
HGB BLD-MCNC: 13.5 G/DL (ref 13.6–17.2)
HGB UR QL STRIP: ABNORMAL
IMM GRANULOCYTES # BLD AUTO: 0 K/UL (ref 0–0.5)
IMM GRANULOCYTES NFR BLD AUTO: 0 % (ref 0–5)
KETONES UR QL STRIP.AUTO: NEGATIVE MG/DL
LACTATE BLD-SCNC: 1.44 MMOL/L (ref 0.5–1.9)
LEUKOCYTE ESTERASE UR QL STRIP.AUTO: ABNORMAL
LYMPHOCYTES # BLD: 1.6 K/UL (ref 0.5–4.6)
LYMPHOCYTES NFR BLD: 24 % (ref 13–44)
MAGNESIUM SERPL-MCNC: 2.4 MG/DL (ref 1.8–2.4)
MCH RBC QN AUTO: 30.8 PG (ref 26.1–32.9)
MCHC RBC AUTO-ENTMCNC: 32.5 G/DL (ref 31.4–35)
MCV RBC AUTO: 95 FL (ref 79.6–97.8)
MONOCYTES # BLD: 0.5 K/UL (ref 0.1–1.3)
MONOCYTES NFR BLD: 7 % (ref 4–12)
NEUTS SEG # BLD: 4.4 K/UL (ref 1.7–8.2)
NEUTS SEG NFR BLD: 66 % (ref 43–78)
NITRITE UR QL STRIP.AUTO: NEGATIVE
NRBC # BLD: 0 K/UL (ref 0–0.2)
OTHER OBSERVATIONS,UCOM: ABNORMAL
PH UR STRIP: 6.5 [PH] (ref 5–9)
PLATELET # BLD AUTO: 187 K/UL (ref 150–450)
PMV BLD AUTO: 9.9 FL (ref 9.4–12.3)
POTASSIUM SERPL-SCNC: 3.8 MMOL/L (ref 3.5–5.1)
PROT SERPL-MCNC: 6.5 G/DL (ref 6.3–8.2)
PROT UR STRIP-MCNC: NEGATIVE MG/DL
RBC # BLD AUTO: 4.38 M/UL (ref 4.23–5.6)
RBC #/AREA URNS HPF: ABNORMAL /HPF
SODIUM SERPL-SCNC: 143 MMOL/L (ref 136–145)
SP GR UR REFRACTOMETRY: 1.01 (ref 1–1.02)
TROPONIN I BLD-MCNC: 0.07 NG/ML (ref 0.02–0.05)
UROBILINOGEN UR QL STRIP.AUTO: 4 EU/DL (ref 0.2–1)
WBC # BLD AUTO: 6.6 K/UL (ref 4.3–11.1)
WBC URNS QL MICRO: >100 /HPF

## 2019-11-27 PROCEDURE — 80053 COMPREHEN METABOLIC PANEL: CPT

## 2019-11-27 PROCEDURE — 83735 ASSAY OF MAGNESIUM: CPT

## 2019-11-27 PROCEDURE — 99285 EMERGENCY DEPT VISIT HI MDM: CPT | Performed by: PHYSICIAN ASSISTANT

## 2019-11-27 PROCEDURE — 84484 ASSAY OF TROPONIN QUANT: CPT

## 2019-11-27 PROCEDURE — 74011250636 HC RX REV CODE- 250/636: Performed by: PHYSICIAN ASSISTANT

## 2019-11-27 PROCEDURE — 87088 URINE BACTERIA CULTURE: CPT

## 2019-11-27 PROCEDURE — 85025 COMPLETE CBC W/AUTO DIFF WBC: CPT

## 2019-11-27 PROCEDURE — 96365 THER/PROPH/DIAG IV INF INIT: CPT | Performed by: PHYSICIAN ASSISTANT

## 2019-11-27 PROCEDURE — 87086 URINE CULTURE/COLONY COUNT: CPT

## 2019-11-27 PROCEDURE — 87186 SC STD MICRODIL/AGAR DIL: CPT

## 2019-11-27 PROCEDURE — 71046 X-RAY EXAM CHEST 2 VIEWS: CPT

## 2019-11-27 PROCEDURE — 83605 ASSAY OF LACTIC ACID: CPT

## 2019-11-27 PROCEDURE — 70450 CT HEAD/BRAIN W/O DYE: CPT

## 2019-11-27 PROCEDURE — 83880 ASSAY OF NATRIURETIC PEPTIDE: CPT

## 2019-11-27 PROCEDURE — 74011250636 HC RX REV CODE- 250/636: Performed by: EMERGENCY MEDICINE

## 2019-11-27 PROCEDURE — 93005 ELECTROCARDIOGRAM TRACING: CPT | Performed by: PHYSICIAN ASSISTANT

## 2019-11-27 PROCEDURE — 74011000258 HC RX REV CODE- 258: Performed by: EMERGENCY MEDICINE

## 2019-11-27 PROCEDURE — 81001 URINALYSIS AUTO W/SCOPE: CPT

## 2019-11-27 RX ORDER — CEFDINIR 300 MG/1
300 CAPSULE ORAL 2 TIMES DAILY
Qty: 14 CAP | Refills: 0 | Status: SHIPPED | OUTPATIENT
Start: 2019-11-27 | End: 2019-12-05

## 2019-11-27 RX ORDER — SODIUM CHLORIDE 9 MG/ML
500 INJECTION, SOLUTION INTRAVENOUS ONCE
Status: COMPLETED | OUTPATIENT
Start: 2019-11-27 | End: 2019-11-27

## 2019-11-27 RX ADMIN — SODIUM CHLORIDE 500 ML: 900 INJECTION, SOLUTION INTRAVENOUS at 18:17

## 2019-11-27 RX ADMIN — CEFTRIAXONE 1 G: 1 INJECTION, POWDER, FOR SOLUTION INTRAMUSCULAR; INTRAVENOUS at 21:40

## 2019-11-27 NOTE — ED TRIAGE NOTES
Pt had a visit from [facility] Nurse today and thought he had a UTI so sent here for evaluation. Pt has history of frequent UTI's.  Also has hx of AF

## 2019-11-27 NOTE — ED PROVIDER NOTES
Called daughter, he is living with her. She states he weak. She states Philip Bolanos has a UTI. We know the symptoms. \" He won't stand as he is weak. [facility] was going to call in an \"antibiotic pill. \" They were going to send him here for \"IV's.\" Patient got weak on the way to the car this am to go to an appointment to podiatrist. This started yesterday. He felt subjectively warm. No chest pain, shortness of breath, abdominal pain, headache, visual changes, no new swelling to arms or legs. No complaints of dysuria. No slurred speech or mental status change. No localized weakness. The history is provided by the patient. Fatigue This is a new problem. The current episode started yesterday. The problem has been gradually worsening. There was no focality noted. Pertinent negatives include no focal weakness, no loss of sensation, no loss of balance, no slurred speech, no speech difficulty, no memory loss, no movement disorder, no agitation, no visual change, no auditory change, no mental status change, no unresponsiveness and no disorientation. There has been no fever. Pertinent negatives include no shortness of breath, no chest pain, no vomiting, no altered mental status, no confusion, no headaches, no choking, no nausea, no bowel incontinence and no bladder incontinence. There were no medications administered prior to arrival.  
  
 
Past Medical History:  
Diagnosis Date  Arrhythmia   
 pt takes pradaxa  Arthritis  Atrial fibrillation ([address] 1/27/2012  CAD (coronary artery disease)  Cancer [facility])   
 prostate  Dementia ([address]  Depression 8/28/2012  GERD (gastroesophageal reflux disease)   
 controlled with medication  Heart failure ([address]   
 pt takes lasix as needed, reports SOB with exertion  History of prostate cancer 8/4/2014  Hypertension   
 controlled with medication  Morbid obesity ([address]  Osteoarthritis of right knee 1/26/2012  Recurrent major depressive disorder, in full remission [facility] 11/30/2017  Total knee replacement status 1/26/2012  Vitamin B12 deficiency 7/23/2017 Past Surgical History:  
Procedure Laterality Date  HX ORTHOPAEDIC  2010  
 left TKA  HX PACEMAKER  8/30/2012 [employer] pacemaker  HX PROSTATECTOMY Family History:  
Problem Relation Age of Onset  Cancer Father Social History Socioeconomic History  Marital status:  Spouse name: Not on file  Number of children: Not on file  Years of education: Not on file  Highest education level: Not on file Occupational History  Not on file Social Needs  Financial resource strain: Not on file  Food insecurity:  
  Worry: Not on file Inability: Not on file  Transportation needs:  
  Medical: Not on file Non-medical: Not on file Tobacco Use  Smoking status: Never Smoker  Smokeless tobacco: Never Used Substance and Sexual Activity  Alcohol use: No  
 Drug use: No  
 Sexual activity: Never Lifestyle  Physical activity:  
  Days per week: Not on file Minutes per session: Not on file  Stress: Not on file Relationships  Social connections:  
  Talks on phone: Not on file Gets together: Not on file Attends Sabianism service: Not on file Active member of club or organization: Not on file Attends meetings of clubs or organizations: Not on file Relationship status: Not on file  Intimate partner violence:  
  Fear of current or ex partner: Not on file Emotionally abused: Not on file Physically abused: Not on file Forced sexual activity: Not on file Other Topics Concern  Not on file Social History Narrative  Not on file ALLERGIES: Patient has no known allergies. Review of Systems Constitutional: Positive for fatigue. HENT: Negative. Eyes: Negative. Respiratory: Negative. Negative for choking and shortness of breath. Cardiovascular: Negative. Negative for chest pain. Gastrointestinal: Negative. Negative for bowel incontinence, nausea and vomiting. Genitourinary: Negative. Negative for bladder incontinence. Musculoskeletal: Negative. Skin: Negative. Neurological: Negative. Negative for focal weakness, speech difficulty, headaches and loss of balance. Psychiatric/Behavioral: Negative. Negative for agitation, confusion and memory loss. All other systems reviewed and are negative. Vitals:  
 11/27/19 1733 11/27/19 1734 BP: 125/60 Temp: 98.1 °F (36.7 °C) SpO2:  98% Physical Exam 
Vitals signs and nursing note reviewed. Constitutional:   
   Appearance: Normal appearance. He is well-developed. HENT:  
   Head: Normocephalic and atraumatic. Right Ear: External ear normal.  
   Left Ear: External ear normal.  
   Nose: Nose normal.  
Eyes:  
   Conjunctiva/sclera: Conjunctivae normal.  
   Pupils: Pupils are equal, round, and reactive to light. Neck: Musculoskeletal: Normal range of motion and neck supple. Cardiovascular:  
   Rate and Rhythm: Normal rate and regular rhythm. Pulses: Normal pulses. Heart sounds: Normal heart sounds. Pulmonary:  
   Effort: Pulmonary effort is normal.  
   Breath sounds: Normal breath sounds. No stridor. No wheezing, rhonchi or rales. Chest:  
   Chest wall: No tenderness. Abdominal:  
   General: Abdomen is flat. Bowel sounds are normal. There is no distension. Palpations: Abdomen is soft. There is no mass. Tenderness: There is no tenderness. There is no right CVA tenderness, left CVA tenderness, guarding or rebound. Hernia: No hernia is present. Musculoskeletal: Normal range of motion. Skin: 
   General: Skin is warm and dry. Neurological:  
   General: No focal deficit present. Mental Status: He is alert and oriented to person, place, and time. Cranial Nerves: No cranial nerve deficit. Motor: Weakness present. Deep Tendon Reflexes: Reflexes are normal and symmetric. Psychiatric:     
   Behavior: Behavior normal.     
   Thought Content: Thought content normal.     
   Judgment: Judgment normal.  
 
  
 
MDM Number of Diagnoses or Management Options Acute cystitis without hematuria: new and does not require workup Diagnosis management comments: 9:29 PM 
Discussed results with patient, need for antibiotics. Amount and/or Complexity of Data Reviewed Clinical lab tests: ordered and reviewed Discuss the patient with other providers: yes (Dr. Mirian Carpenter) Risk of Complications, Morbidity, and/or Mortality Presenting problems: moderate Diagnostic procedures: moderate Management options: moderate Patient Progress Patient progress: improved Procedures 5:45 PM spoke with middle daughter Janelle Franco regarding patient, patient lives with her. They are concerned as he became weak yesterday. 5:59 PM Spoke with Dr. Mirian Carpenter regarding patient. We discussed the history and physical exam.  He is going to assume care of the patient at this time. Blood work, chest x-ray, EKG and urine pending.

## 2019-11-28 NOTE — DISCHARGE INSTRUCTIONS
Mild follow-up with your doctor, call the office to make an appointment. Return to the emergency department if your symptoms worsen.

## 2019-11-28 NOTE — ED NOTES
I have reviewed discharge instructions with the family. The family verbalized understanding. Patient left ED via Discharge Method: stretcher to [facility] with [facility]. Opportunity for questions and clarification provided. Patient given 1 scripts. To continue your aftercare when you leave the hospital, you may receive an automated call from our care team to check in on how you are doing. This is a free service and part of our promise to provide the best care and service to meet your aftercare needs.  If you have questions, or wish to unsubscribe from this service please call [phone]. Thank you for Choosing our [facility] Emergency Department.

## 2019-11-29 LAB
ATRIAL RATE: 227 BPM
CALCULATED R AXIS, ECG10: -83 DEGREES
CALCULATED T AXIS, ECG11: 91 DEGREES
DIAGNOSIS, 93000: NORMAL
Q-T INTERVAL, ECG07: 458 MS
QRS DURATION, ECG06: 170 MS
QTC CALCULATION (BEZET), ECG08: 458 MS
VENTRICULAR RATE, ECG03: 60 BPM

## 2019-12-02 LAB
BACTERIA SPEC CULT: ABNORMAL
SERVICE CMNT-IMP: ABNORMAL

## 2019-12-03 ENCOUNTER — APPOINTMENT (OUTPATIENT)
Dept: GENERAL RADIOLOGY | Age: 79
DRG: 690 | End: 2019-12-03
Attending: EMERGENCY MEDICINE
Payer: MEDICARE

## 2019-12-03 ENCOUNTER — HOSPITAL ENCOUNTER (INPATIENT)
Age: 79
LOS: 1 days | Discharge: HOME HEALTH | DRG: 690 | End: 2019-12-05
Attending: EMERGENCY MEDICINE | Admitting: HOSPITALIST
Payer: MEDICARE

## 2019-12-03 DIAGNOSIS — R53.1 GENERALIZED WEAKNESS: ICD-10-CM

## 2019-12-03 DIAGNOSIS — N39.0 URINARY TRACT INFECTION WITHOUT HEMATURIA, SITE UNSPECIFIED: Primary | ICD-10-CM

## 2019-12-03 PROBLEM — E87.6 HYPOKALEMIA: Status: ACTIVE | Noted: 2019-12-03

## 2019-12-03 PROBLEM — R41.0 CONFUSION AND DISORIENTATION: Status: ACTIVE | Noted: 2019-12-03

## 2019-12-03 LAB
ALBUMIN SERPL-MCNC: 3.1 G/DL (ref 3.2–4.6)
ALBUMIN/GLOB SERPL: 0.8 {RATIO} (ref 1.2–3.5)
ALP SERPL-CCNC: 104 U/L (ref 50–136)
ALT SERPL-CCNC: 20 U/L (ref 12–65)
ANION GAP SERPL CALC-SCNC: 5 MMOL/L (ref 7–16)
APPEARANCE UR: ABNORMAL
AST SERPL-CCNC: 28 U/L (ref 15–37)
ATRIAL RATE: 375 BPM
BACTERIA URNS QL MICRO: ABNORMAL /HPF
BASOPHILS # BLD: 0 K/UL (ref 0–0.2)
BASOPHILS NFR BLD: 0 % (ref 0–2)
BILIRUB SERPL-MCNC: 0.8 MG/DL (ref 0.2–1.1)
BILIRUB UR QL: NEGATIVE
BUN SERPL-MCNC: 8 MG/DL (ref 8–23)
CALCIUM SERPL-MCNC: 9.7 MG/DL (ref 8.3–10.4)
CALCULATED P AXIS, ECG09: 55 DEGREES
CALCULATED R AXIS, ECG10: -78 DEGREES
CALCULATED T AXIS, ECG11: 94 DEGREES
CHLORIDE SERPL-SCNC: 111 MMOL/L (ref 98–107)
CO2 SERPL-SCNC: 28 MMOL/L (ref 21–32)
COLOR UR: ABNORMAL
CREAT SERPL-MCNC: 1.01 MG/DL (ref 0.8–1.5)
DIAGNOSIS, 93000: NORMAL
DIFFERENTIAL METHOD BLD: NORMAL
EOSINOPHIL # BLD: 0.1 K/UL (ref 0–0.8)
EOSINOPHIL NFR BLD: 2 % (ref 0.5–7.8)
ERYTHROCYTE [DISTWIDTH] IN BLOOD BY AUTOMATED COUNT: 14.2 % (ref 11.9–14.6)
GLOBULIN SER CALC-MCNC: 3.8 G/DL (ref 2.3–3.5)
GLUCOSE SERPL-MCNC: 100 MG/DL (ref 65–100)
GLUCOSE UR STRIP.AUTO-MCNC: NEGATIVE MG/DL
HCT VFR BLD AUTO: 43.4 % (ref 41.1–50.3)
HGB BLD-MCNC: 14 G/DL (ref 13.6–17.2)
HGB UR QL STRIP: ABNORMAL
IMM GRANULOCYTES # BLD AUTO: 0 K/UL (ref 0–0.5)
IMM GRANULOCYTES NFR BLD AUTO: 0 % (ref 0–5)
KETONES UR QL STRIP.AUTO: ABNORMAL MG/DL
LACTATE BLD-SCNC: 1.68 MMOL/L (ref 0.5–1.9)
LEUKOCYTE ESTERASE UR QL STRIP.AUTO: ABNORMAL
LYMPHOCYTES # BLD: 1.3 K/UL (ref 0.5–4.6)
LYMPHOCYTES NFR BLD: 21 % (ref 13–44)
MCH RBC QN AUTO: 30.3 PG (ref 26.1–32.9)
MCHC RBC AUTO-ENTMCNC: 32.3 G/DL (ref 31.4–35)
MCV RBC AUTO: 93.9 FL (ref 79.6–97.8)
MONOCYTES # BLD: 0.4 K/UL (ref 0.1–1.3)
MONOCYTES NFR BLD: 7 % (ref 4–12)
NEUTS SEG # BLD: 4.1 K/UL (ref 1.7–8.2)
NEUTS SEG NFR BLD: 70 % (ref 43–78)
NITRITE UR QL STRIP.AUTO: NEGATIVE
NRBC # BLD: 0 K/UL (ref 0–0.2)
OTHER OBSERVATIONS,UCOM: ABNORMAL
PH UR STRIP: 7.5 [PH] (ref 5–9)
PLATELET # BLD AUTO: 193 K/UL (ref 150–450)
PMV BLD AUTO: 9.9 FL (ref 9.4–12.3)
POTASSIUM SERPL-SCNC: 3.3 MMOL/L (ref 3.5–5.1)
PROCALCITONIN SERPL-MCNC: <0.05 NG/ML
PROT SERPL-MCNC: 6.9 G/DL (ref 6.3–8.2)
PROT UR STRIP-MCNC: 100 MG/DL
Q-T INTERVAL, ECG07: 468 MS
QRS DURATION, ECG06: 164 MS
QTC CALCULATION (BEZET), ECG08: 478 MS
RBC # BLD AUTO: 4.62 M/UL (ref 4.23–5.6)
RBC #/AREA URNS HPF: ABNORMAL /HPF
SODIUM SERPL-SCNC: 144 MMOL/L (ref 136–145)
SP GR UR REFRACTOMETRY: 1.01 (ref 1–1.02)
TROPONIN I SERPL-MCNC: 1.02 NG/ML (ref 0.02–0.05)
UROBILINOGEN UR QL STRIP.AUTO: 1 EU/DL (ref 0.2–1)
VENTRICULAR RATE, ECG03: 63 BPM
WBC # BLD AUTO: 5.9 K/UL (ref 4.3–11.1)
WBC URNS QL MICRO: >100 /HPF

## 2019-12-03 PROCEDURE — 99285 EMERGENCY DEPT VISIT HI MDM: CPT | Performed by: EMERGENCY MEDICINE

## 2019-12-03 PROCEDURE — 74011250636 HC RX REV CODE- 250/636: Performed by: EMERGENCY MEDICINE

## 2019-12-03 PROCEDURE — 74011250637 HC RX REV CODE- 250/637: Performed by: HOSPITALIST

## 2019-12-03 PROCEDURE — 81001 URINALYSIS AUTO W/SCOPE: CPT

## 2019-12-03 PROCEDURE — 94760 N-INVAS EAR/PLS OXIMETRY 1: CPT

## 2019-12-03 PROCEDURE — 96372 THER/PROPH/DIAG INJ SC/IM: CPT | Performed by: EMERGENCY MEDICINE

## 2019-12-03 PROCEDURE — 96365 THER/PROPH/DIAG IV INF INIT: CPT | Performed by: EMERGENCY MEDICINE

## 2019-12-03 PROCEDURE — 85025 COMPLETE CBC W/AUTO DIFF WBC: CPT

## 2019-12-03 PROCEDURE — 99218 HC RM OBSERVATION: CPT

## 2019-12-03 PROCEDURE — 77030019605

## 2019-12-03 PROCEDURE — 80053 COMPREHEN METABOLIC PANEL: CPT

## 2019-12-03 PROCEDURE — 83605 ASSAY OF LACTIC ACID: CPT

## 2019-12-03 PROCEDURE — 74011000250 HC RX REV CODE- 250: Performed by: EMERGENCY MEDICINE

## 2019-12-03 PROCEDURE — 74011250636 HC RX REV CODE- 250/636: Performed by: HOSPITALIST

## 2019-12-03 PROCEDURE — 96366 THER/PROPH/DIAG IV INF ADDON: CPT

## 2019-12-03 PROCEDURE — 77030020263 HC SOL INJ SOD CL0.9% LFCR 1000ML

## 2019-12-03 PROCEDURE — 93005 ELECTROCARDIOGRAM TRACING: CPT | Performed by: EMERGENCY MEDICINE

## 2019-12-03 PROCEDURE — 86580 TB INTRADERMAL TEST: CPT | Performed by: HOSPITALIST

## 2019-12-03 PROCEDURE — 87040 BLOOD CULTURE FOR BACTERIA: CPT

## 2019-12-03 PROCEDURE — 74011000302 HC RX REV CODE- 302: Performed by: HOSPITALIST

## 2019-12-03 PROCEDURE — 84145 PROCALCITONIN (PCT): CPT

## 2019-12-03 PROCEDURE — 84484 ASSAY OF TROPONIN QUANT: CPT

## 2019-12-03 PROCEDURE — 71045 X-RAY EXAM CHEST 1 VIEW: CPT

## 2019-12-03 RX ORDER — DONEPEZIL HYDROCHLORIDE 5 MG/1
5 TABLET, FILM COATED ORAL
Status: DISCONTINUED | OUTPATIENT
Start: 2019-12-03 | End: 2019-12-05 | Stop reason: HOSPADM

## 2019-12-03 RX ORDER — FUROSEMIDE 40 MG/1
40 TABLET ORAL
Status: DISCONTINUED | OUTPATIENT
Start: 2019-12-03 | End: 2019-12-05 | Stop reason: HOSPADM

## 2019-12-03 RX ORDER — VANCOMYCIN 2 GRAM/500 ML IN 0.9 % SODIUM CHLORIDE INTRAVENOUS
2000 ONCE
Status: COMPLETED | OUTPATIENT
Start: 2019-12-03 | End: 2019-12-03

## 2019-12-03 RX ORDER — VANCOMYCIN 2 GRAM/500 ML IN 0.9 % SODIUM CHLORIDE INTRAVENOUS
2000 EVERY 12 HOURS
Status: DISCONTINUED | OUTPATIENT
Start: 2019-12-04 | End: 2019-12-05

## 2019-12-03 RX ORDER — ONDANSETRON 2 MG/ML
4 INJECTION INTRAMUSCULAR; INTRAVENOUS
Status: DISCONTINUED | OUTPATIENT
Start: 2019-12-03 | End: 2019-12-05 | Stop reason: HOSPADM

## 2019-12-03 RX ORDER — OXYCODONE AND ACETAMINOPHEN 5; 325 MG/1; MG/1
1 TABLET ORAL
Status: DISCONTINUED | OUTPATIENT
Start: 2019-12-03 | End: 2019-12-05 | Stop reason: HOSPADM

## 2019-12-03 RX ORDER — SODIUM CHLORIDE 0.9 % (FLUSH) 0.9 %
5-40 SYRINGE (ML) INJECTION AS NEEDED
Status: DISCONTINUED | OUTPATIENT
Start: 2019-12-03 | End: 2019-12-05 | Stop reason: HOSPADM

## 2019-12-03 RX ORDER — TAMSULOSIN HYDROCHLORIDE 0.4 MG/1
0.4 CAPSULE ORAL
Status: DISCONTINUED | OUTPATIENT
Start: 2019-12-03 | End: 2019-12-05 | Stop reason: HOSPADM

## 2019-12-03 RX ORDER — ACETAMINOPHEN 325 MG/1
650 TABLET ORAL
Status: DISCONTINUED | OUTPATIENT
Start: 2019-12-03 | End: 2019-12-05 | Stop reason: HOSPADM

## 2019-12-03 RX ORDER — POLYETHYLENE GLYCOL 3350 17 G/17G
17 POWDER, FOR SOLUTION ORAL DAILY
Status: DISCONTINUED | OUTPATIENT
Start: 2019-12-04 | End: 2019-12-05 | Stop reason: HOSPADM

## 2019-12-03 RX ORDER — BICALUTAMIDE 50 MG/1
50 TABLET, FILM COATED ORAL DAILY
Status: DISCONTINUED | OUTPATIENT
Start: 2019-12-04 | End: 2019-12-05 | Stop reason: HOSPADM

## 2019-12-03 RX ORDER — SODIUM CHLORIDE 0.9 % (FLUSH) 0.9 %
5-40 SYRINGE (ML) INJECTION EVERY 8 HOURS
Status: DISCONTINUED | OUTPATIENT
Start: 2019-12-03 | End: 2019-12-05 | Stop reason: HOSPADM

## 2019-12-03 RX ORDER — ARIPIPRAZOLE 5 MG/1
10 TABLET ORAL DAILY
Status: DISCONTINUED | OUTPATIENT
Start: 2019-12-04 | End: 2019-12-05 | Stop reason: HOSPADM

## 2019-12-03 RX ORDER — METOPROLOL TARTRATE 25 MG/1
25 TABLET, FILM COATED ORAL 2 TIMES DAILY
Status: DISCONTINUED | OUTPATIENT
Start: 2019-12-03 | End: 2019-12-05 | Stop reason: HOSPADM

## 2019-12-03 RX ORDER — FAMOTIDINE 20 MG/1
20 TABLET, FILM COATED ORAL 2 TIMES DAILY
Status: DISCONTINUED | OUTPATIENT
Start: 2019-12-03 | End: 2019-12-05 | Stop reason: HOSPADM

## 2019-12-03 RX ORDER — AMOXICILLIN 250 MG
1 CAPSULE ORAL DAILY
Status: DISCONTINUED | OUTPATIENT
Start: 2019-12-04 | End: 2019-12-03 | Stop reason: SDUPTHER

## 2019-12-03 RX ORDER — NALOXONE HYDROCHLORIDE 0.4 MG/ML
0.4 INJECTION, SOLUTION INTRAMUSCULAR; INTRAVENOUS; SUBCUTANEOUS AS NEEDED
Status: DISCONTINUED | OUTPATIENT
Start: 2019-12-03 | End: 2019-12-05 | Stop reason: HOSPADM

## 2019-12-03 RX ORDER — DABIGATRAN ETEXILATE 150 MG/1
150 CAPSULE ORAL EVERY 12 HOURS
Status: DISCONTINUED | OUTPATIENT
Start: 2019-12-03 | End: 2019-12-05 | Stop reason: HOSPADM

## 2019-12-03 RX ORDER — GUAIFENESIN 100 MG/5ML
81 LIQUID (ML) ORAL DAILY
Status: DISCONTINUED | OUTPATIENT
Start: 2019-12-04 | End: 2019-12-05 | Stop reason: HOSPADM

## 2019-12-03 RX ORDER — AMOXICILLIN 250 MG
1 CAPSULE ORAL DAILY
Status: DISCONTINUED | OUTPATIENT
Start: 2019-12-04 | End: 2019-12-05 | Stop reason: HOSPADM

## 2019-12-03 RX ADMIN — POTASSIUM BICARBONATE 40 MEQ: 782 TABLET, EFFERVESCENT ORAL at 17:28

## 2019-12-03 RX ADMIN — LIDOCAINE HYDROCHLORIDE 1 G: 10 INJECTION, SOLUTION INFILTRATION; PERINEURAL at 15:02

## 2019-12-03 RX ADMIN — DABIGATRAN ETEXILATE MESYLATE 150 MG: 150 CAPSULE ORAL at 21:30

## 2019-12-03 RX ADMIN — VANCOMYCIN HYDROCHLORIDE 2000 MG: 10 INJECTION, POWDER, LYOPHILIZED, FOR SOLUTION INTRAVENOUS at 15:37

## 2019-12-03 RX ADMIN — METOPROLOL TARTRATE 25 MG: 25 TABLET ORAL at 17:33

## 2019-12-03 RX ADMIN — Medication 10 ML: at 21:33

## 2019-12-03 RX ADMIN — TAMSULOSIN HYDROCHLORIDE 0.4 MG: 0.4 CAPSULE ORAL at 21:30

## 2019-12-03 RX ADMIN — TUBERCULIN PURIFIED PROTEIN DERIVATIVE 5 UNITS: 5 INJECTION, SOLUTION INTRADERMAL at 17:29

## 2019-12-03 RX ADMIN — FAMOTIDINE 20 MG: 20 TABLET ORAL at 17:33

## 2019-12-03 NOTE — PROGRESS NOTES
Vancomycin Consult MD ordering: Ivon Figueredo ID following? no Indication: UTI 
DOT:  3  days Goal level(s): 10 - 20 Ht Readings from Last 1 Encounters:  
19 6' 2\" (1.88 m) Wt Readings from Last 1 Encounters:  
19 117.9 kg (260 lb) Temp (24hrs), Av.3 °F (36.8 °C), Min:97.6 °F (36.4 °C), Max:99 °F (37.2 °C) Dosing weight: 118 kg 
78 y.o. Date:  Dose/Freq Admin Times Level/Time:  
12/3/19 Vanc 2 gm IV x 1 dose 1537   
19 Vanc 2 gm IV q12h (0400) Recent Labs 19 
1232 19 
1225 19 
1224 BUN  --  8  --   
CREA  --  1.01  --   
WBC  --  5.9  --   
PCT  --   --  <0.05 LACPOC 1.68  --   --   
 
Estimated Creatinine Clearance: 78.3 mL/min (based on SCr of 1.01 mg/dL). Day 1 Assessment and Plan: 
Vancomycin dose initiated at 2 gm IV x 1 dose. Vancomycin dose continued at 2 gm IV every 12 hours. Next dose is due tomorrow at 0400. Will continue to follow. Thank you, Tico Lance, PharmD

## 2019-12-03 NOTE — ED TRIAGE NOTES
Seen here Thursday and dx with UTI. Has prostate cancer and gets frequent UTIs. Pt has been getting weaker and weaker since Sunday. [facility] brought pt in from home. Pt states it is easier to urinate since starting antibiotics but he is still not able to go normally. , did not meet sepsis criteria with EMS.  He does have irregular heartbeat, but this is patient's normal.

## 2019-12-03 NOTE — H&P
HOSPITALIST H&P/CONSULT 
NAME:  Marcy Huynh Age:  78 y.o. 
:   1940 MRN:   [id number] PCP: Dominic Archuleta MD 
Consulting MD: Treatment Team: Attending Provider: Tamara Suarez MD; Primary Nurse: Joshua Kne RN 
HPI:  
Patient is an 78years old AAM with hx of A.fib on Pradaxa, HTN, dCHFpEF, morbid obesity, prostate cancer s/p surgery/radiation, depression brought in for generalized weakness and difficulty in ambulation for past 3 days. As per daughter, pt is having poor oral intake, not really getting out of bed due to lethargy. Pt was recently discharged to home from rehab. Pt is having recurrent UTI. Pt was in ER on , was discharged on [address]  for UTI. Urine cx showed E.coli and S.aureus with extended resistance to abx. Pt denies chest pain, SOB, abdominal pain, diarrhea, nausea/vomiting, fever, chills. ER work up is unremarkable except for hypokalemia 3.3, UA with large leukocyte esterase and trace bacteria. Complete ROS done and is as stated in HPI or otherwise negative Past Medical History:  
Diagnosis Date  Arrhythmia   
 pt takes pradaxa  Arthritis  Atrial fibrillation ([address] 2012  CAD (coronary artery disease)  Cancer [facility])   
 prostate  Dementia ([address]  Depression 2012  GERD (gastroesophageal reflux disease)   
 controlled with medication  Heart failure ([address]   
 pt takes lasix as needed, reports SOB with exertion  History of prostate cancer 2014  Hypertension   
 controlled with medication  Morbid obesity ([address]  Osteoarthritis of right knee 2012  Recurrent major depressive disorder, in full remission ([address] 2017  Total knee replacement status 2012  Vitamin B12 deficiency 2017 Past Surgical History:  
Procedure Laterality Date  HX ORTHOPAEDIC    
 left TKA  HX PACEMAKER  2012 [employer] pacemaker  HX PROSTATECTOMY Prior to Admission Medications Prescriptions Last Dose Informant Patient Reported? Taking? ARIPiprazole (ABILIFY) 10 mg tablet   No No  
Sig: TAKE 1 TABLET BY MOUTH EVERY DAY  
acetaminophen (TYLENOL ARTHRITIS PAIN) 650 mg CR tablet   Yes No  
Sig: Take 650 mg by mouth every six (6) hours as needed for Pain. aspirin 81 mg chewable tablet   No No  
Sig: Take 1 Tab by mouth daily. Indications: prevention of cerebrovascular accident  
bicalutamide (CASODEX) 50 mg tablet   No No  
Sig: TAKE 1 TABLET BY MOUTH EVERY DAY  
cefdinir (OMNICEF) 300 mg capsule   No No  
Sig: Take 1 Cap by mouth two (2) times a day for 7 days. dabigatran etexilate (PRADAXA) 150 mg capsule   No No  
Sig: Take 1 Cap by mouth two (2) times a day for 30 days. Indications: Treatment to Prevent Blood Clots in Chronic Atrial Fibrillation  
donepezil (ARICEPT) 5 mg tablet   No No  
Sig: TAKE 1 TABLET BY MOUTH AT BEDTIME  
famotidine (PEPCID) 20 mg tablet   No No  
Sig: Take 1 Tab by mouth two (2) times a day. famotidine (PEPCID) 20 mg tablet   No No  
Sig: TAKE 1 TAB BY MOUTH TWO (2) TIMES A DAY. furosemide (LASIX) 40 mg tablet   No No  
Sig: Take 40 mg po daily PRN ONLY for evidence of fluid retention  
levomilnacipran (FETZIMA) 40 mg ER capsule   No No  
Sig: TAKE 1 CAP BY MOUTH DAILY. metoprolol tartrate (LOPRESSOR) 25 mg tablet   No No  
Sig: Take 1 Tab by mouth two (2) times a day. multivitamin, tx-iron-ca-min (THERA-M W/ IRON) 9 mg iron-400 mcg tab tablet   Yes No  
Sig: Take 1 Tab by mouth daily. polyethylene glycol (MIRALAX) 17 gram/dose powder   Yes No  
Sig: Take 17 g by mouth daily. Indications: constipation  
senna-docusate (SENNA LAXATIVE-STOOL SOFTENER) 8.6-50 mg per tablet   Yes No  
Sig: Take 1 Tab by mouth daily. tamsulosin (FLOMAX) 0.4 mg capsule   No No  
Sig: Take 1 Cap by mouth nightly. Facility-Administered Medications: None No Known Allergies Social History Tobacco Use  
  Smoking status: Never Smoker  Smokeless tobacco: Never Used Substance Use Topics  Alcohol use: No  
  
Family History Problem Relation Age of Onset  Cancer Father Objective:  
 
Visit Vitals Pulse 60 Temp 99 °F (37.2 °C) Resp 18 Ht 6' 2\" (1.88 m) Wt 117.9 kg (260 lb) SpO2 96% BMI 33.38 kg/m² Temp (24hrs), Av.6 °F (37 °C), Min:98.2 °F (36.8 °C), Max:99 °F (37.2 °C) Oxygen Therapy O2 Sat (%): 96 % (19 1222) O2 Device: Room air (19 1222) Physical Exam: 
General:    Alert, cooperative, NAD, morbidly obese Head:   Normocephalic, without obvious abnormality, atraumatic. Nose:  Nares normal. No drainage or sinus tenderness. Lungs:   Clear to auscultation bilaterally. No Wheezing or Rhonchi. No rales. Heart:   Regular rate and rhythm,  no murmur, rub or gallop. Abdomen:   Soft, non-tender. Not distended. Bowel sounds normal.  
Extremities: No cyanosis. Trace pedal edema. No clubbing Skin:     Bilateral chronic venous insufficiency ulcers in LE, with hyperpigmentation Neurologic: Alert and oriented x 3, no focal deficits Data Review:  
Recent Results (from the past 24 hour(s)) PROCALCITONIN Collection Time: 19 12:24 PM  
Result Value Ref Range Procalcitonin <0.05 ng/mL TROPONIN I Collection Time: 19 12:24 PM  
Result Value Ref Range Troponin-I, Qt. 1.02 (HH) 0.02 - 0.05 NG/ML  
CBC WITH AUTOMATED DIFF Collection Time: 19 12:25 PM  
Result Value Ref Range WBC 5.9 4.3 - 11.1 K/uL  
 RBC 4.62 4.23 - 5.6 M/uL  
 HGB 14.0 13.6 - 17.2 g/dL HCT 43.4 41.1 - 50.3 % MCV 93.9 79.6 - 97.8 FL  
 MCH 30.3 26.1 - 32.9 PG  
 MCHC 32.3 31.4 - 35.0 g/dL  
 RDW 14.2 11.9 - 14.6 % PLATELET 919 938 - 343 K/uL MPV 9.9 9.4 - 12.3 FL ABSOLUTE NRBC 0.00 0.0 - 0.2 K/uL  
 DF AUTOMATED NEUTROPHILS 70 43 - 78 % LYMPHOCYTES 21 13 - 44 % MONOCYTES 7 4.0 - 12.0 % EOSINOPHILS 2 0.5 - 7.8 % BASOPHILS 0 0.0 - 2.0 % IMMATURE GRANULOCYTES 0 0.0 - 5.0 %  
 ABS. NEUTROPHILS 4.1 1.7 - 8.2 K/UL  
 ABS. LYMPHOCYTES 1.3 0.5 - 4.6 K/UL  
 ABS. MONOCYTES 0.4 0.1 - 1.3 K/UL  
 ABS. EOSINOPHILS 0.1 0.0 - 0.8 K/UL  
 ABS. BASOPHILS 0.0 0.0 - 0.2 K/UL  
 ABS. IMM. GRANS. 0.0 0.0 - 0.5 K/UL METABOLIC PANEL, COMPREHENSIVE Collection Time: 12/03/19 12:25 PM  
Result Value Ref Range Sodium 144 136 - 145 mmol/L Potassium 3.3 (L) 3.5 - 5.1 mmol/L Chloride 111 (H) 98 - 107 mmol/L  
 CO2 28 21 - 32 mmol/L Anion gap 5 (L) 7 - 16 mmol/L Glucose 100 65 - 100 mg/dL BUN 8 8 - 23 MG/DL Creatinine 1.01 0.8 - 1.5 MG/DL  
 GFR est AA >60 >60 ml/min/1.73m2 GFR est non-AA >60 >60 ml/min/1.73m2 Calcium 9.7 8.3 - 10.4 MG/DL Bilirubin, total 0.8 0.2 - 1.1 MG/DL  
 ALT (SGPT) 20 12 - 65 U/L  
 AST (SGOT) 28 15 - 37 U/L Alk. phosphatase 104 50 - 136 U/L Protein, total 6.9 6.3 - 8.2 g/dL Albumin 3.1 (L) 3.2 - 4.6 g/dL Globulin 3.8 (H) 2.3 - 3.5 g/dL A-G Ratio 0.8 (L) 1.2 - 3.5 POC LACTIC ACID Collection Time: 12/03/19 12:32 PM  
Result Value Ref Range Lactic Acid (POC) 1.68 0.5 - 1.9 mmol/L  
EKG, 12 LEAD, INITIAL Collection Time: 12/03/19 12:46 PM  
Result Value Ref Range Ventricular Rate 63 BPM  
 Atrial Rate 375 BPM  
 QRS Duration 164 ms Q-T Interval 468 ms QTC Calculation (Bezet) 478 ms Calculated P Axis 55 degrees Calculated R Axis -78 degrees Calculated T Axis 94 degrees Diagnosis Ventricular-paced rhythm with premature ventricular or aberrantly conducted  
complexes Abnormal ECG When compared with ECG of 27-NOV-2019 18:04, No significant change was found Confirmed by ST MAHNAZ MORTENSEN MD (), RAHUL VILLAVICENCIO ([id number]) on 12/3/2019 2:16:35 PM 
  
URINALYSIS W/ RFLX MICROSCOPIC Collection Time: 12/03/19  1:22 PM  
Result Value Ref Range Color RED Appearance TURBID Specific gravity 1.010 1.001 - 1.023    
 pH (UA) 7.5 5.0 - 9.0 Protein 100 (A) NEG mg/dL Glucose NEGATIVE  mg/dL Ketone TRACE (A) NEG mg/dL Bilirubin NEGATIVE  NEG Blood LARGE (A) NEG Urobilinogen 1.0 0.2 - 1.0 EU/dL Nitrites NEGATIVE  NEG Leukocyte Esterase LARGE (A) NEG    
 WBC >100 0 /hpf  
 RBC  0 /hpf Bacteria TRACE 0 /hpf Other observations RESULTS VERIFIED MANUALLY Imaging [facility] ALL DIAGNOSTIC IMAGING PERSONALLY REVIEWED BY ME 
Assessment and Plan: Active Hospital Problems Diagnosis Date Noted  Hypokalemia 12/03/2019  Recurrent UTI 12/03/2019  Confusion and disorientation 12/03/2019  
 SSS (sick sinus syndrome) ([facility] 06/05/2019  Moderate dementia without behavioral disturbance ([facility] 07/26/2018  Generalized weakness 12/23/2016  Morbid obesity ([facility] 08/28/2012  Atrial fibrillation ([facility] 01/27/2012  
 HTN (hypertension) 01/27/2012 PLAN 
· Admit for recurrent UTI resulting in generalized weakness and confusion · Mentation is at baseline now. · Urine culture from 11/27 positive for E.coli and MRSA · Start pt on IV Vancomycin and Rocephin x 3 days · Given recurrent nature of infection, pt will need long term suppression prophylaxis with Nitrofurantoin. · F/u blood culture 
· PT/OT eval 
· Resume other home meds as reconciled in [facility] · Replacing potassium orally, recheck BMP in AM 
 
Code Status: full DVT prophylaxis with Pradaxa Anticipated discharge: 1-2 days pending clinical recovery Signed By: Luana Murguia MD   
 December 3, 2019

## 2019-12-03 NOTE — ED PROVIDER NOTES
77-year-old gentleman has a history of atrial fibrillation prostate cancer CHF hypertension and recurrent UTIs. Treated here few days ago for UTI. Since then he has had some increasing weakness. Unable to stand for the last day or 2. Weakness not focal but generalized. Some chills but no definite fever. He denies any cough or vomiting or diarrhea. No chest pain or shortness of breath. Review of old records revealed patient was placed on Omnicef about 6 days ago for urinary tract infection. Culture returned E. coli and subsequently MRSA. The history is provided by the patient. Bladder Infection This is a new problem. The current episode started more than 2 days ago. The problem occurs every urination. The problem has not changed since onset. There has been no fever. Associated symptoms include chills. Pertinent negatives include no vomiting, no hematuria, no hesitancy and no flank pain. He has tried antibiotics for the symptoms. His past medical history is significant for recurrent UTIs. Past Medical History:  
Diagnosis Date  Arrhythmia   
 pt takes pradaxa  Arthritis  Atrial fibrillation ([address] 1/27/2012  CAD (coronary artery disease)  Cancer [facility])   
 prostate  Dementia ([address]  Depression 8/28/2012  GERD (gastroesophageal reflux disease)   
 controlled with medication  Heart failure ([address]   
 pt takes lasix as needed, reports SOB with exertion  History of prostate cancer 8/4/2014  Hypertension   
 controlled with medication  Morbid obesity ([address]  Osteoarthritis of right knee 1/26/2012  Recurrent major depressive disorder, in full remission ([address] 11/30/2017  Total knee replacement status 1/26/2012  Vitamin B12 deficiency 7/23/2017 Past Surgical History:  
Procedure Laterality Date  HX ORTHOPAEDIC  2010  
 left TKA  HX PACEMAKER  8/30/2012 [employer] pacemaker  HX PROSTATECTOMY Family History: Problem Relation Age of Onset  Cancer Father Social History Socioeconomic History  Marital status:  Spouse name: Not on file  Number of children: Not on file  Years of education: Not on file  Highest education level: Not on file Occupational History  Not on file Social Needs  Financial resource strain: Not on file  Food insecurity:  
  Worry: Not on file Inability: Not on file  Transportation needs:  
  Medical: Not on file Non-medical: Not on file Tobacco Use  Smoking status: Never Smoker  Smokeless tobacco: Never Used Substance and Sexual Activity  Alcohol use: No  
 Drug use: No  
 Sexual activity: Never Lifestyle  Physical activity:  
  Days per week: Not on file Minutes per session: Not on file  Stress: Not on file Relationships  Social connections:  
  Talks on phone: Not on file Gets together: Not on file Attends Gnosticist service: Not on file Active member of club or organization: Not on file Attends meetings of clubs or organizations: Not on file Relationship status: Not on file  Intimate partner violence:  
  Fear of current or ex partner: Not on file Emotionally abused: Not on file Physically abused: Not on file Forced sexual activity: Not on file Other Topics Concern  Not on file Social History Narrative  Not on file ALLERGIES: Patient has no known allergies. Review of Systems Constitutional: Positive for chills and fatigue. Negative for fever. Respiratory: Negative for cough and shortness of breath. Cardiovascular: Negative for chest pain and palpitations. Gastrointestinal: Negative for abdominal pain, diarrhea and vomiting. Genitourinary: Negative for dysuria, flank pain, hematuria and hesitancy. Musculoskeletal: Negative for back pain and neck pain. Skin: Negative for color change and rash. Neurological: Positive for weakness (Generalized). Negative for syncope and headaches. All other systems reviewed and are negative. Vitals:  
 12/03/19 1149 Pulse: 60 Resp: 18 Temp: 98.2 °F (36.8 °C) SpO2: 97% Weight: 117.9 kg (260 lb) Height: 6' 2\" (1.88 m) Physical Exam 
Vitals signs and nursing note reviewed. Constitutional:   
   General: He is not in acute distress. Appearance: He is well-developed. HENT:  
   Head: Normocephalic and atraumatic. Right Ear: External ear normal.  
   Left Ear: External ear normal.  
   Mouth/Throat:  
   Pharynx: No oropharyngeal exudate. Eyes:  
   Conjunctiva/sclera: Conjunctivae normal.  
   Pupils: Pupils are equal, round, and reactive to light. Neck: Musculoskeletal: Normal range of motion and neck supple. Cardiovascular:  
   Rate and Rhythm: Normal rate and regular rhythm. Heart sounds: No murmur. Pulmonary:  
   Effort: No respiratory distress. Breath sounds: Normal breath sounds. Abdominal:  
   General: Bowel sounds are normal.  
   Palpations: Abdomen is soft. There is no mass. Tenderness: There is no tenderness. There is no guarding or rebound. Hernia: No hernia is present. Skin: 
   General: Skin is warm and dry. Neurological:  
   General: No focal deficit present. Mental Status: He is alert and oriented to person, place, and time. Gait: Gait normal.  
   Comments: Nl speech No obvious drift. Normal finger-nose testing. Can lift either leg off the stretcher but seems to be generally weak. Psychiatric:     
   Speech: Speech normal.  
 
  
 
MDM Number of Diagnoses or Management Options Diagnosis management comments: Check for persistent infection, sepsis, dehydration, electrolyte abnormality. Check EKG and troponin. Amount and/or Complexity of Data Reviewed Clinical lab tests: ordered and reviewed Tests in the radiology section of CPT®: ordered and reviewed Tests in the medicine section of CPT®: ordered and reviewed Review and summarize past medical records: yes Independent visualization of images, tracings, or specimens: yes Risk of Complications, Morbidity, and/or Mortality Presenting problems: moderate Diagnostic procedures: minimal 
Management options: low Patient Progress Patient progress: stable Procedures Results Include: 
 
Recent Results (from the past 24 hour(s)) PROCALCITONIN Collection Time: 12/03/19 12:24 PM  
Result Value Ref Range Procalcitonin <0.05 ng/mL TROPONIN I Collection Time: 12/03/19 12:24 PM  
Result Value Ref Range Troponin-I, Qt. 1.02 (HH) 0.02 - 0.05 NG/ML  
CBC WITH AUTOMATED DIFF Collection Time: 12/03/19 12:25 PM  
Result Value Ref Range WBC 5.9 4.3 - 11.1 K/uL  
 RBC 4.62 4.23 - 5.6 M/uL  
 HGB 14.0 13.6 - 17.2 g/dL HCT 43.4 41.1 - 50.3 % MCV 93.9 79.6 - 97.8 FL  
 MCH 30.3 26.1 - 32.9 PG  
 MCHC 32.3 31.4 - 35.0 g/dL  
 RDW 14.2 11.9 - 14.6 % PLATELET 531 753 - 007 K/uL MPV 9.9 9.4 - 12.3 FL ABSOLUTE NRBC 0.00 0.0 - 0.2 K/uL  
 DF AUTOMATED NEUTROPHILS 70 43 - 78 % LYMPHOCYTES 21 13 - 44 % MONOCYTES 7 4.0 - 12.0 % EOSINOPHILS 2 0.5 - 7.8 % BASOPHILS 0 0.0 - 2.0 % IMMATURE GRANULOCYTES 0 0.0 - 5.0 %  
 ABS. NEUTROPHILS 4.1 1.7 - 8.2 K/UL  
 ABS. LYMPHOCYTES 1.3 0.5 - 4.6 K/UL  
 ABS. MONOCYTES 0.4 0.1 - 1.3 K/UL  
 ABS. EOSINOPHILS 0.1 0.0 - 0.8 K/UL  
 ABS. BASOPHILS 0.0 0.0 - 0.2 K/UL  
 ABS. IMM. GRANS. 0.0 0.0 - 0.5 K/UL METABOLIC PANEL, COMPREHENSIVE Collection Time: 12/03/19 12:25 PM  
Result Value Ref Range Sodium 144 136 - 145 mmol/L Potassium 3.3 (L) 3.5 - 5.1 mmol/L Chloride 111 (H) 98 - 107 mmol/L  
 CO2 28 21 - 32 mmol/L Anion gap 5 (L) 7 - 16 mmol/L Glucose 100 65 - 100 mg/dL BUN 8 8 - 23 MG/DL Creatinine 1.01 0.8 - 1.5 MG/DL  
 GFR est AA >60 >60 ml/min/1.73m2 GFR est non-AA >60 >60 ml/min/1.73m2 Calcium 9.7 8.3 - 10.4 MG/DL Bilirubin, total 0.8 0.2 - 1.1 MG/DL  
 ALT (SGPT) 20 12 - 65 U/L  
 AST (SGOT) 28 15 - 37 U/L Alk. phosphatase 104 50 - 136 U/L Protein, total 6.9 6.3 - 8.2 g/dL Albumin 3.1 (L) 3.2 - 4.6 g/dL Globulin 3.8 (H) 2.3 - 3.5 g/dL A-G Ratio 0.8 (L) 1.2 - 3.5 POC LACTIC ACID Collection Time: 12/03/19 12:32 PM  
Result Value Ref Range Lactic Acid (POC) 1.68 0.5 - 1.9 mmol/L  
EKG, 12 LEAD, INITIAL Collection Time: 12/03/19 12:46 PM  
Result Value Ref Range Ventricular Rate 63 BPM  
 Atrial Rate 375 BPM  
 QRS Duration 164 ms Q-T Interval 468 ms QTC Calculation (Bezet) 478 ms Calculated P Axis 55 degrees Calculated R Axis -78 degrees Calculated T Axis 94 degrees Diagnosis Ventricular-paced rhythm with premature ventricular or aberrantly conducted  
complexes Abnormal ECG When compared with ECG of 27-NOV-2019 18:04, No significant change was found Confirmed by ST MAHNAZ MORTENSEN MD (), RAHUL VILLAVICENCIO ([id number]) on 12/3/2019 2:16:35 PM 
  
URINALYSIS W/ RFLX MICROSCOPIC Collection Time: 12/03/19  1:22 PM  
Result Value Ref Range Color RED Appearance TURBID Specific gravity 1.010 1.001 - 1.023    
 pH (UA) 7.5 5.0 - 9.0 Protein 100 (A) NEG mg/dL Glucose NEGATIVE  mg/dL Ketone TRACE (A) NEG mg/dL Bilirubin NEGATIVE  NEG Blood LARGE (A) NEG Urobilinogen 1.0 0.2 - 1.0 EU/dL Nitrites NEGATIVE  NEG Leukocyte Esterase LARGE (A) NEG    
 WBC >100 0 /hpf  
 RBC  0 /hpf Bacteria TRACE 0 /hpf Other observations RESULTS VERIFIED MANUALLY Xr Chest [facility] Result Date: 12/3/2019 EXAM: CHEST X-RAY, 1 VIEW INDICATION: Cough. COMPARISON: Chest x-ray dated 10/30/2019 TECHNIQUE: Single AP view of the chest was obtained. FINDINGS: A pacer lead projects over the right heart. The lungs are clear and the pulmonary vasculature is normal. No pneumothorax or pleural effusion.  The cardiac silhouette is enlarged but unchanged. Degenerative changes of the left shoulder. IMPRESSION: No radiographic evidence of acute cardiopulmonary disease. UTI persist.  Borderline fever. Too weak to get out of bed. Discussed with hospitalist regarding mission. May require physical therapy or some strengthening there. Check for fever.

## 2019-12-03 NOTE — PROGRESS NOTES
Assessment complete via flow sheet. Pt alert, orientated to self, place. Respirations even and unlabored, diminished in bases. S1 S2 auscultated,irregular. Pt on room air. Pt reports pain level of 0 out of 10. Bowel sounds active, abdomen semi soft. Brief changed, good janice care given. Denies other needs. Bed in lowest position, side rails up 3, call bell in reach. Instructed to call for assistance. Pt verbalized understanding. Plan of care reviewed with patient.

## 2019-12-03 NOTE — PROGRESS NOTES
12/03/19 1656 Dual Skin Pressure Injury Assessment Dual Skin Pressure Injury Assessment WDL Second Care Provider (Based on Facility Policy) Erica Moore

## 2019-12-03 NOTE — ED NOTES
TRANSFER - OUT REPORT: 
 
Verbal report given to Nori Claude, RN(name) on Tylor Marc  being transferred to 348(unit) for routine progression of care Report consisted of patients Situation, Background, Assessment and  
Recommendations(SBAR). Information from the following report(s) SBAR, ED Summary, [facility] and Recent Results was reviewed with the receiving nurse. Lines:  
Peripheral IV 12/03/19 Left Hand (Active) Site Assessment Clean, dry, & intact 12/3/2019 12:22 PM  
Phlebitis Assessment 0 12/3/2019 12:22 PM  
Infiltration Assessment 0 12/3/2019 12:22 PM  
Dressing Status Clean, dry, & intact 12/3/2019 12:22 PM  
Hub Color/Line Status Blue 12/3/2019 12:22 PM  
Alcohol Cap Used No 12/3/2019 12:22 PM  
  
 
Opportunity for questions and clarification was provided. Patient transported with: 
 [employer]

## 2019-12-03 NOTE — ED NOTES
Patient placed on Cefdinir at home, added that to staph susceptibilities. Called to check on patient. Left VM for patients daughter to return call.

## 2019-12-04 LAB
ANION GAP SERPL CALC-SCNC: 5 MMOL/L (ref 7–16)
BUN SERPL-MCNC: 7 MG/DL (ref 8–23)
CALCIUM SERPL-MCNC: 9.5 MG/DL (ref 8.3–10.4)
CHLORIDE SERPL-SCNC: 111 MMOL/L (ref 98–107)
CO2 SERPL-SCNC: 26 MMOL/L (ref 21–32)
CREAT SERPL-MCNC: 0.88 MG/DL (ref 0.8–1.5)
ERYTHROCYTE [DISTWIDTH] IN BLOOD BY AUTOMATED COUNT: 14 % (ref 11.9–14.6)
GLUCOSE SERPL-MCNC: 84 MG/DL (ref 65–100)
HCT VFR BLD AUTO: 40.7 % (ref 41.1–50.3)
HGB BLD-MCNC: 13.5 G/DL (ref 13.6–17.2)
MCH RBC QN AUTO: 30.9 PG (ref 26.1–32.9)
MCHC RBC AUTO-ENTMCNC: 33.2 G/DL (ref 31.4–35)
MCV RBC AUTO: 93.1 FL (ref 79.6–97.8)
MM INDURATION POC: 0 MM (ref 0–5)
NRBC # BLD: 0 K/UL (ref 0–0.2)
PLATELET # BLD AUTO: 184 K/UL (ref 150–450)
PMV BLD AUTO: 10 FL (ref 9.4–12.3)
POTASSIUM SERPL-SCNC: 3.8 MMOL/L (ref 3.5–5.1)
PPD POC: NEGATIVE NEGATIVE
RBC # BLD AUTO: 4.37 M/UL (ref 4.23–5.6)
SODIUM SERPL-SCNC: 142 MMOL/L (ref 136–145)
WBC # BLD AUTO: 6.4 K/UL (ref 4.3–11.1)

## 2019-12-04 PROCEDURE — 74011250636 HC RX REV CODE- 250/636: Performed by: HOSPITALIST

## 2019-12-04 PROCEDURE — 96366 THER/PROPH/DIAG IV INF ADDON: CPT

## 2019-12-04 PROCEDURE — 99218 HC RM OBSERVATION: CPT

## 2019-12-04 PROCEDURE — 97530 THERAPEUTIC ACTIVITIES: CPT

## 2019-12-04 PROCEDURE — 97165 OT EVAL LOW COMPLEX 30 MIN: CPT

## 2019-12-04 PROCEDURE — 97163 PT EVAL HIGH COMPLEX 45 MIN: CPT

## 2019-12-04 PROCEDURE — 36415 COLL VENOUS BLD VENIPUNCTURE: CPT

## 2019-12-04 PROCEDURE — 85027 COMPLETE CBC AUTOMATED: CPT

## 2019-12-04 PROCEDURE — 80048 BASIC METABOLIC PNL TOTAL CA: CPT

## 2019-12-04 PROCEDURE — 74011250637 HC RX REV CODE- 250/637: Performed by: HOSPITALIST

## 2019-12-04 PROCEDURE — 96367 TX/PROPH/DG ADDL SEQ IV INF: CPT

## 2019-12-04 PROCEDURE — 74011000258 HC RX REV CODE- 258: Performed by: HOSPITALIST

## 2019-12-04 RX ORDER — DONEPEZIL HYDROCHLORIDE 5 MG/1
10 TABLET, FILM COATED ORAL DAILY
Status: DISCONTINUED | OUTPATIENT
Start: 2019-12-05 | End: 2019-12-05 | Stop reason: HOSPADM

## 2019-12-04 RX ADMIN — DABIGATRAN ETEXILATE MESYLATE 150 MG: 150 CAPSULE ORAL at 08:27

## 2019-12-04 RX ADMIN — DABIGATRAN ETEXILATE MESYLATE 150 MG: 150 CAPSULE ORAL at 22:20

## 2019-12-04 RX ADMIN — SENNOSIDES AND DOCUSATE SODIUM 1 TABLET: 8.6; 5 TABLET ORAL at 08:28

## 2019-12-04 RX ADMIN — POTASSIUM BICARBONATE 40 MEQ: 782 TABLET, EFFERVESCENT ORAL at 18:05

## 2019-12-04 RX ADMIN — ASPIRIN 81 MG 81 MG: 81 TABLET ORAL at 08:28

## 2019-12-04 RX ADMIN — FAMOTIDINE 20 MG: 20 TABLET ORAL at 08:26

## 2019-12-04 RX ADMIN — VANCOMYCIN HYDROCHLORIDE 2000 MG: 10 INJECTION, POWDER, LYOPHILIZED, FOR SOLUTION INTRAVENOUS at 04:23

## 2019-12-04 RX ADMIN — Medication 10 ML: at 14:26

## 2019-12-04 RX ADMIN — VANCOMYCIN HYDROCHLORIDE 2000 MG: 10 INJECTION, POWDER, LYOPHILIZED, FOR SOLUTION INTRAVENOUS at 16:12

## 2019-12-04 RX ADMIN — BICALUTAMIDE 50 MG: 50 TABLET, FILM COATED ORAL at 09:00

## 2019-12-04 RX ADMIN — METOPROLOL TARTRATE 25 MG: 25 TABLET ORAL at 08:27

## 2019-12-04 RX ADMIN — ARIPIPRAZOLE 10 MG: 5 TABLET ORAL at 08:26

## 2019-12-04 RX ADMIN — CEFTRIAXONE SODIUM 2 G: 2 INJECTION, POWDER, FOR SOLUTION INTRAMUSCULAR; INTRAVENOUS at 08:29

## 2019-12-04 RX ADMIN — METOPROLOL TARTRATE 25 MG: 25 TABLET ORAL at 18:05

## 2019-12-04 RX ADMIN — Medication 10 ML: at 06:33

## 2019-12-04 RX ADMIN — DONEPEZIL HYDROCHLORIDE 5 MG: 5 TABLET, FILM COATED ORAL at 22:20

## 2019-12-04 RX ADMIN — FAMOTIDINE 20 MG: 20 TABLET ORAL at 18:05

## 2019-12-04 RX ADMIN — Medication 10 ML: at 22:20

## 2019-12-04 RX ADMIN — TAMSULOSIN HYDROCHLORIDE 0.4 MG: 0.4 CAPSULE ORAL at 22:20

## 2019-12-04 RX ADMIN — POLYETHYLENE GLYCOL (3350) 17 G: 17 POWDER, FOR SOLUTION ORAL at 08:29

## 2019-12-04 RX ADMIN — POTASSIUM BICARBONATE 40 MEQ: 782 TABLET, EFFERVESCENT ORAL at 08:37

## 2019-12-04 NOTE — PROGRESS NOTES
Shift assessment complete. A&OX2- to self & place. Lungs clear on auscultation. Respirations present. Even and unlabored. No s/s of distress. No c/o pain at this time. Call light within reach. Encouraged patient to call for assistance. Patient verbalizes understanding. See Doc Flowsheet for assessment details. Patient resting in bed. Bed alarm in progress. Door open for observation.

## 2019-12-04 NOTE — PROGRESS NOTES
Vancomycin Consult MD ordering: Rohini DAVIS following? no Indication: UTI 
DOT:  3  days Goal level(s): 10 - 20 Ht Readings from Last 1 Encounters:  
19 6' 2\" (1.88 m) Wt Readings from Last 1 Encounters:  
19 117.9 kg (260 lb) Temp (24hrs), Av.9 °F (36.6 °C), Min:97.5 °F (36.4 °C), Max:98.9 °F (37.2 °C) Dosing weight: 118 kg 
78 y.o. Date:  Dose/Freq Admin Times Level/Time:  
12/3/19 Vanc 2 gm IV x 1 dose 1537    
19 Vanc 2 gm IV q12h 0423, 1612    
19 Vanc 2 gm IV q12h  (0400) Tr due at 0300 Recent Labs 19 
0546 19 
1232 19 
1225 19 
1224 BUN 7*  --  8  --   
CREA 0.88  --  1.01  --   
WBC 6.4  --  5.9  --   
PCT  --   --   --  <0.05 LACPOC  --  1.68  --   --   
 
Estimated Creatinine Clearance: 89.9 mL/min (based on SCr of 0.88 mg/dL). No results found for: Roney Vargas Day 2 Assessment and Plan: 
Continue current dose of Vancomycin 2 gm IV every 12 hours. Trough level scheduled for tomorrow at 0300, prior to 0400 dose. Will continue to follow. Thank you, Sergei Bowens, KarleneD

## 2019-12-04 NOTE — PROGRESS NOTES
Hospitalist Progress Note 2019 Admit Date: 12/3/2019 11:35 AM  
NAME: Gera Ramirez :  1940 MRN:  [id number] Attending: Sidney Dash MD 
PCP:  Azalia Roberson MD 
 
SUBJECTIVE:  
 
Gera Ramirez is a 78years old AAM with hx of A.fib on Pradaxa, HTN, dCHFpEF, morbid obesity, prostate cancer s/p surgery/radiation, depression admitted on 12/3 for recurrent UTI, generalized weakness and difficulty in ambulation. : Pt reports feeling well. Sitting up in bed, eating without any assistance. Denies chest pain, abdominal pain, nausea/vomiting. Review of Systems negative with exception of pertinent positives noted above PHYSICAL EXAM  
 
 
Visit Vitals /74 (BP 1 Location: Left arm, BP Patient Position: At rest) Pulse 60 Temp 98.2 °F (36.8 °C) Resp 16 Ht 6' 2\" (1.88 m) Wt 117.9 kg (260 lb) SpO2 96% BMI 33.38 kg/m² Temp (24hrs), Av.2 °F (36.8 °C), Min:97.5 °F (36.4 °C), Max:99 °F (37.2 °C) Oxygen Therapy O2 Sat (%): 96 % (19 0410) Pulse via Oximetry: 67 beats per minute (19 1701) O2 Device: Room air (19 1923) No intake or output data in the 24 hours ending 19 0745 General: Alert, awake, NAD, morbidly obese Head:  Atraumatic Normocephalic. Eyes:  PERRLA, EOMI, Anicteric. ENT:  No discharges/lesions. Lungs:  CTA Bilaterally. CVS:  Regular rate and rhythm,  No murmur, rub, or gallop, No JVD, No lower   extremity edema. Abdomen: Soft, Non distended, Non tender, Positive bowel sounds. MSK:  No deformities, lesions, Spontaneously moves extremities. Neurologic:  AOx3. No focal deficits Psychiatry:      No anxiety/Depression Skin:   Bilateral chronic venous insufficiency ulcers in LE with hyperpigmentation Heme/Lymph/Immune:  No petechiae, ecchymoses, overt signs of bleeding or    lymphadenopathy noted. Recent Results (from the past 24 hour(s)) CULTURE, BLOOD Collection Time: 12/03/19 12:24 PM  
Result Value Ref Range Special Requests: LEFT 
HAND Culture result: NO GROWTH AFTER 18 HOURS    
PROCALCITONIN Collection Time: 12/03/19 12:24 PM  
Result Value Ref Range Procalcitonin <0.05 ng/mL TROPONIN I Collection Time: 12/03/19 12:24 PM  
Result Value Ref Range Troponin-I, Qt. 1.02 (HH) 0.02 - 0.05 NG/ML  
CBC WITH AUTOMATED DIFF Collection Time: 12/03/19 12:25 PM  
Result Value Ref Range WBC 5.9 4.3 - 11.1 K/uL  
 RBC 4.62 4.23 - 5.6 M/uL  
 HGB 14.0 13.6 - 17.2 g/dL HCT 43.4 41.1 - 50.3 % MCV 93.9 79.6 - 97.8 FL  
 MCH 30.3 26.1 - 32.9 PG  
 MCHC 32.3 31.4 - 35.0 g/dL  
 RDW 14.2 11.9 - 14.6 % PLATELET 455 922 - 479 K/uL MPV 9.9 9.4 - 12.3 FL ABSOLUTE NRBC 0.00 0.0 - 0.2 K/uL  
 DF AUTOMATED NEUTROPHILS 70 43 - 78 % LYMPHOCYTES 21 13 - 44 % MONOCYTES 7 4.0 - 12.0 % EOSINOPHILS 2 0.5 - 7.8 % BASOPHILS 0 0.0 - 2.0 % IMMATURE GRANULOCYTES 0 0.0 - 5.0 %  
 ABS. NEUTROPHILS 4.1 1.7 - 8.2 K/UL  
 ABS. LYMPHOCYTES 1.3 0.5 - 4.6 K/UL  
 ABS. MONOCYTES 0.4 0.1 - 1.3 K/UL  
 ABS. EOSINOPHILS 0.1 0.0 - 0.8 K/UL  
 ABS. BASOPHILS 0.0 0.0 - 0.2 K/UL  
 ABS. IMM. GRANS. 0.0 0.0 - 0.5 K/UL METABOLIC PANEL, COMPREHENSIVE Collection Time: 12/03/19 12:25 PM  
Result Value Ref Range Sodium 144 136 - 145 mmol/L Potassium 3.3 (L) 3.5 - 5.1 mmol/L Chloride 111 (H) 98 - 107 mmol/L  
 CO2 28 21 - 32 mmol/L Anion gap 5 (L) 7 - 16 mmol/L Glucose 100 65 - 100 mg/dL BUN 8 8 - 23 MG/DL Creatinine 1.01 0.8 - 1.5 MG/DL  
 GFR est AA >60 >60 ml/min/1.73m2 GFR est non-AA >60 >60 ml/min/1.73m2 Calcium 9.7 8.3 - 10.4 MG/DL Bilirubin, total 0.8 0.2 - 1.1 MG/DL  
 ALT (SGPT) 20 12 - 65 U/L  
 AST (SGOT) 28 15 - 37 U/L Alk. phosphatase 104 50 - 136 U/L Protein, total 6.9 6.3 - 8.2 g/dL Albumin 3.1 (L) 3.2 - 4.6 g/dL Globulin 3.8 (H) 2.3 - 3.5 g/dL A-G Ratio 0.8 (L) 1.2 - 3.5 CULTURE, BLOOD Collection Time: 12/03/19 12:26 PM  
Result Value Ref Range Special Requests: RIGHT 
FOREARM Culture result: NO GROWTH AFTER 18 HOURS    
POC LACTIC ACID Collection Time: 12/03/19 12:32 PM  
Result Value Ref Range Lactic Acid (POC) 1.68 0.5 - 1.9 mmol/L  
EKG, 12 LEAD, INITIAL Collection Time: 12/03/19 12:46 PM  
Result Value Ref Range Ventricular Rate 63 BPM  
 Atrial Rate 375 BPM  
 QRS Duration 164 ms Q-T Interval 468 ms QTC Calculation (Bezet) 478 ms Calculated P Axis 55 degrees Calculated R Axis -78 degrees Calculated T Axis 94 degrees Diagnosis Ventricular-paced rhythm with premature ventricular or aberrantly conducted  
complexes Abnormal ECG When compared with ECG of 27-NOV-2019 18:04, No significant change was found Confirmed by ST MAHNAZ MORTENSEN MD (), RAHUL VILLAVICENCIO ([id number]) on 12/3/2019 2:16:35 PM 
  
URINALYSIS W/ RFLX MICROSCOPIC Collection Time: 12/03/19  1:22 PM  
Result Value Ref Range Color RED Appearance TURBID Specific gravity 1.010 1.001 - 1.023    
 pH (UA) 7.5 5.0 - 9.0 Protein 100 (A) NEG mg/dL Glucose NEGATIVE  mg/dL Ketone TRACE (A) NEG mg/dL Bilirubin NEGATIVE  NEG Blood LARGE (A) NEG Urobilinogen 1.0 0.2 - 1.0 EU/dL Nitrites NEGATIVE  NEG Leukocyte Esterase LARGE (A) NEG    
 WBC >100 0 /hpf  
 RBC  0 /hpf Bacteria TRACE 0 /hpf Other observations RESULTS VERIFIED MANUALLY METABOLIC PANEL, BASIC Collection Time: 12/04/19  5:46 AM  
Result Value Ref Range Sodium 142 136 - 145 mmol/L Potassium 3.8 3.5 - 5.1 mmol/L Chloride 111 (H) 98 - 107 mmol/L  
 CO2 26 21 - 32 mmol/L Anion gap 5 (L) 7 - 16 mmol/L Glucose 84 65 - 100 mg/dL BUN 7 (L) 8 - 23 MG/DL Creatinine 0.88 0.8 - 1.5 MG/DL  
 GFR est AA >60 >60 ml/min/1.73m2 GFR est non-AA >60 >60 ml/min/1.73m2 Calcium 9.5 8.3 - 10.4 MG/DL  
CBC W/O DIFF Collection Time: 12/04/19  5:46 AM  
Result Value Ref Range WBC 6.4 4.3 - 11.1 K/uL  
 RBC 4.37 4.23 - 5.6 M/uL  
 HGB 13.5 (L) 13.6 - 17.2 g/dL HCT 40.7 (L) 41.1 - 50.3 % MCV 93.1 79.6 - 97.8 FL  
 MCH 30.9 26.1 - 32.9 PG  
 MCHC 33.2 31.4 - 35.0 g/dL  
 RDW 14.0 11.9 - 14.6 % PLATELET 681 839 - 365 K/uL MPV 10.0 9.4 - 12.3 FL ABSOLUTE NRBC 0.00 0.0 - 0.2 K/uL Imaging [facility] All diagnostic imaging personally reviewed by me. ASSESSMENT Hospital Problems as of 12/4/2019 Date Reviewed: 3/20/2019 Codes Class Noted - Resolved POA Hypokalemia ICD-10-CM: E87.6 ICD-9-CM: 276.8  12/3/2019 - Present Unknown Recurrent UTI ICD-10-CM: N39.0 ICD-9-CM: 599.0  12/3/2019 - Present Unknown Confusion and disorientation ICD-10-CM: R41.0 ICD-9-CM: 780.97  12/3/2019 - Present Unknown  
   
 SSS (sick sinus syndrome) (HCC) ICD-10-CM: I49.5 ICD-9-CM: 427.81  6/5/2019 - Present Yes Moderate dementia without behavioral disturbance (HCC) ICD-10-CM: F03.90 ICD-9-CM: 290.0  7/26/2018 - Present Yes Generalized weakness ICD-10-CM: R53.1 ICD-9-CM: 780.79  12/23/2016 - Present Unknown Morbid obesity ([facility] [address] (Chronic) ICD-10-CM: E66.01 
ICD-9-CM: 278.01  8/28/2012 - Present Yes Atrial fibrillation (HCC) (Chronic) ICD-10-CM: I48.91 
ICD-9-CM: 427.31  1/27/2012 - Present Yes HTN (hypertension) (Chronic) ICD-10-CM: I10 
ICD-9-CM: 401.9  1/27/2012 - Present Yes Plan: 
- continue IV Vancomycin and rocephin for 2 more days Switch to oral nitrofurantoin on discharge for suppression prophylaxis in view of recurrent UTI 
- PT/OT eval  
Continue other home meds as reconciled in [facility] Continue supportive care DVT Prophylaxis: pradaxa Dispo; discharge to home in next 1-2 days with [facility] [employer] MD

## 2019-12-04 NOTE — PROGRESS NOTES
Problem: Falls - Risk of 
Goal: *Absence of Falls Description Document Megan Gibson Fall Risk and appropriate interventions in the flowsheet. Outcome: Progressing Towards Goal 
Note: Fall Risk Interventions: 
  
 
Mentation Interventions: Door open when patient unattended, Eyeglasses and hearing aids, Reorient patient, Room close to nurse's station, Toileting rounds Medication Interventions: Assess postural VS orthostatic hypotension, Patient to call before getting OOB, Teach patient to arise slowly Elimination Interventions: Call light in reach, Patient to call for help with toileting needs, Toileting schedule/hourly rounds Problem: Falls - Risk of 
Goal: *Absence of Falls Description Document Megan Gibson Fall Risk and appropriate interventions in the flowsheet. Outcome: Progressing Towards Goal 
Note: Fall Risk Interventions: 
  
 
Mentation Interventions: Door open when patient unattended, Eyeglasses and hearing aids, Reorient patient, Room close to nurse's station, Toileting rounds Medication Interventions: Assess postural VS orthostatic hypotension, Patient to call before getting OOB, Teach patient to arise slowly Elimination Interventions: Call light in reach, Patient to call for help with toileting needs, Toileting schedule/hourly rounds Problem: Falls - Risk of 
Goal: *Absence of Falls Description Document Emgan Gibson Fall Risk and appropriate interventions in the flowsheet. Outcome: Progressing Towards Goal 
Note: Fall Risk Interventions: 
  
 
Mentation Interventions: Door open when patient unattended, Eyeglasses and hearing aids, Reorient patient, Room close to nurse's station, Toileting rounds Medication Interventions: Assess postural VS orthostatic hypotension, Patient to call before getting OOB, Teach patient to arise slowly Elimination Interventions: Call light in reach, Patient to call for help with toileting needs, Toileting schedule/hourly rounds

## 2019-12-04 NOTE — PROGRESS NOTES
Shift assessment complete. Pt oriented times 3 (disoriented to time). Respirations unlabored, even. Lung sounds diminished bilaterally. HR irregular. Abdomen soft, non tender. Bowel sounds active in all 4 quadrants. No c/o pain. Patient resting in bed. Bed in low, locked position. Door open for observation.

## 2019-12-04 NOTE — PROGRESS NOTES
Care Management Interventions PCP Verified by CM: Yes Mode of Transport at Discharge: Other (see comment) Transition of Care Consult (CM Consult): Home Health Reason Outside [place]: Patient already serviced by other home care/hospice agency Current Support Network: Lives with Caregiver Confirm Follow Up Transport: Family Plan discussed with Pt/Family/Caregiver: Yes Freedom of Choice Offered: Yes Discharge Location Discharge Placement: Home with home health Visited with pt regarding plans for discharge, pt came form home with family and current with [facility] services. Pt also has caregivers at home to provide extra assistance to family. PT/PPD orders written and  will continue to follow.

## 2019-12-04 NOTE — PROGRESS NOTES
Problem: Self Care Deficits Care Plan (Adult) Goal: *Acute Goals and Plan of Care (Insert Text) Description 1. Patient will perform grooming with supervision. 2. Patient will perform Upper body dressing with min assist. 
3. Patient will perform lower body dressing with mod assist 
4. Patient will perform upper body bathing with min assist.  
5. Patient will perform toilet transfers with min assist 
6. Patient will participate in 30 + minutes of ADL/ therapeutic exercise/therapeutic activity with min rest breaks to increase activity tolerance for self care. 7. Patient will perform ADL functional mobility in room with min assist. 
 
Goals to be achieved in 7 days. Outcome: Progressing Towards Goal 
  
OCCUPATIONAL THERAPY: Initial Assessment, Daily Note, and AM 12/4/2019 OBSERVATION:   
[place] / [place] / Product Type: Medicare /  
  
NAME/AGE/GENDER: Sole Dong is a 78 y.o. male PRIMARY DIAGNOSIS:  Generalized weakness [R53.1] Hypokalemia [E87.6] Recurrent UTI [N39.0] Confusion and disorientation [R41.0] <principal problem not specified> <principal problem not specified> 
  
  
ICD-10: Treatment Diagnosis:  
 · Generalized Muscle Weakness (M62.81) Precautions/Allergies: 
  Patient has no known allergies. ASSESSMENT:  
 
Mr. Suzan Tucker  presents with severe general weakness, self care, functional mobility,  along with cognitive limitations. Pt very slow to process and respond. This pt would be difficult to manage in a home environment, MSW was made aware of this concern. Pt would benefit from skilled OT to increase independence. Pt performed functional transfers and mobility this am.  
  
 
This section established at most recent assessment PROBLEM LIST (Impairments causing functional limitations): 1. Decreased Strength 2. Decreased ADL/Functional Activities 3. Decreased Transfer Abilities 4. Decreased Ambulation Ability/Technique 5. Decreased Balance 6. Decreased Activity Tolerance INTERVENTIONS PLANNED: (Benefits and precautions of occupational therapy have been discussed with the patient.) 1. Activities of daily living training 2. Adaptive equipment training 3. Balance training 4. Clothing management 5. Therapeutic activity 6. Therapeutic exercise TREATMENT PLAN: Frequency/Duration: Follow patient 3x/week to address above goals. Rehabilitation Potential For Stated Goals: Good REHAB RECOMMENDATIONS (at time of discharge pending progress):   
Placement: It is my opinion, based on this patient's performance to date, that Mr. Lynette Mo may benefit from participating in 1-2 additional therapy sessions in order to continue to assess for rehab potential and then make recommendation for disposition at discharge. Equipment:  
? None at this time OCCUPATIONAL PROFILE AND HISTORY:  
History of Present Injury/Illness (Reason for Referral): Confusion, recurrent UTI, generalized weakness Past Medical History/Comorbidities:  
Mr. Lynette Mo  has a past medical history of Arrhythmia, Arthritis, Atrial fibrillation ([address] (1/27/2012), CAD (coronary artery disease), Cancer ([address] Dementia ([address] Depression (8/28/2012), GERD (gastroesophageal reflux disease), Heart failure ([address] History of prostate cancer (8/4/2014), Hypertension, Morbid obesity ([address] Osteoarthritis of right knee (1/26/2012), Recurrent major depressive disorder, in full remission ([address] (11/30/2017), Total knee replacement status (1/26/2012), and Vitamin B12 deficiency (7/23/2017). Mr. Lynette Mo  has a past surgical history that includes hx orthopaedic (2010); hx prostatectomy; and hx pacemaker (8/30/2012). Social History/Living Environment:  
Home Environment: Private residence # Steps to Enter: 0 One/Two Story Residence: One story Living Alone: No 
Support Systems: Family member(s) Patient Expects to be Discharged to[de-identified] Private residence Prior Level of Function/Work/Activity: 
Assist with ADLs and mobility Number of Personal Factors/Comorbidities that affect the Plan of Care: Brief history (0):  LOW COMPLEXITY ASSESSMENT OF OCCUPATIONAL PERFORMANCE[de-identified]  
Activities of Daily Living:  
Basic ADLs (From Assessment) Complex ADLs (From Assessment) Feeding: Setup Oral Facial Hygiene/Grooming: Moderate assistance Bathing: Maximum assistance Upper Body Dressing: Maximum assistance Lower Body Dressing: Maximum assistance, Total assistance Toileting: Total assistance Grooming/Bathing/Dressing Activities of Daily Living Cognitive Retraining Safety/Judgement: Fall prevention Functional Transfers Bathroom Mobility: Moderate assistance Toilet Transfer : Moderate assistance Shower Transfer: Moderate assistance Bed/Mat Mobility Rolling: Maximum assistance Supine to Sit: Minimum assistance Sit to Supine: Moderate assistance Sit to Stand: Moderate assistance Stand to Sit: Moderate assistance Bed to Chair: Moderate assistance Most Recent Physical Functioning:  
Gross Assessment: 
AROM: Generally decreased, functional(BUE) Strength: Generally decreased, functional(BUE) Coordination: Generally decreased, functional(BUE) Tone: Normal 
Sensation: Intact(BUE) Posture: 
  
Balance: 
Sitting: Intact; Without support Standing: Impaired; With support Standing - Static: Fair Standing - Dynamic : (fair- with walker) Bed Mobility: 
Rolling: Maximum assistance Supine to Sit: Minimum assistance Sit to Supine: Moderate assistance Wheelchair Mobility: 
  
Transfers: 
Sit to Stand: Moderate assistance Stand to Sit: Moderate assistance Bed to Chair: Moderate assistance Patient Vitals for the past 6 hrs: 
 BP BP Patient Position SpO2 Pulse 12/04/19 0746 120/73 At rest;Supine 97 % 72  
12/04/19 0827 120/73   72 Mental Status Neurologic State: Alert Orientation Level: Oriented to person, Oriented to place, Disoriented to time Cognition: Decreased attention/concentration, Impaired decision making Perception: Appears intact Perseveration: No perseveration noted Safety/Judgement: Fall prevention LLE Assessment LLE Assessment (WDL): Exception to [facility] Physical Skills Involved: 
1. Balance 2. Strength 3. Activity Tolerance Cognitive Skills Affected (resulting in the inability to perform in a timely and safe manner): 1. Divided Attention 2. Comprehension 3. Expression Psychosocial Skills Affected: 1. Habits/Routines 2. Social Interaction Number of elements that affect the Plan of Care: 5+:  HIGH COMPLEXITY CLINICAL DECISION MAKING:  
[facility] AM-PAC 6 Clicks Daily Activity Inpatient Short Form How much help from another person does the patient currently need. .. Total A Lot A Little None 1. Putting on and taking off regular lower body clothing? [] 1   [x] 2   [] 3   [] 4  
2. Bathing (including washing, rinsing, drying)? [] 1   [x] 2   [] 3   [] 4  
3. Toileting, which includes using toilet, bedpan or urinal?   [x] 1   [] 2   [] 3   [] 4  
4. Putting on and taking off regular upper body clothing? [] 1   [x] 2   [] 3   [] 4  
5. Taking care of personal grooming such as brushing teeth? [] 1   [] 2   [x] 3   [] 4  
6. Eating meals? [] 1   [] 2   [x] 3   [] 4  
© 2007, Trustees of [facility], under license to [employer]. All rights reserved Score:  Initial: 13 Most Recent: X (Date: -- ) Interpretation of Tool:  Represents activities that are increasingly more difficult (i.e. Bed mobility, Transfers, Gait). Medical Necessity:    
· Patient is expected to demonstrate progress in  
· strength, balance, coordination, and functional technique ·  to  
· increase independence with self care and functional mobility · . Reason for Services/Other Comments: · Patient continues to require skilled intervention due to · Decrease self care and mobility · . Use of outcome tool(s) and clinical judgement create a POC that gives a: LOW COMPLEXITY  
 
 
 
TREATMENT:  
(In addition to Assessment/Re-Assessment sessions the following treatments were rendered) Pre-treatment Symptoms/Complaints:  tolerated evaluation Pain: Initial:  
Pain Intensity 1: 0  Post Session:  0 Therapeutic Activity: (10):  Therapeutic activities including Chair transfers, Ambulation on level ground, to improve mobility, strength, and coordination. Required moderate cues  to promote static balance in standing. Assessment/Reassessment (5) Braces/Orthotics/Lines/Etc:  
· O2 Device: Room air Treatment/Session Assessment:   
· Response to Treatment:  tolerated fairly · Interdisciplinary Collaboration:  
o Occupational Therapist 
o Registered Nurse 
o Rehabilitation Attendant · After treatment position/precautions:  
o Up in chair 
o Bed alarm/tab alert on 
o Bed/Chair-wheels locked 
o Call light within reach 
o RN notified 
o LEs reclined · Compliance with Program/Exercises: Compliant all of the time. · Recommendations/Intent for next treatment session: \"Next visit will focus on advancements to more challenging activities and reduction in assistance provided\". Total Treatment Duration: OT Patient Time In/Time Out Time In: 1130 Time Out: 1145 Paris Hinson OT

## 2019-12-04 NOTE — PROGRESS NOTES
Problem: Mobility Impaired (Adult and Pediatric) Goal: *Acute Goals and Plan of Care (Insert Text) Description DISCHARGE GOALS : 
(1.)Mr. Merle Vick will move from supine to sit and sit to supine  with MINIMAL ASSIST (consistently) with bed modified. (2.)Mr. Merle Vick will transfer from bed to chair and chair to bed with MINIMAL (consistently) ASSIST using a walker (3.)Mr. Merle Vick will ambulate with MINIMAL ASSIST for 10-15 feet with rolling walker. ________________________________________________________________________________________________ Outcome: Progressing Towards Goal 
  
PHYSICAL THERAPY: Initial Assessment and AM 12/4/2019 OBSERVATION: PT Visit Days : 1 [place] / Product Type: Medicare /   
  
NAME/AGE/GENDER: Makenna Ellis is a 78 y.o. male PRIMARY DIAGNOSIS: Generalized weakness [R53.1] Hypokalemia [E87.6] Recurrent UTI [N39.0] Confusion and disorientation [R41.0] <principal problem not specified> <principal problem not specified> 
  
  
ICD-10: Treatment Diagnosis:  
 · Generalized Muscle Weakness (M62.81) · Other lack of cordination (R27.8) · Difficulty in walking, Not elsewhere classified (R26.2) · Other abnormalities of gait and mobility (R26.89) Precaution/Allergies: 
Patient has no known allergies. ASSESSMENT:  
 
Mr. Merle Vick presents with severe general weakness along with cognitive limitations. This pt requires significant assist for bed mobility, transfers & gait very short distances. This pt would be difficult to manage in a home environment, MSW was made aware of this concern. This section established at most recent assessment PROBLEM LIST (Impairments causing functional limitations): 1. Decreased Strength 2. Decreased ADL/Functional Activities 3. Decreased Transfer Abilities 4. Decreased Ambulation Ability/Technique 5. Decreased Balance 6. Decreased Activity Tolerance 7. Decreased Flexibility/Joint Mobility INTERVENTIONS PLANNED: (Benefits and precautions of physical therapy have been discussed with the patient.) 1. Balance Exercise 2. Bed Mobility 3. Family Education 4. Gait Training 5. Home Exercise Program (HEP) 6. Therapeutic Activites 7. Therapeutic Exercise/Strengthening 8. Transfer Training TREATMENT PLAN: Frequency/Duration: daily for duration of hospital stay Rehabilitation Potential For Stated Goals: Fair REHAB RECOMMENDATIONS (at time of discharge pending progress):   
Placement: It is my opinion, based on this patient's performance to date, that Mr. Cj Sacnhez may benefit from intensive therapy at a [facility] after discharge due to the functional deficits listed above that are likely to improve with skilled rehabilitation and concerns that he/she may be unsafe to be unsupervised at home. Equipment: ? To be determined HISTORY:  
History of Present Injury/Illness (Reason for Referral): 
Patient is an 78years old AAM with hx of A.fib on Pradaxa, HTN, dCHFpEF, morbid obesity, prostate cancer s/p surgery/radiation, depression brought in for generalized weakness and difficulty in ambulation for past 3 days. As per daughter, pt is having poor oral intake, not really getting out of bed due to lethargy. Pt was recently discharged to home from rehab. Pt is having recurrent UTI. Pt was in ER on 11/27, was discharged on [address]  for UTI. Urine cx showed E.coli and S.aureus with extended resistance to abx. Pt denies chest pain, SOB, abdominal pain, diarrhea, nausea/vomiting, fever, chills. ER work up is unremarkable except for hypokalemia 3.3, UA with large leukocyte esterase and trace bacteria.  
Past Medical History/Comorbidities:  
Mr. Cj Sanchez  has a past medical history of Arrhythmia, Arthritis, Atrial fibrillation ([facility] (1/27/2012), CAD (coronary artery disease), Cancer ([facility] Dementia ([facility] Depression (8/28/2012), GERD (gastroesophageal reflux disease), Heart failure ([facility] History of prostate cancer (8/4/2014), Hypertension, Morbid obesity ([facility] Osteoarthritis of right knee (1/26/2012), Recurrent major depressive disorder, in full remission ([facility] (11/30/2017), Total knee replacement status (1/26/2012), and Vitamin B12 deficiency (7/23/2017). Mr. Aamir Bennett  has a past surgical history that includes hx orthopaedic (2010); hx prostatectomy; and hx pacemaker (8/30/2012). Social History/Living Environment:  
Home Environment: Private residence # Steps to Enter: 0 One/Two Story Residence: One story Living Alone: No 
Support Systems: Family member(s) Patient Expects to be Discharged to[de-identified] Private residence Prior Level of Function/Work/Activity: Pt was needing assist for all functional mobility, unclear of level of assist required prior to this admission. Personal Factors: Other factors that influence how disability is experienced by the patient:  current & PMH Number of Personal Factors/Comorbidities that affect the Plan of Care: 3+: HIGH COMPLEXITY EXAMINATION:  
Most Recent Physical Functioning:  
Gross Assessment: 
AROM: Grossly decreased, non-functional(all limbs & core) Strength: Grossly decreased, non-functional(all limbs & core) Coordination: Grossly decreased, non-functional(all limbs & core) Balance: 
Sitting: Intact; Without support Standing: Impaired; With support(walker) Standing - Static: (fair- with walker) Standing - Dynamic : (fair- with walker) Bed Mobility: 
Rolling: Maximum assistance Supine to Sit: Minimum assistance Sit to Supine: Moderate assistance Transfers: 
Sit to Stand: Moderate assistance Stand to Sit: Moderate assistance Bed to Chair: Moderate assistance(with walker) Gait: 
  
Speed/Ángela: Shuffled; Slow Step Length: Left shortened;Right shortened Gait Abnormalities: Decreased step clearance;Trunk sway increased(flexed psoture) Distance (ft): 4 Feet (ft) Assistive Device: Walker, rolling Ambulation - Level of Assistance: Moderate assistance Functional Mobility:  
      Gait/Ambulation:  mod Transfers:  mod Bed Mobility:  mod Body Structures Involved: 1. Nerves 2. Muscles Body Functions Affected: 1. Neuromusculoskeletal 
2. Movement Related Activities and Participation Affected: 1. General Tasks and Demands 2. Mobility Number of elements that affect the Plan of Care: 4+: HIGH COMPLEXITY CLINICAL PRESENTATION:  
Presentation: Evolving clinical presentation with unstable and unpredictable characteristics: HIGH COMPLEXITY CLINICAL DECISION MAKING:  
[facility] AM-PAC 6 Clicks Basic Mobility Inpatient Short Form How much difficulty does the patient currently have. .. Unable A Lot A Little None 1. Turning over in bed (including adjusting bedclothes, sheets and blankets)? [] 1   [x] 2   [] 3   [] 4  
2. Sitting down on and standing up from a chair with arms ( e.g., wheelchair, bedside commode, etc.)   [] 1   [x] 2   [] 3   [] 4  
3. Moving from lying on back to sitting on the side of the bed? [] 1   [x] 2   [] 3   [] 4 How much help from another person does the patient currently need. .. Total A Lot A Little None 4. Moving to and from a bed to a chair (including a wheelchair)? [] 1   [x] 2   [] 3   [] 4  
5. Need to walk in hospital room? [] 1   [x] 2   [] 3   [] 4  
6. Climbing 3-5 steps with a railing? [x] 1   [] 2   [] 3   [] 4  
© 2007, Trustees of [facility], under license to [employer]. All rights reserved Score:  Initial: 11 Most Recent: X (Date: -- ) Interpretation of Tool:  Represents activities that are increasingly more difficult (i.e. Bed mobility, Transfers, Gait).  
 
Medical Necessity:    
· Patient is expected to demonstrate progress in  
 · strength, balance, coordination, and functional technique ·  to  
· decrease assistance required with bed mobility, transfers & gait · . Reason for Services/Other Comments: 
· Patient continues to require skilled intervention due to · Very unsafe functional mobility · . Use of outcome tool(s) and clinical judgement create a POC that gives a: Difficult prediction of patient's progress: HIGH COMPLEXITY  
  
 
 
 
TREATMENT:  
(In addition to Assessment/Re-Assessment sessions the following treatments were rendered) Pre-treatment Symptoms/Complaints:  weakness Pain: Initial: numeric scale Pain Intensity 1: 0  Post Session:  0/10 Therapeutic Activity: (    23 min ( extra time to work through activity noted): Therapeutic activities including  repeated rolling in bed, repeated sit <>stand, prolonged standing balance for clean up & wt-shifting with walker along with mild cool down AAROM to UE & LE's to improve mobility, strength, balance, coordination, and dynamic movement of arm - bilateral, leg - bilateral, and core to improve functional muscle activity & core stability . Braces/Orthotics/Lines/Etc:  
· IV Treatment/Session Assessment:   
· Response to Treatment:  very weak, slow to process · Interdisciplinary Collaboration:  
o Registered Nurse 
o Certified Nursing Assistant/Patient Care Technician · After treatment position/precautions:  
o Up in chair 
o Bed alarm/tab alert on 
o Bed/Chair-wheels locked 
o Call light within reach 
o RN notified · Compliance with Program/Exercises: Will assess as treatment progresses · Recommendations/Intent for next treatment session: \"Next visit will focus on reduction in assistance provided\". Total Treatment Duration: PT Patient Time In/Time Out Time In: 8878 Time Out: 1031 Thee Greenberg PT

## 2019-12-04 NOTE — PROGRESS NOTES
Interdisciplinary team rounds were held 12/4/2019 with the following team members:Care Management, Nursing, Nutrition, Pharmacy, Physical Therapy and Physician. Plan of care discussed. See clinical pathway and/or care plan for interventions and desired outcomes.

## 2019-12-05 VITALS
WEIGHT: 260 LBS | OXYGEN SATURATION: 95 % | BODY MASS INDEX: 33.37 KG/M2 | HEART RATE: 60 BPM | SYSTOLIC BLOOD PRESSURE: 93 MMHG | TEMPERATURE: 98.2 F | DIASTOLIC BLOOD PRESSURE: 59 MMHG | HEIGHT: 74 IN | RESPIRATION RATE: 18 BRPM

## 2019-12-05 LAB
ANION GAP SERPL CALC-SCNC: 3 MMOL/L (ref 7–16)
BUN SERPL-MCNC: 10 MG/DL (ref 8–23)
CALCIUM SERPL-MCNC: 9.8 MG/DL (ref 8.3–10.4)
CHLORIDE SERPL-SCNC: 109 MMOL/L (ref 98–107)
CO2 SERPL-SCNC: 29 MMOL/L (ref 21–32)
CREAT SERPL-MCNC: 0.97 MG/DL (ref 0.8–1.5)
ERYTHROCYTE [DISTWIDTH] IN BLOOD BY AUTOMATED COUNT: 13.7 % (ref 11.9–14.6)
GLUCOSE SERPL-MCNC: 73 MG/DL (ref 65–100)
HCT VFR BLD AUTO: 42.3 % (ref 41.1–50.3)
HGB BLD-MCNC: 13.8 G/DL (ref 13.6–17.2)
MCH RBC QN AUTO: 30.7 PG (ref 26.1–32.9)
MCHC RBC AUTO-ENTMCNC: 32.6 G/DL (ref 31.4–35)
MCV RBC AUTO: 94 FL (ref 79.6–97.8)
NRBC # BLD: 0 K/UL (ref 0–0.2)
PLATELET # BLD AUTO: 180 K/UL (ref 150–450)
PMV BLD AUTO: 10 FL (ref 9.4–12.3)
POTASSIUM SERPL-SCNC: 4.2 MMOL/L (ref 3.5–5.1)
RBC # BLD AUTO: 4.5 M/UL (ref 4.23–5.6)
SODIUM SERPL-SCNC: 141 MMOL/L (ref 136–145)
VANCOMYCIN TROUGH SERPL-MCNC: 21.5 UG/ML (ref 5–20)
WBC # BLD AUTO: 6.3 K/UL (ref 4.3–11.1)

## 2019-12-05 PROCEDURE — 74011000258 HC RX REV CODE- 258: Performed by: HOSPITALIST

## 2019-12-05 PROCEDURE — 74011250636 HC RX REV CODE- 250/636: Performed by: HOSPITALIST

## 2019-12-05 PROCEDURE — 36415 COLL VENOUS BLD VENIPUNCTURE: CPT

## 2019-12-05 PROCEDURE — 97530 THERAPEUTIC ACTIVITIES: CPT

## 2019-12-05 PROCEDURE — 99218 HC RM OBSERVATION: CPT

## 2019-12-05 PROCEDURE — 80202 ASSAY OF VANCOMYCIN: CPT

## 2019-12-05 PROCEDURE — 80048 BASIC METABOLIC PNL TOTAL CA: CPT

## 2019-12-05 PROCEDURE — 96366 THER/PROPH/DIAG IV INF ADDON: CPT

## 2019-12-05 PROCEDURE — 74011250637 HC RX REV CODE- 250/637: Performed by: HOSPITALIST

## 2019-12-05 PROCEDURE — 85027 COMPLETE CBC AUTOMATED: CPT

## 2019-12-05 PROCEDURE — 65270000029 HC RM PRIVATE

## 2019-12-05 RX ORDER — VANCOMYCIN HYDROCHLORIDE
1250 EVERY 12 HOURS
Status: DISCONTINUED | OUTPATIENT
Start: 2019-12-05 | End: 2019-12-05 | Stop reason: HOSPADM

## 2019-12-05 RX ORDER — NITROFURANTOIN 25; 75 MG/1; MG/1
100 CAPSULE ORAL DAILY
Qty: 60 CAP | Refills: 2 | Status: SHIPPED | OUTPATIENT
Start: 2019-12-05 | End: 2020-01-04

## 2019-12-05 RX ADMIN — DONEPEZIL HYDROCHLORIDE 10 MG: 5 TABLET, FILM COATED ORAL at 08:13

## 2019-12-05 RX ADMIN — POLYETHYLENE GLYCOL (3350) 17 G: 17 POWDER, FOR SOLUTION ORAL at 08:10

## 2019-12-05 RX ADMIN — SENNOSIDES AND DOCUSATE SODIUM 1 TABLET: 8.6; 5 TABLET ORAL at 08:09

## 2019-12-05 RX ADMIN — FAMOTIDINE 20 MG: 20 TABLET ORAL at 08:12

## 2019-12-05 RX ADMIN — DABIGATRAN ETEXILATE MESYLATE 150 MG: 150 CAPSULE ORAL at 08:09

## 2019-12-05 RX ADMIN — ASPIRIN 81 MG 81 MG: 81 TABLET ORAL at 08:10

## 2019-12-05 RX ADMIN — VANCOMYCIN HYDROCHLORIDE 2000 MG: 10 INJECTION, POWDER, LYOPHILIZED, FOR SOLUTION INTRAVENOUS at 04:24

## 2019-12-05 RX ADMIN — METOPROLOL TARTRATE 25 MG: 25 TABLET ORAL at 08:11

## 2019-12-05 RX ADMIN — Medication 1 AMPULE: at 11:01

## 2019-12-05 RX ADMIN — BICALUTAMIDE 50 MG: 50 TABLET, FILM COATED ORAL at 09:00

## 2019-12-05 RX ADMIN — Medication 10 ML: at 06:00

## 2019-12-05 RX ADMIN — POTASSIUM BICARBONATE 40 MEQ: 782 TABLET, EFFERVESCENT ORAL at 08:10

## 2019-12-05 RX ADMIN — CEFTRIAXONE SODIUM 2 G: 2 INJECTION, POWDER, FOR SOLUTION INTRAMUSCULAR; INTRAVENOUS at 08:11

## 2019-12-05 RX ADMIN — ARIPIPRAZOLE 10 MG: 5 TABLET ORAL at 09:00

## 2019-12-05 NOTE — PROGRESS NOTES
Discharge instructions reviewed with patient. Opportunity for questions given. Pt able to leave once ride arrives.

## 2019-12-05 NOTE — PROGRESS NOTES
Problem: Mobility Impaired (Adult and Pediatric) Goal: *Acute Goals and Plan of Care (Insert Text) Description DISCHARGE GOALS : 
(1.)Mr. Merle Vick will move from supine to sit and sit to supine  with MINIMAL ASSIST (consistently) with bed modified. (2.)Mr. Merle Vick will transfer from bed to chair and chair to bed with MINIMAL (consistently) ASSIST using a walker (3.)Mr. Merle Vick will ambulate with MINIMAL ASSIST for 10-15 feet with rolling walker. ________________________________________________________________________________________________ Outcome: Progressing Towards Goal 
  
PHYSICAL THERAPY: Daily Note and PM 12/5/2019 INPATIENT: PT Visit Days : 2 [place] / [place] / Product Type: Medicare /   
  
NAME/AGE/GENDER: Makenna Ellis is a 78 y.o. male PRIMARY DIAGNOSIS: Generalized weakness [R53.1] Hypokalemia [E87.6] Recurrent UTI [N39.0] Confusion and disorientation [R41.0] Generalized weakness [R53.1] Hypokalemia [E87.6] Recurrent UTI [N39.0] Confusion and disorientation [R41.0] <principal problem not specified> <principal problem not specified> 
 
  
ICD-10: Treatment Diagnosis:  
 · Generalized Muscle Weakness (M62.81) · Other lack of cordination (R27.8) · Difficulty in walking, Not elsewhere classified (R26.2) · Other abnormalities of gait and mobility (R26.89) Precaution/Allergies: 
Patient has no known allergies. ASSESSMENT:  
 
Mr. Merle Vick presents with severe general weakness along with cognitive limitations. Worked on rolling and bed mobility for cleaning and brief change. Performed some LE exercises in bed and in recliner. Min Assist X 2 for transfers and short distance ambulation with walker. This section established at most recent assessment PROBLEM LIST (Impairments causing functional limitations): 1. Decreased Strength 2. Decreased ADL/Functional Activities 3. Decreased Transfer Abilities 4. Decreased Ambulation Ability/Technique 5. Decreased Balance 6. Decreased Activity Tolerance 7. Decreased Flexibility/Joint Mobility INTERVENTIONS PLANNED: (Benefits and precautions of physical therapy have been discussed with the patient.) 1. Balance Exercise 2. Bed Mobility 3. Family Education 4. Gait Training 5. Home Exercise Program (HEP) 6. Therapeutic Activites 7. Therapeutic Exercise/Strengthening 8. Transfer Training TREATMENT PLAN: Frequency/Duration: daily for duration of hospital stay Rehabilitation Potential For Stated Goals: Fair REHAB RECOMMENDATIONS (at time of discharge pending progress):   
Placement: It is my opinion, based on this patient's performance to date, that Mr. Lynette Mo may benefit from intensive therapy at a [address] [place] [address] after discharge due to the functional deficits listed above that are likely to improve with skilled rehabilitation and concerns that he/she may be unsafe to be unsupervised at home. Equipment: ? To be determined HISTORY:  
History of Present Injury/Illness (Reason for Referral): 
Patient is an 78years old AAM with hx of A.fib on Pradaxa, HTN, dCHFpEF, morbid obesity, prostate cancer s/p surgery/radiation, depression brought in for generalized weakness and difficulty in ambulation for past 3 days. As per daughter, pt is having poor oral intake, not really getting out of bed due to lethargy. Pt was recently discharged to home from rehab. Pt is having recurrent UTI. Pt was in ER on 11/27, was discharged on [address] for UTI. Urine cx showed E.coli and S.aureus with extended resistance to abx. Pt denies chest pain, SOB, abdominal pain, diarrhea, nausea/vomiting, fever, chills. ER work up is unremarkable except for hypokalemia 3.3, UA with large leukocyte esterase and trace bacteria.  
Past Medical History/Comorbidities:  
Mr. Lynette Mo  has a past medical history of Arrhythmia, Arthritis, Atrial fibrillation ([facility] (1/27/2012), CAD (coronary artery disease), Cancer ([facility] Dementia ([facility] Depression (8/28/2012), GERD (gastroesophageal reflux disease), [facility] History of prostate cancer (8/4/2014), Hypertension, Morbid obesity ([facility] Osteoarthritis of right knee (1/26/2012), Recurrent major depressive disorder, in full remission ([facility] (11/30/2017), Total knee replacement status (1/26/2012), and Vitamin B12 deficiency (7/23/2017). Mr. Se Palomo  has a past surgical history that includes hx orthopaedic (2010); hx prostatectomy; and hx pacemaker (8/30/2012). Social History/Living Environment:  
Home Environment: Private residence # Steps to Enter: 0 One/Two Story Residence: One story Living Alone: No 
Support Systems: Family member(s) Patient Expects to be Discharged to[de-identified] Private residence Prior Level of Function/Work/Activity: Pt was needing assist for all functional mobility, unclear of level of assist required prior to this admission. Personal Factors: Other factors that influence how disability is experienced by the patient:  current & PMH Number of Personal Factors/Comorbidities that affect the Plan of Care: 3+: HIGH COMPLEXITY EXAMINATION:  
Most Recent Physical Functioning:  
Gross Assessment: 
  
         
  
 
  
Balance: 
Sitting: Intact Standing: With support Bed Mobility: 
Rolling: Moderate assistance Supine to Sit: Minimum assistance Sit to Supine: (up in chair) Transfers: 
Sit to Stand: Moderate assistance Stand to Sit: Minimum assistance Bed to Chair: Minimum assistance; Additional time;Assist x2 Gait: 
  
Speed/Ángela: Shuffled; Slow Step Length: Left shortened;Right shortened Gait Abnormalities: Decreased step clearance;Trunk sway increased Distance (ft): 5 Feet (ft) Assistive Device: Walker, rolling Ambulation - Level of Assistance: Minimal assistance; Additional time;Assist x2 Body Structures Involved: 1. Nerves 2. Muscles Body Functions Affected: 1. Neuromusculoskeletal 
2. Movement Related Activities and Participation Affected: 1. General Tasks and Demands 2. Mobility Number of elements that affect the Plan of Care: 4+: HIGH COMPLEXITY CLINICAL PRESENTATION:  
Presentation: Evolving clinical presentation with unstable and unpredictable characteristics: HIGH COMPLEXITY CLINICAL DECISION MAKIN[address] [place] [address] AM-PAC 6 Clicks Basic Mobility Inpatient Short Form How much difficulty does the patient currently have. .. Unable A Lot A Little None 1. Turning over in bed (including adjusting bedclothes, sheets and blankets)? [] 1   [x] 2   [] 3   [] 4  
2. Sitting down on and standing up from a chair with arms ( e.g., wheelchair, bedside commode, etc.)   [] 1   [x] 2   [] 3   [] 4  
3. Moving from lying on back to sitting on the side of the bed? [] 1   [x] 2   [] 3   [] 4 How much help from another person does the patient currently need. .. Total A Lot A Little None 4. Moving to and from a bed to a chair (including a wheelchair)? [] 1   [x] 2   [] 3   [] 4  
5. Need to walk in hospital room? [] 1   [x] 2   [] 3   [] 4  
6. Climbing 3-5 steps with a railing? [x] 1   [] 2   [] 3   [] 4  
© , Trustees of [address] [place] [address], under license to [employer]. All rights reserved Score:  Initial: 11 Most Recent: X (Date: -- ) Interpretation of Tool:  Represents activities that are increasingly more difficult (i.e. Bed mobility, Transfers, Gait). Medical Necessity:    
· Patient is expected to demonstrate progress in  
· strength, balance, coordination, and functional technique ·  to  
· decrease assistance required with bed mobility, transfers & gait · . Reason for Services/Other Comments: 
· Patient continues to require skilled intervention due to · Very unsafe functional mobility · . Use of outcome tool(s) and clinical judgement create a POC that gives a: Difficult prediction of patient's progress: HIGH COMPLEXITY  
  
 
 
 
TREATMENT:  
(In addition to Assessment/Re-Assessment sessions the following treatments were rendered) Pre-treatment Symptoms/Complaints:  weakness Pain: Initial: numeric scale Pain Intensity 1: 0  Post Session:  0/10 Therapeutic Activity: (    25 min): Therapeutic activities including rolling and bed mobility, Bed transfers, Chair transfers, Ambulation on level ground and LE exercises as below  to improve mobility, strength, balance and coordination. Date: 
12/5/19 Date: 
 Date: Activity/Exercise Parameters Parameters Parameters Ankle pumps 10 Heel slides 10 AA Hip abduction/adduction 10    
seated marching 10 Long arc quads 10 Braces/Orthotics/Lines/Etc:  
· O2 Device: Room air Treatment/Session Assessment:   
· Response to Treatment:  very weak, slow to process · Interdisciplinary Collaboration:  
o Physical Therapist 
o Registered Nurse 
o Certified Nursing Assistant/Patient Care Technician · After treatment position/precautions:  
o Up in chair 
o Bed alarm/tab alert on 
o Bed/Chair-wheels locked 
o Call light within reach 
o Nurse at bedside · Compliance with Program/Exercises: Compliant most of the time, Will assess as treatment progresses · Recommendations/Intent for next treatment session: \"Next visit will focus on reduction in assistance provided\". Total Treatment Duration: PT Patient Time In/Time Out Time In: 4289 Time Out: 27[address], PT

## 2019-12-05 NOTE — DISCHARGE SUMMARY
Hospitalist Discharge Summary Patient ID: 
Odilia Nails [id number] 
83 y.o. 
1940 Admit date: 12/3/2019 11:35 AM 
Discharge date and time: 12/5/2019 Attending: Randi Nicholson MD 
PCP:  Srikanth Orta MD 
Treatment Team: Attending Provider: Randi Nicholson MD; Utilization Review: Kate Rodriguez RN; Care Manager: Dwight Carr RN; Physical Therapist: Melinda Brown PT; Nurse Extern: Clovis Kirk 
 
Principal Diagnosis: 
Generalized weakness Recurrent UTI Active Problems: 
  Atrial fibrillation ([facility] (1/27/2012) HTN (hypertension) (1/27/2012) Morbid obesity ([facility] (8/28/2012) Generalized weakness (12/23/2016) Moderate dementia without behavioral disturbance ([facility] (7/26/2018) SSS (sick sinus syndrome) ([facility] (6/5/2019) Hypokalemia (12/3/2019) Recurrent UTI (12/3/2019) Confusion and disorientation (12/3/2019) Hospital Course: 
Please refer to the admission H&P for details of presentation. In summary, the patient is 78years old AAM with hx of A.fib on Pradaxa, HTN, dCHFpEF, morbid obesity, prostate cancer s/p surgery/radiation, depression admitted on 12/3 for recurrent UTI, generalized weakness and difficulty in ambulation. Pt was started on IV rocephin and vancomycin based on sensitivities of urine culture. Urine culture from 11/27 was growing E.coli and MRSA. Given prior hx of recurrent UTI's, pt will benefit from suppression prophylaxis with nitrofurantoin. I have discussed this pt's daughter and counseled her about this. Pt is doing well, feels better. He is medically cleared for discharge to home with HHA/PT/OT. Significant Diagnostic Studies: EXAM: CHEST X-RAY, 1 VIEW 
  
INDICATION: Cough. 
  
COMPARISON: Chest x-ray dated 10/30/2019 
  
TECHNIQUE: Single AP view of the chest was obtained. 
  
FINDINGS: A pacer lead projects over the right heart.  The lungs are clear and 
 the pulmonary vasculature is normal. No pneumothorax or pleural effusion. The 
cardiac silhouette is enlarged but unchanged. Degenerative changes of the left 
shoulder. 
  
  
IMPRESSION IMPRESSION: 
No radiographic evidence of acute cardiopulmonary disease. Labs: Results:  
   
Chemistry Recent Labs 12/05/19 0315 12/04/19 0546 12/03/19 
1225 GLU 73 84 100  142 144  
K 4.2 3.8 3.3*  
* 111* 111* CO2 29 26 28 BUN 10 7* 8  
CREA 0.97 0.88 1.01  
CA 9.8 9.5 9.7 AGAP 3* 5* 5* AP  --   --  104 TP  --   --  6.9 ALB  --   --  3.1*  
GLOB  --   --  3.8* AGRAT  --   --  0.8* CBC w/Diff Recent Labs 12/05/19 0315 12/04/19 0546 12/03/19 
1225 WBC 6.3 6.4 5.9  
RBC 4.50 4.37 4.62  
HGB 13.8 13.5* 14.0  
HCT 42.3 40.7* 43.4  184 193 GRANS  --   --  70  
LYMPH  --   --  21  
EOS  --   --  2 Cardiac Enzymes No results for input(s): CPK, CKND1, ALEXANDRA in the last 72 hours. No lab exists for component: Radha Stout Coagulation No results for input(s): PTP, INR, APTT, INREXT in the last 72 hours. Lipid Panel Lab Results Component Value Date/Time Cholesterol, total 142 04/17/2017 11:59 AM  
 HDL Cholesterol 76 04/17/2017 11:59 AM  
 LDL, calculated 52 04/17/2017 11:59 AM  
 VLDL, calculated 14 04/17/2017 11:59 AM  
 Triglyceride 69 04/17/2017 11:59 AM  
 CHOL/HDL Ratio 1.9 08/29/2012 01:43 AM  
  
BNP No results for input(s): BNPP in the last 72 hours. Liver Enzymes Recent Labs 12/03/19 
1225 TP 6.9 ALB 3.1*  SGOT 28 Thyroid Studies Lab Results Component Value Date/Time T4, Total 7.6 05/12/2016 10:24 AM  
 TSH 0.587 05/13/2018 01:23 AM  
    
 
 
Discharge Exam: 
Visit Vitals /65 (BP 1 Location: Right arm, BP Patient Position: At rest) Pulse 65 Temp 97.7 °F (36.5 °C) Resp 17 Ht 6' 2\" (1.88 m) Wt 117.9 kg (260 lb) SpO2 98% BMI 33.38 kg/m² General appearance: alert, cooperative, no distress, appears stated age Lungs: clear to auscultation bilaterally Heart: regular rate and rhythm, S1, S2 normal, no murmur, click, rub or gallop Abdomen: soft, non-tender. Bowel sounds normal. No masses,  no organomegaly Extremities: no cyanosis or edema Neurologic: Grossly normal 
 
Disposition: home Discharge Condition: stable Patient Instructions:  
Current Discharge Medication List  
  
START taking these medications Details  
nitrofurantoin, macrocrystal-monohydrate, (MACROBID) 100 mg capsule Take 1 Cap by mouth daily for 30 days. Indications: suppression prophylaxis for recurrent UTI Qty: 60 Cap, Refills: 2 CONTINUE these medications which have NOT CHANGED Details  
tamsulosin (FLOMAX) 0.4 mg capsule Take 1 Cap by mouth nightly. Qty: 30 Cap, Refills: 0  
  
dabigatran etexilate (PRADAXA) 150 mg capsule Take 1 Cap by mouth two (2) times a day for 30 days. Indications: Treatment to Prevent Blood Clots in Chronic Atrial Fibrillation 
Qty: 60 Cap, Refills: 1  
  
bicalutamide (CASODEX) 50 mg tablet TAKE 1 TABLET BY MOUTH EVERY DAY Qty: 90 Tab, Refills: 1 Associated Diagnoses: CA of prostate ([facility] ARIPiprazole (ABILIFY) 10 mg tablet TAKE 1 TABLET BY MOUTH EVERY DAY Qty: 90 Tab, Refills: 1 Associated Diagnoses: Major depressive disorder with single episode, in remission ([facility] donepezil (ARICEPT) 5 mg tablet TAKE 1 TABLET BY MOUTH AT BEDTIME Qty: 90 Tab, Refills: 0 Associated Diagnoses: Mild dementia ([facility] metoprolol tartrate (LOPRESSOR) 25 mg tablet Take 1 Tab by mouth two (2) times a day. Qty: 60 Tab, Refills: 0  
  
furosemide (LASIX) 40 mg tablet Take 40 mg po daily PRN ONLY for evidence of fluid retention 
Qty: 15 Tab, Refills: 0  
  
polyethylene glycol (MIRALAX) 17 gram/dose powder Take 17 g by mouth daily.  Indications: constipation  
  
multivitamin, tx-iron-ca-min (THERA-M W/ IRON) 9 mg iron-400 mcg tab tablet Take 1 Tab by mouth daily. levomilnacipran (FETZIMA) 40 mg ER capsule TAKE 1 CAP BY MOUTH DAILY. Qty: 90 Cap, Refills: 3 Associated Diagnoses: Other depression  
  
famotidine (PEPCID) 20 mg tablet Take 1 Tab by mouth two (2) times a day. Qty: 180 Tab, Refills: 3 Associated Diagnoses: Gastroesophageal reflux disease without esophagitis  
  
senna-docusate (SENNA LAXATIVE-STOOL SOFTENER) 8.6-50 mg per tablet Take 1 Tab by mouth daily. aspirin 81 mg chewable tablet Take 1 Tab by mouth daily. Indications: prevention of cerebrovascular accident Qty: 30 Tab, Refills: 0  
  
acetaminophen (TYLENOL ARTHRITIS PAIN) 650 mg CR tablet Take 650 mg by mouth every six (6) hours as needed for Pain. STOP taking these medications  
  
 cefdinir (OMNICEF) 300 mg capsule Comments:  
Reason for Stopping:   
   
  
 
 
Activity: PT/OT per Home Health Diet: Cardiac Diet Wound Care: None needed Follow-up ·   Follow up with PCP in 1 week Time spent to discharge patient 35 minutes Signed: 
Reji Galarza MD 
12/5/2019 
10:48 AM

## 2019-12-05 NOTE — PROGRESS NOTES
Care Management Interventions PCP Verified by CM: Yes Mode of Transport at Discharge: Other (see comment) Transition of Care Consult (CM Consult): Home Health Reason Outside [place]: Patient already serviced by other home care/hospice agency Current Support Network: Lives with Caregiver Confirm Follow Up Transport: Family Plan discussed with Pt/Family/Caregiver: Yes Freedom of Choice Offered: Yes Discharge Location Discharge Placement: Home with home health Pt ready to d/c home with family, I have faxed resumption orders to [facility] to resume PT/OT/RN services. Also confirmed that pt was current / [facility], I have faxed d/c summary as well. I attempted multiple times to talk with dtr/Art #[id number], and had to leave a message. Dtr has transported pt home in the past, I will need to confirm with her that this is what she wants to do today or confirm that someone will be at the house so I can arrange EMS transportation. @[phone] Dtr confirmed that someone will be home after 1630, the correct address [address] [place] [address] Dr. Davida Rodrigues, [id number]. Willard pradhan for  @ 1630.

## 2019-12-05 NOTE — PROGRESS NOTES
Shift assessment: Pt in stable condition, in bed, resting quietly, alert, oriented x4. Respirations unlabored, even, no distress noted. HR irregular. Abdomen soft, non tender. Bowel sounds active in 4 quadrants. Denies pain or needs. Bed in low, locked position. Call light withing reach.

## 2019-12-06 ENCOUNTER — PATIENT OUTREACH (OUTPATIENT)
Dept: CASE MANAGEMENT | Age: 79
End: 2019-12-06

## 2019-12-06 NOTE — PROGRESS NOTES
This note will not be viewable in [address] RNCM attempted to reach pt for PRASANNA, no answer, dgtrs vm is full unable to leave message. Will continue attempts to reach pt/family.

## 2019-12-06 NOTE — PROGRESS NOTES
This note will not be viewable in [address] Transition of Care Discharge Follow-up Questionnaire Date/Time of Call: 12/6/19    0845g What was the patient hospitalized for? IP 12/3/2019 - 12/5/2019 UTI IP 10/30/2019 - 11/5/2019 UTI Rehab dc 11/19/19 Dc 6/10 to [facility] s/p fall w/ rhabdo, hx HF 
ED 7/7  UTI IP DC 8/1 UTI Does the patient understand his/her diagnosis and/or treatment and what happened during the hospitalization? Pt verbalizes understanding of dx and treatment. Did the patient receive discharge instructions? Yes  
CM Assessed Risk for Readmission:  
 
Patient stated Risk for Readmission: Age, frequent UTIs, fall w/ increasing confusion per SNF note 
none Review any discharge instructions (see discharge instructions/AVS in [facility]). Ask patient if they understand these. Do they have any questions? DC Instructions reviewed w/ pts  dgtr Ms. Acuna. Discussed s/s urinary infection, no questions, understanding verbalized. Were home services ordered (nursing, PT, OT, ST, etc.)? Interim HH to resume PT/OT/RN services. Also current w/ [facility] If so, has the first visit occurred? If not, why? (Assist with coordination of services if necessary.) NO, RNCM called [facility] and spoke w/  who states referral was not sent yesterday as intended, but will send in today. Was any DME ordered? No  
If so, has it been received? If not, why?  (Assist patient in obtaining DME orders &/or equipment if necessary. ) NA Complete a review of all medications (new, continued and discontinued meds per the D/C instructions and medication tab in [facility]). Medications reviewed w/ dgtr Mrs. Acuna. Were all new prescriptions filled? If not, why?  (Assist patient in obtaining medications if necessary  escalate for CCM &/or SW if ongoing issues are verbalized by pt or anticipated) Yes Does the patient understand the purpose and dosing instructions for all medications? (If patient has questions, provide explanation and education.) Yes Does the patient have any problems in performing ADLs? (If patient is unable to perform ADLs  what is the limiting factor(s)? Do they have a support system that can assist? If no support system is present, discuss possible assistance that they may be able to obtain. Escalate for CCM/SW if ongoing issues are verbalized by pt or anticipated) Pt weak in legs, unable to stand, ddgtrs aid w/ transfers. Does the patient have all follow-up appointments scheduled? 7 day f/up with PCP?  
(f/up with PCP may be w/in 14 days if patient has a f/up with their specialist w/in 7 days) 7-14 day f/up with specialist?  
(or per discharge instructions) If f/up has not been made  what actions has the care coordinator made to accomplish this? Has transportation been arranged? Pt unable to attend PCP appts, d/t inability to transfer in and out of car at this time. [facility] seeing pt in home. Any other questions or concerns expressed by the patient?  none Schedule next appointment with TOBI GUEVARA Coordinator or refer to RN Case Manager/ per the workflow guidelines. When is care coordinators next follow-up call scheduled? If referred for CCM  what RN care manager was the referral assigned? RNCM scheduled f/u w/ in 7-14d. PRASANNA Call Completed By: Georges Rdz, RN, BSN Community Nurse Care Manager

## 2019-12-14 ENCOUNTER — HOSPITAL ENCOUNTER (INPATIENT)
Age: 79
LOS: 4 days | Discharge: NURSING, REHAB OR LONG-TERM CARE | DRG: 948 | End: 2019-12-18
Attending: EMERGENCY MEDICINE | Admitting: INTERNAL MEDICINE
Payer: MEDICARE

## 2019-12-14 ENCOUNTER — APPOINTMENT (OUTPATIENT)
Dept: CT IMAGING | Age: 79
DRG: 948 | End: 2019-12-14
Attending: INTERNAL MEDICINE
Payer: MEDICARE

## 2019-12-14 ENCOUNTER — APPOINTMENT (OUTPATIENT)
Dept: GENERAL RADIOLOGY | Age: 79
DRG: 948 | End: 2019-12-14
Attending: EMERGENCY MEDICINE
Payer: MEDICARE

## 2019-12-14 DIAGNOSIS — R77.8 ELEVATED TROPONIN: Primary | ICD-10-CM

## 2019-12-14 DIAGNOSIS — R53.1 WEAKNESS: ICD-10-CM

## 2019-12-14 PROBLEM — C61 PROSTATE CANCER (HCC): Chronic | Status: ACTIVE | Noted: 2019-12-14

## 2019-12-14 LAB
ALBUMIN SERPL-MCNC: 3.2 G/DL (ref 3.2–4.6)
ALBUMIN/GLOB SERPL: 0.7 {RATIO} (ref 1.2–3.5)
ALP SERPL-CCNC: 114 U/L (ref 50–136)
ALT SERPL-CCNC: 26 U/L (ref 12–65)
ANION GAP SERPL CALC-SCNC: 3 MMOL/L (ref 7–16)
AST SERPL-CCNC: 57 U/L (ref 15–37)
BACTERIA URNS QL MICRO: ABNORMAL /HPF
BASOPHILS # BLD: 0 K/UL (ref 0–0.2)
BASOPHILS NFR BLD: 0 % (ref 0–2)
BILIRUB SERPL-MCNC: 0.8 MG/DL (ref 0.2–1.1)
BUN SERPL-MCNC: 9 MG/DL (ref 8–23)
CALCIUM SERPL-MCNC: 10.1 MG/DL (ref 8.3–10.4)
CASTS URNS QL MICRO: 0 /LPF
CHLORIDE SERPL-SCNC: 109 MMOL/L (ref 98–107)
CO2 SERPL-SCNC: 29 MMOL/L (ref 21–32)
CREAT SERPL-MCNC: 1.12 MG/DL (ref 0.8–1.5)
CRYSTALS URNS QL MICRO: 0 /LPF
DIFFERENTIAL METHOD BLD: NORMAL
EOSINOPHIL # BLD: 0.1 K/UL (ref 0–0.8)
EOSINOPHIL NFR BLD: 1 % (ref 0.5–7.8)
EPI CELLS #/AREA URNS HPF: ABNORMAL /HPF
ERYTHROCYTE [DISTWIDTH] IN BLOOD BY AUTOMATED COUNT: 13.8 % (ref 11.9–14.6)
GLOBULIN SER CALC-MCNC: 4.6 G/DL (ref 2.3–3.5)
GLUCOSE SERPL-MCNC: 79 MG/DL (ref 65–100)
HCT VFR BLD AUTO: 43.5 % (ref 41.1–50.3)
HGB BLD-MCNC: 14.1 G/DL (ref 13.6–17.2)
IMM GRANULOCYTES # BLD AUTO: 0 K/UL (ref 0–0.5)
IMM GRANULOCYTES NFR BLD AUTO: 0 % (ref 0–5)
LACTATE BLD-SCNC: 2.43 MMOL/L (ref 0.5–1.9)
LACTATE SERPL-SCNC: 1.5 MMOL/L (ref 0.4–2)
LACTATE SERPL-SCNC: 2.1 MMOL/L (ref 0.4–2)
LYMPHOCYTES # BLD: 1.1 K/UL (ref 0.5–4.6)
LYMPHOCYTES NFR BLD: 15 % (ref 13–44)
MCH RBC QN AUTO: 30.8 PG (ref 26.1–32.9)
MCHC RBC AUTO-ENTMCNC: 32.4 G/DL (ref 31.4–35)
MCV RBC AUTO: 95 FL (ref 79.6–97.8)
MONOCYTES # BLD: 0.6 K/UL (ref 0.1–1.3)
MONOCYTES NFR BLD: 8 % (ref 4–12)
MUCOUS THREADS URNS QL MICRO: 0 /LPF
NEUTS SEG # BLD: 5.7 K/UL (ref 1.7–8.2)
NEUTS SEG NFR BLD: 76 % (ref 43–78)
NRBC # BLD: 0 K/UL (ref 0–0.2)
PLATELET # BLD AUTO: 188 K/UL (ref 150–450)
PMV BLD AUTO: 10.6 FL (ref 9.4–12.3)
POTASSIUM SERPL-SCNC: 5.1 MMOL/L (ref 3.5–5.1)
PROT SERPL-MCNC: 7.8 G/DL (ref 6.3–8.2)
RBC # BLD AUTO: 4.58 M/UL (ref 4.23–5.6)
RBC #/AREA URNS HPF: >100 /HPF
SODIUM SERPL-SCNC: 141 MMOL/L (ref 136–145)
TROPONIN I BLD-MCNC: 0.14 NG/ML (ref 0.02–0.05)
WBC # BLD AUTO: 7.5 K/UL (ref 4.3–11.1)
WBC URNS QL MICRO: >100 /HPF

## 2019-12-14 PROCEDURE — 93005 ELECTROCARDIOGRAM TRACING: CPT | Performed by: EMERGENCY MEDICINE

## 2019-12-14 PROCEDURE — 74011250637 HC RX REV CODE- 250/637: Performed by: INTERNAL MEDICINE

## 2019-12-14 PROCEDURE — 83605 ASSAY OF LACTIC ACID: CPT

## 2019-12-14 PROCEDURE — 65660000000 HC RM CCU STEPDOWN

## 2019-12-14 PROCEDURE — 81015 MICROSCOPIC EXAM OF URINE: CPT

## 2019-12-14 PROCEDURE — 86580 TB INTRADERMAL TEST: CPT | Performed by: INTERNAL MEDICINE

## 2019-12-14 PROCEDURE — 84484 ASSAY OF TROPONIN QUANT: CPT

## 2019-12-14 PROCEDURE — 74176 CT ABD & PELVIS W/O CONTRAST: CPT

## 2019-12-14 PROCEDURE — 70450 CT HEAD/BRAIN W/O DYE: CPT

## 2019-12-14 PROCEDURE — 80053 COMPREHEN METABOLIC PANEL: CPT

## 2019-12-14 PROCEDURE — 36415 COLL VENOUS BLD VENIPUNCTURE: CPT

## 2019-12-14 PROCEDURE — 85025 COMPLETE CBC W/AUTO DIFF WBC: CPT

## 2019-12-14 PROCEDURE — 99285 EMERGENCY DEPT VISIT HI MDM: CPT | Performed by: NURSE PRACTITIONER

## 2019-12-14 PROCEDURE — 87086 URINE CULTURE/COLONY COUNT: CPT

## 2019-12-14 PROCEDURE — 74011250636 HC RX REV CODE- 250/636

## 2019-12-14 PROCEDURE — 74011000302 HC RX REV CODE- 302: Performed by: INTERNAL MEDICINE

## 2019-12-14 PROCEDURE — 71045 X-RAY EXAM CHEST 1 VIEW: CPT

## 2019-12-14 PROCEDURE — 93005 ELECTROCARDIOGRAM TRACING: CPT | Performed by: INTERNAL MEDICINE

## 2019-12-14 RX ORDER — SODIUM CHLORIDE 0.9 % (FLUSH) 0.9 %
5-40 SYRINGE (ML) INJECTION AS NEEDED
Status: DISCONTINUED | OUTPATIENT
Start: 2019-12-14 | End: 2019-12-18 | Stop reason: HOSPADM

## 2019-12-14 RX ORDER — NYSTATIN 100000 [USP'U]/G
POWDER TOPICAL 2 TIMES DAILY
Status: DISCONTINUED | OUTPATIENT
Start: 2019-12-14 | End: 2019-12-18 | Stop reason: HOSPADM

## 2019-12-14 RX ORDER — METOPROLOL TARTRATE 25 MG/1
25 TABLET, FILM COATED ORAL 2 TIMES DAILY
Status: DISCONTINUED | OUTPATIENT
Start: 2019-12-14 | End: 2019-12-18 | Stop reason: HOSPADM

## 2019-12-14 RX ORDER — FAMOTIDINE 20 MG/1
20 TABLET, FILM COATED ORAL 2 TIMES DAILY
Status: DISCONTINUED | OUTPATIENT
Start: 2019-12-14 | End: 2019-12-18 | Stop reason: HOSPADM

## 2019-12-14 RX ORDER — ONDANSETRON 2 MG/ML
4 INJECTION INTRAMUSCULAR; INTRAVENOUS
Status: DISCONTINUED | OUTPATIENT
Start: 2019-12-14 | End: 2019-12-18 | Stop reason: HOSPADM

## 2019-12-14 RX ORDER — ACETAMINOPHEN 325 MG/1
650 TABLET ORAL
Status: DISCONTINUED | OUTPATIENT
Start: 2019-12-14 | End: 2019-12-18 | Stop reason: HOSPADM

## 2019-12-14 RX ORDER — SODIUM CHLORIDE 0.9 % (FLUSH) 0.9 %
5-40 SYRINGE (ML) INJECTION EVERY 8 HOURS
Status: DISCONTINUED | OUTPATIENT
Start: 2019-12-14 | End: 2019-12-18 | Stop reason: HOSPADM

## 2019-12-14 RX ORDER — ARIPIPRAZOLE 5 MG/1
10 TABLET ORAL DAILY
Status: DISCONTINUED | OUTPATIENT
Start: 2019-12-15 | End: 2019-12-18 | Stop reason: HOSPADM

## 2019-12-14 RX ORDER — GUAIFENESIN 100 MG/5ML
81 LIQUID (ML) ORAL DAILY
Status: DISCONTINUED | OUTPATIENT
Start: 2019-12-15 | End: 2019-12-18 | Stop reason: HOSPADM

## 2019-12-14 RX ORDER — TAMSULOSIN HYDROCHLORIDE 0.4 MG/1
0.4 CAPSULE ORAL
Status: DISCONTINUED | OUTPATIENT
Start: 2019-12-14 | End: 2019-12-18 | Stop reason: HOSPADM

## 2019-12-14 RX ORDER — SODIUM CHLORIDE 9 MG/ML
100 INJECTION, SOLUTION INTRAVENOUS CONTINUOUS
Status: DISCONTINUED | OUTPATIENT
Start: 2019-12-14 | End: 2019-12-18 | Stop reason: HOSPADM

## 2019-12-14 RX ORDER — DONEPEZIL HYDROCHLORIDE 5 MG/1
5 TABLET, FILM COATED ORAL DAILY
Status: DISCONTINUED | OUTPATIENT
Start: 2019-12-15 | End: 2019-12-15

## 2019-12-14 RX ORDER — BICALUTAMIDE 50 MG/1
50 TABLET, FILM COATED ORAL DAILY
Status: DISCONTINUED | OUTPATIENT
Start: 2019-12-15 | End: 2019-12-18 | Stop reason: HOSPADM

## 2019-12-14 RX ADMIN — NYSTATIN: 100000 POWDER TOPICAL at 17:36

## 2019-12-14 RX ADMIN — FAMOTIDINE 20 MG: 10 TABLET ORAL at 17:38

## 2019-12-14 RX ADMIN — TAMSULOSIN HYDROCHLORIDE 0.4 MG: 0.4 CAPSULE ORAL at 21:19

## 2019-12-14 RX ADMIN — TUBERCULIN PURIFIED PROTEIN DERIVATIVE 5 UNITS: 5 INJECTION INTRADERMAL at 17:38

## 2019-12-14 RX ADMIN — Medication 10 ML: at 21:20

## 2019-12-14 RX ADMIN — Medication 10 ML: at 17:39

## 2019-12-14 RX ADMIN — SODIUM CHLORIDE 100 ML/HR: 900 INJECTION, SOLUTION INTRAVENOUS at 12:35

## 2019-12-14 RX ADMIN — METOPROLOL TARTRATE 25 MG: 25 TABLET ORAL at 21:20

## 2019-12-14 NOTE — ED TRIAGE NOTES
Pt to ED via EMS from home c/o weakness \"in the legs\" after finishing the antibiotics for UTI. Per family, patient is normally weak after a UTI. Just want patient evaluated. Pt does not meet sepsis criteria.

## 2019-12-14 NOTE — ED NOTES
TRANSFER - OUT REPORT: 
 
Verbal report given to Esteban on Tylor Leon [place]  being transferred to [phone] for routine progression of care Report consisted of patients Situation, Background, Assessment and  
Recommendations(SBAR). Information from the following report(s) SBAR, ED Summary and MAR was reviewed with the receiving nurse. Lines:  
Peripheral IV 12/14/19 Left Antecubital (Active) Site Assessment Clean, dry, & intact 12/14/2019 11:10 AM  
Infiltration Assessment 0 12/14/2019 11:10 AM  
Dressing Type Transparent 12/14/2019 11:10 AM  
Hub Color/Line Status Pink 12/14/2019 11:10 AM  
Action Taken Blood drawn 12/14/2019 11:10 AM  
  
 
Opportunity for questions and clarification was provided. Patient transported with: 
 Monitor Registered Nurse

## 2019-12-14 NOTE — H&P
Hospitalist H&P Note Admit Date:  2019 10:51 AM  
Name:  Corbin Aquino Age:  78 y.o. 
:  1940 MRN:  [id number] PCP:  Elsy Tam MD 
Treatment Team: Attending Provider: Esthela Chisholm MD; Primary Nurse: Nabor Hinojosa RN; Consulting Provider: Elgin Graves MD 
 
HPI:  
 
CC:  Weakness Mr. Grey is a 77 yo male with PMH of prostate cancer taking casodex, AFIB on pradaxa/ PPM, recurrent UTI, HFpEF, depression, HTN who is evaluated with dizziness and weakness. He lives at home with his daughter and was just discharged home 19 after an admit for MRSA/ECOLI UTI. He is not able to tell me if he follows with urology or oncology for his prostate cancer but he is taking casodex. He has infected appearing urine with gross hematuria but denies dysuria, is afebrile and has no leukocytosis. He was recently placed on macrobid prophylaxis 19. He has had 4 admits in the prior 12 months for UTIs. He has complaints of generalized weakness and difficulty walking. No falls. Still taking pradaxa. In the ED troponin slightly elevated and has questionable EKG changes. The ED provider spoke with cardiology who came to evaluate. 10 systems reviewed and negative except as noted in HPI. Past Medical History:  
Diagnosis Date  Arrhythmia   
 pt takes pradaxa  Arthritis  Atrial fibrillation ([address] 2012  CAD (coronary artery disease)  Cancer [facility])   
 prostate  Dementia ([address]  Depression 2012  GERD (gastroesophageal reflux disease)   
 controlled with medication  Heart failure ([address]   
 pt takes lasix as needed, reports SOB with exertion  History of prostate cancer 2014  Hypertension   
 controlled with medication  Morbid obesity ([address]  Osteoarthritis of right knee 2012  Recurrent major depressive disorder, in full remission ([address] 2017  Total knee replacement status 1/26/2012  Vitamin B12 deficiency 7/23/2017 Past Surgical History:  
Procedure Laterality Date  HX ORTHOPAEDIC  2010  
 left TKA  HX PACEMAKER  8/30/2012 [employer] pacemaker  HX PROSTATECTOMY No Known Allergies Social History Tobacco Use  Smoking status: Never Smoker  Smokeless tobacco: Never Used Substance Use Topics  Alcohol use: No  
  
Family History Problem Relation Age of Onset  Cancer Father Immunization History Administered Date(s) Administered  Influenza Vaccine 09/26/2012  Influenza Vaccine (Quad) Mdck Pf 11/30/2017  Influenza Vaccine (Quad) PF 10/24/2016, 09/28/2018  Pneumococcal Vaccine (Unspecified Type) 06/25/2013  TB Skin Test (PPD) Intradermal 07/23/2017, 05/13/2018, 09/25/2018, 06/04/2019, 07/29/2019, 11/01/2019, 12/03/2019  Tdap 05/19/2016 PTA Medications: 
Prior to Admission Medications Prescriptions Last Dose Informant Patient Reported? Taking? ARIPiprazole (ABILIFY) 10 mg tablet   No No  
Sig: TAKE 1 TABLET BY MOUTH EVERY DAY  
acetaminophen (TYLENOL ARTHRITIS PAIN) 650 mg CR tablet   Yes No  
Sig: Take 650 mg by mouth every six (6) hours as needed for Pain. aspirin 81 mg chewable tablet   No No  
Sig: Take 1 Tab by mouth daily. Indications: prevention of cerebrovascular accident  
bicalutamide (CASODEX) 50 mg tablet   No No  
Sig: TAKE 1 TABLET BY MOUTH EVERY DAY  
donepezil (ARICEPT) 5 mg tablet   No No  
Sig: TAKE 1 TABLET BY MOUTH AT BEDTIME  
famotidine (PEPCID) 20 mg tablet   No No  
Sig: Take 1 Tab by mouth two (2) times a day. furosemide (LASIX) 40 mg tablet   No No  
Sig: Take 40 mg po daily PRN ONLY for evidence of fluid retention  
levomilnacipran (FETZIMA) 40 mg ER capsule   No No  
Sig: TAKE 1 CAP BY MOUTH DAILY. metoprolol tartrate (LOPRESSOR) 25 mg tablet   No No  
Sig: Take 1 Tab by mouth two (2) times a day. multivitamin, tx-iron-ca-min (THERA-M W/ IRON) 9 mg iron-400 mcg tab tablet   Yes No  
Sig: Take 1 Tab by mouth daily. nitrofurantoin, macrocrystal-monohydrate, (MACROBID) 100 mg capsule   No No  
Sig: Take 1 Cap by mouth daily for 30 days. Indications: suppression prophylaxis for recurrent UTI polyethylene glycol (MIRALAX) 17 gram/dose powder   Yes No  
Sig: Take 17 g by mouth daily. Indications: constipation  
senna-docusate (SENNA LAXATIVE-STOOL SOFTENER) 8.6-50 mg per tablet   Yes No  
Sig: Take 1 Tab by mouth daily. tamsulosin (FLOMAX) 0.4 mg capsule   No No  
Sig: Take 1 Cap by mouth nightly. Facility-Administered Medications: None Objective:  
 
Patient Vitals for the past 24 hrs: 
 Temp Pulse Resp BP SpO2  
12/14/19 1331  64 26 127/69 98 % 12/14/19 1324 98.3 °F (36.8 °C)      
12/14/19 1311  60 23 117/66 96 % 12/14/19 1239  60 24 121/74 99 % 12/14/19 1234  73 24  97 % 12/14/19 1202  68 13  99 % 12/14/19 1054 97.9 °F (36.6 °C) 65 16 129/60 (!) 80 % Oxygen Therapy O2 Sat (%): 98 % (12/14/19 1331) Pulse via Oximetry: 67 beats per minute (12/14/19 1331) O2 Device: Room air (12/14/19 1054) No intake or output data in the 24 hours ending 12/14/19 1359 Physical Exam: 
General:    Alert. No distress, elderly Eyes:   Normal sclera. Extraocular movements intact. PERRLA 
ENT:  Normocephalic, atraumatic. Moist mucous membranes CV:   RRR. No m/r/g. 1+  edema Lungs:  CTAB. No wheezing, rhonchi, or rales. anterior Abdomen: Soft, nontender, nondistended. Present BS Extremities: Warm and dry. . 
Neurologic:  generally weak Skin:     No rashes or jaundice. Normal coloration Psych:  Flat affect I reviewed the labs, imaging, EKGs, telemetry, and other studies done this admission. Data Review:  
Recent Results (from the past 24 hour(s)) CBC WITH AUTOMATED DIFF Collection Time: 12/14/19 11:12 AM  
Result Value Ref Range WBC 7.5 4.3 - 11.1 K/uL RBC 4.58 4.23 - 5.6 M/uL  
 HGB 14.1 13.6 - 17.2 g/dL HCT 43.5 41.1 - 50.3 % MCV 95.0 79.6 - 97.8 FL  
 MCH 30.8 26.1 - 32.9 PG  
 MCHC 32.4 31.4 - 35.0 g/dL  
 RDW 13.8 11.9 - 14.6 % PLATELET 331 954 - 455 K/uL MPV 10.6 9.4 - 12.3 FL ABSOLUTE NRBC 0.00 0.0 - 0.2 K/uL  
 DF AUTOMATED NEUTROPHILS 76 43 - 78 % LYMPHOCYTES 15 13 - 44 % MONOCYTES 8 4.0 - 12.0 % EOSINOPHILS 1 0.5 - 7.8 % BASOPHILS 0 0.0 - 2.0 % IMMATURE GRANULOCYTES 0 0.0 - 5.0 %  
 ABS. NEUTROPHILS 5.7 1.7 - 8.2 K/UL  
 ABS. LYMPHOCYTES 1.1 0.5 - 4.6 K/UL  
 ABS. MONOCYTES 0.6 0.1 - 1.3 K/UL  
 ABS. EOSINOPHILS 0.1 0.0 - 0.8 K/UL  
 ABS. BASOPHILS 0.0 0.0 - 0.2 K/UL  
 ABS. IMM. GRANS. 0.0 0.0 - 0.5 K/UL METABOLIC PANEL, COMPREHENSIVE Collection Time: 12/14/19 11:12 AM  
Result Value Ref Range Sodium 141 136 - 145 mmol/L Potassium 5.1 3.5 - 5.1 mmol/L Chloride 109 (H) 98 - 107 mmol/L  
 CO2 29 21 - 32 mmol/L Anion gap 3 (L) 7 - 16 mmol/L Glucose 79 65 - 100 mg/dL BUN 9 8 - 23 MG/DL Creatinine 1.12 0.8 - 1.5 MG/DL  
 GFR est AA >60 >60 ml/min/1.73m2 GFR est non-AA >60 >60 ml/min/1.73m2 Calcium 10.1 8.3 - 10.4 MG/DL Bilirubin, total 0.8 0.2 - 1.1 MG/DL  
 ALT (SGPT) 26 12 - 65 U/L  
 AST (SGOT) 57 (H) 15 - 37 U/L Alk. phosphatase 114 50 - 136 U/L Protein, total 7.8 6.3 - 8.2 g/dL Albumin 3.2 3.2 - 4.6 g/dL Globulin 4.6 (H) 2.3 - 3.5 g/dL A-G Ratio 0.7 (L) 1.2 - 3.5 POC TROPONIN-I Collection Time: 12/14/19 11:16 AM  
Result Value Ref Range Troponin-I (POC) 0.14 (H) 0.02 - 0.05 ng/ml POC LACTIC ACID Collection Time: 12/14/19 11:18 AM  
Result Value Ref Range Lactic Acid (POC) 2.43 (H) 0.5 - 1.9 mmol/L  
URINE MICROSCOPIC Collection Time: 12/14/19 12:12 PM  
Result Value Ref Range WBC >100 0 /hpf  
 RBC >100 0 /hpf Epithelial cells 0-3 0 /hpf Bacteria 1+ (H) 0 /hpf Casts 0 0 /lpf Crystals, urine 0 0 /LPF Mucus 0 0 /lpf All Micro Results Procedure Component Value Units Date/Time CULTURE, URINE [[id number]] Order Status:  Sent Specimen:  Urine from Clean catch CULTURE, URINE [[id number]] Collected:  12/14/19 1212 Order Status:  Completed Specimen:  Cath Urine Updated:  12/14/19 1216 CULTURE, BLOOD [[id number]] Order Status:  Canceled Specimen:  Blood CULTURE, BLOOD [[id number]] Order Status:  Canceled Specimen:  Blood Other Studies: Xr Chest [facility] Result Date: 12/14/2019 AP PORTABLE CHEST X-RAY 12/14/2019 11:22 AM HISTORY: fatigue COMPARISON: December 3, 2019 FINDINGS:  EKG leads are present. A cardiac pacemaker device is present. The cardiac silhouette is prominent. There is no lobar consolidation, pleural effusions or pulmonary edema. IMPRESSION: No consolidation. Assessment and Plan:  
 
Hospital Problems as of 12/14/2019 Date Reviewed: 3/20/2019 Codes Class Noted - Resolved POA * (Principal) Weakness ICD-10-CM: R53.1 ICD-9-CM: 780.79  12/14/2019 - Present Yes Elevated troponin (Chronic) ICD-10-CM: R79.89 ICD-9-CM: 790.6  12/14/2019 - Present Yes Prostate cancer ([facility] (Chronic) ICD-10-CM: S75 ICD-9-CM: 185  12/14/2019 - Present Yes Atrial fibrillation (H[facility]) (Chronic) ICD-10-CM: I48.91 
ICD-9-CM: 427.31  1/27/2012 - Present Yes HTN (hypertension) (Chronic) ICD-10-CM: I10 
ICD-9-CM: 401.9  1/27/2012 - Present Yes · Generalized weakness: admit for workup with CT head, unable to have MRI due to having PPM, PPD, PT/OT consults · Hematuria/ prostate cancer/ recurrent UTI: he does not have complaints of fever/ dysuria and has normal WBC, will not start antibiotics and will followup culture, consulted urology due to lost to followup regarding prostate cancer and current hematuria- spoke with Dr aNkul Hoyos who states can obtain CT AP and have him followup with [facility] urology once discharged and if developed further issues then re consult · AFIB: holding pradaxa due to fall risk, has PPM and on  Metoprolol · depression: continue abilify and fetmiza · Dementia: continued aricept · Elevated troponin: discussed with Dr. Neil Soto of cardiology, followup ECHO to evaluate EF, has recently much higher troponin, does not represent ischemic event per cardiology, continued asa · Elevated lactate: hydrate and reassess lab Discharge planning:  PPD, PT/OT 
DVT ppx: SCD Code status:  Full Estimated LOS:  Greater than 2 midnights Risk:  high Care plan: daughter Migdalia  Signed: Rylan Escobedo MD

## 2019-12-14 NOTE — CONSULTS
[id number] [address] CONSULTATION Name:  Montez Lawson 
MR#:  [id number] :  1940 ACCOUNT #:  [de-identified] DATE OF SERVICE:  2019 CARDIOLOGY CONSULTATION 
 
REFERRING PHYSICIAN:  Hospitalist. 
 
CLINICAL INDICATION FOR CONSULTATION:  Elevated troponin. HISTORY OF PRESENT ILLNESS:  The patient is an 68-[facility] male, who apparently was brought to the emergency room department due to progressive weakness. The patient states that he could no longer stand or move his legs to support his weight and he was subsequently brought back to the hospital.  He denies any fever, chills, nausea, vomiting, diarrhea, dysuria, or chest pain. He denies any excessive shortness of breath. Apparently, the patient was recently hospitalized for similar problems with recurrent urinary tract infection, general weakness, and debilitation. He was treated with antibiotic therapy and apparently released to home with outpatient home health therapy and PT. Apparently, the patient has not been able to improve his condition with these arrangements. It is of note that the patient's troponin level on 2019, was 1.02. Today's troponin level is only 0.14. Cardiology consultation was requested. The patient denies any history of coronary artery disease. He does state he has a history of pacemaker. He cannot remember the exact date of the implant. The patient has been followed by Dr. Juany Billy in the outpatient setting. ADDITIONAL PAST MEDICAL HISTORY: 
1. General debilitation with recent urinary tract infection. 2.  History of sick sinus syndrome with pacemaker. 3.  History of paroxysmal atrial fibrillation. 4.  Hypertension. 5.  History of chronic diastolic heart failure. 6.  History of prostate cancer with radiation and surgery. ALLERGIES:  NO STATED ALLERGY. SOCIAL HISTORY:  The patient lives with his family.   He denies alcohol or tobacco. 
 
 LISTED HOME MEDICATIONS:  Macrodantin 100 mg daily, Flomax 0.4 mg daily, Casodex 50 mg daily, Abilify 10 mg daily, Aricept 5 mg daily, Lopressor 25 mg daily, Lasix 40 mg p.r.n., MiraLax p.r.n., Fetzima 40 mg daily, Pepcid 20 mg twice a day, aspirin 81 mg daily. REVIEW OF SYSTEMS: 
SKIN:  The patient denies rash. NEURO:  The patient denies prior stroke or seizure. PSYCH:  The patient denies anxiety. HEENT:  The patient denies headache. PULMONARY:  The patient denies shortness of breath. CARDIOVASCULAR:  The patient denies chest pain or palpitations. GI:  The patient denies diarrhea, melena, or hematochezia. :  The patient denies dysuria or hematuria. MUSCULOSKELETAL:  The patient complains of near falls with profound weakness. PHYSICAL EXAMINATION: 
VITAL SIGNS:  Temperature is 97.9, pulse is 60, respirations 14, blood pressure 121/74. GENERAL:  The patient is a pleasant elderly  male, in no acute distress. SKIN:  Warm and dry. NEURO:  Alert. PSYCH:  Normal mood and affect. HEENT:  Normocephalic, atraumatic. Pupils are reactive. NECK:  Supple. He has 2+ carotid upstrokes. I could not clearly appreciate significant JVD. CHEST:  Sounds clear to auscultation. CARDIAC EXAM:  Reveals a regular rate and rhythm without significant murmur, rub, or gallop. ABDOMEN:  Soft, obese. Positive bowel sounds. EXTREMITIES:  Show 2+ bilateral pitting, pretibial, and pedal edema. LAB DATA: 
1.  CBC:  WBC is 7.5, hemoglobin 14.1, hematocrit is 43.5. His platelet count is 032,607. 
2.  Electrolytes:  Sodium is 141, potassium is 5.1, chloride is 109, CO2 is 29, creatinine is 1.12, BUN is 9, glucose is 79. 
3.  Point-of-care troponin is 0.14, lactic acid is 2.43. IMPRESSION AND PLAN: 
1. General debilitation with recent urinary tract infection:  The patient is being admitted by the hospitalist for further treatment and management. 2.  History of sick sinus syndrome with single chamber pacemaker implantation, 08/2012. Apparently, the patient has a history of chronic persistent atrial fibrillation and has been on Pradaxa in the distant past.  At this point, I would check an EKG and consider pacer interrogation. This appears to be a stable condition at this point. 3.  Elevated troponin level: The patient's troponin level was actually more elevated 11 days ago, when he was admitted with urinary tract infection. He reports no history of chest pain and I suspect this is a downward trend and most likely represents an elevated troponin in the recent past due to supply demand mismatch in an elderly debilitated individual with suspected urinary infection requiring intravenous antibiotic and hospitalization. Echocardiogram will be performed to assess his overall left ventricular function and I do not feel like this represents an acute ischemic event or myocardial infarction at this time. EKG has been ordered as well as a followup echocardiogram.  Previous echocardiogram in 06/2019, revealed an ejection fraction of 60%-65% with mild aortic regurgitation and mild-to-moderate tricuspid regurgitation being described. At this point, we will be on standby and will be happy to participate in the patient's care in the future as needed or requested. MD ALEX [email or web address] 
D:  12/14/2019 13:01 
T:  12/14/2019 16:14 
JOB #:  [id number] CC:  [facility] Cardiology

## 2019-12-14 NOTE — CONSULTS
Patient seen and examined in the ER. Consult dictated. E#:[id number]. Please consult for details. Trop is trending down from 12-3-19. Presently,I do not feel that this represents an acute ischemic event. Echo and EKG ordered. Will will be on standby as needed.

## 2019-12-14 NOTE — ED PROVIDER NOTES
57-year-old male presents via EMS with chief complaint of weakness. Patient tells me that he is having difficulty walking, that his legs feel weak and will not move appropriately. Family told EMS that this was a common event after his urinary tract infections. The patient told me that he was walking after his discharge from the hospital but that it got worse after about 3 or 4 days. He has been unable to ambulate since that time. He was discharged from the hospital on 5 December. He was admitted for urinary tract infection. Currently he is alert and appropriate. He denies that he has had fever, cough, congestion, nausea vomiting or diarrhea. He tells me he is incontinent and uses pads for urination. He is able to move his legs grossly but unable to stand. No family are present. Past Medical History:  
Diagnosis Date  Arrhythmia   
 pt takes pradaxa  Arthritis  Atrial fibrillation ([address] 1/27/2012  CAD (coronary artery disease)  Cancer [facility])   
 prostate  Dementia ([address]  Depression 8/28/2012  GERD (gastroesophageal reflux disease)   
 controlled with medication  Heart failure ([address]   
 pt takes lasix as needed, reports SOB with exertion  History of prostate cancer 8/4/2014  Hypertension   
 controlled with medication  Morbid obesity ([address]  Osteoarthritis of right knee 1/26/2012  Recurrent major depressive disorder, in full remission ([address] 11/30/2017  Total knee replacement status 1/26/2012  Vitamin B12 deficiency 7/23/2017 Past Surgical History:  
Procedure Laterality Date  HX ORTHOPAEDIC  2010  
 left TKA  HX PACEMAKER  8/30/2012 [employer] pacemaker  HX PROSTATECTOMY Family History:  
Problem Relation Age of Onset  Cancer Father Social History Socioeconomic History  Marital status:  Spouse name: Not on file  Number of children: Not on file  Years of education: Not on file  Highest education level: Not on file Occupational History  Not on file Social Needs  Financial resource strain: Not on file  Food insecurity:  
  Worry: Not on file Inability: Not on file  Transportation needs:  
  Medical: Not on file Non-medical: Not on file Tobacco Use  Smoking status: Never Smoker  Smokeless tobacco: Never Used Substance and Sexual Activity  Alcohol use: No  
 Drug use: No  
 Sexual activity: Never Lifestyle  Physical activity:  
  Days per week: Not on file Minutes per session: Not on file  Stress: Not on file Relationships  Social connections:  
  Talks on phone: Not on file Gets together: Not on file Attends Gnosticism service: Not on file Active member of club or organization: Not on file Attends meetings of clubs or organizations: Not on file Relationship status: Not on file  Intimate partner violence:  
  Fear of current or ex partner: Not on file Emotionally abused: Not on file Physically abused: Not on file Forced sexual activity: Not on file Other Topics Concern  Not on file Social History Narrative  Not on file ALLERGIES: Patient has no known allergies. Review of Systems Constitutional: Positive for fatigue. Negative for appetite change (says he is eating well, favorite food baked chicken), chills and fever. HENT: Negative for postnasal drip and sore throat. Eyes: Negative for discharge and redness. Respiratory: Negative for cough and wheezing. Cardiovascular: Negative for chest pain and palpitations. Gastrointestinal: Negative for abdominal pain, diarrhea, nausea and vomiting. Genitourinary: Positive for difficulty urinating (hsx incontinence). Negative for flank pain. Musculoskeletal: Positive for gait problem. Negative for back pain and myalgias. Skin: Negative for color change and rash. Neurological: Positive for weakness. Negative for tremors. Psychiatric/Behavioral: The patient is not nervous/anxious. Vitals:  
 12/14/19 1054 BP: 129/60 Pulse: 65 Resp: 16 Temp: 97.9 °F (36.6 °C) SpO2: 99% Weight: 117.9 kg (260 lb) Height: 6' 2\" (1.88 m) Physical Exam 
Vitals signs and nursing note reviewed. Constitutional:   
   Appearance: Normal appearance. He is not diaphoretic. HENT:  
   Head: Normocephalic and atraumatic. Nose: Nose normal. No congestion. Mouth/Throat:  
   Mouth: Mucous membranes are moist.  
Eyes:  
   General:     
   Right eye: No discharge. Left eye: No discharge. Extraocular Movements: Extraocular movements intact. Pupils: Pupils are equal, round, and reactive to light. Neck: Musculoskeletal: Normal range of motion. No neck rigidity. Cardiovascular:  
   Rate and Rhythm: Normal rate. Rhythm irregular. Heart sounds: Normal heart sounds. Pulmonary:  
   Effort: Pulmonary effort is normal.  
   Breath sounds: Normal breath sounds. No wheezing, rhonchi or rales. Abdominal:  
   General: There is no distension. Palpations: Abdomen is soft. Tenderness: There is no tenderness. Musculoskeletal: Normal range of motion. General: No tenderness. Right lower leg: Edema (1-2+) present. Left lower leg: Edema (1-2+) present. Skin: 
   General: Skin is warm and dry. Capillary Refill: Capillary refill takes less than 2 seconds. Neurological:  
   Mental Status: He is alert and oriented to person, place, and time. Comments: eoms intact. No nystagmus/slurred speech/facial asymmetry.  strong and equal bilaterally. Follows commands with ease. Purposeful mvt all extremities. Right leg weaker than left. Gross motor lower extremities on command. Psychiatric:     
   Mood and Affect: Mood normal.     
   Behavior: Behavior normal.     
   Thought Content:  Thought content normal.     
   Judgment: Judgment normal.  
 
  
 
MDM 
 Number of Diagnoses or Management Options Diagnosis management comments: 41-year-old male presents via EMS with chief complaint of weakness. Patient tells me that he is having difficulty walking, that his legs feel weak and will not move appropriately. Family told EMS that this was a common event after his urinary tract infections. The patient told me that he was walking after his discharge from the hospital but that it got worse after about 3 or 4 days. He has been unable to ambulate since that time. He was discharged from the hospital on 5 December. He was admitted for urinary tract infection. Currently he is alert and appropriate. He denies that he has had fever, cough, congestion, nausea vomiting or diarrhea. He tells me he is incontinent and uses pads for urination. He is able to move his legs grossly but unable to stand. No family are present. 11:48 AM on exam, pt is alert and appropriate. Right leg weaker than left. Upper extremities equal.  Trop 0.14.  ekg with bbb. Reviewed w dr baker and cardiology paged. 12:01 PM  D/w dr Roberth Mcdonald, one of cardiologist will see pt shortly in ed. Amount and/or Complexity of Data Reviewed Clinical lab tests: ordered Tests in the radiology section of CPT®: ordered Discuss the patient with other providers: yes ([place], green) Procedures

## 2019-12-15 LAB
ANION GAP SERPL CALC-SCNC: 6 MMOL/L (ref 7–16)
ATRIAL RATE: 66 BPM
ATRIAL RATE: 66 BPM
BASOPHILS # BLD: 0 K/UL (ref 0–0.2)
BASOPHILS NFR BLD: 1 % (ref 0–2)
BUN SERPL-MCNC: 8 MG/DL (ref 8–23)
CALCIUM SERPL-MCNC: 9.4 MG/DL (ref 8.3–10.4)
CALCULATED P AXIS, ECG09: 90 DEGREES
CALCULATED R AXIS, ECG10: -37 DEGREES
CALCULATED R AXIS, ECG10: -83 DEGREES
CALCULATED T AXIS, ECG11: -3 DEGREES
CALCULATED T AXIS, ECG11: 96 DEGREES
CHLORIDE SERPL-SCNC: 110 MMOL/L (ref 98–107)
CO2 SERPL-SCNC: 26 MMOL/L (ref 21–32)
CREAT SERPL-MCNC: 0.92 MG/DL (ref 0.8–1.5)
DIAGNOSIS, 93000: NORMAL
DIAGNOSIS, 93000: NORMAL
DIFFERENTIAL METHOD BLD: ABNORMAL
EOSINOPHIL # BLD: 0.2 K/UL (ref 0–0.8)
EOSINOPHIL NFR BLD: 3 % (ref 0.5–7.8)
ERYTHROCYTE [DISTWIDTH] IN BLOOD BY AUTOMATED COUNT: 13.4 % (ref 11.9–14.6)
GLUCOSE SERPL-MCNC: 89 MG/DL (ref 65–100)
HCT VFR BLD AUTO: 38.7 % (ref 41.1–50.3)
HGB BLD-MCNC: 12.6 G/DL (ref 13.6–17.2)
IMM GRANULOCYTES # BLD AUTO: 0 K/UL (ref 0–0.5)
IMM GRANULOCYTES NFR BLD AUTO: 1 % (ref 0–5)
LACTATE SERPL-SCNC: 1.4 MMOL/L (ref 0.4–2)
LYMPHOCYTES # BLD: 1.6 K/UL (ref 0.5–4.6)
LYMPHOCYTES NFR BLD: 25 % (ref 13–44)
MCH RBC QN AUTO: 30.5 PG (ref 26.1–32.9)
MCHC RBC AUTO-ENTMCNC: 32.6 G/DL (ref 31.4–35)
MCV RBC AUTO: 93.7 FL (ref 79.6–97.8)
MM INDURATION POC: 0 MM (ref 0–5)
MONOCYTES # BLD: 0.6 K/UL (ref 0.1–1.3)
MONOCYTES NFR BLD: 9 % (ref 4–12)
NEUTS SEG # BLD: 4.2 K/UL (ref 1.7–8.2)
NEUTS SEG NFR BLD: 63 % (ref 43–78)
NRBC # BLD: 0 K/UL (ref 0–0.2)
P-R INTERVAL, ECG05: 154 MS
PLATELET # BLD AUTO: 179 K/UL (ref 150–450)
PMV BLD AUTO: 10.3 FL (ref 9.4–12.3)
POTASSIUM SERPL-SCNC: 3.5 MMOL/L (ref 3.5–5.1)
PPD POC: NEGATIVE NEGATIVE
Q-T INTERVAL, ECG07: 528 MS
Q-T INTERVAL, ECG07: 544 MS
QRS DURATION, ECG06: 124 MS
QRS DURATION, ECG06: 152 MS
QTC CALCULATION (BEZET), ECG08: 553 MS
QTC CALCULATION (BEZET), ECG08: 570 MS
RBC # BLD AUTO: 4.13 M/UL (ref 4.23–5.6)
SODIUM SERPL-SCNC: 142 MMOL/L (ref 136–145)
VENTRICULAR RATE, ECG03: 66 BPM
VENTRICULAR RATE, ECG03: 66 BPM
WBC # BLD AUTO: 6.6 K/UL (ref 4.3–11.1)

## 2019-12-15 PROCEDURE — 97162 PT EVAL MOD COMPLEX 30 MIN: CPT

## 2019-12-15 PROCEDURE — 74011250636 HC RX REV CODE- 250/636: Performed by: INTERNAL MEDICINE

## 2019-12-15 PROCEDURE — 74011250637 HC RX REV CODE- 250/637: Performed by: INTERNAL MEDICINE

## 2019-12-15 PROCEDURE — 65270000029 HC RM PRIVATE

## 2019-12-15 PROCEDURE — 85025 COMPLETE CBC W/AUTO DIFF WBC: CPT

## 2019-12-15 PROCEDURE — 80048 BASIC METABOLIC PNL TOTAL CA: CPT

## 2019-12-15 PROCEDURE — 77030020263 HC SOL INJ SOD CL0.9% LFCR 1000ML

## 2019-12-15 PROCEDURE — 97165 OT EVAL LOW COMPLEX 30 MIN: CPT

## 2019-12-15 PROCEDURE — 83605 ASSAY OF LACTIC ACID: CPT

## 2019-12-15 PROCEDURE — 77030040361 HC SLV COMPR DVT MDII -B

## 2019-12-15 RX ORDER — DONEPEZIL HYDROCHLORIDE 5 MG/1
5 TABLET, FILM COATED ORAL
Status: DISCONTINUED | OUTPATIENT
Start: 2019-12-15 | End: 2019-12-18 | Stop reason: HOSPADM

## 2019-12-15 RX ADMIN — Medication 1 AMPULE: at 08:33

## 2019-12-15 RX ADMIN — DONEPEZIL HYDROCHLORIDE 5 MG: 5 TABLET, FILM COATED ORAL at 21:11

## 2019-12-15 RX ADMIN — ASPIRIN 81 MG 81 MG: 81 TABLET ORAL at 08:33

## 2019-12-15 RX ADMIN — SODIUM CHLORIDE 100 ML/HR: 900 INJECTION, SOLUTION INTRAVENOUS at 01:25

## 2019-12-15 RX ADMIN — METOPROLOL TARTRATE 25 MG: 25 TABLET ORAL at 08:33

## 2019-12-15 RX ADMIN — NYSTATIN: 100000 POWDER TOPICAL at 08:34

## 2019-12-15 RX ADMIN — SODIUM CHLORIDE 100 ML/HR: 900 INJECTION, SOLUTION INTRAVENOUS at 23:13

## 2019-12-15 RX ADMIN — FAMOTIDINE 20 MG: 10 TABLET ORAL at 08:33

## 2019-12-15 RX ADMIN — ARIPIPRAZOLE 10 MG: 10 TABLET ORAL at 08:33

## 2019-12-15 RX ADMIN — TAMSULOSIN HYDROCHLORIDE 0.4 MG: 0.4 CAPSULE ORAL at 21:11

## 2019-12-15 RX ADMIN — SODIUM CHLORIDE 100 ML/HR: 900 INJECTION, SOLUTION INTRAVENOUS at 11:39

## 2019-12-15 RX ADMIN — NYSTATIN: 100000 POWDER TOPICAL at 17:08

## 2019-12-15 RX ADMIN — METOPROLOL TARTRATE 25 MG: 25 TABLET ORAL at 21:11

## 2019-12-15 RX ADMIN — Medication 1 AMPULE: at 21:11

## 2019-12-15 RX ADMIN — FAMOTIDINE 20 MG: 10 TABLET ORAL at 17:07

## 2019-12-15 RX ADMIN — BICALUTAMIDE 50 MG: 50 TABLET, FILM COATED ORAL at 09:00

## 2019-12-15 RX ADMIN — Medication 10 ML: at 05:28

## 2019-12-15 NOTE — PROGRESS NOTES
Problem: Mobility Impaired (Adult and Pediatric) Goal: *Acute Goals and Plan of Care (Insert Text) Description STG: 
(1.)Mr. Se Palomo will move from supine to sit and sit to supine  with MAX ASSIST within 1-2 treatment day(s). (2.)Mr. Se Palomo will transfer from bed to chair and chair to bed with MAXIMAL ASSIST using the least restrictive device within 1-2 treatment day(s). (3.)Mr. Se Palomo will ambulate with MAXIMAL ASSIST for 1-2 feet with the least restrictive device within 1-2 treatment day(s). LTG: 
(1.)Mr. Se Palomo will move from supine to sit and sit to supine  in bed with MODERATE ASSIST within 3-10 treatment day(s). (2.)Mr. Se Palomo will transfer from bed to chair and chair to bed with MODERATE ASSIST using the least restrictive device within 3-10 treatment day(s). (3.)Mr. Se Palomo will ambulate with MAXIMAL ASSIST for 2-5 feet with the least restrictive device within 3-10 treatment day(s). ________________________________________________________________________________________________ 
  
12/15/2019 1534 by Scotty West PT Outcome: Progressing Towards Goal 
 
 
PHYSICAL THERAPY: Initial Assessment 12/15/2019 [place]PATIENT:   
[place] / Product Type: Medicare /   
  
NAME/AGE/GENDER: Ugo Duarte is a 78 y.o. male PRIMARY DIAGNOSIS: Weakness [R53.1] Weakness Weakness ICD-10: Treatment Diagnosis: · Difficulty in walking, Not elsewhere classified (R26.2) · Other abnormalities of gait and mobility (R26.89) Precaution/Allergies: 
Patient has no known allergies. ASSESSMENT:  
 
Mr. Se Palomo presents with decreased independence with functional mobility. Pt performed supine to sit. Pt performed sit to stand, twice. Pt stood for CNA to change linens. Pt returned supine. Needs in reach. This section established at most recent assessment PROBLEM LIST (Impairments causing functional limitations): 1. Decreased Strength 2. Decreased Transfer Abilities 3. Decreased Ambulation Ability/Technique 4. Decreased Balance 5. Decreased Activity Tolerance 6. Decreased Flexibility/Joint Mobility INTERVENTIONS PLANNED: (Benefits and precautions of physical therapy have been discussed with the patient.) 1. Bed Mobility 2. Gait Training 3. Therapeutic Activites 4. Therapeutic Exercise/Strengthening 5. Transfer Training TREATMENT PLAN: Frequency/Duration: daily for duration of hospital stay Rehabilitation Potential For Stated Goals: Fair REHAB RECOMMENDATIONS (at time of discharge pending progress):   
Placement: It is my opinion, based on this patient's performance to date, that Mr. Ted Spence may benefit from participating in 1-2 additional therapy sessions in order to continue to assess for rehab potential and then make recommendation for disposition at discharge. Equipment:  
? None at this time HISTORY:  
History of Present Injury/Illness (Reason for Referral): 
Pt admitted with above diagnosis. Past Medical History/Comorbidities:  
Mr. Ted Spence  has a past medical history of Arrhythmia, Arthritis, Atrial fibrillation ([address] (1/27/2012), CAD (coronary artery disease), Cancer ([address] Dementia ([address] Depression (8/28/2012), GERD (gastroesophageal reflux disease), Heart failure ([address] History of prostate cancer (8/4/2014), Hypertension, Morbid obesity ([address] Osteoarthritis of right knee (1/26/2012), Recurrent major depressive disorder, in full remission ([address] (11/30/2017), Total knee replacement status (1/26/2012), and Vitamin B12 deficiency (7/23/2017). Mr. Ted Spence  has a past surgical history that includes hx orthopaedic (2010); hx prostatectomy; and hx pacemaker (8/30/2012). Social History/Living Environment:  
Home Environment: Private residence # Steps to Enter: 7 One/Two Story Residence: One story Living Alone: No 
Support Systems: Family member(s) Patient Expects to be Discharged to[de-identified] Private residence Current DME Used/Available at Home: Cane, straight Prior Level of Function/Work/Activity: 
Pt states he walked with a walker or cane. When pressed, pt states the last time he stood was at [facility]. Pt states he lives with his daughter. Number of Personal Factors/Comorbidities that affect the Plan of Care: 0: LOW COMPLEXITY EXAMINATION:  
Most Recent Physical Functioning:  
Gross Assessment: 
AROM: Generally decreased, functional 
Strength: Generally decreased, functional 
Coordination: Grossly decreased, non-functional 
         
  
Posture: 
  
Balance: 
Sitting: Intact Standing: Pull to stand; With support Bed Mobility: 
Supine to Sit: Maximum assistance;Assist x2 Sit to Supine: Maximum assistance;Assist x2 Wheelchair Mobility: 
  
Transfers: 
Sit to Stand: Maximum assistance;Assist x2 Stand to Sit: Maximum assistance;Assist x2 Gait: 
  
Distance (ft): 1 Feet (ft) Ambulation - Level of Assistance: Maximum assistance;Assist x2 Body Structures Involved: 1. Bones 2. Joints 3. Muscles 4. Ligaments Body Functions Affected: 1. Neuromusculoskeletal 
2. Movement Related Activities and Participation Affected: 1. Mobility Number of elements that affect the Plan of Care: 4+: HIGH COMPLEXITY CLINICAL PRESENTATION:  
Presentation: Evolving clinical presentation with changing clinical characteristics: MODERATE COMPLEXITY CLINICAL DECISION MAKING:  
[employer] AM-PAC 6 Clicks Basic Mobility Inpatient Short Form How much difficulty does the patient currently have. .. Unable A Lot A Little None 1. Turning over in bed (including adjusting bedclothes, sheets and blankets)? [] 1   [x] 2   [] 3   [] 4  
2. Sitting down on and standing up from a chair with arms ( e.g., wheelchair, bedside commode, etc.)   [] 1   [x] 2   [] 3   [] 4  
3. Moving from lying on back to sitting on the side of the bed?    [] 1   [x] 2   [] 3   [] 4  
 How much help from another person does the patient currently need. .. Total A Lot A Little None 4. Moving to and from a bed to a chair (including a wheelchair)? [] 1   [x] 2   [] 3   [] 4  
5. Need to walk in hospital room? [] 1   [x] 2   [] 3   [] 4  
6. Climbing 3-5 steps with a railing? [] 1   [x] 2   [] 3   [] 4  
© 2007, Trustees of [address] [place] [address] [address] [address], under license to [employer]. All rights reserved Score:  Initial: 12 Most Recent: X (Date: -- ) Interpretation of Tool:  Represents activities that are increasingly more difficult (i.e. Bed mobility, Transfers, Gait). Medical Necessity:    
· Skilled intervention continues to be required due to decreased mobility ability. Reason for Services/Other Comments: 
· Patient continues to require skilled intervention due to · Decreased mobility ability. · . Use of outcome tool(s) and clinical judgement create a POC that gives a: Questionable prediction of patient's progress: MODERATE COMPLEXITY  
  
 
 
 
TREATMENT:  
(In addition to Assessment/Re-Assessment sessions the following treatments were rendered) Pre-treatment Symptoms/Complaints:  pt greeted therapy Pain: Initial:  
   Post Session:  pt limited verbalizations Assessment/Reassessment only, no treatment provided today Braces/Orthotics/Lines/Etc:  
· O2 Device: Room air Treatment/Session Assessment:   
· Response to Treatment:  Pt agreeable to therapy. · Interdisciplinary Collaboration:  
o Physical Therapist 
o Registered Nurse 
o Certified Nursing Assistant/Patient Care Technician · After treatment position/precautions:  
o Supine in bed 
o Bed/Chair-wheels locked 
o Bed in low position 
o Call light within reach 
o RN notified · Compliance with Program/Exercises: Will assess as treatment progresses · Recommendations/Intent for next treatment session: \"Next visit will focus on advancements to more challenging activities and reduction in assistance provided\". Total Treatment Duration: PT Patient Time In/Time Out Time In: 1216 Time Out: 1515 Sourav Spicer, PT

## 2019-12-15 NOTE — PROGRESS NOTES
SW met with patient to obtain a baseline assessment. Patient presented unsure of how to answer many questions for SW. SW asked the patient for permission to call his daughter for confirmation of information provided and he agreed. Patient did advise SW that he lives with his daughter and uses a cane to ambulate. He denied any falls. SW called the patient's daughter Agnes Hollingsworth [phone] and ANDREW. SHANNAN Rodarte  [facility] [email or web address]

## 2019-12-15 NOTE — PROGRESS NOTES
Problem: Self Care Deficits Care Plan (Adult) Goal: *Acute Goals and Plan of Care (Insert Text) Description 1. Patient will perform grooming with supervision. 2. Patient will perform upper body dressing with minimal assist. 
3. Patient will perform lower body dressing with moderate assist. 
4. Patient will perform bathing with moderate assist. 
5. Patient will perform toileting and toilet transfer with moderate assist. 
6. Patient will perform ADL functional mobility and tranfers in room with moderate assist. 
7. Patient/family to demonstrate knowledge of home safety and DME recommendations. Goals to be achieved in 7 days. Outcome: Progressing Towards Goal 
 
 
OCCUPATIONAL THERAPY: Initial Assessment and AM 12/15/2019 INPATIENT:   
[place] / Product Type: Medicare /  
  
NAME/AGE/GENDER: Nuha Garcia is a 78 y.o. male PRIMARY DIAGNOSIS:  Weakness [R53.1] Weakness Weakness ICD-10: Treatment Diagnosis:  
 Generalized Muscle Weakness (M62.81) Other lack of cordination (R27.8) Precautions/Allergies: 
   Patient has no known allergies. ASSESSMENT:  
 
Mr. Mp Wild presents with above diagnosis. Pt with recent admission to the hospital and rehab stay per report. Pt's true baseline is unknown at this time. Pt will benefit from acute OT services to address deficits in self care and functional mobility. This section established at most recent assessment PROBLEM LIST (Impairments causing functional limitations): 
Decreased Strength Decreased ADL/Functional Activities Decreased Transfer Abilities Decreased Ambulation Ability/Technique Decreased Balance INTERVENTIONS PLANNED: (Benefits and precautions of occupational therapy have been discussed with the patient.) Activities of daily living training Adaptive equipment training Clothing management Cognitive training Donning&doffing training Hygiene training Therapeutic activity Therapeutic exercise TREATMENT PLAN: Frequency/Duration: Follow patient 3x/wk to address above goals. Rehabilitation Potential For Stated Goals: Good REHAB RECOMMENDATIONS (at time of discharge pending progress):   
Placement: It is my opinion, based on this patient's performance to date, that Mr. Du Townsend may benefit from participating in 1-2 additional therapy sessions in order to continue to assess for rehab potential and then make recommendation for disposition at discharge. Equipment: TBD OCCUPATIONAL PROFILE AND HISTORY:  
History of Present Injury/Illness (Reason for Referral): 
See above Past Medical History/Comorbidities:  
Mr. Du Townsend  has a past medical history of Arrhythmia, Arthritis, Atrial fibrillation ([address] (1/27/2012), CAD (coronary artery disease), Cancer ([address] Dementia ([address] Depression (8/28/2012), GERD (gastroesophageal reflux disease), Heart failure ([address] History of prostate cancer (8/4/2014), Hypertension, Morbid obesity ([address] Osteoarthritis of right knee (1/26/2012), Recurrent major depressive disorder, in full remission ([address] (11/30/2017), Total knee replacement status (1/26/2012), and Vitamin B12 deficiency (7/23/2017). Mr. Du Townsend  has a past surgical history that includes hx orthopaedic (2010); hx prostatectomy; and hx pacemaker (8/30/2012). Social History/Living Environment:  
Home Environment: Private residence # Steps to Enter: 7 One/Two Story Residence: One story Living Alone: No 
Support Systems: Family member(s) Patient Expects to be Discharged to[de-identified] Private residence Current DME Used/Available at Home: Cane, straight Prior Level of Function/Work/Activity: 
Functional baseline unknown Number of Personal Factors/Comorbidities that affect the Plan of Care: Brief history (0):  LOW COMPLEXITY ASSESSMENT OF OCCUPATIONAL PERFORMANCE[de-identified]  
Activities of Daily Living:  
Basic ADLs (From Assessment) Complex ADLs (From Assessment) Feeding: Moderate assistance Oral Facial Hygiene/Grooming: Moderate assistance Bathing: Maximum assistance Upper Body Dressing: Maximum assistance Lower Body Dressing: Maximum assistance Toileting: Total assistance Grooming/Bathing/Dressing Activities of Daily Living Cognitive Retraining Safety/Judgement: Fall prevention Bed/Mat Mobility Rolling: Maximum assistance Supine to Sit: Maximum assistance;Assist x2 Sit to Supine: Maximum assistance;Assist x2 Scooting: Maximum assistance;Assist x2 Most Recent Physical Functioning:  
Gross Assessment: 
  
         
  
Posture: 
  
Balance: 
  Bed Mobility: 
Rolling: Maximum assistance Supine to Sit: Maximum assistance;Assist x2 Sit to Supine: Maximum assistance;Assist x2 Scooting: Maximum assistance;Assist x2 Wheelchair Mobility: 
  
Transfers: 
   
 
    
 
Patient Vitals for the past 6 hrs: 
 BP SpO2 Pulse 12/15/19 0600 100/50 94 % 65  
12/15/19 0700 99/52 92 % 60  
12/15/19 0800 105/53 97 % 60  
12/15/19 0833 105/53  60  
12/15/19 0900 110/63 95 % 65  
12/15/19 1000 (!) 83/60 95 % 60  
12/15/19 1100 (!) 86/48 95 % (!) 59 Mental Status Neurologic State: Alert Orientation Level: Oriented to person, Oriented to place Cognition: Follows commands Perception: Appears intact Perseveration: No perseveration noted Safety/Judgement: Fall prevention Physical Skills Involved: 
Range of Motion Balance Strength Activity Tolerance Cognitive Skills Affected (resulting in the inability to perform in a timely and safe manner): 
None  Psychosocial Skills Affected: 
None Number of elements that affect the Plan of Care: 1-3:  LOW COMPLEXITY CLINICAL DECISION MAKING:  
[employer] AM-[employer]AC 6 Clicks Daily Activity Inpatient Short Form How much help from another person does the patient currently need. .. Total A Lot A Little None 1.  Putting on and taking off regular lower body clothing? [] 1   [x] 2   [] 3   [] 4  
2. Bathing (including washing, rinsing, drying)? [] 1   [x] 2   [] 3   [] 4  
3. Toileting, which includes using toilet, bedpan or urinal?   [] 1   [x] 2   [] 3   [] 4  
4. Putting on and taking off regular upper body clothing? [] 1   [x] 2   [] 3   [] 4  
5. Taking care of personal grooming such as brushing teeth? [] 1   [] 2   [x] 3   [] 4  
6. Eating meals? [] 1   [] 2   [x] 3   [] 4  
© 2007, Trustees of [address] [place] [address] [address] [address], under license to [employer]. All rights reserved Score:  Initial: 14 Most Recent: X (Date: -- ) Interpretation of Tool:  Represents activities that are increasingly more difficult (i.e. Bed mobility, Transfers, Gait). Medical Necessity:    
Patient is expected to demonstrate progress in  
strength, balance, coordination, and functional technique 
 to  
decrease assistance required with self care and functional mobility [facility] Reason for Services/Other Comments: 
Patient continues to require skilled intervention due to Decreased indep with self care and functional  
. Use of outcome tool(s) and clinical judgement create a POC that gives a: LOW COMPLEXITY  
 
 
 
TREATMENT:  
(In addition to Assessment/Re-Assessment sessions the following treatments were rendered) Pre-treatment Symptoms/Complaints:   
Pain: Initial:  
Pain Intensity 1: 0  Post Session:  0 Assessment/Reassessment only, no treatment provided today Braces/Orthotics/Lines/Etc:  
O2 Device: Room air Treatment/Session Assessment:   
Response to Treatment:  Fair tolerance Interdisciplinary Collaboration:  
Physical Therapist 
Occupational Therapist 
Registered Nurse After treatment position/precautions:  
Supine in bed Bed/Chair-wheels locked Bed in low position Call light within reach RN notified Side rails x 2 Compliance with Program/Exercises: Will assess as treatment progresses. Recommendations/Intent for next treatment session: \"Next visit will focus on advancements to more challenging activities\". Total Treatment Duration: OT Patient Time In/Time Out Time In: 1100 Time Out: 1110 Nicolas Nguyen OT

## 2019-12-15 NOTE — PHYSICIAN ADVISORY
Letter of Status Determination:  
Recommend hospitalization status remain INPATIENT  Status Pt Name:  Altagracia Díaz MR#  
[facility] # [id number] / 
[id number] [place] / Plan: [place] MEDICARE PART A AND B / Product Type: Medicare /   
[facility]#  [id number] Room and Hospital  353/01  @ [address] [place] [address] Hospitalization date  12/14/2019 10:51 AM  
Current Attending Physician  Anival Zhou MD  
Principal diagnosis  Weakness [R53.1] Clinicals  78 y.o. y.o  male hospitalized with above diagnosis CT head is unremarkable and cannot have MRI due to having PPM. Per PT notes, Mr. Donna Vega presents with decreased independence with functional mobility. Pt performed supine to sit. Pt performed sit to stand, twice. No documentation of ambulation. OT lists the following Impairments causing functional limitations: 1. Decreased Strength 2. Decreased ADL/Functional Activities 3. Decreased Transfer Abilities 4. Decreased Ambulation Ability/Technique Decreased Balance Elevated troponin likely demand ischemia. Echo( 06/2019 ): EF 60%-65% with mild aortic regurgitation and mild-to-moderate tricuspid regurgitation  
- Dr. Rick(Cardiology) following: repeat ECHO to evaluate EF Hypotension: bp this afternoon 83/60, then 86/48. Patient remains on iv fluids. [place] (MCG) criteria Does apply Patient has conditions which need treatment and monitoring requiring inpatient admission. STATUS DETERMINATION  Inpatient. Additional comments [place] / [place] / Product Type: Medicare /   
  
 
 
Susana Garnett M.D. Physician Advisor [facility]sell [address] C: [phone] [email or web address]

## 2019-12-15 NOTE — PROGRESS NOTES
Report received. Chart reviewed. Patient will awaken , answer questions appropriately. Follow simple commands. Vital signs acceptable. Call bell placed within reach, instructed to call nursing for any needs.

## 2019-12-15 NOTE — PROGRESS NOTES
12/15/19 1644 Dual Skin Pressure Injury Assessment Dual Skin Pressure Injury Assessment [facility] Second Care Provider (Based on [address] [place] [address]) Jodie Shafer Pt has scattered scabs, excoriation to groin

## 2019-12-15 NOTE — PROGRESS NOTES
Hospitalist Note Admit Date:  2019 10:51 AM  
Name:  Pablito Georges Age:  78 y.o. 
:  1940 MRN:  [id number] PCP:  Andreas Warner MD 
Treatment Team: Attending Provider: Senaida Kawasaki, MD; Occupational Therapist: Zeke Whitmore OT; Staff Nurse: Andreina Canales, RN; Physical Therapist: Nancie Tai, PT 
 
HPI/Subjective:  
Summary:Mr. Darwin Sabillon is a 77 yo male with PMH of prostate cancer taking casodex, AFIB(not on AC), SSS s/p PPM, recurrent UTI, HFpEF, depression, HTN who is admitted for dizziness and weakness. He was recently discharged from hospital on 2019 for MRSA/E. coli UTI. Urine appeared infected with gross hematuria but he denies any urinary symptoms. Patient is afebrile without any leukocytosis. He had 4 admissions in the past 12 months for UTIs. According was slightly elevated in the ED and cardiology was called for evaluation. Patient is seen and examined at bedside. No acute events reported overnight by nursing staff. States he feels weak but other wise well. Appears tired and sleepy but arousable and wake up to answer questions. Patient denies fever, chills, chest pains, shortness of breath, n/v, abdominal pain, urinary symptoms. Objective:  
 
Patient Vitals for the past 24 hrs: 
 Temp Pulse Resp BP SpO2  
12/15/19 0732 98.1 °F (36.7 °C)      
12/15/19 0700  60 15 99/52 92 % 12/15/19 0600  65 27 100/50 94 % 12/15/19 0500  66 8 115/60 98 % 12/15/19 0400 97.6 °F (36.4 °C) 62 25 120/61 98 % 12/15/19 0300  60 22 93/46 99 % 12/15/19 0200  63 24 96/46 94 % 12/15/19 0100  60 20 92/40 97 % 12/15/19 0000    110/59   
19 2300  65 19 114/57 97 % 19 2200  62 25 135/55 98 % 19 2120  61  140/61   
19 2100  62 30 140/61 97 % 19  70 24 116/81 99 % 19 1900 97.4 °F (36.3 °C) 60 25 122/49 97 % 19 1851  60 12 123/44 98 % 12/14/19 1727  60 13 146/63 98 % 12/14/19 1627  68 26 144/58 99 % 12/14/19 1609 98.1 °F (36.7 °C)      
12/14/19 1600  80     
12/14/19 1522 98.5 °F (36.9 °C) 65 25 149/59 98 % 12/14/19 1401  63 15 136/64 97 % 12/14/19 1331  64 26 127/69 98 % 12/14/19 1324 98.3 °F (36.8 °C)      
12/14/19 1311  60 23 117/66 96 % 12/14/19 1239  60 24 121/74 99 % 12/14/19 1234  73 24  97 % 12/14/19 1202  68 13  99 % 12/14/19 1054 97.9 °F (36.6 °C) 65 16 129/60 (!) 80 % Oxygen Therapy O2 Sat (%): 92 % (12/15/19 0700) Pulse via Oximetry: 60 beats per minute (12/15/19 0700) O2 Device: Room air (12/14/19 1929) Estimated body mass index is 33.64 kg/m² as calculated from the following: 
  Height as of this encounter: 6' 2\" (1.88 m). Weight as of this encounter: 118.8 kg (262 lb). Intake/Output Summary (Last 24 hours) at 12/15/2019 0987 Last data filed at 12/15/2019 3934 Gross per 24 hour Intake 812 ml Output 300 ml Net 512 ml *Note that automatically entered I/Os may not be accurate; dependent on patient compliance with collection and accurate  by techs. Physical Exam:  
General:     Tired/lethargic, arousable, no acute distress. Well nourished and obese Head:   normocephalic, atraumatic Eyes, Ears, nose: PERRL, EOMI. Neck:    supple, non-tender Lungs:   Decreased breath sounds due to body habitus, CTAB, no wheezing, rhonchi, rales Cardiac:   RRR, Normal S1 and S2. Abdomen:   Soft, non distended, nontender, +BS, no guarding/rebound Extremities:   Warm, dry. Trace to +1 edema Skin:   No rashes, no jaundice Neuro:  Alert and oriented to person, time, place, situation. No gross focal neurological deficit Psychiatric:  No anxiety, calm, cooperative Data Review: 
I have reviewed all labs, meds, and studies from the last 24 hours: 
 
Recent Results (from the past 24 hour(s)) EKG, 12 LEAD, INITIAL  Collection Time: 12/14/19 11:00 AM  
 Result Value Ref Range Ventricular Rate 66 BPM  
 Atrial Rate 66 BPM  
 QRS Duration 152 ms Q-T Interval 528 ms QTC Calculation (Bezet) 553 ms Calculated R Axis -83 degrees Calculated T Axis 96 degrees Diagnosis    
  !!! Poor data quality, interpretation may be adversely affected Electronic ventricular pacemaker Abnormal ECG When compared with ECG of 03-DEC-2019 12:46, 
Poor data quality in current ECG precludes serial comparison Confirmed by ST MAHNAZ MORTENSEN MD (), RAHUL VILLAVICENCIO ([id number]) on 12/15/2019 8:11:08 AM 
  
CBC WITH AUTOMATED DIFF Collection Time: 12/14/19 11:12 AM  
Result Value Ref Range WBC 7.5 4.3 - 11.1 K/uL  
 RBC 4.58 4.23 - 5.6 M/uL  
 HGB 14.1 13.6 - 17.2 g/dL HCT 43.5 41.1 - 50.3 % MCV 95.0 79.6 - 97.8 FL  
 MCH 30.8 26.1 - 32.9 PG  
 MCHC 32.4 31.4 - 35.0 g/dL  
 RDW 13.8 11.9 - 14.6 % PLATELET 943 125 - 356 K/uL MPV 10.6 9.4 - 12.3 FL ABSOLUTE NRBC 0.00 0.0 - 0.2 K/uL  
 DF AUTOMATED NEUTROPHILS 76 43 - 78 % LYMPHOCYTES 15 13 - 44 % MONOCYTES 8 4.0 - 12.0 % EOSINOPHILS 1 0.5 - 7.8 % BASOPHILS 0 0.0 - 2.0 % IMMATURE GRANULOCYTES 0 0.0 - 5.0 %  
 ABS. NEUTROPHILS 5.7 1.7 - 8.2 K/UL  
 ABS. LYMPHOCYTES 1.1 0.5 - 4.6 K/UL  
 ABS. MONOCYTES 0.6 0.1 - 1.3 K/UL  
 ABS. EOSINOPHILS 0.1 0.0 - 0.8 K/UL  
 ABS. BASOPHILS 0.0 0.0 - 0.2 K/UL  
 ABS. IMM. GRANS. 0.0 0.0 - 0.5 K/UL METABOLIC PANEL, COMPREHENSIVE Collection Time: 12/14/19 11:12 AM  
Result Value Ref Range Sodium 141 136 - 145 mmol/L Potassium 5.1 3.5 - 5.1 mmol/L Chloride 109 (H) 98 - 107 mmol/L  
 CO2 29 21 - 32 mmol/L Anion gap 3 (L) 7 - 16 mmol/L Glucose 79 65 - 100 mg/dL BUN 9 8 - 23 MG/DL Creatinine 1.12 0.8 - 1.5 MG/DL  
 GFR est AA >60 >60 ml/min/1.73m2 GFR est non-AA >60 >60 ml/min/1.73m2 Calcium 10.1 8.3 - 10.4 MG/DL  Bilirubin, total 0.8 0.2 - 1.1 MG/DL  
 ALT (SGPT) 26 12 - 65 U/L  
 AST (SGOT) 57 (H) 15 - 37 U/L  
 Alk. phosphatase 114 50 - 136 U/L Protein, total 7.8 6.3 - 8.2 g/dL Albumin 3.2 3.2 - 4.6 g/dL Globulin 4.6 (H) 2.3 - 3.5 g/dL A-G Ratio 0.7 (L) 1.2 - 3.5 POC TROPONIN-I Collection Time: 12/14/19 11:16 AM  
Result Value Ref Range Troponin-I (POC) 0.14 (H) 0.02 - 0.05 ng/ml POC LACTIC ACID Collection Time: 12/14/19 11:18 AM  
Result Value Ref Range Lactic Acid (POC) 2.43 (H) 0.5 - 1.9 mmol/L  
URINE MICROSCOPIC Collection Time: 12/14/19 12:12 PM  
Result Value Ref Range WBC >100 0 /hpf  
 RBC >100 0 /hpf Epithelial cells 0-3 0 /hpf Bacteria 1+ (H) 0 /hpf Casts 0 0 /lpf Crystals, urine 0 0 /LPF Mucus 0 0 /lpf LACTIC ACID Collection Time: 12/14/19  2:57 PM  
Result Value Ref Range Lactic acid 1.5 0.4 - 2.0 MMOL/L  
LACTIC ACID Collection Time: 12/14/19  6:54 PM  
Result Value Ref Range Lactic acid 2.1 (HH) 0.4 - 2.0 MMOL/L  
LACTIC ACID Collection Time: 12/15/19 12:19 AM  
Result Value Ref Range Lactic acid 1.4 0.4 - 2.0 MMOL/L  
METABOLIC PANEL, BASIC Collection Time: 12/15/19  3:26 AM  
Result Value Ref Range Sodium 142 136 - 145 mmol/L Potassium 3.5 3.5 - 5.1 mmol/L Chloride 110 (H) 98 - 107 mmol/L  
 CO2 26 21 - 32 mmol/L Anion gap 6 (L) 7 - 16 mmol/L Glucose 89 65 - 100 mg/dL BUN 8 8 - 23 MG/DL Creatinine 0.92 0.8 - 1.5 MG/DL  
 GFR est AA >60 >60 ml/min/1.73m2 GFR est non-AA >60 >60 ml/min/1.73m2 Calcium 9.4 8.3 - 10.4 MG/DL  
CBC WITH AUTOMATED DIFF Collection Time: 12/15/19  3:26 AM  
Result Value Ref Range WBC 6.6 4.3 - 11.1 K/uL  
 RBC 4.13 (L) 4.23 - 5.6 M/uL  
 HGB 12.6 (L) 13.6 - 17.2 g/dL HCT 38.7 (L) 41.1 - 50.3 % MCV 93.7 79.6 - 97.8 FL  
 MCH 30.5 26.1 - 32.9 PG  
 MCHC 32.6 31.4 - 35.0 g/dL  
 RDW 13.4 11.9 - 14.6 % PLATELET 863 602 - 021 K/uL MPV 10.3 9.4 - 12.3 FL ABSOLUTE NRBC 0.00 0.0 - 0.2 K/uL  
 DF AUTOMATED NEUTROPHILS 63 43 - 78 % LYMPHOCYTES 25 13 - 44 % MONOCYTES 9 4.0 - 12.0 % EOSINOPHILS 3 0.5 - 7.8 % BASOPHILS 1 0.0 - 2.0 % IMMATURE GRANULOCYTES 1 0.0 - 5.0 %  
 ABS. NEUTROPHILS 4.2 1.7 - 8.2 K/UL  
 ABS. LYMPHOCYTES 1.6 0.5 - 4.6 K/UL  
 ABS. MONOCYTES 0.6 0.1 - 1.3 K/UL  
 ABS. EOSINOPHILS 0.2 0.0 - 0.8 K/UL  
 ABS. BASOPHILS 0.0 0.0 - 0.2 K/UL  
 ABS. IMM. GRANS. 0.0 0.0 - 0.5 K/UL All Micro Results Procedure Component Value Units Date/Time CULTURE, URINE [[id number]] Collected:  12/14/19 1212 Order Status:  Completed Specimen:  Cath Urine Updated:  12/14/19 2052 CULTURE, URINE [[id number]] Collected:  12/14/19 1345 Order Status:  Canceled Specimen:  Urine from Clean catch CULTURE, BLOOD [[id number]] Order Status:  Canceled Specimen:  Blood CULTURE, BLOOD [[id number]] Order Status:  Canceled Specimen:  Blood Current Meds: 
Current Facility-Administered Medications Medication Dose Route Frequency  alcohol 62% (NOZIN) nasal  1 Ampule  1 Ampule Topical Q12H  
 0.9% sodium chloride infusion  100 mL/hr IntraVENous CONTINUOUS  
 ARIPiprazole (ABILIFY) tablet 10 mg  10 mg Oral DAILY  aspirin chewable tablet 81 mg  81 mg Oral DAILY  bicalutamide (CASODEX) tablet 50 mg  50 mg Oral DAILY  donepezil (ARICEPT) tablet 5 mg  5 mg Oral DAILY  famotidine (PEPCID) tablet 20 mg  20 mg Oral BID  levomilnacipran (FETZIMA) ER capsule 40 mg   (Patient Supplied)  40 mg Oral DAILY  metoprolol tartrate (LOPRESSOR) tablet 25 mg  25 mg Oral BID  tamsulosin (FLOMAX) capsule 0.4 mg  0.4 mg Oral QHS  sodium chloride (NS) flush 5-40 mL  5-40 mL IntraVENous Q8H  
 sodium chloride (NS) flush 5-40 mL  5-40 mL IntraVENous PRN  
 acetaminophen (TYLENOL) tablet 650 mg  650 mg Oral Q6H PRN  
 ondansetron (ZOFRAN) injection 4 mg  4 mg IntraVENous Q4H PRN  
 nystatin (MYCOSTATIN) 100,000 unit/gram powder   Topical BID  tuberculin injection 5 Units  5 Units IntraDERMal ONCE Other Studies: Ct Head Wo Cont Result Date: 12/14/2019 HEAD CT WITHOUT CONTRAST  12/14/2019 HISTORY:   vertigo TECHNIQUE: Noncontrast axial images were obtained through the brain. All CT scans at this facility used dose modulation, interactive reconstruction and/or weight based dosing when appropriate to reduce radiation dose to as low as reasonably achievable. COMPARISON: November 27, 2019 FINDINGS: There is no acute intracranial hemorrhage, significant mass effect or CT evidence of acute large artery territorial infarction. Please note that a hyperacute infarct or small vessel infarct may not be apparent on initial CT imaging. Moderate cerebral and cerebellar volume loss are present with compensatory ventricular enlargement. Areas of low-attenuation are present in the supratentorial white matter. This pattern may be present with chronic small vessel ischemic disease. There is no hydrocephalus , intra-axial mass or abnormal extra-axial fluid collection. There are no displaced skull fractures. The mastoid air cells and paranasal sinuses are clear where imaged. IMPRESSION: Cerebral and cerebellar volume loss. Ct Abd Pelv Wo Cont Result Date: 12/14/2019 CT ABDOMEN AND PELVIS WITHOUT CONTRAST 12/14/2019 6:13 PM History:  Recurrent UTI. Prostate cancer. Technique: Noncontrast  axial images were obtained through the abdomen and pelvis per the renal stone protocol. Coronal reformatted images were generated. Dose reduction techniques were used such as automated exposure control, adjustment of the MA or KV according to patient size, and iterative reconstruction. Comparison:  September 27, 2012 Findings: Included portions of the lung bases are clear. ABDOMEN:    There are no ureteral calculi. A few punctate nonobstructing stones are present in the right renal collecting system. There is no hydronephrosis.  Evaluation of the abdominal viscera is limited without IV contrast.  Artifact from the patient's arms which were not elevated limits evaluation of the abdomen. The unenhanced appearance of the gallbladder, liver, pancreas, spleen and adrenal glands is normal. The appendix is normal in appearance. A moderate to large amount of stool is present throughout the colon. PELVIS: A penile prosthesis is present. The bladder is normal appearance. There is no free pelvic fluid. Bone windows demonstrated no aggressive osseous lesions. IMPRESSION: 1. Artifact from arm positioning and motion limits evaluation of the abdomen. 2. Nonobstructing small right renal collecting system stones. 3. No ureteral calculi or hydronephrosis. 4. Moderate to large amount of stool present throughout the colon. Xr Chest [facility] Result Date: 12/14/2019 AP PORTABLE CHEST X-RAY 12/14/2019 11:22 AM HISTORY: fatigue COMPARISON: December 3, 2019 FINDINGS:  EKG leads are present. A cardiac pacemaker device is present. The cardiac silhouette is prominent. There is no lobar consolidation, pleural effusions or pulmonary edema. IMPRESSION: No consolidation. Assessment and Plan:  
 
Hospital Problems as of 12/15/2019 Date Reviewed: 3/20/2019 Codes Class Noted - Resolved POA * (Principal) Weakness ICD-10-CM: R53.1 ICD-9-CM: 780.79  12/14/2019 - Present Yes Elevated troponin (Chronic) ICD-10-CM: R79.89 ICD-9-CM: 790.6  12/14/2019 - Present Yes Prostate cancer ([facility] [address] (Chronic) ICD-10-CM: O72 ICD-9-CM: 185  12/14/2019 - Present Yes Atrial fibrillation (HCC) (Chronic) ICD-10-CM: I48.91 
ICD-9-CM: 427.31  1/27/2012 - Present Yes HTN (hypertension) (Chronic) ICD-10-CM: I10 
ICD-9-CM: 401.9  1/27/2012 - Present Yes Plan: 
Mr. Se Palomo is a 79 yo male with PMH of prostate cancer taking casodex, AFIB, SSS s/p PPM, recurrent UTI, HFpEF, depression, HTN who is admitted for dizziness and weakness. Patient is currently in ICU and is stable for general medical floor. # Generalized weakness - CT head is unremarkable and cannot have MRI due to having PPM. No signs of neurological or infectious etiology. - f/u PT/OT # Hematuria/ prostate cancer/ recurrent UTI 
- hx of MRSA/E. Coli UTI but no complaints of urinary symptoms. Afebrile and no leukocytosis - f/u cultures - Urology consult: CT AP reviewed and for patient to f/u with [facility] on discharge. # Elevated troponin likely demand ischemia # SSS s/p PPM 
# AFIB(not on AC) # HTN # Chronic HFpEF 
- Echo( 06/2019 ): EF 60%-65% with mild aortic regurgitation and mild-to-moderate tricuspid regurgitation  
- Dr. Rick(Cardiology) following: repeat ECHO to evaluate EF 
- c/w home meds 
- not on AC due to fall risk # Depression: continue abilify and fetmiza # Dementia: continued aricept # Elevated lactate: hydrate and reassess lab Diet:  DIET CARDIAC 
DVT PPx: SCDs Code: Full Code Medical Decision Making: 
 
Labs/Imaging reviewed. Additional information obtained from nursing staff Patient is moderate risk due to medical condition and comorbidities. Plan discussed with nursing staff. Plan discussed with patient/family. All questions/concerns were addressed. Pt/family agrees with the plan. Signed By: Marge Constantino MD   
 December 15, 2019

## 2019-12-15 NOTE — PROGRESS NOTES
TRANSFER - OUT REPORT: 
 
Verbal report given to MIREYA Faulkner on Tylor Hinojosa  being transferred to [facility] 18 31 for routine progression of care Report consisted of patients Situation, Background, Assessment and  
Recommendations(SBAR). Information from the following report(s) SBAR, ED Summary, Intake/Output, MAR and Accordion was reviewed with the receiving nurse. Lines:  
Peripheral IV 12/14/19 Left Antecubital (Active) Site Assessment Clean, dry, & intact 12/15/2019  4:12 AM  
Phlebitis Assessment 0 12/15/2019  4:12 AM  
Infiltration Assessment 0 12/15/2019  4:12 AM  
Dressing Status Clean, dry, & intact 12/15/2019  4:12 AM  
Dressing Type Transparent 12/15/2019  4:12 AM  
Hub Color/Line Status Pink; Infusing 12/15/2019  4:12 AM  
Action Taken Blood drawn 12/14/2019 11:10 AM  
Alcohol Cap Used No 12/15/2019  4:12 AM  
  
 
Opportunity for questions and clarification was provided.    
 
Patient transported with:

## 2019-12-15 NOTE — PROGRESS NOTES
Assessment complete via flow sheet. Pt drowsy, orientated to self. Respirations even and unlabored, diminished in bases. S1 S2 auscultated, HR irregular. Pt on room air. Bowel sounds active, abdomen soft. Denies other needs. Bed in lowest position, side rails up 3, call bell in reach. Instructed to call for assistance. Plan of care reviewed with patient.

## 2019-12-15 NOTE — PROGRESS NOTES
EPI spoke with the patient's daughter Jc Chamorro who states that the patient lives with her at the moment but will be moving in with her sister once she gets settled into her new home. Per Jc Chamorro, the patient is nonambulatory for the past few months. The patient went to [facility] or [facility]" per Jc Chamorro. He then readmitted to the hospital and went home with Fernando Cuevas. Jc Chamorro states that ideally she would prefer her father go to [facility] at discharge because \"his upper body is strong, but his lower body is not. \" EPI advised that the patient had been out of rehab just over a month and we would need to see how many days he has left as they would not have reset yet. EPI advised that she could not get Ashley at [facility] today, but would ask the weekday SW to call to inquire. Sheryl Ortiz, BSW  [email or web address]

## 2019-12-15 NOTE — PROGRESS NOTES
TRANSFER - IN REPORT: 
 
Verbal report received from Urszula(name) on Tylor Dailey  being received from ICU(unit) for routine progression of care Report consisted of patients Situation, Background, Assessment and  
Recommendations(SBAR). Information from the following report(s) SBAR, Procedure Summary, Intake/Output, MAR and Recent Results was reviewed with the receiving nurse. Opportunity for questions and clarification was provided. Assessment completed upon patients arrival to unit and care assumed.

## 2019-12-16 LAB
ANION GAP SERPL CALC-SCNC: 5 MMOL/L (ref 7–16)
BASOPHILS # BLD: 0 K/UL (ref 0–0.2)
BASOPHILS NFR BLD: 0 % (ref 0–2)
BUN SERPL-MCNC: 8 MG/DL (ref 8–23)
CALCIUM SERPL-MCNC: 9.2 MG/DL (ref 8.3–10.4)
CHLORIDE SERPL-SCNC: 113 MMOL/L (ref 98–107)
CO2 SERPL-SCNC: 26 MMOL/L (ref 21–32)
CREAT SERPL-MCNC: 0.86 MG/DL (ref 0.8–1.5)
DIFFERENTIAL METHOD BLD: ABNORMAL
EOSINOPHIL # BLD: 0.2 K/UL (ref 0–0.8)
EOSINOPHIL NFR BLD: 4 % (ref 0.5–7.8)
ERYTHROCYTE [DISTWIDTH] IN BLOOD BY AUTOMATED COUNT: 13.3 % (ref 11.9–14.6)
GLUCOSE SERPL-MCNC: 88 MG/DL (ref 65–100)
HCT VFR BLD AUTO: 41.1 % (ref 41.1–50.3)
HGB BLD-MCNC: 13.4 G/DL (ref 13.6–17.2)
IMM GRANULOCYTES # BLD AUTO: 0 K/UL (ref 0–0.5)
IMM GRANULOCYTES NFR BLD AUTO: 0 % (ref 0–5)
LYMPHOCYTES # BLD: 1.6 K/UL (ref 0.5–4.6)
LYMPHOCYTES NFR BLD: 32 % (ref 13–44)
MCH RBC QN AUTO: 30.6 PG (ref 26.1–32.9)
MCHC RBC AUTO-ENTMCNC: 32.6 G/DL (ref 31.4–35)
MCV RBC AUTO: 93.8 FL (ref 79.6–97.8)
MM INDURATION POC: 0 MM (ref 0–5)
MONOCYTES # BLD: 0.4 K/UL (ref 0.1–1.3)
MONOCYTES NFR BLD: 9 % (ref 4–12)
NEUTS SEG # BLD: 2.9 K/UL (ref 1.7–8.2)
NEUTS SEG NFR BLD: 55 % (ref 43–78)
NRBC # BLD: 0 K/UL (ref 0–0.2)
PLATELET # BLD AUTO: 165 K/UL (ref 150–450)
PMV BLD AUTO: 9.9 FL (ref 9.4–12.3)
POTASSIUM SERPL-SCNC: 3.7 MMOL/L (ref 3.5–5.1)
PPD POC: NEGATIVE NEGATIVE
RBC # BLD AUTO: 4.38 M/UL (ref 4.23–5.6)
SODIUM SERPL-SCNC: 144 MMOL/L (ref 136–145)
WBC # BLD AUTO: 5.2 K/UL (ref 4.3–11.1)

## 2019-12-16 PROCEDURE — 77030019605

## 2019-12-16 PROCEDURE — 74011250636 HC RX REV CODE- 250/636: Performed by: INTERNAL MEDICINE

## 2019-12-16 PROCEDURE — 74011250637 HC RX REV CODE- 250/637: Performed by: INTERNAL MEDICINE

## 2019-12-16 PROCEDURE — 85025 COMPLETE CBC W/AUTO DIFF WBC: CPT

## 2019-12-16 PROCEDURE — C8929 TTE W OR WO FOL WCON,DOPPLER: HCPCS

## 2019-12-16 PROCEDURE — 77030020263 HC SOL INJ SOD CL0.9% LFCR 1000ML

## 2019-12-16 PROCEDURE — 65270000029 HC RM PRIVATE

## 2019-12-16 PROCEDURE — 74011000250 HC RX REV CODE- 250: Performed by: INTERNAL MEDICINE

## 2019-12-16 PROCEDURE — 97535 SELF CARE MNGMENT TRAINING: CPT

## 2019-12-16 PROCEDURE — 80048 BASIC METABOLIC PNL TOTAL CA: CPT

## 2019-12-16 PROCEDURE — 97116 GAIT TRAINING THERAPY: CPT

## 2019-12-16 PROCEDURE — 97530 THERAPEUTIC ACTIVITIES: CPT

## 2019-12-16 PROCEDURE — 36415 COLL VENOUS BLD VENIPUNCTURE: CPT

## 2019-12-16 RX ADMIN — FAMOTIDINE 20 MG: 10 TABLET ORAL at 17:28

## 2019-12-16 RX ADMIN — SODIUM CHLORIDE 100 ML/HR: 900 INJECTION, SOLUTION INTRAVENOUS at 20:30

## 2019-12-16 RX ADMIN — FAMOTIDINE 20 MG: 10 TABLET ORAL at 08:24

## 2019-12-16 RX ADMIN — NYSTATIN: 100000 POWDER TOPICAL at 17:28

## 2019-12-16 RX ADMIN — NYSTATIN: 100000 POWDER TOPICAL at 08:23

## 2019-12-16 RX ADMIN — ASPIRIN 81 MG 81 MG: 81 TABLET ORAL at 08:24

## 2019-12-16 RX ADMIN — DONEPEZIL HYDROCHLORIDE 5 MG: 5 TABLET, FILM COATED ORAL at 22:57

## 2019-12-16 RX ADMIN — Medication 1 AMPULE: at 08:22

## 2019-12-16 RX ADMIN — PERFLUTREN 1 ML: 6.52 INJECTION, SUSPENSION INTRAVENOUS at 08:00

## 2019-12-16 RX ADMIN — BICALUTAMIDE 50 MG: 50 TABLET, FILM COATED ORAL at 09:00

## 2019-12-16 RX ADMIN — METOPROLOL TARTRATE 25 MG: 25 TABLET ORAL at 08:24

## 2019-12-16 RX ADMIN — Medication 1 AMPULE: at 22:56

## 2019-12-16 RX ADMIN — ARIPIPRAZOLE 10 MG: 10 TABLET ORAL at 09:00

## 2019-12-16 RX ADMIN — METOPROLOL TARTRATE 25 MG: 25 TABLET ORAL at 22:57

## 2019-12-16 RX ADMIN — TAMSULOSIN HYDROCHLORIDE 0.4 MG: 0.4 CAPSULE ORAL at 22:57

## 2019-12-16 NOTE — PROGRESS NOTES
Chart reviewed. EPI called [facility] spoke with Imani Keller who stated she was off today and directed SW call Ashley Stephens in their admitting office ) to verify how many days pt used. Per Andrei Primo pt used 14 days. Pt has 6 days left at 100% coverage and then pt has BC/BS which will cover the co-pay days. EPI sent referral to [facility] via CC link. EIP spoke with pt's daughter Daiana Berry [phone] ) who is agreeable to bed at [facility] or [facility].

## 2019-12-16 NOTE — PROGRESS NOTES
Problem: Self Care Deficits Care Plan (Adult) Goal: *Acute Goals and Plan of Care (Insert Text) Description 1. Patient will perform grooming with supervision. 2. Patient will perform upper body dressing with minimal assist.GOAL MET 12/16/2019 3. Patient will perform lower body dressing with moderate assist. 
4. Patient will perform bathing with moderate assist. 
5. Patient will perform toileting and toilet transfer with moderate assist. [place] 6. Patient will perform ADL functional mobility and tranfers in room with moderate assist.PROGRESSING 7. Patient/family to demonstrate knowledge of home safety and DME recommendations. Goals to be achieved in 7 days. Outcome: Progressing Towards Goal 
 
 
OCCUPATIONAL THERAPY: Daily Note and AM 12/16/2019 INPATIENT:   
[place] / [place] / Product Type: Medicare /  
  
NAME/AGE/GENDER: Sole Dong is a 78 y.o. male PRIMARY DIAGNOSIS:  Weakness [R53.1] Weakness Weakness ICD-10: Treatment Diagnosis:  
 · Generalized Muscle Weakness (M62.81) · Other lack of cordination (R27.8) Precautions/Allergies: 
   Patient has no known allergies. ASSESSMENT:  
 
Mr. Suzan Tucker presents with above diagnosis. Pt with recent admission to the hospital and rehab stay per report. Pt's true baseline is unknown at this time. Pt will benefit from acute OT services to address deficits in self care and functional mobility. 12/16/19 1030 patient alert and appropriate, he was slow to respond but accurate. HE was max x 2 supine to sit and stood 3 times with mod assist. Once in standing he was able to take side steps to the [facility] with min assist using walker. He returned to sit and changed his gown with CGA. He returned to supine max x 2 . He is making progress with goals and would continue to benefit from skilled OT. This section established at most recent assessment PROBLEM LIST (Impairments causing functional limitations): 1. Decreased Strength 2. Decreased ADL/Functional Activities 3. Decreased Transfer Abilities 4. Decreased Ambulation Ability/Technique 5. Decreased Balance INTERVENTIONS PLANNED: (Benefits and precautions of occupational therapy have been discussed with the patient.) 1. Activities of daily living training 2. Adaptive equipment training 3. Clothing management 4. Cognitive training 5. Donning&doffing training 6. Hygiene training 7. Therapeutic activity 8. Therapeutic exercise TREATMENT PLAN: Frequency/Duration: Follow patient 3x/wk to address above goals. Rehabilitation Potential For Stated Goals: Good REHAB RECOMMENDATIONS (at time of discharge pending progress):   
Placement: It is my opinion, based on this patient's performance to date, that Mr. Blossom Nelson may benefit from participating in 1-2 additional therapy sessions in order to continue to assess for rehab potential and then make recommendation for disposition at discharge. Equipment: ? TBD   
    
 
 
 
OCCUPATIONAL PROFILE AND HISTORY:  
History of Present Injury/Illness (Reason for Referral): 
See above Past Medical History/Comorbidities:  
Mr. Blossom Nelson  has a past medical history of Arrhythmia, Arthritis, Atrial fibrillation ([address] (1/27/2012), CAD (coronary artery disease), Cancer ([address] Dementia ([address] Depression (8/28/2012), GERD (gastroesophageal reflux disease), Heart failure ([address] History of prostate cancer (8/4/2014), Hypertension, Morbid obesity ([address] Osteoarthritis of right knee (1/26/2012), Recurrent major depressive disorder, in full remission ([address] (11/30/2017), Total knee replacement status (1/26/2012), and Vitamin B12 deficiency (7/23/2017). Mr. Blossom Nelson  has a past surgical history that includes hx orthopaedic (2010); hx prostatectomy; and hx pacemaker (8/30/2012). Social History/Living Environment:  
Home Environment: Private residence # Steps to Enter: 7 One/Two Story Residence: One story Living Alone: No 
Support Systems: Family member(s) Patient Expects to be Discharged to[de-identified] Private residence Current DME Used/Available at Home: [employer] Prior Level of Function/Work/Activity: 
Functional baseline unknown Number of Personal Factors/Comorbidities that affect the Plan of Care: Brief history (0):  LOW COMPLEXITY ASSESSMENT OF OCCUPATIONAL PERFORMANCE[de-identified]  
Activities of Daily Living:  
Basic ADLs (From Assessment) Complex ADLs (From Assessment) Feeding: Moderate assistance Oral Facial Hygiene/Grooming: Moderate assistance Bathing: Maximum assistance Upper Body Dressing: Maximum assistance Lower Body Dressing: Maximum assistance Toileting: Total assistance Grooming/Bathing/Dressing Activities of Daily Living Cognitive Retraining Safety/Judgement: Awareness of environment; Fall prevention Feeding Drink to Mouth: Set-up(cup with lid and straw) Upper Body Dressing Assistance Hospital Gown: Contact guard assistance(sitting on EOB) Lower Body Dressing Assistance Socks: Total assistance (dependent) Bed/Mat Mobility Supine to Sit: Maximum assistance;Assist x2 Sit to Supine: Maximum assistance;Assist x2 Sit to Stand: Assist x2; Moderate assistance Stand to Sit: Assist x2; Moderate assistance Scooting: Maximum assistance Most Recent Physical Functioning:  
Gross Assessment: 
  
         
  
Posture: 
  
Balance: 
Sitting: Intact Standing: Pull to stand; With support Bed Mobility: 
Supine to Sit: Maximum assistance;Assist x2 Sit to Supine: Maximum assistance;Assist x2 Scooting: Maximum assistance Wheelchair Mobility: 
  
Transfers: 
Sit to Stand: Assist x2; Moderate assistance Stand to Sit: Assist x2; Moderate assistance Patient Vitals for the past 6 hrs: 
 BP BP Patient Position SpO2 Pulse 12/16/19 0740 118/75 At rest;Supine 98 % 61 Mental Status Neurologic State: Alert, Appropriate for age Orientation Level: Appropriate for age Cognition: Follows commands Perception: Cues to maintain midline in sitting, Cues to maintain midline in standing Perseveration: No perseveration noted Safety/Judgement: Awareness of environment, Fall prevention Physical Skills Involved: 1. Range of Motion 2. Balance 3. Strength 4. Activity Tolerance Cognitive Skills Affected (resulting in the inability to perform in a timely and safe manner): 1. None  Psychosocial Skills Affected: 1. None Number of elements that affect the Plan of Care: 1-3:  LOW COMPLEXITY CLINICAL DECISION MAKIN[address] [place] [address] AM-PAC 6 Clicks Daily Activity Inpatient Short Form How much help from another person does the patient currently need. .. Total A Lot A Little None 1. Putting on and taking off regular lower body clothing? [] 1   [x] 2   [] 3   [] 4  
2. Bathing (including washing, rinsing, drying)? [] 1   [x] 2   [] 3   [] 4  
3. Toileting, which includes using toilet, bedpan or urinal?   [] 1   [x] 2   [] 3   [] 4  
4. Putting on and taking off regular upper body clothing? [] 1   [x] 2   [] 3   [] 4  
5. Taking care of personal grooming such as brushing teeth? [] 1   [] 2   [x] 3   [] 4  
6. Eating meals? [] 1   [] 2   [x] 3   [] 4  
© , Trustees of [address] [place] [address], under license to [employer]. All rights reserved Score:  Initial: 14 Most Recent: X (Date: -- ) Interpretation of Tool:  Represents activities that are increasingly more difficult (i.e. Bed mobility, Transfers, Gait). Medical Necessity:    
· Patient is expected to demonstrate progress in  
· strength, balance, coordination, and functional technique ·  to  
· decrease assistance required with self care and functional mobility · . Reason for Services/Other Comments: 
· Patient continues to require skilled intervention due to  
 · Decreased indep with self care and functional  
· . Use of outcome tool(s) and clinical judgement create a POC that gives a: LOW COMPLEXITY  
 
 
 
TREATMENT:  
(In addition to Assessment/Re-Assessment sessions the following treatments were rendered) Pre-treatment Symptoms/Complaints:   
Pain: Initial:  
Pain Intensity 1: 0  Post Session:  0 Self Care: (10): Procedure(s) (per grid) utilized to improve and/or restore self-care/home management as related to dressing and self feeding. Required minimal manual and   cueing to facilitate activities of daily living skills and compensatory activities. Braces/Orthotics/Lines/Etc:  
· O2 Device: Room air Treatment/Session Assessment:   
· Response to Treatment:  Fair tolerance · Interdisciplinary Collaboration:  
o Physical Therapist 
o Occupational Therapist 
o Registered Nurse · After treatment position/precautions:  
o Supine in bed 
o Bed alarm/tab alert on 
o Bed/Chair-wheels locked 
o Bed in low position 
o Call light within reach 
o RN notified 
o Side rails x 2  
· Compliance with Program/Exercises: Compliant all of the time. · Recommendations/Intent for next treatment session: \"Next visit will focus on advancements to more challenging activities\". Total Treatment Duration:10 
OT Patient Time In/Time Out Time In: 1030 Time Out: 1040 Cristiane Paulino OT

## 2019-12-16 NOTE — PROGRESS NOTES
Report received from [address] [place] [address] Pt is resting quietly in bed. Bed is low and locked with call light in reach. Assessment complete and documented in flowsheets. Pt is alert and oriented to person and place. No needs are voiced at this time.

## 2019-12-16 NOTE — PROGRESS NOTES
A.M assessment complete; pt in bed resting quietly. Alert & oriented. Resp even & unlabored on room air; lungs diminished. HR irregular. Abdomen obese & soft with active bowel sounds. Brief in place; c/d/i. No c/o pain at this time. Bed low & locked; call light in reach; no needs voiced.

## 2019-12-16 NOTE — PROGRESS NOTES
12/16/2019 4:04 PM 
 
Admit Date: 12/14/2019 Admit Diagnosis: Weakness [R53.1] Subjective: No cp or sob Objective:  
  
Visit Vitals /55 (BP 1 Location: Left arm, BP Patient Position: At rest) Pulse 60 Temp 96.2 °F (35.7 °C) Resp 18 Ht 6' 2\" (1.88 m) Wt 118.8 kg (262 lb) SpO2 95% BMI 33.64 kg/m² Physical Exam: 
Jeannie Busman, Well Nourished, No Acute Distress, Alert & Oriented x 3, appropriate mood. Neck- supple, no JVD 
CV- regular rate and rhythm no MRG Lung- clear bilaterally Abd- soft, nontender, nondistended Ext- no edema bilaterally. Skin- warm and dry Data Review:  
Recent Labs 12/16/19 
0617   
K 3.7 BUN 8  
CREA 0.86 WBC 5.2 HGB 13.4* HCT 41.1  Assessment/Plan:  
 
Principal Problem: 
  Weakness (12/14/2019) Active Problems: 
  Atrial fibrillation ([facility] (1/27/2012) HTN (hypertension) (1/27/2012) Elevated troponin (12/14/2019) Prostate cancer ([facility] (12/14/2019) Demand ischemia- no angina- no further intervention needed with normal lvef Will sign off

## 2019-12-16 NOTE — PROGRESS NOTES
Hospitalist Note Admit Date:  2019 10:51 AM  
Name:  Osvaldo Davis Age:  78 y.o. 
:  1940 MRN:  [id number] PCP:  Mara Fry MD 
Treatment Team: Attending Provider: Na Aly MD; Utilization Review: Amelia Hair; : Jaki Sherman Nurse Extern: Ant Hollingsworth; Physical Therapist: Georges Figueredo PT; : Ml Molina; Occupational Therapist: Migue Vela OT 
 
HPI/Subjective:  
Summary:Mr. Ted Spence is a 79 yo male with PMH of prostate cancer taking casodex, AFIB(not on AC), SSS s/p PPM, recurrent UTI, HFpEF, depression, HTN who is admitted for dizziness and weakness. He was recently discharged from hospital on 2019 for MRSA/E. coli UTI. Urine appeared infected with gross hematuria but he denies any urinary symptoms. Patient is afebrile without any leukocytosis. He had 4 admissions in the past 12 months for UTIs. According was slightly elevated in the ED and cardiology was called for evaluation. Patient was initially admitted to ICU but transferred to medical floor on 12/15/2019. Patient is seen and examined at bedside. No acute events reported overnight by nursing staff. He is more alert and awake today. States his weakness seems to be improving. No other active complaints at this time. States his right LE swelling has been ongoing. Denies any pain in his extremities. Patient denies fever, chills, chest pains, shortness of breath, n/v, abdominal pain, urinary symptoms. Objective:  
 
Patient Vitals for the past 24 hrs: 
 Temp Pulse Resp BP SpO2  
19 0740 98.1 °F (36.7 °C) 61 18 118/75 98 % 19 0349 98.3 °F (36.8 °C) 63 18 125/65 97 % 12/15/19 2334 98.6 °F (37 °C) 60 18 135/62 94 % 12/15/19 2111  65  114/74   
12/15/19 1807 99.1 °F (37.3 °C) 66 18 112/66 99 % 12/15/19 1247  60 28 110/55 96 % 12/15/19 1200  60 (!) 35 94/47 99 % 12/15/19 1122 97.9 °F (36.6 °C)     Oxygen Therapy O2 Sat (%): 98 % (12/16/19 0740) Pulse via Oximetry: 60 beats per minute (12/15/19 1247) O2 Device: Nasal cannula (12/15/19 1644) Estimated body mass index is 33.64 kg/m² as calculated from the following: 
  Height as of this encounter: 6' 2\" (1.88 m). Weight as of this encounter: 118.8 kg (262 lb). Intake/Output Summary (Last 24 hours) at 12/16/2019 1120 Last data filed at 12/15/2019 2116 Gross per 24 hour Intake  Output 1450 ml Net -1450 ml *Note that automatically entered I/Os may not be accurate; dependent on patient compliance with collection and accurate  by techs. Physical Exam:  
General:     Tired/lethargic, arousable, no acute distress. Well nourished and obese Head:   normocephalic, atraumatic Eyes, Ears, nose: PERRL, EOMI. Neck:    supple, non-tender Lungs:   Decreased breath sounds due to body habitus, CTAB, no wheezing, rhonchi, rales Cardiac:   RRR, Normal S1 and S2. Abdomen:   Soft, non distended, nontender, +BS, no guarding/rebound Extremities:   Warm, dry. Trace to +1 edema (R>L) Skin:   No rashes, no jaundice Neuro:  Alert and oriented to person, time, place, situation. No gross focal neurological deficit Psychiatric:  No anxiety, calm, cooperative Data Review: 
I have reviewed all labs, meds, and studies from the last 24 hours: 
 
Recent Results (from the past 24 hour(s)) PLEASE READ & DOCUMENT PPD TEST IN 24 HRS Collection Time: 12/15/19  4:53 PM  
Result Value Ref Range PPD Negative Negative  
 mm Induration 0 0 - 5 mm METABOLIC PANEL, BASIC Collection Time: 12/16/19  5:46 AM  
Result Value Ref Range Sodium 144 136 - 145 mmol/L Potassium 3.7 3.5 - 5.1 mmol/L Chloride 113 (H) 98 - 107 mmol/L  
 CO2 26 21 - 32 mmol/L Anion gap 5 (L) 7 - 16 mmol/L Glucose 88 65 - 100 mg/dL BUN 8 8 - 23 MG/DL  Creatinine 0.86 0.8 - 1.5 MG/DL  
 GFR est AA >60 >60 ml/min/1.73m2 GFR est non-AA >60 >60 ml/min/1.73m2 Calcium 9.2 8.3 - 10.4 MG/DL  
CBC WITH AUTOMATED DIFF Collection Time: 12/16/19  5:46 AM  
Result Value Ref Range WBC 5.2 4.3 - 11.1 K/uL  
 RBC 4.38 4.23 - 5.6 M/uL  
 HGB 13.4 (L) 13.6 - 17.2 g/dL HCT 41.1 41.1 - 50.3 % MCV 93.8 79.6 - 97.8 FL  
 MCH 30.6 26.1 - 32.9 PG  
 MCHC 32.6 31.4 - 35.0 g/dL  
 RDW 13.3 11.9 - 14.6 % PLATELET 783 120 - 742 K/uL MPV 9.9 9.4 - 12.3 FL ABSOLUTE NRBC 0.00 0.0 - 0.2 K/uL  
 DF AUTOMATED NEUTROPHILS 55 43 - 78 % LYMPHOCYTES 32 13 - 44 % MONOCYTES 9 4.0 - 12.0 % EOSINOPHILS 4 0.5 - 7.8 % BASOPHILS 0 0.0 - 2.0 % IMMATURE GRANULOCYTES 0 0.0 - 5.0 %  
 ABS. NEUTROPHILS 2.9 1.7 - 8.2 K/UL  
 ABS. LYMPHOCYTES 1.6 0.5 - 4.6 K/UL  
 ABS. MONOCYTES 0.4 0.1 - 1.3 K/UL  
 ABS. EOSINOPHILS 0.2 0.0 - 0.8 K/UL  
 ABS. BASOPHILS 0.0 0.0 - 0.2 K/UL  
 ABS. IMM. GRANS. 0.0 0.0 - 0.5 K/UL All Micro Results Procedure Component Value Units Date/Time CULTURE, URINE [[id number]] Collected:  12/14/19 1212 Order Status:  Completed Specimen:  Cath Urine Updated:  12/16/19 6858 Special Requests: NO SPECIAL REQUESTS Culture result: NO GROWTH 1 DAY     
 CULTURE, URINE [[id number]] Collected:  12/14/19 2312 Order Status:  Canceled Specimen:  Urine from Clean catch CULTURE, BLOOD [[id number]] Order Status:  Canceled Specimen:  Blood CULTURE, BLOOD [[id number]] Order Status:  Canceled Specimen:  Blood Current Meds: 
Current Facility-Administered Medications Medication Dose Route Frequency  perflutren lipid microspheres (DEFINITY) in NS bolus IV  1 mL IntraVENous PRN  
 alcohol 62% (NOZIN) nasal  1 Ampule  1 Ampule Topical Q12H  
 donepezil (ARICEPT) tablet 5 mg  5 mg Oral QHS  
 0.9% sodium chloride infusion  100 mL/hr IntraVENous CONTINUOUS  
 ARIPiprazole (ABILIFY) tablet 10 mg  10 mg Oral DAILY  aspirin chewable tablet 81 mg  81 mg Oral DAILY  bicalutamide (CASODEX) tablet 50 mg  50 mg Oral DAILY  famotidine (PEPCID) tablet 20 mg  20 mg Oral BID  levomilnacipran (FETZIMA) ER capsule 40 mg   (Patient Supplied)  40 mg Oral DAILY  metoprolol tartrate (LOPRESSOR) tablet 25 mg  25 mg Oral BID  tamsulosin (FLOMAX) capsule 0.4 mg  0.4 mg Oral QHS  sodium chloride (NS) flush 5-40 mL  5-40 mL IntraVENous Q8H  
 sodium chloride (NS) flush 5-40 mL  5-40 mL IntraVENous PRN  
 acetaminophen (TYLENOL) tablet 650 mg  650 mg Oral Q6H PRN  
 ondansetron (ZOFRAN) injection 4 mg  4 mg IntraVENous Q4H PRN  
 nystatin (MYCOSTATIN) 100,000 unit/gram powder   Topical BID Other Studies: 
 
Ct Head Wo Cont Result Date: 12/14/2019 HEAD CT WITHOUT CONTRAST  12/14/2019 HISTORY:   vertigo TECHNIQUE: Noncontrast axial images were obtained through the brain. All CT scans at this facility used dose modulation, interactive reconstruction and/or weight based dosing when appropriate to reduce radiation dose to as low as reasonably achievable. COMPARISON: November 27, 2019 FINDINGS: There is no acute intracranial hemorrhage, significant mass effect or CT evidence of acute large artery territorial infarction. Please note that a hyperacute infarct or small vessel infarct may not be apparent on initial CT imaging. Moderate cerebral and cerebellar volume loss are present with compensatory ventricular enlargement. Areas of low-attenuation are present in the supratentorial white matter. This pattern may be present with chronic small vessel ischemic disease. There is no hydrocephalus , intra-axial mass or abnormal extra-axial fluid collection. There are no displaced skull fractures. The mastoid air cells and paranasal sinuses are clear where imaged. IMPRESSION: Cerebral and cerebellar volume loss. Ct Abd Pelv Wo Cont Result Date: 12/14/2019 CT ABDOMEN AND PELVIS WITHOUT CONTRAST 12/14/2019 6:13 PM History:  Recurrent UTI. Prostate cancer. Technique: Noncontrast  axial images were obtained through the abdomen and pelvis per the renal stone protocol. Coronal reformatted images were generated. Dose reduction techniques were used such as automated exposure control, adjustment of the MA or KV according to patient size, and iterative reconstruction. Comparison:  September 27, 2012 Findings: Included portions of the lung bases are clear. ABDOMEN:    There are no ureteral calculi. A few punctate nonobstructing stones are present in the right renal collecting system. There is no hydronephrosis. Evaluation of the abdominal viscera is limited without IV contrast.  Artifact from the patient's arms which were not elevated limits evaluation of the abdomen. The unenhanced appearance of the gallbladder, liver, pancreas, spleen and adrenal glands is normal. The appendix is normal in appearance. A moderate to large amount of stool is present throughout the colon. PELVIS: A penile prosthesis is present. The bladder is normal appearance. There is no free pelvic fluid. Bone windows demonstrated no aggressive osseous lesions. IMPRESSION: 1. Artifact from arm positioning and motion limits evaluation of the abdomen. 2. Nonobstructing small right renal collecting system stones. 3. No ureteral calculi or hydronephrosis. 4. Moderate to large amount of stool present throughout the colon. Xr Chest [facility] Result Date: 12/14/2019 AP PORTABLE CHEST X-RAY 12/14/2019 11:22 AM HISTORY: fatigue COMPARISON: December 3, 2019 FINDINGS:  EKG leads are present. A cardiac pacemaker device is present. The cardiac silhouette is prominent. There is no lobar consolidation, pleural effusions or pulmonary edema. IMPRESSION: No consolidation. Assessment and Plan:  
 
Hospital Problems as of 12/16/2019 Date Reviewed: 3/20/2019 Codes Class Noted - Resolved POA * (Principal) Weakness ICD-10-CM: R53.1 ICD-9-CM: 780.79  12/14/2019 - Present Yes Elevated troponin (Chronic) ICD-10-CM: R79.89 ICD-9-CM: 790.6  12/14/2019 - Present Yes Prostate cancer ([facility] [address] (Chronic) ICD-10-CM: U79 ICD-9-CM: 185  12/14/2019 - Present Yes Atrial fibrillation ([facility]) (Chronic) ICD-10-CM: I48.91 
ICD-9-CM: 427.31  1/27/2012 - Present Yes HTN (hypertension) (Chronic) ICD-10-CM: I10 
ICD-9-CM: 401.9  1/27/2012 - Present Yes Plan: 
Mr. Wendy Owens is a 79 yo male with PMH of prostate cancer taking casodex, AFIB, SSS s/p PPM, recurrent UTI, HFpEF, depression, HTN who is admitted for dizziness and weakness. Patient is currently in ICU and is stable for general medical floor. # Generalized weakness 
- CT head is unremarkable and cannot have MRI due to having PPM. No signs of neurological or infectious etiology. - f/u PT/OT recommendations. - care management for discharge planning. # Hematuria/ prostate cancer/ recurrent UTI 
- hx of MRSA/E. Coli UTI but no complaints of urinary symptoms. Afebrile and no leukocytosis - f/u urine culture (No growth to date) - Urology consulted: CT AP reviewed and for patient to f/u with [facility] on discharge. # Elevated troponin likely demand ischemia # SSS s/p PPM 
# AFIB(not on AC) # HTN # Chronic HFpEF 
- Echo( 06/2019 ): EF 60%-65% with mild aortic regurgitation and mild-to-moderate tricuspid regurgitation  
- Dr. Rick(Cardiology) following: repeat ECHO to evaluate EF(completed but no report yet) - c/w home meds 
- not on AC due to fall risk # Depression: continue abilify and fetmiza # Dementia: continued aricept # Elevated lactate: hydrate and reassess lab Diet:  DIET CARDIAC 
DVT PPx: SCDs Code: Full Code Medical Decision Making: 
 
Labs/Imaging reviewed. Additional information obtained from nursing staff Patient is moderate risk due to medical condition and comorbidities. Plan discussed with nursing staff. Plan discussed with patient/family. All questions/concerns were addressed. Pt/family agrees with the plan. Signed By: Arias Arndt MD   
 December 16, 2019

## 2019-12-16 NOTE — PROGRESS NOTES
Problem: Mobility Impaired (Adult and Pediatric) Goal: *Acute Goals and Plan of Care (Insert Text) Description STG: 
(1.)Mr. Du Townsend will move from supine to sit and sit to supine  with MAX ASSIST within 1-2 treatment day(s). (2.)Mr. Du Townsend will transfer from bed to chair and chair to bed with MAXIMAL ASSIST using the least restrictive device within 1-2 treatment day(s). (3.)Mr. Du Townsend will ambulate with MAXIMAL ASSIST for 1-2 feet with the least restrictive device within 1-2 treatment day(s). LTG: 
(1.)Mr. Du Townsend will move from supine to sit and sit to supine  in bed with MODERATE ASSIST within 3-10 treatment day(s). (2.)Mr. Du Townsend will transfer from bed to chair and chair to bed with MODERATE ASSIST using the least restrictive device within 3-10 treatment day(s). (3.)Mr. Du Townsend will ambulate with MAXIMAL ASSIST for 2-5 feet with the least restrictive device within 3-10 treatment day(s). ________________________________________________________________________________________________ 
  
12/15/2019 1534 by Arcadio Coleman PT Outcome: Progressing Towards Goal 
 
 
PHYSICAL THERAPY: Daily Note and AM 12/16/2019 INPATIENT:   
[place] / Product Type: Medicare /   
  
NAME/AGE/GENDER: Alan Eden is a 78 y.o. male PRIMARY DIAGNOSIS: Weakness [R53.1] Weakness Weakness ICD-10: Treatment Diagnosis: · Difficulty in walking, Not elsewhere classified (R26.2) · Other abnormalities of gait and mobility (R26.89) Precaution/Allergies: 
Patient has no known allergies. ASSESSMENT:  
 
Mr. Du Townsend presents with decreased independence with functional mobility. Pt performed supine to sit. Pt performed sit to stand, twice. Pt stood for CNA to change linens. Pt returned supine. Needs in reach. 12/16--Pt performed supine to sit. Pt sat edge of bed.  Pt performed sit to stand two times. Pt stood and took steps to head of bed. Pt returned supine with needs in reach. This section established at most recent assessment PROBLEM LIST (Impairments causing functional limitations): 1. Decreased Strength 2. Decreased Transfer Abilities 3. Decreased Ambulation Ability/Technique 4. Decreased Balance 5. Decreased Activity Tolerance 6. Decreased Flexibility/Joint Mobility INTERVENTIONS PLANNED: (Benefits and precautions of physical therapy have been discussed with the patient.) 1. Bed Mobility 2. Gait Training 3. Therapeutic Activites 4. Therapeutic Exercise/Strengthening 5. Transfer Training TREATMENT PLAN: Frequency/Duration: daily for duration of hospital stay Rehabilitation Potential For Stated Goals: Fair REHAB RECOMMENDATIONS (at time of discharge pending progress):   
Placement: It is my opinion, based on this patient's performance to date, that Mr. Anastasia Rodas may benefit from participating in 1-2 additional therapy sessions in order to continue to assess for rehab potential and then make recommendation for disposition at discharge. Equipment:  
? None at this time HISTORY:  
History of Present Injury/Illness (Reason for Referral): 
Pt admitted with above diagnosis. Past Medical History/Comorbidities:  
Mr. Anastasia Rodas  has a past medical history of Arrhythmia, Arthritis, Atrial fibrillation ([address] (1/27/2012), CAD (coronary artery disease), Cancer ([address] Dementia ([address] Depression (8/28/2012), GERD (gastroesophageal reflux disease), Heart failure ([address] History of prostate cancer (8/4/2014), Hypertension, Morbid obesity ([address] Osteoarthritis of right knee (1/26/2012), Recurrent major depressive disorder, in full remission ([address] (11/30/2017), Total knee replacement status (1/26/2012), and Vitamin B12 deficiency (7/23/2017). Mr. Anastasia Rodas  has a past surgical history that includes hx orthopaedic (2010); hx prostatectomy; and hx pacemaker (8/30/2012). Social History/Living Environment:  
Home Environment: Private residence # Steps to Enter: 7 One/Two Story Residence: One story Living Alone: No 
Support Systems: Family member(s) Patient Expects to be Discharged to[de-identified] Private residence Current DME Used/Available at Home: Cane, straight Prior Level of Function/Work/Activity: 
Pt states he walked with a walker or cane. When pressed, pt states the last time he stood was at Thanksgiving. Pt states he lives with his daughter. Number of Personal Factors/Comorbidities that affect the Plan of Care: 0: LOW COMPLEXITY EXAMINATION:  
Most Recent Physical Functioning:  
Gross Assessment: 
AROM: Generally decreased, functional 
Strength: Generally decreased, functional 
Coordination: Grossly decreased, non-functional 
         
  
Posture: 
  
Balance: 
Sitting: Intact Standing: Pull to stand; With support Bed Mobility: 
Supine to Sit: Maximum assistance;Assist x2 Sit to Supine: Maximum assistance;Assist x2 Wheelchair Mobility: 
  
Transfers: 
Sit to Stand: Assist x2; Moderate assistance Stand to Sit: Assist x2; Moderate assistance Gait: 
  
Distance (ft): 5 Feet (ft)(to head of bed) Assistive Device: Walker, rolling Ambulation - Level of Assistance: Assist x2;Minimal assistance Body Structures Involved: 1. Bones 2. Joints 3. Muscles 4. Ligaments Body Functions Affected: 1. Neuromusculoskeletal 
2. Movement Related Activities and Participation Affected: 1. Mobility Number of elements that affect the Plan of Care: 4+: HIGH COMPLEXITY CLINICAL PRESENTATION:  
Presentation: Evolving clinical presentation with changing clinical characteristics: MODERATE COMPLEXITY CLINICAL DECISION [facility] [address] [address] AM-PAC 6 Clicks Basic Mobility Inpatient Short Form How much difficulty does the patient currently have. .. Unable A Lot A Little None 1.   Turning over in bed (including adjusting bedclothes, sheets and blankets)? [] 1   [x] 2   [] 3   [] 4  
2. Sitting down on and standing up from a chair with arms ( e.g., wheelchair, bedside commode, etc.)   [] 1   [x] 2   [] 3   [] 4  
3. Moving from lying on back to sitting on the side of the bed? [] 1   [x] 2   [] 3   [] 4 How much help from another person does the patient currently need. .. Total A Lot A Little None 4. Moving to and from a bed to a chair (including a wheelchair)? [] 1   [x] 2   [] 3   [] 4  
5. Need to walk in hospital room? [] 1   [x] 2   [] 3   [] 4  
6. Climbing 3-5 steps with a railing? [] 1   [x] 2   [] 3   [] 4  
© 2007, Trustees of [address] [place], [place] [id number], under license to [employer]. All rights reserved Score:  Initial: 12 Most Recent: X (Date: -- ) Interpretation of Tool:  Represents activities that are increasingly more difficult (i.e. Bed mobility, Transfers, Gait). Medical Necessity:    
· Skilled intervention continues to be required due to decreased mobility ability. Reason for Services/Other Comments: 
· Patient continues to require skilled intervention due to · Decreased mobility ability. · . Use of outcome tool(s) and clinical judgement create a POC that gives a: Questionable prediction of patient's progress: MODERATE COMPLEXITY  
  
 
 
 
TREATMENT:  
(In addition to Assessment/Re-Assessment sessions the following treatments were rendered) Pre-treatment Symptoms/Complaints:  pt greeted therapy Pain: Initial:  
   Post Session:  pt limited verbalizations Gait Training (  15 minutes):  Gait training to improve and/or restore physical functioning as related to mobility. Ambulated 5 Feet (ft)(to head of bed) with Assist x2;Minimal assistance using a Walker, rolling Therapeutic Activity: (    8 mintues): Therapeutic activities including Bed transfers and Ambulation on level ground to improve mobility. Braces/Orthotics/Lines/Etc:  
· O2 Device: Room air Treatment/Session Assessment: · Response to Treatment:  Pt agreeable to take steps to head of bed. · Interdisciplinary Collaboration:  
o Physical Therapist 
o Occupational Therapist 
o Registered Nurse · After treatment position/precautions:  
o Supine in bed 
o Bed/Chair-wheels locked 
o Bed in low position 
o Call light within reach 
o RN notified · Compliance with Program/Exercises: Will assess as treatment progresses · Recommendations/Intent for next treatment session: \"Next visit will focus on advancements to more challenging activities and reduction in assistance provided\". Total Treatment Duration: PT Patient Time In/Time Out Time In: 7800 Time Out: 6685 Karla Kennedy, PT

## 2019-12-16 NOTE — PROGRESS NOTES
Problem: Pain Goal: *Control of Pain Outcome: Progressing Towards Goal 
  
Problem: Patient Education: Go to Patient Education Activity Goal: Patient/Family Education Outcome: Progressing Towards Goal 
  
Problem: Falls - Risk of 
Goal: *Absence of Falls Description Document Devere Lilbourn Fall Risk and appropriate interventions in the flowsheet. Outcome: Progressing Towards Goal 
Note: Fall Risk Interventions: 
Mobility Interventions: Bed/chair exit alarm, OT consult for ADLs, Patient to call before getting OOB, PT Consult for mobility concerns, Utilize walker, cane, or other assistive device Mentation Interventions: Adequate sleep, hydration, pain control, Bed/chair exit alarm, Door open when patient unattended, More frequent rounding, Reorient patient, Room close to nurse's station, Toileting rounds, Update white board Medication Interventions: Bed/chair exit alarm, Patient to call before getting OOB, Teach patient to arise slowly Elimination Interventions: Bed/chair exit alarm, Call light in reach, Patient to call for help with toileting needs, Toileting schedule/hourly rounds History of Falls Interventions: Bed/chair exit alarm, Door open when patient unattended, Investigate reason for fall, Room close to nurse's station Problem: Urinary Tract Infection - Adult Goal: *Absence of infection signs and symptoms Outcome: Progressing Towards Goal 
  
Problem: Patient Education: Go to Patient Education Activity Goal: Patient/Family Education Outcome: Progressing Towards Goal 
  
Problem: Pressure Injury - Risk of 
Goal: *Prevention of pressure injury Description Document Maxwell Scale and appropriate interventions in the flowsheet. Outcome: Progressing Towards Goal 
Note: Pressure Injury Interventions: 
Sensory Interventions: Assess changes in LOC, Check visual cues for pain Moisture Interventions: Absorbent underpads, Check for incontinence Q2 hours and as needed Activity Interventions: Assess need for specialty bed, Increase time out of bed, PT/OT evaluation Mobility Interventions: Assess need for specialty bed, PT/OT evaluation, HOB 30 degrees or less Nutrition Interventions: Document food/fluid/supplement intake, Offer support with meals,snacks and hydration Problem: Patient Education: Go to Patient Education Activity Goal: Patient/Family Education Outcome: Progressing Towards Goal

## 2019-12-16 NOTE — PROGRESS NOTES
Problem: Pain Goal: *Control of Pain Outcome: Progressing Towards Goal 
  
Problem: Falls - Risk of 
Goal: *Absence of Falls Description Document Charity Gardner Fall Risk and appropriate interventions in the flowsheet. Outcome: Progressing Towards Goal 
Note: Fall Risk Interventions: 
Mobility Interventions: Bed/chair exit alarm Mentation Interventions: Adequate sleep, hydration, pain control Medication Interventions: Bed/chair exit alarm Elimination Interventions: Bed/chair exit alarm, Call light in reach History of Falls Interventions: Door open when patient unattended, Evaluate medications/consider consulting pharmacy, Room close to nurse's station, Utilize gait belt for transfer/ambulation, Bed/chair exit alarm Problem: Urinary Tract Infection - Adult Goal: *Absence of infection signs and symptoms Outcome: Progressing Towards Goal 
  
Problem: Pressure Injury - Risk of 
Goal: *Prevention of pressure injury Description Document Maxwell Scale and appropriate interventions in the flowsheet. Outcome: Progressing Towards Goal 
Note: Pressure Injury Interventions: 
Sensory Interventions: Assess changes in LOC Moisture Interventions: Absorbent underpads Activity Interventions: Increase time out of bed Mobility Interventions: HOB 30 degrees or less Nutrition Interventions: Document food/fluid/supplement intake

## 2019-12-16 NOTE — PROGRESS NOTES
Interdisciplinary team rounds were held 12/16/2019 with the following team members:Care Management, Nursing, Nutrition, Pharmacy, Physical Therapy and Physician. Plan of care discussed. See clinical pathway and/or care plan for interventions and desired outcomes.

## 2019-12-17 ENCOUNTER — PATIENT OUTREACH (OUTPATIENT)
Dept: CASE MANAGEMENT | Age: 79
End: 2019-12-17

## 2019-12-17 LAB
ANION GAP SERPL CALC-SCNC: 3 MMOL/L (ref 7–16)
BACTERIA SPEC CULT: NORMAL
BASOPHILS # BLD: 0 K/UL (ref 0–0.2)
BASOPHILS NFR BLD: 0 % (ref 0–2)
BUN SERPL-MCNC: 9 MG/DL (ref 8–23)
CALCIUM SERPL-MCNC: 9.5 MG/DL (ref 8.3–10.4)
CHLORIDE SERPL-SCNC: 116 MMOL/L (ref 98–107)
CO2 SERPL-SCNC: 26 MMOL/L (ref 21–32)
CREAT SERPL-MCNC: 0.88 MG/DL (ref 0.8–1.5)
DIFFERENTIAL METHOD BLD: ABNORMAL
EOSINOPHIL # BLD: 0.2 K/UL (ref 0–0.8)
EOSINOPHIL NFR BLD: 3 % (ref 0.5–7.8)
ERYTHROCYTE [DISTWIDTH] IN BLOOD BY AUTOMATED COUNT: 13.2 % (ref 11.9–14.6)
GLUCOSE SERPL-MCNC: 86 MG/DL (ref 65–100)
HCT VFR BLD AUTO: 41.6 % (ref 41.1–50.3)
HGB BLD-MCNC: 13.5 G/DL (ref 13.6–17.2)
IMM GRANULOCYTES # BLD AUTO: 0 K/UL (ref 0–0.5)
IMM GRANULOCYTES NFR BLD AUTO: 0 % (ref 0–5)
LYMPHOCYTES # BLD: 1.3 K/UL (ref 0.5–4.6)
LYMPHOCYTES NFR BLD: 23 % (ref 13–44)
MCH RBC QN AUTO: 30.5 PG (ref 26.1–32.9)
MCHC RBC AUTO-ENTMCNC: 32.5 G/DL (ref 31.4–35)
MCV RBC AUTO: 94.1 FL (ref 79.6–97.8)
MONOCYTES # BLD: 0.4 K/UL (ref 0.1–1.3)
MONOCYTES NFR BLD: 8 % (ref 4–12)
NEUTS SEG # BLD: 3.7 K/UL (ref 1.7–8.2)
NEUTS SEG NFR BLD: 66 % (ref 43–78)
NRBC # BLD: 0 K/UL (ref 0–0.2)
PLATELET # BLD AUTO: 172 K/UL (ref 150–450)
PMV BLD AUTO: 10 FL (ref 9.4–12.3)
POTASSIUM SERPL-SCNC: 3.8 MMOL/L (ref 3.5–5.1)
RBC # BLD AUTO: 4.42 M/UL (ref 4.23–5.6)
SERVICE CMNT-IMP: NORMAL
SODIUM SERPL-SCNC: 145 MMOL/L (ref 136–145)
WBC # BLD AUTO: 5.6 K/UL (ref 4.3–11.1)

## 2019-12-17 PROCEDURE — 77030020263 HC SOL INJ SOD CL0.9% LFCR 1000ML

## 2019-12-17 PROCEDURE — 74011250637 HC RX REV CODE- 250/637: Performed by: INTERNAL MEDICINE

## 2019-12-17 PROCEDURE — 80048 BASIC METABOLIC PNL TOTAL CA: CPT

## 2019-12-17 PROCEDURE — 74011250636 HC RX REV CODE- 250/636: Performed by: INTERNAL MEDICINE

## 2019-12-17 PROCEDURE — 97530 THERAPEUTIC ACTIVITIES: CPT

## 2019-12-17 PROCEDURE — 85025 COMPLETE CBC W/AUTO DIFF WBC: CPT

## 2019-12-17 PROCEDURE — 36415 COLL VENOUS BLD VENIPUNCTURE: CPT

## 2019-12-17 PROCEDURE — 65270000029 HC RM PRIVATE

## 2019-12-17 RX ADMIN — ARIPIPRAZOLE 10 MG: 10 TABLET ORAL at 09:00

## 2019-12-17 RX ADMIN — DONEPEZIL HYDROCHLORIDE 5 MG: 5 TABLET, FILM COATED ORAL at 23:00

## 2019-12-17 RX ADMIN — SODIUM CHLORIDE 100 ML/HR: 900 INJECTION, SOLUTION INTRAVENOUS at 16:16

## 2019-12-17 RX ADMIN — TAMSULOSIN HYDROCHLORIDE 0.4 MG: 0.4 CAPSULE ORAL at 22:59

## 2019-12-17 RX ADMIN — Medication 1 AMPULE: at 22:59

## 2019-12-17 RX ADMIN — ACETAMINOPHEN 650 MG: 325 TABLET, FILM COATED ORAL at 16:15

## 2019-12-17 RX ADMIN — SODIUM CHLORIDE 100 ML/HR: 900 INJECTION, SOLUTION INTRAVENOUS at 06:38

## 2019-12-17 RX ADMIN — ASPIRIN 81 MG 81 MG: 81 TABLET ORAL at 09:00

## 2019-12-17 RX ADMIN — FAMOTIDINE 20 MG: 10 TABLET ORAL at 17:26

## 2019-12-17 RX ADMIN — NYSTATIN: 100000 POWDER TOPICAL at 18:00

## 2019-12-17 RX ADMIN — Medication 1 AMPULE: at 09:00

## 2019-12-17 RX ADMIN — METOPROLOL TARTRATE 25 MG: 25 TABLET ORAL at 22:59

## 2019-12-17 RX ADMIN — FAMOTIDINE 20 MG: 10 TABLET ORAL at 09:54

## 2019-12-17 RX ADMIN — METOPROLOL TARTRATE 25 MG: 25 TABLET ORAL at 09:00

## 2019-12-17 RX ADMIN — NYSTATIN: 100000 POWDER TOPICAL at 09:00

## 2019-12-17 RX ADMIN — BICALUTAMIDE 50 MG: 50 TABLET, FILM COATED ORAL at 09:00

## 2019-12-17 NOTE — PROGRESS NOTES
Care Transition Nurse [facility] Date/Time:  2019 11:45 AM 
 
Patient is identified as High Risk for Readmission. Medical History:  
 
 
Advanced Care Planning:   
Does patient have an Advance Directive:  reviewed and current Inpatient RRAT score: 22 Was this a readmission? yes Care Transition Nurse (CTN) introduced self to the patient. Verified name and  with patient as identifiers. Provided explanation of the CTN role. Patients top risk factors for readmission:  depression, functional physical ability, ineffective coping, medical condition Medication Reconciliation:  
Medication reconciliation was performed with patient, who verbalizes understanding of administration of home medications. There were no barriers to obtaining medications identified at this time. No current facility-administered medications for this visit. No current outpatient medications on file. Facility-Administered Medications Ordered in Other Visits Medication Dose Route Frequency  alcohol 62% (NOZIN) nasal  1 Ampule  1 Ampule Topical Q12H  
 donepezil (ARICEPT) tablet 5 mg  5 mg Oral QHS  
 0.9% sodium chloride infusion  100 mL/hr IntraVENous CONTINUOUS  
 ARIPiprazole (ABILIFY) tablet 10 mg  10 mg Oral DAILY  aspirin chewable tablet 81 mg  81 mg Oral DAILY  bicalutamide (CASODEX) tablet 50 mg  50 mg Oral DAILY  famotidine (PEPCID) tablet 20 mg  20 mg Oral BID  levomilnacipran (FETZIMA) ER capsule 40 mg   (Patient Supplied)  40 mg Oral DAILY  metoprolol tartrate (LOPRESSOR) tablet 25 mg  25 mg Oral BID  tamsulosin (FLOMAX) capsule 0.4 mg  0.4 mg Oral QHS  sodium chloride (NS) flush 5-40 mL  5-40 mL IntraVENous Q8H  
 sodium chloride (NS) flush 5-40 mL  5-40 mL IntraVENous PRN  
 acetaminophen (TYLENOL) tablet 650 mg  650 mg Oral Q6H PRN  
 ondansetron (ZOFRAN) injection 4 mg  4 mg IntraVENous Q4H PRN  
  nystatin (MYCOSTATIN) 100,000 unit/gram powder   Topical BID Discharge Plan: Patient to discharge to [address] when medically stable. Goals  Patient/Family verbalizes understanding of self-management of chronic disease. 1. Pt will verbalize s/s UT infection in 4wks. This note will not be viewable in [address]

## 2019-12-17 NOTE — PROGRESS NOTES
Interdisciplinary team rounds were held 12/17/2019 with the following team members:Care Management, Nursing, Nutrition, Pharmacy, Physical Therapy and Physician. Plan of care discussed. See clinical pathway and/or care plan for interventions and desired outcomes.

## 2019-12-17 NOTE — PROGRESS NOTES
Problem: Mobility Impaired (Adult and Pediatric) Goal: *Acute Goals and Plan of Care (Insert Text) Description STG: 
(1.)Mr. Jewel Merchant will move from supine to sit and sit to supine  with MAX ASSIST within 1-2 treatment day(s). (2.)Mr. Jewel Merchant will transfer from bed to chair and chair to bed with MAXIMAL ASSIST using the least restrictive device within 1-2 treatment day(s). (3.)Mr. Jewel Merchant will ambulate with MAXIMAL ASSIST for 1-2 feet with the least restrictive device within 1-2 treatment day(s). LTG: 
(1.)Mr. Jewel Merchant will move from supine to sit and sit to supine  in bed with MODERATE ASSIST within 3-10 treatment day(s). (2.)Mr. Jewel Merchant will transfer from bed to chair and chair to bed with MODERATE ASSIST using the least restrictive device within 3-10 treatment day(s). (3.)Mr. Jewel Merchant will ambulate with MAXIMAL ASSIST for 2-5 feet with the least restrictive device within 3-10 treatment day(s). ________________________________________________________________________________________________ 
  
12/15/2019 1534 by Brett Bey PT Outcome: Progressing Towards Goal 
 
 
PHYSICAL THERAPY: Daily Note and PM 12/17/2019 INPATIENT: PT Visit Days : 2 [place] / Product Type: Medicare /   
  
NAME/AGE/GENDER: Estelita Woodruff is a 78 y.o. male PRIMARY DIAGNOSIS: Weakness [R53.1] Weakness Weakness ICD-10: Treatment Diagnosis: · Difficulty in walking, Not elsewhere classified (R26.2) · Other abnormalities of gait and mobility (R26.89) Precaution/Allergies: 
Patient has no known allergies. ASSESSMENT:  
 
Mr. Jewel Merchant presents with decreased independence with functional mobility. Pt performed supine to sit. Pt performed sit to stand, twice. Pt stood for CNA to change linens. Pt returned supine. Needs in reach. 12/16--Pt performed supine to sit. Pt sat edge of bed.  Pt performed sit to stand two times. Pt stood and took steps to head of bed. Pt returned supine with needs in reach. 12/17--Pt agreeble to bridge and roll. Pt declined exercise. This section established at most recent assessment PROBLEM LIST (Impairments causing functional limitations): 1. Decreased Strength 2. Decreased Transfer Abilities 3. Decreased Ambulation Ability/Technique 4. Decreased Balance 5. Decreased Activity Tolerance 6. Decreased Flexibility/Joint Mobility INTERVENTIONS PLANNED: (Benefits and precautions of physical therapy have been discussed with the patient.) 1. Bed Mobility 2. Gait Training 3. Therapeutic Activites 4. Therapeutic Exercise/Strengthening 5. Transfer Training TREATMENT PLAN: Frequency/Duration: daily for duration of hospital stay Rehabilitation Potential For Stated Goals: Fair REHAB RECOMMENDATIONS (at time of discharge pending progress):   
Placement: It is my opinion, based on this patient's performance to date, that Mr. Joslyn Hamman may benefit from participating in 1-2 additional therapy sessions in order to continue to assess for rehab potential and then make recommendation for disposition at discharge. Equipment:  
? None at this time HISTORY:  
History of Present Injury/Illness (Reason for Referral): 
Pt admitted with above diagnosis. Past Medical History/Comorbidities:  
Mr. Joslyn Hamman  has a past medical history of Arrhythmia, Arthritis, Atrial fibrillation ([address] (1/27/2012), CAD (coronary artery disease), Cancer ([address] Dementia ([address] Depression (8/28/2012), GERD (gastroesophageal reflux disease), Heart failure ([address] History of prostate cancer (8/4/2014), Hypertension, Morbid obesity ([address] Osteoarthritis of right knee (1/26/2012), Recurrent major depressive disorder, in full remission ([address] (11/30/2017), Total knee replacement status (1/26/2012), and Vitamin B12 deficiency (7/23/2017).   Mr. Joslyn Hamman  has a past surgical history that includes hx orthopaedic (); hx prostatectomy; and hx pacemaker (2012). Social History/Living Environment:  
Home Environment: Private residence # Steps to Enter: 7 One/Two Story Residence: One story Living Alone: No 
Support Systems: Family member(s) Patient Expects to be Discharged to[de-identified] Private residence Current DME Used/Available at Home: Cane, straight Prior Level of Function/Work/Activity: 
Pt states he walked with a walker or cane. When pressed, pt states the [facility] stood [facility]. Pt states he lives with his daughter. Number of Personal Factors/Comorbidities that affect the Plan of Care: 0: LOW COMPLEXITY EXAMINATION:  
Most Recent Physical Functioning:  
Gross Assessment: 
AROM: Generally decreased, functional 
Strength: Generally decreased, functional 
Coordination: Grossly decreased, non-functional 
         
  
Posture: 
  
Balance: 
  Bed Mobility: 
Rolling: Maximum assistance Wheelchair Mobility: 
  
Transfers: 
  
Gait: 
  
   
  
Body Structures Involved: 1. Bones 2. Joints 3. Muscles 4. Ligaments Body Functions Affected: 1. Neuromusculoskeletal 
2. Movement Related Activities and Participation Affected: 1. Mobility Number of elements that affect the Plan of Care: 4+: HIGH COMPLEXITY CLINICAL PRESENTATION:  
Presentation: Evolving clinical presentation with changing clinical characteristics: MODERATE COMPLEXITY CLINICAL DECISION MAKIN [facility] [address] [address] AM-PAC 6 Clicks Basic Mobility Inpatient Short Form How much difficulty does the patient currently have. .. Unable A Lot A Little None 1. Turning over in bed (including adjusting bedclothes, sheets and blankets)? [] 1   [x] 2   [] 3   [] 4  
2. Sitting down on and standing up from a chair with arms ( e.g., wheelchair, bedside commode, etc.)   [] 1   [x] 2   [] 3   [] 4  
3. Moving from lying on back to sitting on the side of the bed?    [] 1   [x] 2   [] 3   [] 4  
 How much help from another person does the patient currently need. .. Total A Lot A Little None 4. Moving to and from a bed to a chair (including a wheelchair)? [] 1   [x] 2   [] 3   [] 4  
5. Need to walk in hospital room? [] 1   [x] 2   [] 3   [] 4  
6. Climbing 3-5 steps with a railing? [] 1   [x] 2   [] 3   [] 4  
© 2007, Trustees of [address] [place] [address], under license to [employer]. All rights reserved Score:  Initial: 12 Most Recent: X (Date: -- ) Interpretation of Tool:  Represents activities that are increasingly more difficult (i.e. Bed mobility, Transfers, Gait). Medical Necessity:    
· Skilled intervention continues to be required due to decreased mobility ability. Reason for Services/Other Comments: 
· Patient continues to require skilled intervention due to · Decreased mobility ability. · . Use of outcome tool(s) and clinical judgement create a POC that gives a: Questionable prediction of patient's progress: MODERATE COMPLEXITY  
  
 
 
 
TREATMENT:  
(In addition to Assessment/Re-Assessment sessions the following treatments were rendered) Pre-treatment Symptoms/Complaints:  Pt states doing okay today Pain: Initial:  
Pain Intensity 1: 0  Post Session:  pt limited verbalizations Gait Training (   minutes):  Gait training to improve and/or restore physical functioning as related to mobility. Ambulated   with   using a Therapeutic Activity: (    10 mintues): Therapeutic activities including Bed transfers and Ambulation on level ground to improve mobility. Braces/Orthotics/Lines/Etc:  
· O2 Device: Room air Treatment/Session Assessment:   
· Response to Treatment:  Pt agreeable to roll. · Interdisciplinary Collaboration:  
o Physical Therapist 
o Registered Nurse 
o Certified Nursing Assistant/Patient Care Technician · After treatment position/precautions:  
o Supine in bed 
o Bed/Chair-wheels locked 
o Bed in low position 
o Call light within reach o RN notified · Compliance with Program/Exercises: Will assess as treatment progresses · Recommendations/Intent for next treatment session: \"Next visit will focus on advancements to more challenging activities and reduction in assistance provided\". Total Treatment Duration: PT Patient Time In/Time Out Time In: 1400 Time Out: 1410 Gucci Harvey PT

## 2019-12-17 NOTE — PROGRESS NOTES
Problem: Pain Goal: *Control of Pain Outcome: Progressing Towards Goal 
  
Problem: Falls - Risk of 
Goal: *Absence of Falls Description Document Winsome Astorga Fall Risk and appropriate interventions in the flowsheet. Outcome: Progressing Towards Goal 
Note: Fall Risk Interventions: 
Mobility Interventions: Bed/chair exit alarm Mentation Interventions: Bed/chair exit alarm Medication Interventions: Bed/chair exit alarm Elimination Interventions: Call light in reach History of Falls Interventions: Bed/chair exit alarm, Door open when patient unattended, Investigate reason for fall, Room close to nurse's station Problem: Urinary Tract Infection - Adult Goal: *Absence of infection signs and symptoms Outcome: Progressing Towards Goal 
  
Problem: Pressure Injury - Risk of 
Goal: *Prevention of pressure injury Description Document Maxwell Scale and appropriate interventions in the flowsheet. Outcome: Progressing Towards Goal 
Note: Pressure Injury Interventions: 
Sensory Interventions: Assess changes in LOC Moisture Interventions: Absorbent underpads, Apply protective barrier, creams and emollients Activity Interventions: Increase time out of bed Mobility Interventions: Pressure redistribution bed/mattress (bed type) Nutrition Interventions: Offer support with meals,snacks and hydration

## 2019-12-17 NOTE — PROGRESS NOTES
Shift assessment complete. A&Ox4. Respirations present, even, unlabored. Lung sounds diminished on room air. Abdomen obese and soft with active bowel sounds in all 4 quadrants. Brief in place. IVF infusing without difficulty. Pt denies pain at this time. Bed in lowest position, call light within reach, side rails x3. Encouraged to call for help when needed.

## 2019-12-17 NOTE — PROGRESS NOTES
Hospitalist Note Admit Date:  2019 10:51 AM  
Name:  Addi Campbell Age:  78 y.o. 
:  1940 MRN:  [id number] PCP:  Louie Trivedi MD 
Treatment Team: Attending Provider: Selvin Strickland MD; Utilization Review: [email or web address], Mirian BARNHART; : Bj De Anda; : Vita Adame; Physical Therapist: Juliane Ordaz PT 
 
HPI/Subjective:  
Summary:Mr. Heather Barreto is a 79 yo male with PMH of prostate cancer taking casodex, AFIB(not on AC), SSS s/p PPM, recurrent UTI, HFpEF, depression, HTN who is admitted for dizziness and weakness. He was recently discharged from hospital on 2019 for MRSA/E. coli UTI. Urine appeared infected with gross hematuria but he denies any urinary symptoms. Patient is afebrile without any leukocytosis. He had 4 admissions in the past 12 months for UTIs. According was slightly elevated in the ED and cardiology was called for evaluation. Patient was initially admitted to ICU but transferred to medical floor on 12/15/2019. 
 
 
: 
Phuong Dominguez Weakness improved somewhat. Alert. No acute complaints. Await rehab Objective:  
 
Patient Vitals for the past 24 hrs: 
 Temp Pulse Resp BP SpO2  
19 0828 98.2 °F (36.8 °C) 66 18 164/74 95 % 19 0426 97.8 °F (36.6 °C) 60 16 114/64 97 % 19 2355 97.8 °F (36.6 °C) 60 18 159/67 96 % 19 2024 97.9 °F (36.6 °C) 60 18 135/63 97 % 19 1600 98.4 °F (36.9 °C) (!) 58 18 130/69 96 % Oxygen Therapy O2 Sat (%): 95 % (19 0828) Pulse via Oximetry: 60 beats per minute (12/15/19 1247) O2 Device: Room air (19 0818) Estimated body mass index is 33.64 kg/m² as calculated from the following: 
  Height as of this encounter: 6' 2\" (1.88 m). Weight as of this encounter: 118.8 kg (262 lb). Intake/Output Summary (Last 24 hours) at 2019 1201 Last data filed at 2019 1842 Gross per 24 hour Intake 1650 ml Output  Net 1650 ml  
 *Note that automatically entered I/Os may not be accurate; dependent on patient compliance with collection and accurate  by techs. Physical Exam:  
General:     Obese, no acute distress. Head:   normocephalic, atraumatic Lungs:   Diminished, clear Cardiac:   RRR, Normal S1 and S2. Abdomen:   Soft, non distended, nontender, +BS, no guarding/rebound Extremities:   Warm, dry. Trace to +1 edema (R>L) Skin:   No rashes, no jaundice Neuro:  Alert and oriented to person, time, place, situation. No gross focal neurological deficit Psychiatric:  No anxiety, calm, cooperative Data Review: 
I have reviewed all labs, meds, and studies from the last 24 hours: 
 
Recent Results (from the past 24 hour(s)) PLEASE READ & DOCUMENT PPD TEST IN 48 HRS Collection Time: 12/16/19  2:40 PM  
Result Value Ref Range PPD Negative Negative  
 mm Induration 0 0 - 5 mm CBC WITH AUTOMATED DIFF Collection Time: 12/17/19  5:55 AM  
Result Value Ref Range WBC 5.6 4.3 - 11.1 K/uL  
 RBC 4.42 4.23 - 5.6 M/uL  
 HGB 13.5 (L) 13.6 - 17.2 g/dL HCT 41.6 41.1 - 50.3 % MCV 94.1 79.6 - 97.8 FL  
 MCH 30.5 26.1 - 32.9 PG  
 MCHC 32.5 31.4 - 35.0 g/dL  
 RDW 13.2 11.9 - 14.6 % PLATELET 880 943 - 912 K/uL MPV 10.0 9.4 - 12.3 FL ABSOLUTE NRBC 0.00 0.0 - 0.2 K/uL  
 DF AUTOMATED NEUTROPHILS 66 43 - 78 % LYMPHOCYTES 23 13 - 44 % MONOCYTES 8 4.0 - 12.0 % EOSINOPHILS 3 0.5 - 7.8 % BASOPHILS 0 0.0 - 2.0 % IMMATURE GRANULOCYTES 0 0.0 - 5.0 %  
 ABS. NEUTROPHILS 3.7 1.7 - 8.2 K/UL  
 ABS. LYMPHOCYTES 1.3 0.5 - 4.6 K/UL  
 ABS. MONOCYTES 0.4 0.1 - 1.3 K/UL  
 ABS. EOSINOPHILS 0.2 0.0 - 0.8 K/UL  
 ABS. BASOPHILS 0.0 0.0 - 0.2 K/UL  
 ABS. IMM. GRANS. 0.0 0.0 - 0.5 K/UL METABOLIC PANEL, BASIC Collection Time: 12/17/19  5:55 AM  
Result Value Ref Range Sodium 145 136 - 145 mmol/L Potassium 3.8 3.5 - 5.1 mmol/L  Chloride 116 (H) 98 - 107 mmol/L  
 CO2 26 21 - 32 mmol/L  
 Anion gap 3 (L) 7 - 16 mmol/L Glucose 86 65 - 100 mg/dL BUN 9 8 - 23 MG/DL Creatinine 0.88 0.8 - 1.5 MG/DL  
 GFR est AA >60 >60 ml/min/1.73m2 GFR est non-AA >60 >60 ml/min/1.73m2 Calcium 9.5 8.3 - 10.4 MG/DL All Micro Results Procedure Component Value Units Date/Time CULTURE, URINE [[id number]] Collected:  12/14/19 1212 Order Status:  Completed Specimen:  Cath Urine Updated:  12/17/19 1602 Special Requests: NO SPECIAL REQUESTS Culture result: NO GROWTH 2 DAYS     
 CULTURE, URINE [[id number]] Collected:  12/14/19 1345 Order Status:  Canceled Specimen:  Urine from Clean catch CULTURE, BLOOD [[id number]] Order Status:  Canceled Specimen:  Blood CULTURE, BLOOD [[id number]] Order Status:  Canceled Specimen:  Blood Current Meds: 
Current Facility-Administered Medications Medication Dose Route Frequency  alcohol 62% (NOZIN) nasal  1 Ampule  1 Ampule Topical Q12H  
 donepezil (ARICEPT) tablet 5 mg  5 mg Oral QHS  
 0.9% sodium chloride infusion  100 mL/hr IntraVENous CONTINUOUS  
 ARIPiprazole (ABILIFY) tablet 10 mg  10 mg Oral DAILY  aspirin chewable tablet 81 mg  81 mg Oral DAILY  bicalutamide (CASODEX) tablet 50 mg  50 mg Oral DAILY  famotidine (PEPCID) tablet 20 mg  20 mg Oral BID  levomilnacipran (FETZIMA) ER capsule 40 mg   (Patient Supplied)  40 mg Oral DAILY  metoprolol tartrate (LOPRESSOR) tablet 25 mg  25 mg Oral BID  tamsulosin (FLOMAX) capsule 0.4 mg  0.4 mg Oral QHS  sodium chloride (NS) flush 5-40 mL  5-40 mL IntraVENous Q8H  
 sodium chloride (NS) flush 5-40 mL  5-40 mL IntraVENous PRN  
 acetaminophen (TYLENOL) tablet 650 mg  650 mg Oral Q6H PRN  
 ondansetron (ZOFRAN) injection 4 mg  4 mg IntraVENous Q4H PRN  
 nystatin (MYCOSTATIN) 100,000 unit/gram powder   Topical BID Other Studies: 
 
Ct Head Wo Cont Result Date: 12/14/2019 HEAD CT WITHOUT CONTRAST  12/14/2019 HISTORY:   vertigo TECHNIQUE: Noncontrast axial images were obtained through the brain. All CT scans at this facility used dose modulation, interactive reconstruction and/or weight based dosing when appropriate to reduce radiation dose to as low as reasonably achievable. COMPARISON: November 27, 2019 FINDINGS: There is no acute intracranial hemorrhage, significant mass effect or CT evidence of acute large artery territorial infarction. Please note that a hyperacute infarct or small vessel infarct may not be apparent on initial CT imaging. Moderate cerebral and cerebellar volume loss are present with compensatory ventricular enlargement. Areas of low-attenuation are present in the supratentorial white matter. This pattern may be present with chronic small vessel ischemic disease. There is no hydrocephalus , intra-axial mass or abnormal extra-axial fluid collection. There are no displaced skull fractures. The mastoid air cells and paranasal sinuses are clear where imaged. IMPRESSION: Cerebral and cerebellar volume loss. Ct Abd Pelv Wo Cont Result Date: 12/14/2019 CT ABDOMEN AND PELVIS WITHOUT CONTRAST 12/14/2019 6:13 PM History:  Recurrent UTI. Prostate cancer. Technique: Noncontrast  axial images were obtained through the abdomen and pelvis per the renal stone protocol. Coronal reformatted images were generated. Dose reduction techniques were used such as automated exposure control, adjustment of the MA or KV according to patient size, and iterative reconstruction. Comparison:  September 27, 2012 Findings: Included portions of the lung bases are clear. ABDOMEN:    There are no ureteral calculi. A few punctate nonobstructing stones are present in the right renal collecting system. There is no hydronephrosis.  Evaluation of the abdominal viscera is limited without IV contrast.  Artifact from the patient's arms which were not elevated limits evaluation of the abdomen. The unenhanced appearance of the gallbladder, liver, pancreas, spleen and adrenal glands is normal. The appendix is normal in appearance. A moderate to large amount of stool is present throughout the colon. PELVIS: A penile prosthesis is present. The bladder is normal appearance. There is no free pelvic fluid. Bone windows demonstrated no aggressive osseous lesions. IMPRESSION: 1. Artifact from arm positioning and motion limits evaluation of the abdomen. 2. Nonobstructing small right renal collecting system stones. 3. No ureteral calculi or hydronephrosis. 4. Moderate to large amount of stool present throughout the colon. Xr Chest [facility] Result Date: 12/14/2019 AP PORTABLE CHEST X-RAY 12/14/2019 11:22 AM HISTORY: fatigue COMPARISON: December 3, 2019 FINDINGS:  EKG leads are present. A cardiac pacemaker device is present. The cardiac silhouette is prominent. There is no lobar consolidation, pleural effusions or pulmonary edema. IMPRESSION: No consolidation. Assessment and Plan:  
 
Hospital Problems as of 12/17/2019 Date Reviewed: 3/20/2019 Codes Class Noted - Resolved POA * (Principal) Weakness ICD-10-CM: R53.1 ICD-9-CM: 780.79  12/14/2019 - Present Yes Elevated troponin (Chronic) ICD-10-CM: R79.89 ICD-9-CM: 790.6  12/14/2019 - Present Yes Prostate cancer ([facility] [address] (Chronic) ICD-10-CM: L27 ICD-9-CM: 185  12/14/2019 - Present Yes Atrial fibrillation (HCC) (Chronic) ICD-10-CM: I48.91 
ICD-9-CM: 427.31  1/27/2012 - Present Yes HTN (hypertension) (Chronic) ICD-10-CM: I10 
ICD-9-CM: 401.9  1/27/2012 - Present Yes Plan: 
Mr. Concepción Xiao is a 79 yo male with PMH of prostate cancer taking casodex, AFIB, SSS s/p PPM, recurrent UTI, HFpEF, depression, HTN who is admitted for dizziness and weakness. Patient is currently in ICU and is stable for general medical floor. # Generalized weakness - CT head is unremarkable and cannot have MRI due to having PPM. No signs of neurological or infectious etiology. - f/u PT/OT recommendations. - care management for discharge planning. # Hematuria/ prostate cancer/ recurrent UTI 
- hx of MRSA/E. Coli UTI but no complaints of urinary symptoms. Afebrile and no leukocytosis 
- urine culture NGTD 
- Urology consulted: CT AP reviewed and for patient to f/u with [facility] on discharge. # Elevated troponin # SSS s/p PPM 
# AFIB(not on AC) # HTN # Chronic HFpEF 
- Echo unremarkable, seen by cards. Demand ischemia, no intervention needed. - c/w home meds 
- not on AC due to fall risk # Depression: continue abilify and fetmiza # Dementia: continued aricept # Elevated lactate: resolved with fluids Diet:  DIET CARDIAC 
DVT PPx: SCDs Code: Full Code Dispo: stable for STR Signed By: Sydnie Sommer MD   
 December 17, 2019

## 2019-12-17 NOTE — PROGRESS NOTES
Care Management Interventions Transition of Care Consult (CM Consult): Discharge Planning Physical Therapy Consult: Yes Occupational Therapy Consult: Yes Current Support Network: Lives with Caregiver Confirm Follow Up Transport: Other (see comment) Plan discussed with Pt/Family/Caregiver: Yes Freedom of Choice Offered: Yes Discharge Location Discharge Placement: Skilled nursing facility [address] [place] [address] declined pt. EPI left daughter Earl Mortimer [phone] ) a [employer] message providing update as to no bed offers as of yet. EPI sent referral to [address] [address] via [address] [place] [address]. Pt has not received any bed offers. [facility] not have any beds. [facility] declined pt but after EPI spoke with Horace Sexton she agreed to review again and get back with EPI.  EPI sent referral to [address] [place] [address].

## 2019-12-18 VITALS
TEMPERATURE: 99 F | WEIGHT: 262 LBS | BODY MASS INDEX: 33.62 KG/M2 | RESPIRATION RATE: 20 BRPM | SYSTOLIC BLOOD PRESSURE: 154 MMHG | HEIGHT: 74 IN | DIASTOLIC BLOOD PRESSURE: 65 MMHG | OXYGEN SATURATION: 96 % | HEART RATE: 62 BPM

## 2019-12-18 LAB
ANION GAP SERPL CALC-SCNC: 4 MMOL/L (ref 7–16)
BASOPHILS # BLD: 0 K/UL (ref 0–0.2)
BASOPHILS NFR BLD: 0 % (ref 0–2)
BUN SERPL-MCNC: 6 MG/DL (ref 8–23)
CALCIUM SERPL-MCNC: 9.5 MG/DL (ref 8.3–10.4)
CHLORIDE SERPL-SCNC: 115 MMOL/L (ref 98–107)
CO2 SERPL-SCNC: 24 MMOL/L (ref 21–32)
CREAT SERPL-MCNC: 0.86 MG/DL (ref 0.8–1.5)
DIFFERENTIAL METHOD BLD: ABNORMAL
EOSINOPHIL # BLD: 0.2 K/UL (ref 0–0.8)
EOSINOPHIL NFR BLD: 4 % (ref 0.5–7.8)
ERYTHROCYTE [DISTWIDTH] IN BLOOD BY AUTOMATED COUNT: 13.1 % (ref 11.9–14.6)
GLUCOSE SERPL-MCNC: 93 MG/DL (ref 65–100)
HCT VFR BLD AUTO: 41.1 % (ref 41.1–50.3)
HGB BLD-MCNC: 13.4 G/DL (ref 13.6–17.2)
IMM GRANULOCYTES # BLD AUTO: 0 K/UL (ref 0–0.5)
IMM GRANULOCYTES NFR BLD AUTO: 0 % (ref 0–5)
LYMPHOCYTES # BLD: 1.1 K/UL (ref 0.5–4.6)
LYMPHOCYTES NFR BLD: 23 % (ref 13–44)
MCH RBC QN AUTO: 30.5 PG (ref 26.1–32.9)
MCHC RBC AUTO-ENTMCNC: 32.6 G/DL (ref 31.4–35)
MCV RBC AUTO: 93.6 FL (ref 79.6–97.8)
MONOCYTES # BLD: 0.4 K/UL (ref 0.1–1.3)
MONOCYTES NFR BLD: 8 % (ref 4–12)
NEUTS SEG # BLD: 3.1 K/UL (ref 1.7–8.2)
NEUTS SEG NFR BLD: 65 % (ref 43–78)
NRBC # BLD: 0 K/UL (ref 0–0.2)
PLATELET # BLD AUTO: 189 K/UL (ref 150–450)
PMV BLD AUTO: 10.1 FL (ref 9.4–12.3)
POTASSIUM SERPL-SCNC: 3.6 MMOL/L (ref 3.5–5.1)
RBC # BLD AUTO: 4.39 M/UL (ref 4.23–5.6)
SODIUM SERPL-SCNC: 143 MMOL/L (ref 136–145)
WBC # BLD AUTO: 4.7 K/UL (ref 4.3–11.1)

## 2019-12-18 PROCEDURE — 85025 COMPLETE CBC W/AUTO DIFF WBC: CPT

## 2019-12-18 PROCEDURE — 74011250636 HC RX REV CODE- 250/636: Performed by: INTERNAL MEDICINE

## 2019-12-18 PROCEDURE — 36415 COLL VENOUS BLD VENIPUNCTURE: CPT

## 2019-12-18 PROCEDURE — 77030020263 HC SOL INJ SOD CL0.9% LFCR 1000ML

## 2019-12-18 PROCEDURE — 74011250637 HC RX REV CODE- 250/637: Performed by: INTERNAL MEDICINE

## 2019-12-18 PROCEDURE — 80048 BASIC METABOLIC PNL TOTAL CA: CPT

## 2019-12-18 RX ADMIN — FAMOTIDINE 20 MG: 10 TABLET ORAL at 08:30

## 2019-12-18 RX ADMIN — ARIPIPRAZOLE 10 MG: 10 TABLET ORAL at 09:00

## 2019-12-18 RX ADMIN — Medication 1 AMPULE: at 08:30

## 2019-12-18 RX ADMIN — BICALUTAMIDE 50 MG: 50 TABLET, FILM COATED ORAL at 09:00

## 2019-12-18 RX ADMIN — ASPIRIN 81 MG 81 MG: 81 TABLET ORAL at 08:30

## 2019-12-18 RX ADMIN — NYSTATIN: 100000 POWDER TOPICAL at 08:30

## 2019-12-18 RX ADMIN — METOPROLOL TARTRATE 25 MG: 25 TABLET ORAL at 08:30

## 2019-12-18 RX ADMIN — SODIUM CHLORIDE 100 ML/HR: 900 INJECTION, SOLUTION INTRAVENOUS at 02:55

## 2019-12-18 NOTE — PROGRESS NOTES
Problem: Pain Goal: *Control of Pain Outcome: Progressing Towards Goal 
  
Problem: Falls - Risk of 
Goal: *Absence of Falls Description Document Rebbecca Persons Fall Risk and appropriate interventions in the flowsheet. Outcome: Progressing Towards Goal 
Note: Fall Risk Interventions: 
Mobility Interventions: Bed/chair exit alarm Mentation Interventions: Bed/chair exit alarm Medication Interventions: Bed/chair exit alarm Elimination Interventions: Call light in reach History of Falls Interventions: Bed/chair exit alarm Problem: Urinary Tract Infection - Adult Goal: *Absence of infection signs and symptoms Outcome: Progressing Towards Goal 
  
Problem: Pressure Injury - Risk of 
Goal: *Prevention of pressure injury Description Document Maxwell Scale and appropriate interventions in the flowsheet. Outcome: Progressing Towards Goal 
Note: Pressure Injury Interventions: 
Sensory Interventions: Assess changes in LOC Moisture Interventions: Absorbent underpads, Apply protective barrier, creams and emollients Activity Interventions: Increase time out of bed Mobility Interventions: Pressure redistribution bed/mattress (bed type) Nutrition Interventions: Offer support with meals,snacks and hydration Friction and Shear Interventions: Apply protective barrier, creams and emollients

## 2019-12-18 NOTE — DISCHARGE SUMMARY
Hospitalist Discharge Summary Patient ID: 
Cassie Remy [id number] 
35 y.o. 
1940 Admit date: 12/14/2019 10:51 AM 
Discharge date and time: 12/18/2019 Attending: Neil Urrutia DO 
PCP:  Cece Carmona MD 
Treatment Team: Attending Provider: Neil Urrutia DO; Utilization Review: Mirian Abernathy; : Willy Yanes; : Dominique Rouse; Physical Therapist: Rigo Andujar PT 
 
Principal Diagnosis Weakness Hospital Problems as of 12/18/2019 Date Reviewed: 3/20/2019 Codes Class Noted - Resolved POA * (Principal) Weakness ICD-10-CM: R53.1 ICD-9-CM: 780.79  12/14/2019 - Present Yes Elevated troponin (Chronic) ICD-10-CM: R79.89 ICD-9-CM: 790.6  12/14/2019 - Present Yes Prostate cancer ([address] (Chronic) ICD-10-CM: Z32 ICD-9-CM: 185  12/14/2019 - Present Yes Atrial fibrillation (HCC) (Chronic) ICD-10-CM: I48.91 
ICD-9-CM: 427.31  1/27/2012 - Present Yes HTN (hypertension) (Chronic) ICD-10-CM: I10 
ICD-9-CM: 401.9  1/27/2012 - Present Yes Hospital Course: 
77 yo male with PMH of prostate cancer taking casodex, AFIB(not on AC), SSS s/p PPM, recurrent UTI, HFpEF, depression, HTN who is admitted for dizziness and weakness. He was recently discharged from hospital on 12/5/2019 for MRSA/E. coli UTI. Urine appeared infected with gross hematuria but he denies any urinary symptoms. Patient was afebrile without any leukocytosis. He had 4 admissions in the past 12 months for UTIs. Troponin was slightly elevated in the ED and cardiology was called for evaluation. Patient was initially admitted to ICU but transferred to medical floor on 12/15/2019. He remained stable and was discharged to acute rehab in stable condition. Significant Diagnostic Studies: 
 
Labs: Results:  
   
Chemistry Recent Labs 12/18/19 
2129 12/17/19 
3789 12/16/19 
7795 GLU 93 86 88  145 144 K 3.6 3.8 3.7 * 116* 113* CO2 24 26 26 BUN 6* 9 8  
CREA 0.86 0.88 0.86 CA 9.5 9.5 9.2 AGAP 4* 3* 5* CBC w/Diff Recent Labs 12/18/19 
6588 12/17/19 
6537 12/16/19 
5391 WBC 4.7 5.6 5.2  
RBC 4.39 4.42 4.38  
HGB 13.4* 13.5* 13.4* HCT 41.1 41.6 41.1  172 165 GRANS 65 66 55 LYMPH 23 23 32 EOS 4 3 4 Cardiac Enzymes No results for input(s): CPK, CKND1, ALEXANDRA in the last 72 hours. No lab exists for component: Celestino Cane Coagulation No results for input(s): PTP, INR, APTT, INREXT in the last 72 hours. Lipid Panel Lab Results Component Value Date/Time Cholesterol, total 142 04/17/2017 11:59 AM  
 HDL Cholesterol 76 04/17/2017 11:59 AM  
 LDL, calculated 52 04/17/2017 11:59 AM  
 VLDL, calculated 14 04/17/2017 11:59 AM  
 Triglyceride 69 04/17/2017 11:59 AM  
 CHOL/HDL Ratio 1.9 08/29/2012 01:43 AM  
  
BNP No results for input(s): BNPP in the last 72 hours. Liver Enzymes No results for input(s): TP, ALB, TBIL, AP, SGOT, GPT in the last 72 hours. No lab exists for component: DBIL Thyroid Studies Lab Results Component Value Date/Time T4, Total 7.6 05/12/2016 10:24 AM  
 TSH 0.587 05/13/2018 01:23 AM  
    
 
 
Imaging: 
Ct Head Wo Cont Result Date: 12/14/2019 IMPRESSION: Cerebral and cerebellar volume loss. Ct Abd Pelv Wo Cont Result Date: 12/14/2019 IMPRESSION: 1. Artifact from arm positioning and motion limits evaluation of the abdomen. 2. Nonobstructing small right renal collecting system stones. 3. No ureteral calculi or hydronephrosis. 4. Moderate to large amount of stool present throughout the colon. Xr Chest [facility] Result Date: 12/14/2019 IMPRESSION: No consolidation. Microbiology/Cultures: All Micro Results Procedure Component Value Units Date/Time CULTURE, URINE [[id number]] Collected:  12/14/19 1212 Order Status:  Completed Specimen:  Cath Urine Updated:  12/17/19 7684 Special Requests: NO SPECIAL REQUESTS Culture result: NO GROWTH 2 DAYS     
 CULTURE, URINE [[id number]] Collected:  12/14/19 1345 Order Status:  Canceled Specimen:  Urine from Clean catch CULTURE, BLOOD [[id number]] Order Status:  Canceled Specimen:  Blood CULTURE, BLOOD [[id number]] Order Status:  Canceled Specimen:  Blood Discharge Exam: 
Visit Vitals /80 Pulse 80 Temp 98.2 °F (36.8 °C) Resp 20 Ht 6' 2\" (1.88 m) Wt 118.8 kg (262 lb) SpO2 95% BMI 33.64 kg/m² General appearance: alert, cooperative, no distress, appears stated age, slow speech Lungs: clear to auscultation bilaterally Heart: regular rate and rhythm, S1, S2 normal, no murmur, click, rub or gallop Abdomen: soft, non-tender. Bowel sounds normal. No masses,  no organomegaly Extremities: no cyanosis or edema Neurologic: Grossly normal 
 
Disposition: SNF Discharge Condition: stable Patient Instructions:  
Current Discharge Medication List  
  
CONTINUE these medications which have NOT CHANGED Details ARIPiprazole (ABILIFY) 10 mg tablet TAKE 1 TABLET BY MOUTH EVERY DAY Qty: 90 Tab, Refills: 1 Associated Diagnoses: Major depressive disorder with single episode, in remission ([facility] donepezil (ARICEPT) 5 mg tablet TAKE 1 TABLET BY MOUTH AT BEDTIME Qty: 90 Tab, Refills: 0 Associated Diagnoses: Mild dementia (HCC)  
  
nitrofurantoin, macrocrystal-monohydrate, (MACROBID) 100 mg capsule Take 1 Cap by mouth daily for 30 days. Indications: suppression prophylaxis for recurrent UTI Qty: 60 Cap, Refills: 2  
  
tamsulosin (FLOMAX) 0.4 mg capsule Take 1 Cap by mouth nightly. Qty: 30 Cap, Refills: 0  
  
bicalutamide (CASODEX) 50 mg tablet TAKE 1 TABLET BY MOUTH EVERY DAY Qty: 90 Tab, Refills: 1 Associated Diagnoses: CA of prostate ([facility] metoprolol tartrate (LOPRESSOR) 25 mg tablet Take 1 Tab by mouth two (2) times a day. Qty: 60 Tab, Refills: 0 furosemide (LASIX) 40 mg tablet Take 40 mg po daily PRN ONLY for evidence of fluid retention 
Qty: 15 Tab, Refills: 0  
  
polyethylene glycol (MIRALAX) 17 gram/dose powder Take 17 g by mouth daily. Indications: constipation  
  
multivitamin, tx-iron-ca-min (THERA-M W/ IRON) 9 mg iron-400 mcg tab tablet Take 1 Tab by mouth daily. levomilnacipran (FETZIMA) 40 mg ER capsule TAKE 1 CAP BY MOUTH DAILY. Qty: 90 Cap, Refills: 3 Associated Diagnoses: Other depression  
  
famotidine (PEPCID) 20 mg tablet Take 1 Tab by mouth two (2) times a day. Qty: 180 Tab, Refills: 3 Associated Diagnoses: Gastroesophageal reflux disease without esophagitis  
  
senna-docusate (SENNA LAXATIVE-STOOL SOFTENER) 8.6-50 mg per tablet Take 1 Tab by mouth daily. aspirin 81 mg chewable tablet Take 1 Tab by mouth daily. Indications: prevention of cerebrovascular accident Qty: 30 Tab, Refills: 0  
  
acetaminophen (TYLENOL ARTHRITIS PAIN) 650 mg CR tablet Take 650 mg by mouth every six (6) hours as needed for Pain. Activity: Activity as tolerated Diet: Cardiac Diet, 2 gram sodium Wound Care: None needed Follow-up ·   Dr. Rody Diaz one week after rehab · [facility] 1-2 weeks after rehab Time spent to discharge patient 35 minutes Signed: 
Juliane Brunson DO 
12/18/2019 
12:04 PM

## 2019-12-18 NOTE — PROGRESS NOTES
Shift assessment completed via doc flow sheet. Alert and oriented with periodic confusion. NAD or c/o pain. Safety alarm in place. Call light within reach.

## 2019-12-18 NOTE — PROGRESS NOTES
Care Management Interventions Transition of Care Consult (CM Consult): SNF Partner SNF: Yes Physical Therapy Consult: Yes Occupational Therapy Consult: Yes Current Support Network: Lives with Caregiver Confirm Follow Up Transport: Other (see comment) Discharge Location Discharge Placement: Skilled nursing facility SW spoke with dtr , Milton Frey, who states she is the POA. She was made aware pt will transfer to [facility]. Dtr not agreeable at first ,wanting [facility] but then agreed. [facility] transport arranged. Khris Dunaway

## 2019-12-18 NOTE — PROGRESS NOTES
Attempted to call report to [facility]. Staff at facility stated nurse would call back. Waiting on phone call back.

## 2019-12-19 ENCOUNTER — PATIENT OUTREACH (OUTPATIENT)
Dept: CASE MANAGEMENT | Age: 79
End: 2019-12-19

## 2019-12-19 NOTE — PROGRESS NOTES
Patient discharged to a SNF [facility] Provider [facility]). Will forward to RN CCM to include patient in weekly care coordination call. Patient engaged with Ambulatory CM prior to admission CTN notified ACM of patient discharge plan. This note will not be viewable in [address]

## 2020-01-01 NOTE — PROGRESS NOTES
Shift assessment complete. Pt resting in bed. A&Ox4. Respirations present, even, unlabored. Lung sounds clear to auscultation. Abdomen soft and obese with active bowel sounds in all 4 quadrants. IVF infusing without difficulty. Pt denies pain at this time. Bed in lowest position, call light within reach, side rails x3. Encouraged to call for help when needed. 53

## 2020-01-03 ENCOUNTER — PATIENT OUTREACH (OUTPATIENT)
Dept: CASE MANAGEMENT | Age: 80
End: 2020-01-03

## 2020-01-03 NOTE — PROGRESS NOTES
This note will not be viewable in [address] RNCM called [facility], informed pt remains at facility at this time. Will continue to follow for PRASANNA to home.

## 2020-01-09 ENCOUNTER — PATIENT OUTREACH (OUTPATIENT)
Dept: CASE MANAGEMENT | Age: 80
End: 2020-01-09

## 2020-01-09 NOTE — PROGRESS NOTES
Community Care Team documentation for patient in [facility]    The information below provided by:Edna Almaraz    PT Update: [email or web address]. Gait Tasks: Assistive Devices @ The patient requires parallel bars and moderate assistance for safe ambulation for 5 feet . Transfer: Bed<>Wheelchair @ The patient is able to safely transfer from bed<>wheelchair requiring maximum assistance. [email or web address]. Self Feeding: General @ Th patient is able to feed self after set-up utilizing regular utensils for 100% of meal requiring set-up with mod verbal cues . Toileting Clothing Mgt. @ The patient is able to manage clothing utilizing   while using toilet/commode requiring maximum assistance x 2 to max A . [email or web address] decreased safety and dependent in transfers . [email or web address] incoming functional information. . [email or web address]@[employer] instruction/cues and displaying marked impairment level. [email or web address] instruction/cues. [email or web address] instruction/cues.  [email or web address] using swallow strategies/precautions as instructed by staff and/or caregivers. Nursing Update:Diet changed from regular/thin to mech soft/thin. Nystatin added as topical tx. Abx continue for UTI, ED 1/18. All monitoring and interventions for medications remains in place.        Discharge Date:TBD      Assign to [address] [place] [address]

## 2020-01-09 NOTE — Clinical Note
Rowan,No current urology appt. Scheduled for Mr. Aly Lam is currently on an ABX for a UTI. Very unsteady and has had a couple of falls, no injuries reported. No D/C date set as of yet. Thank you!

## 2020-01-15 ENCOUNTER — PATIENT OUTREACH (OUTPATIENT)
Dept: CASE MANAGEMENT | Age: 80
End: 2020-01-15

## 2020-01-15 NOTE — PROGRESS NOTES
Community Care Team documentation for patient in [facility] The information below provided by:[facility] PT Update: [email or web address] Gait Tasks: Assistive Devices @ The patient requires parallel bars and moderate assistance for safe ambulation for 5 feet . [email or web address] maximum assistance x 2. [email or web address]@[email or web address] incoming functional information. . [email or web address] instruction/cues and displaying marked impairment level. [email or web address]@[employer] given mod visual and verbal instruction/cues. [email or web address] instruction/cues. [email or web address] strategies/precautions as instructed by staff and/or caregivers. Nursing Update:No new orders since last review. Discharge Date: 
1/15/20 [facility] Assign to [address] [place] [address]

## 2020-01-16 ENCOUNTER — PATIENT OUTREACH (OUTPATIENT)
Dept: CASE MANAGEMENT | Age: 80
End: 2020-01-16

## 2020-01-16 NOTE — PROGRESS NOTES
This note will not be viewable in [address] RNCM attempted to reach pt/dgtr for TOBI GUEVARA call as HR (89%) pt dc'd from SNF rehab yesterday. No answer, dgtrs vm is full, will continue attempts to reach pt/family.

## 2020-01-17 ENCOUNTER — PATIENT OUTREACH (OUTPATIENT)
Dept: CASE MANAGEMENT | Age: 80
End: 2020-01-17

## 2020-01-17 NOTE — PROGRESS NOTES
This note will not be viewable in [address] Transition of Care Discharge Follow-up Questionnaire Date/Time of Call: 1/17/20    150p What was the patient hospitalized for? IP 12/3/2019 - 12/5/2019 UTI,  
[facility] rehab dc 1/15/20 IP 10/30/2019 - 11/5/2019 UTI Rehab dc 11/19/19 Dc 6/10 to [facility] s/p fall w/ rhabdo, hx HF 
ED 7/7  UTI IP DC 8/1 UTI Does the patient understand his/her diagnosis and/or treatment and what happened during the hospitalization? Pt verbalizes understanding of dx and treatment. Did the patient receive discharge instructions? Yes  
CM Assessed Risk for Readmission:  
 
Patient stated Risk for Readmission: Age, frequent UTIs, fall w/ increasing confusion per SNF note 
none Review any discharge instructions (see discharge instructions/AVS in [facility]). Ask patient if they understand these. Do they have any questions? DC Instructions reviewed w/ pts  dgtr Ms. Acuna. Discussed s/s urinary infection, no questions, understanding verbalized. Were home services ordered (nursing, PT, OT, ST, etc.)? Interim HH to resume PT/OT/RN services. Also current w/ [facility] If so, has the first visit occurred? If not, why? (Assist with coordination of services if necessary.) Yes Was any DME ordered? Has BSC, and alena lift, hospital bed being taken to [facility] home tomorrow from [facility]. If so, has it been received? If not, why?  (Assist patient in obtaining DME orders &/or equipment if necessary.) Yes Complete a review of all medications (new, continued and discontinued meds per the D/C instructions and medication tab in [facility]). Medications reviewed w/ dgtr Elijah Dent. Pt started on macrodantin at SNF. Were all new prescriptions filled? If not, why?  (Assist patient in obtaining medications if necessary  escalate for CCM &/or SW if ongoing issues are verbalized by pt or anticipated) Yes Does the patient understand the purpose and dosing instructions for all medications? (If patient has questions, provide explanation and education.) Yes Does the patient have any problems in performing ADLs? (If patient is unable to perform ADLs  what is the limiting factor(s)? Do they have a support system that can assist? If no support system is present, discuss possible assistance that they may be able to obtain. Escalate for CCM/SW if ongoing issues are verbalized by pt or anticipated) Pt weak in legs, unable to walk at this time. Rogelio Pena reports pt has fallen since dc home but uninjured. Pts mattresses on floor until hospital bed is transferred from [facility] tomorrow. Does the patient have all follow-up appointments scheduled? 7 day f/up with PCP?  
(f/up with PCP may be w/in 14 days if patient has a f/up with their specialist w/in 7 days) 7-14 day f/up with specialist?  
(or per discharge instructions) If f/up has not been made  what actions has the care coordinator made to accomplish this? Has transportation been arranged? Pt unable to attend PCP appts, d/t inability to transfer in and out of car at this time. [facility] seeing pt in home, visit scheduled 1/20/20. Any other questions or concerns expressed by the patient?  none Schedule next appointment with TOBI GUEVARA Coordinator or refer to RN Case Manager/ per the workflow guidelines. When is care coordinators next follow-up call scheduled? If referred for CCM  what RN care manager was the referral assigned? RNCM scheduled f/u w/ in 7-14d. PRASANNA Call Completed By: Elton Higgins, RN, BSN Community Nurse Care Manager

## 2020-01-24 ENCOUNTER — PATIENT OUTREACH (OUTPATIENT)
Dept: CASE MANAGEMENT | Age: 80
End: 2020-01-24

## 2020-01-24 NOTE — PROGRESS NOTES
This note will not be viewable in [address] RNCM spoke w/ pt's dgtr Samantha Vogel who reports pt is doing ok, has [place] home visits, and comfort is goal.  RNCM offered verbal encouragement/support to Samantha Vogel who expressed appreciation. RNCM called Alli and confirmed pt is now on hospice service. Will close out PRASANNA episode.

## 2020-04-02 ENCOUNTER — APPOINTMENT (OUTPATIENT)
Dept: CT IMAGING | Age: 80
DRG: 690 | End: 2020-04-02
Attending: EMERGENCY MEDICINE
Payer: MEDICARE

## 2020-04-02 ENCOUNTER — APPOINTMENT (OUTPATIENT)
Dept: GENERAL RADIOLOGY | Age: 80
DRG: 690 | End: 2020-04-02
Attending: EMERGENCY MEDICINE
Payer: MEDICARE

## 2020-04-02 ENCOUNTER — HOSPITAL ENCOUNTER (INPATIENT)
Age: 80
LOS: 3 days | Discharge: HOME | DRG: 690 | End: 2020-04-05
Attending: EMERGENCY MEDICINE | Admitting: INTERNAL MEDICINE
Payer: MEDICARE

## 2020-04-02 DIAGNOSIS — N30.00 ACUTE CYSTITIS WITHOUT HEMATURIA: Primary | ICD-10-CM

## 2020-04-02 DIAGNOSIS — A41.9 SEPSIS, DUE TO UNSPECIFIED ORGANISM, UNSPECIFIED WHETHER ACUTE ORGAN DYSFUNCTION PRESENT (HCC): ICD-10-CM

## 2020-04-02 PROBLEM — Z51.5: Chronic | Status: ACTIVE | Noted: 2020-04-02

## 2020-04-02 PROBLEM — Z45.02: Chronic | Status: ACTIVE | Noted: 2020-04-02

## 2020-04-02 PROBLEM — F03.B0 MODERATE DEMENTIA WITHOUT BEHAVIORAL DISTURBANCE: Chronic | Status: ACTIVE | Noted: 2018-07-26

## 2020-04-02 PROBLEM — N39.0 ACUTE UTI: Status: ACTIVE | Noted: 2020-04-02

## 2020-04-02 LAB
ALBUMIN SERPL-MCNC: 2.8 G/DL (ref 3.2–4.6)
ALBUMIN/GLOB SERPL: 0.6 {RATIO} (ref 1.2–3.5)
ALP SERPL-CCNC: 154 U/L (ref 50–136)
ALT SERPL-CCNC: 26 U/L (ref 12–65)
ANION GAP SERPL CALC-SCNC: 13 MMOL/L (ref 7–16)
APPEARANCE UR: ABNORMAL
AST SERPL-CCNC: 38 U/L (ref 15–37)
ATRIAL RATE: 214 BPM
BACTERIA URNS QL MICRO: ABNORMAL /HPF
BASOPHILS # BLD: 0 K/UL (ref 0–0.2)
BASOPHILS NFR BLD: 0 % (ref 0–2)
BILIRUB SERPL-MCNC: 1.2 MG/DL (ref 0.2–1.1)
BILIRUB UR QL: ABNORMAL
BUN SERPL-MCNC: 7 MG/DL (ref 8–23)
CALCIUM SERPL-MCNC: 10.7 MG/DL (ref 8.3–10.4)
CALCULATED R AXIS, ECG10: -54 DEGREES
CALCULATED T AXIS, ECG11: 89 DEGREES
CHLORIDE SERPL-SCNC: 108 MMOL/L (ref 98–107)
CK SERPL-CCNC: 466 U/L (ref 21–215)
CO2 SERPL-SCNC: 23 MMOL/L (ref 21–32)
COLOR UR: ABNORMAL
CREAT SERPL-MCNC: 1.36 MG/DL (ref 0.8–1.5)
DIAGNOSIS, 93000: NORMAL
DIFFERENTIAL METHOD BLD: ABNORMAL
EOSINOPHIL # BLD: 0 K/UL (ref 0–0.8)
EOSINOPHIL NFR BLD: 0 % (ref 0.5–7.8)
ERYTHROCYTE [DISTWIDTH] IN BLOOD BY AUTOMATED COUNT: 14.9 % (ref 11.9–14.6)
GLOBULIN SER CALC-MCNC: 4.9 G/DL (ref 2.3–3.5)
GLUCOSE SERPL-MCNC: 128 MG/DL (ref 65–100)
GLUCOSE UR STRIP.AUTO-MCNC: NEGATIVE MG/DL
HCT VFR BLD AUTO: 50.1 % (ref 41.1–50.3)
HGB BLD-MCNC: 17 G/DL (ref 13.6–17.2)
HGB UR QL STRIP: ABNORMAL
IMM GRANULOCYTES # BLD AUTO: 0.1 K/UL (ref 0–0.5)
IMM GRANULOCYTES NFR BLD AUTO: 1 % (ref 0–5)
KETONES UR QL STRIP.AUTO: NEGATIVE MG/DL
LACTATE SERPL-SCNC: 3.5 MMOL/L (ref 0.4–2)
LACTATE SERPL-SCNC: 6.2 MMOL/L (ref 0.4–2)
LEUKOCYTE ESTERASE UR QL STRIP.AUTO: ABNORMAL
LYMPHOCYTES # BLD: 1 K/UL (ref 0.5–4.6)
LYMPHOCYTES NFR BLD: 9 % (ref 13–44)
MCH RBC QN AUTO: 31.2 PG (ref 26.1–32.9)
MCHC RBC AUTO-ENTMCNC: 33.9 G/DL (ref 31.4–35)
MCV RBC AUTO: 91.9 FL (ref 79.6–97.8)
MONOCYTES # BLD: 0.5 K/UL (ref 0.1–1.3)
MONOCYTES NFR BLD: 5 % (ref 4–12)
NEUTS SEG # BLD: 9.1 K/UL (ref 1.7–8.2)
NEUTS SEG NFR BLD: 85 % (ref 43–78)
NITRITE UR QL STRIP.AUTO: NEGATIVE
NRBC # BLD: 0 K/UL (ref 0–0.2)
PH UR STRIP: 6.5 [PH] (ref 5–9)
PLATELET # BLD AUTO: 214 K/UL (ref 150–450)
PMV BLD AUTO: 10.7 FL (ref 9.4–12.3)
POTASSIUM SERPL-SCNC: 3.5 MMOL/L (ref 3.5–5.1)
PROT SERPL-MCNC: 7.7 G/DL (ref 6.3–8.2)
PROT UR STRIP-MCNC: 30 MG/DL
Q-T INTERVAL, ECG07: 356 MS
QRS DURATION, ECG06: 100 MS
QTC CALCULATION (BEZET), ECG08: 445 MS
RBC # BLD AUTO: 5.45 M/UL (ref 4.23–5.6)
RBC #/AREA URNS HPF: ABNORMAL /HPF
SODIUM SERPL-SCNC: 144 MMOL/L (ref 136–145)
SP GR UR REFRACTOMETRY: 1.01 (ref 1–1.02)
UROBILINOGEN UR QL STRIP.AUTO: 2 EU/DL (ref 0.2–1)
VENTRICULAR RATE, ECG03: 94 BPM
WBC # BLD AUTO: 10.6 K/UL (ref 4.3–11.1)
WBC URNS QL MICRO: >100 /HPF

## 2020-04-02 PROCEDURE — 83605 ASSAY OF LACTIC ACID: CPT

## 2020-04-02 PROCEDURE — 65660000000 HC RM CCU STEPDOWN

## 2020-04-02 PROCEDURE — 85025 COMPLETE CBC W/AUTO DIFF WBC: CPT

## 2020-04-02 PROCEDURE — 74011250636 HC RX REV CODE- 250/636: Performed by: NURSE PRACTITIONER

## 2020-04-02 PROCEDURE — 36415 COLL VENOUS BLD VENIPUNCTURE: CPT

## 2020-04-02 PROCEDURE — 70450 CT HEAD/BRAIN W/O DYE: CPT

## 2020-04-02 PROCEDURE — 71045 X-RAY EXAM CHEST 1 VIEW: CPT

## 2020-04-02 PROCEDURE — 96365 THER/PROPH/DIAG IV INF INIT: CPT

## 2020-04-02 PROCEDURE — 77030040831 HC BAG URINE DRNG MDII -A

## 2020-04-02 PROCEDURE — 74011000258 HC RX REV CODE- 258: Performed by: NURSE PRACTITIONER

## 2020-04-02 PROCEDURE — 99285 EMERGENCY DEPT VISIT HI MDM: CPT

## 2020-04-02 PROCEDURE — 81001 URINALYSIS AUTO W/SCOPE: CPT

## 2020-04-02 PROCEDURE — 74011250637 HC RX REV CODE- 250/637: Performed by: NURSE PRACTITIONER

## 2020-04-02 PROCEDURE — 87040 BLOOD CULTURE FOR BACTERIA: CPT

## 2020-04-02 PROCEDURE — 93005 ELECTROCARDIOGRAM TRACING: CPT | Performed by: EMERGENCY MEDICINE

## 2020-04-02 PROCEDURE — 96361 HYDRATE IV INFUSION ADD-ON: CPT

## 2020-04-02 PROCEDURE — 82550 ASSAY OF CK (CPK): CPT

## 2020-04-02 PROCEDURE — 74011250636 HC RX REV CODE- 250/636: Performed by: EMERGENCY MEDICINE

## 2020-04-02 PROCEDURE — 87186 SC STD MICRODIL/AGAR DIL: CPT

## 2020-04-02 PROCEDURE — 77030040829 HC CATH EXT URINE MDII -B

## 2020-04-02 PROCEDURE — 87086 URINE CULTURE/COLONY COUNT: CPT

## 2020-04-02 PROCEDURE — 80053 COMPREHEN METABOLIC PANEL: CPT

## 2020-04-02 PROCEDURE — 87088 URINE BACTERIA CULTURE: CPT

## 2020-04-02 RX ORDER — BICALUTAMIDE 50 MG/1
50 TABLET, FILM COATED ORAL DAILY
Status: DISCONTINUED | OUTPATIENT
Start: 2020-04-03 | End: 2020-04-05 | Stop reason: HOSPADM

## 2020-04-02 RX ORDER — UREA 10 %
2 LOTION (ML) TOPICAL 2 TIMES DAILY
Status: DISCONTINUED | OUTPATIENT
Start: 2020-04-02 | End: 2020-04-05 | Stop reason: HOSPADM

## 2020-04-02 RX ORDER — TAMSULOSIN HYDROCHLORIDE 0.4 MG/1
0.4 CAPSULE ORAL
Status: DISCONTINUED | OUTPATIENT
Start: 2020-04-02 | End: 2020-04-05 | Stop reason: HOSPADM

## 2020-04-02 RX ORDER — SODIUM CHLORIDE 0.9 % (FLUSH) 0.9 %
5-40 SYRINGE (ML) INJECTION AS NEEDED
Status: DISCONTINUED | OUTPATIENT
Start: 2020-04-02 | End: 2020-04-05 | Stop reason: HOSPADM

## 2020-04-02 RX ORDER — AMOXICILLIN 250 MG
1 CAPSULE ORAL DAILY
Status: DISCONTINUED | OUTPATIENT
Start: 2020-04-03 | End: 2020-04-05 | Stop reason: HOSPADM

## 2020-04-02 RX ORDER — ACETAMINOPHEN 325 MG/1
650 TABLET ORAL
Status: DISCONTINUED | OUTPATIENT
Start: 2020-04-02 | End: 2020-04-05 | Stop reason: HOSPADM

## 2020-04-02 RX ORDER — ENOXAPARIN SODIUM 100 MG/ML
40 INJECTION SUBCUTANEOUS EVERY 24 HOURS
Status: DISCONTINUED | OUTPATIENT
Start: 2020-04-02 | End: 2020-04-05 | Stop reason: HOSPADM

## 2020-04-02 RX ORDER — ONDANSETRON 8 MG/1
4 TABLET, ORALLY DISINTEGRATING ORAL
Status: DISCONTINUED | OUTPATIENT
Start: 2020-04-02 | End: 2020-04-05 | Stop reason: HOSPADM

## 2020-04-02 RX ORDER — METOPROLOL TARTRATE 25 MG/1
25 TABLET, FILM COATED ORAL 2 TIMES DAILY
Status: DISCONTINUED | OUTPATIENT
Start: 2020-04-02 | End: 2020-04-05 | Stop reason: HOSPADM

## 2020-04-02 RX ORDER — ARIPIPRAZOLE 5 MG/1
10 TABLET ORAL DAILY
Status: DISCONTINUED | OUTPATIENT
Start: 2020-04-03 | End: 2020-04-05 | Stop reason: HOSPADM

## 2020-04-02 RX ORDER — VANCOMYCIN/0.9 % SOD CHLORIDE 1.5G/250ML
1500 PLASTIC BAG, INJECTION (ML) INTRAVENOUS EVERY 24 HOURS
Status: DISCONTINUED | OUTPATIENT
Start: 2020-04-03 | End: 2020-04-03

## 2020-04-02 RX ORDER — DONEPEZIL HYDROCHLORIDE 5 MG/1
5 TABLET, FILM COATED ORAL
Status: DISCONTINUED | OUTPATIENT
Start: 2020-04-02 | End: 2020-04-05 | Stop reason: HOSPADM

## 2020-04-02 RX ORDER — VANCOMYCIN 2 GRAM/500 ML IN 0.9 % SODIUM CHLORIDE INTRAVENOUS
2000
Status: DISCONTINUED | OUTPATIENT
Start: 2020-04-02 | End: 2020-04-02 | Stop reason: DRUGHIGH

## 2020-04-02 RX ORDER — GUAIFENESIN 100 MG/5ML
81 LIQUID (ML) ORAL DAILY
Status: DISCONTINUED | OUTPATIENT
Start: 2020-04-03 | End: 2020-04-05 | Stop reason: HOSPADM

## 2020-04-02 RX ORDER — POLYETHYLENE GLYCOL 3350 17 G/17G
17 POWDER, FOR SOLUTION ORAL DAILY
Status: DISCONTINUED | OUTPATIENT
Start: 2020-04-03 | End: 2020-04-05 | Stop reason: HOSPADM

## 2020-04-02 RX ORDER — FUROSEMIDE 40 MG/1
40 TABLET ORAL
Status: DISCONTINUED | OUTPATIENT
Start: 2020-04-02 | End: 2020-04-05 | Stop reason: HOSPADM

## 2020-04-02 RX ORDER — FAMOTIDINE 20 MG/1
20 TABLET, FILM COATED ORAL 2 TIMES DAILY
Status: DISCONTINUED | OUTPATIENT
Start: 2020-04-02 | End: 2020-04-05 | Stop reason: HOSPADM

## 2020-04-02 RX ORDER — SODIUM CHLORIDE 9 MG/ML
75 INJECTION, SOLUTION INTRAVENOUS CONTINUOUS
Status: DISCONTINUED | OUTPATIENT
Start: 2020-04-02 | End: 2020-04-04

## 2020-04-02 RX ORDER — SODIUM CHLORIDE 0.9 % (FLUSH) 0.9 %
5-40 SYRINGE (ML) INJECTION EVERY 8 HOURS
Status: DISCONTINUED | OUTPATIENT
Start: 2020-04-02 | End: 2020-04-05 | Stop reason: HOSPADM

## 2020-04-02 RX ADMIN — METOPROLOL TARTRATE 25 MG: 25 TABLET, FILM COATED ORAL at 18:12

## 2020-04-02 RX ADMIN — SODIUM CHLORIDE 500 ML: 900 INJECTION, SOLUTION INTRAVENOUS at 11:05

## 2020-04-02 RX ADMIN — VANCOMYCIN HYDROCHLORIDE 2500 MG: 10 INJECTION, POWDER, LYOPHILIZED, FOR SOLUTION INTRAVENOUS at 12:28

## 2020-04-02 RX ADMIN — SODIUM CHLORIDE 1000 ML: 900 INJECTION, SOLUTION INTRAVENOUS at 12:25

## 2020-04-02 RX ADMIN — SODIUM CHLORIDE 75 ML/HR: 900 INJECTION, SOLUTION INTRAVENOUS at 16:55

## 2020-04-02 RX ADMIN — ENOXAPARIN SODIUM 40 MG: 40 INJECTION SUBCUTANEOUS at 16:55

## 2020-04-02 RX ADMIN — FAMOTIDINE 20 MG: 20 TABLET, FILM COATED ORAL at 18:12

## 2020-04-02 RX ADMIN — LACTOBACILLUS TAB 2 TABLET: TAB at 18:12

## 2020-04-02 RX ADMIN — SODIUM CHLORIDE 1000 ML: 900 INJECTION, SOLUTION INTRAVENOUS at 21:22

## 2020-04-02 RX ADMIN — CEFTRIAXONE 1 G: 1 INJECTION, POWDER, FOR SOLUTION INTRAMUSCULAR; INTRAVENOUS at 16:56

## 2020-04-02 RX ADMIN — DONEPEZIL HYDROCHLORIDE 5 MG: 5 TABLET, FILM COATED ORAL at 21:20

## 2020-04-02 RX ADMIN — Medication 10 ML: at 21:20

## 2020-04-02 RX ADMIN — TAMSULOSIN HYDROCHLORIDE 0.4 MG: 0.4 CAPSULE ORAL at 21:20

## 2020-04-02 NOTE — PROGRESS NOTES
Skin assessment shows dry flaky skin on feet, old scars on right shin, left blackened 3rd toe nail. PIV in left wrist and right AC. Penis with area of healing sore? Scant amount of blood. With cholo johns RN.

## 2020-04-02 NOTE — ED TRIAGE NOTES
Patient from home via EMS. Patient has dementia and is bed confined. Patient fell out of bed and family found him on the floor. Unknown how long he was on floor. Patient states that \"it wasn't long. \" Patient able to answer questions appropriately. Patient incontinent and wears briefs. EMS found patient to have respirations of 24-28. Pulse 120, /86. Sepsis protocols started in route. Patient had 1 set of cultures drawn and zosyn 3.375 mg given.

## 2020-04-02 NOTE — H&P
Hospitalist Admission History and Physical  
 
CHIEF COMPLAINT: FALL OUT OF BED Subjective:  
Patient is a frail, debilitated 78 y.o.  male who presents to ER after family found him on floor next to bed. Patient reportedly told them he fell out of bed and counldn't get up. No obvious injury or skin disruption. Patient denies hitting his head or LOC. He was tachypneic and tachycardic on EMS arrival.  Found with asymptomatic UTI. Frequent UTIs with E COLI and MRSA in the past.  He was hospitalized in 2019 x 6, mostly with UTIs. He does not appear septic, however lactic acid on arrival is 6.2, repeat 3.5. CK:  422. On floor unclear length of time, but per daughter no longer than an hour. Patient is minimally verbal and unable to give any history other than his name. CT head negative. Extensive history as noted below. After admitting patient, spoke with daughters Peng Rios and Vaishali Crain [employer] [facility] and primary caregiver), with whom patient lives. I am informed he is a home hospice patient at that time. Spoke with representative from [facility] care who states he can return to Home hospice on discharge, OK to treat with antibiotic as needed. Past Medical History:  
Diagnosis Date  Arthritis  Atrial fibrillation ([address] 01/27/2012 ON PRADAXA  CAD (coronary artery disease)  Dementia ([address]  Depression 8/28/2012  Encounter for disabling ICD in hospice patient 4/2/2020 Spoke with hospice . Can be on abx and hospice  GERD (gastroesophageal reflux disease)   
 controlled with medication  Heart failure ([address]   
 pt takes lasix as needed, reports SOB with exertion  History of prostate cancer 8/4/2014  Hypertension   
 controlled with medication  Morbid obesity ([address]  Recurrent major depressive disorder, in full remission ([address] 11/30/2017  Total knee replacement status 1/26/2012  Vitamin B12 deficiency 7/23/2017 Past Surgical History:  
Procedure Laterality Date  HX KNEE REPLACEMENT  2010  
 left TKA  HX PACEMAKER  8/30/2012 [employer] pacemaker  HX PROSTATECTOMY CANCER Family History Problem Relation Age of Onset  Cancer Father Social History Social History Narrative 4/2/20:  PATIENT IS . LIVES WITH DAUGHTER ANDREA [phone]. Bryce Carson [phone] ALSO NEARBY. Social History Substance and Sexual Activity Drug Use No  
 
No Known Allergies Prior to Admission medications Medication Sig Start Date End Date Taking? Authorizing Provider  
tamsulosin (FLOMAX) 0.4 mg capsule Take 1 Cap by mouth nightly. 11/21/19   Theresa Staff, MD  
bicalutamide (CASODEX) 50 mg tablet TAKE 1 TABLET BY MOUTH EVERY DAY 9/20/19   Theresa Staff MD BRITTANEY  
ARIPiprazole (ABILIFY) 10 mg tablet TAKE 1 TABLET BY MOUTH EVERY DAY 9/20/19   Theresa Staff MD BRITTANEY  
donepezil (ARICEPT) 5 mg tablet TAKE 1 TABLET BY MOUTH AT BEDTIME 6/10/19   Theresa Staff, MD  
metoprolol tartrate (LOPRESSOR) 25 mg tablet Take 1 Tab by mouth two (2) times a day. 6/10/19   Inge Lombard, Arnoldo Rimes, MD  
furosemide (LASIX) 40 mg tablet Take 40 mg po daily PRN ONLY for evidence of fluid retention 6/10/19   Inge Lombard, Arnoldo Rimes, MD  
polyethylene glycol [facility]) 17 gram/dose powder Take 17 g by mouth daily. Indications: constipation    Theresa Staff MD BRITTANEY  
multivitamin, tx-iron-ca-min (THERA-M W/ IRON) 9 mg iron-400 mcg tab tablet Take 1 Tab by mouth daily. Provider, Historical  
levomilnacipran (FETZIMA) 40 mg ER capsule TAKE 1 CAP BY MOUTH DAILY. 7/26/18   Theresa Staff, MD  
famotidine (PEPCID) 20 mg tablet Take 1 Tab by mouth two (2) times a day. 7/26/18   Theresa Staff, MD  
senna-docusate (SENNA LAXATIVE-STOOL SOFTENER) 8.6-50 mg per tablet Take 1 Tab by mouth daily.     Provider, Historical  
 aspirin 81 mg chewable tablet Take 1 Tab by mouth daily. Indications: prevention of cerebrovascular accident 7/26/17   Liban Avery MD  
acetaminophen (TYLENOL ARTHRITIS PAIN) 650 mg CR tablet Take 650 mg by mouth every six (6) hours as needed for Pain. Provider, Historical  
 
Home meds reviewed and reconciled by me PHP:  Yoni Cedillo MD  
UROLOGY:  Giulia Kaplan CARDIOLOGY:  [facility] HOSPICE: [facility] Review of Systems Patient is unable Objective:  
 
Visit Vitals /70 (BP 1 Location: Left arm, BP Patient Position: At rest) Pulse 67 Temp 98.3 °F (36.8 °C) Resp 19 Ht 6' 2\" (1.88 m) Wt 118.8 kg (262 lb) SpO2 98% BMI 33.64 kg/m² 04/02 1901 - 04/03 0700 In: 675 [I.V.:675] Out: 415 [Urine:415] 04/01 0701 - 04/02 1900 In: 12 [P.O.:462] Out: - PHYSICAL EXAM: 
General appearance: Awake and alert, cooperative. Speaking very little and in a whisper. Denies pain. Unclear if confused. Frail. Does not appear septic. Debilitated Head: Normocephalic, without obvious abnormality, atraumatic Eyes: conjunctivae/corneas clear. PERRL Throat: Lips, mucosa, and tongue normal. Teeth and gums normal 
Neck: supple, symmetrical, trachea midline, no adenopathy, and no JVD Lungs: clear to auscultation bilaterally Heart: Irregular rate and rhythm, S1, S2 normal, no murmur, click, rub or gallop Abdomen: soft, non-tender. Bowel sounds normal. No masses,  no organomegaly Extremities: All extremities normal, atraumatic, no cyanosis or edema Skin: Skin color, texture, turgor normal. No rashes or lesions Neurologic: Grossly normal, no unilateral deficit. Data Review:  
Recent Results (from the past 24 hour(s)) EKG, 12 LEAD, INITIAL Collection Time: 04/02/20 10:29 AM  
Result Value Ref Range Ventricular Rate 94 BPM  
 Atrial Rate 214 BPM  
 QRS Duration 100 ms Q-T Interval 356 ms  
 QTC Calculation (Bezet) 445 ms Calculated R Axis -54 degrees Calculated T Axis 89 degrees Diagnosis    
  !! AGE AND GENDER SPECIFIC ECG ANALYSIS !! Atrial fibrillation with premature ventricular or aberrantly conducted  
complexes Left axis deviation Incomplete right bundle branch block Inferior infarct , age undetermined Anterolateral infarct , age undetermined Abnormal ECG When compared with ECG of 14-DEC-2019 13:30, Atrial fibrillation has replaced Electronic ventricular pacemaker Confirmed by JUSTIN CERVANTES (), Lauren Officer ([id number]) on 4/2/2020 2:35:50 PM 
  
CBC WITH AUTOMATED DIFF Collection Time: 04/02/20 10:35 AM  
Result Value Ref Range WBC 10.6 4.3 - 11.1 K/uL  
 RBC 5.45 4.23 - 5.6 M/uL  
 HGB 17.0 13.6 - 17.2 g/dL HCT 50.1 41.1 - 50.3 % MCV 91.9 79.6 - 97.8 FL  
 MCH 31.2 26.1 - 32.9 PG  
 MCHC 33.9 31.4 - 35.0 g/dL  
 RDW 14.9 (H) 11.9 - 14.6 % PLATELET 364 146 - 659 K/uL MPV 10.7 9.4 - 12.3 FL ABSOLUTE NRBC 0.00 0.0 - 0.2 K/uL  
 DF AUTOMATED NEUTROPHILS 85 (H) 43 - 78 % LYMPHOCYTES 9 (L) 13 - 44 % MONOCYTES 5 4.0 - 12.0 % EOSINOPHILS 0 (L) 0.5 - 7.8 % BASOPHILS 0 0.0 - 2.0 % IMMATURE GRANULOCYTES 1 0.0 - 5.0 %  
 ABS. NEUTROPHILS 9.1 (H) 1.7 - 8.2 K/UL  
 ABS. LYMPHOCYTES 1.0 0.5 - 4.6 K/UL  
 ABS. MONOCYTES 0.5 0.1 - 1.3 K/UL  
 ABS. EOSINOPHILS 0.0 0.0 - 0.8 K/UL  
 ABS. BASOPHILS 0.0 0.0 - 0.2 K/UL  
 ABS. IMM. GRANS. 0.1 0.0 - 0.5 K/UL METABOLIC PANEL, COMPREHENSIVE Collection Time: 04/02/20 10:35 AM  
Result Value Ref Range Sodium 144 136 - 145 mmol/L Potassium 3.5 3.5 - 5.1 mmol/L Chloride 108 (H) 98 - 107 mmol/L  
 CO2 23 21 - 32 mmol/L Anion gap 13 7 - 16 mmol/L Glucose 128 (H) 65 - 100 mg/dL BUN 7 (L) 8 - 23 MG/DL Creatinine 1.36 0.8 - 1.5 MG/DL  
 GFR est AA >60 >60 ml/min/1.73m2 GFR est non-AA 53 (L) >60 ml/min/1.73m2 Calcium 10.7 (H) 8.3 - 10.4 MG/DL  Bilirubin, total 1.2 (H) 0.2 - 1.1 MG/DL  
 ALT (SGPT) 26 12 - 65 U/L  
 AST (SGOT) 38 (H) 15 - 37 U/L  
 Alk. phosphatase 154 (H) 50 - 136 U/L Protein, total 7.7 6.3 - 8.2 g/dL Albumin 2.8 (L) 3.2 - 4.6 g/dL Globulin 4.9 (H) 2.3 - 3.5 g/dL A-G Ratio 0.6 (L) 1.2 - 3.5 LACTIC ACID Collection Time: 04/02/20 10:35 AM  
Result Value Ref Range Lactic acid 6.2 (HH) 0.4 - 2.0 MMOL/L  
CK Collection Time: 04/02/20 10:35 AM  
Result Value Ref Range  (H) 21 - 215 U/L  
URINALYSIS W/ RFLX MICROSCOPIC Collection Time: 04/02/20 10:50 AM  
Result Value Ref Range Color VIRGIL Appearance TURBID Specific gravity 1.009 1.001 - 1.023    
 pH (UA) 6.5 5.0 - 9.0 Protein 30 (A) NEG mg/dL Glucose NEGATIVE  mg/dL Ketone NEGATIVE  NEG mg/dL Bilirubin SMALL (A) NEG Blood MODERATE (A) NEG Urobilinogen 2.0 (H) 0.2 - 1.0 EU/dL Nitrites NEGATIVE  NEG Leukocyte Esterase LARGE (A) NEG    
 WBC >100 0 /hpf  
 RBC 10-20 0 /hpf Bacteria 4+ (H) 0 /hpf LACTIC ACID Collection Time: 04/02/20  5:58 PM  
Result Value Ref Range Lactic acid 3.5 (HH) 0.4 - 2.0 MMOL/L  
LACTIC ACID Collection Time: 04/03/20 12:06 AM  
Result Value Ref Range Lactic acid 2.2 (HH) 0.4 - 2.0 MMOL/L Old records reviewed. CT HEAD:  No evidence of acute intracranial abnormality. Chronic changes as above. Air-fluid level in the left maxillary sinus with frothy mucosal secretions, possible acute sinusitis. CXR:  Mild right basilar atelectasis without a focal consolidation to suggest pneumonia. Retrocardiac region somewhat limited in evaluation on AP projection. 
  
Assessment/Plan:  
Fall from bed, unclear how long on floor CT head negative No apparent injury CK elevated, recheck in am  
 Lactic acid 6.2 on admission,  recheck 3.5, will recheck in am  
 
Acute UTI:  Culture ordered Begun on rocephin and vancomycin Monitor culture Blood cultures ordered also Recurrent UTI:  No less than 6 UTI in 2019 Multiple UTIs with MRSA and E Coli Elevated LFT:  Acute disease vs other Lactic acidosis:  Admission LA: 6.2, recheck 3.5 Check again in am  
 Gentle IVF Elevated CK:  466 on admission Recheck in am  
 
Home hospice patient:  Spoke with Rep: ok to be on antibiotics Return to home hospice on  discharge Chronic  Atrial fibrillation previously on pradaxa:  Continue home meds SSS with pacemaker, ICD turned off Hypertension:  Home meds Morbid obesity Dementia Prostate cancer with history of prostatectomy DNR per daughter and Poly Guox for DVT prophy Seen and examined by Dr Calvin Whitman also Signed By: Cherelle Tong NP April 3, 2020

## 2020-04-02 NOTE — ED PROVIDER NOTES
Surgical mask was worn during patients examination. Mackenzie Read is a 78 y.o. male who presents to the ED with a chief complaint of fall. Patient has history of dementia and history is limited from this. He is able to tell me his name and that is not hurting but not much else. He was found on the floor this morning he does live at home and they were unsure how long he was on the floor for. He is incontinent and has had urinary tract infections previously he was tachycardic and tachypneic for EMS so they did start sepsis protocol and gave Zosyn prior to arrival. 
 
 
  
 
Past Medical History:  
Diagnosis Date  Arrhythmia   
 pt takes pradaxa  Arthritis  Atrial fibrillation ([address] 1/27/2012  CAD (coronary artery disease)  Cancer [facility])   
 prostate  Dementia ([address]  Depression 8/28/2012  GERD (gastroesophageal reflux disease)   
 controlled with medication  Heart failure ([address]   
 pt takes lasix as needed, reports SOB with exertion  History of prostate cancer 8/4/2014  Hypertension   
 controlled with medication  Morbid obesity ([address]  Osteoarthritis of right knee 1/26/2012  Recurrent major depressive disorder, in full remission ([address] 11/30/2017  Total knee replacement status 1/26/2012  Vitamin B12 deficiency 7/23/2017 Past Surgical History:  
Procedure Laterality Date  HX ORTHOPAEDIC  2010  
 left TKA  HX PACEMAKER  8/30/2012 [employer] pacemaker  HX PROSTATECTOMY Family History:  
Problem Relation Age of Onset  Cancer Father Social History Socioeconomic History  Marital status:  Spouse name: Not on file  Number of children: Not on file  Years of education: Not on file  Highest education level: Not on file Occupational History  Not on file Social Needs  Financial resource strain: Not on file  Food insecurity Worry: Not on file Inability: Not on file  Transportation needs Medical: Not on file Non-medical: Not on file Tobacco Use  Smoking status: Never Smoker  Smokeless tobacco: Never Used Substance and Sexual Activity  Alcohol use: No  
 Drug use: No  
 Sexual activity: Never Lifestyle  Physical activity Days per week: Not on file Minutes per session: Not on file  Stress: Not on file Relationships  Social connections Talks on phone: Not on file Gets together: Not on file Attends [employer] service: Not on file Active member of club or organization: Not on file Attends meetings of clubs or organizations: Not on file Relationship status: Not on file  Intimate partner violence Fear of current or ex partner: Not on file Emotionally abused: Not on file Physically abused: Not on file Forced sexual activity: Not on file Other Topics Concern  Not on file Social History Narrative  Not on file ALLERGIES: Patient has no known allergies. Review of Systems Unable to perform ROS: Dementia Vitals:  
 04/02/20 1026 04/02/20 1029 BP: 139/74 139/74 Pulse: 92 97 Resp: 20 17 Temp: 98.2 °F (36.8 °C) SpO2: 94% 95% Weight: 118.8 kg (262 lb) Height: 6' 2\" (1.88 m) Physical Exam 
Vitals signs and nursing note reviewed. Constitutional:   
   General: He is not in acute distress. Appearance: Normal appearance. He is well-developed. He is not ill-appearing, toxic-appearing or diaphoretic. HENT:  
   Head: Normocephalic and atraumatic. Eyes:  
   General: No scleral icterus. Conjunctiva/sclera: Conjunctivae normal.  
Neck:  
   Trachea: No tracheal deviation. Pulmonary:  
   Effort: Pulmonary effort is normal. No respiratory distress. Breath sounds: No stridor. No wheezing, rhonchi or rales. Abdominal:  
   General: Abdomen is flat. Tenderness: There is no abdominal tenderness. There is no guarding. Hernia: No hernia is present. Musculoskeletal: Normal range of motion. Skin: 
   General: Skin is warm. Capillary Refill: Capillary refill takes less than 2 seconds. Coloration: Skin is not jaundiced or pale. Neurological:  
   General: No focal deficit present. Mental Status: He is alert. Mental status is at baseline. Cranial Nerves: No cranial nerve deficit. Motor: No weakness. Comments: Oriented times 1. Psychiatric:     
   Mood and Affect: Mood normal.     
   Behavior: Behavior normal.  
 
  
 
MDM Number of Diagnoses or Management Options Diagnosis management comments: Patient has very elevated lactic acid and UTI. I have added vancomycin to the Zosyn he Jac received in route. Patient's blood pressure is normal we are giving IV hydration and I have spoken to the hospitalist for evaluation. Bel Yeung MD; 4/2/2020 @12:15 PM Voice dictation software was used during the making of this note. This software is not perfect and grammatical and other typographical errors may be present. This note has not been proofread for errors. 
=================================================================== Amount and/or Complexity of Data Reviewed Clinical lab tests: ordered and reviewed (Results for orders placed or performed during the hospital encounter of 04/02/20 
-CBC WITH AUTOMATED DIFF Result                      Value             Ref Range WBC                         10.6              4.3 - 11.1 K* 
     RBC                         5.45              4.23 - 5.6 M* HGB                         17.0              13.6 - 17.2 * HCT                         50.1              41.1 - 50.3 % MCV                         91.9              79.6 - 97.8 * MCH                         31.2              26.1 - 32.9 * MCHC                        33.9              31.4 - 35.0 * RDW                         14.9 (H)          11.9 - 14.6 % PLATELET                    214               150 - 450 K/* MPV                         10.7              9.4 - 12.3 FL ABSOLUTE NRBC               0.00              0.0 - 0.2 K/* DF                          AUTOMATED NEUTROPHILS                 85 (H)            43 - 78 % LYMPHOCYTES                 9 (L)             13 - 44 % MONOCYTES                   5                 4.0 - 12.0 % EOSINOPHILS                 0 (L)             0.5 - 7.8 % BASOPHILS                   0                 0.0 - 2.0 % IMMATURE GRANULOCYTES       1                 0.0 - 5.0 %   
     ABS. NEUTROPHILS            9.1 (H)           1.7 - 8.2 K/* ABS. LYMPHOCYTES            1.0               0.5 - 4.6 K/* ABS. MONOCYTES              0.5               0.1 - 1.3 K/* ABS. EOSINOPHILS            0.0               0.0 - 0.8 K/* ABS. BASOPHILS              0.0               0.0 - 0.2 K/* ABS. IMM. GRANS.            0.1               0.0 - 0.5 K/* 
-METABOLIC PANEL, COMPREHENSIVE Result                      Value             Ref Range Sodium                      144               136 - 145 mm* Potassium                   3.5               3.5 - 5.1 mm* Chloride                    108 (H)           98 - 107 mmo* CO2                         23                21 - 32 mmol* Anion gap                   13                7 - 16 mmol/L Glucose                     128 (H)           65 - 100 mg/* BUN                         7 (L)             8 - 23 MG/DL Creatinine                  1.36              0.8 - 1.5 MG* 
     GFR est AA                  >60               >60 ml/min/1* GFR est non-AA              53 (L)            >60 ml/min/1* Calcium                     10.7 (H)          8.3 - 10.4 M*      Bilirubin, total            1.2 (H)           0.2 - 1.1 MG* 
 ALT (SGPT)                  26                12 - 65 U/L   
     AST (SGOT)                  38 (H)            15 - 37 U/L Alk. phosphatase            154 (H)           50 - 136 U/L Protein, total              7.7               6.3 - 8.2 g/* Albumin                     2.8 (L)           3.2 - 4.6 g/* Globulin                    4.9 (H)           2.3 - 3.5 g/* A-G Ratio                   0.6 (L)           1.2 - 3.5     
-LACTIC ACID Result                      Value             Ref Range Lactic acid                 6.2 (HH)          0.4 - 2.0 MM* 
-URINALYSIS W/ RFLX MICROSCOPIC Result                      Value             Ref Range Color                       VIRGIL Appearance                  TURBID Specific gravity            1.009             1.001 - 1.02* pH (UA)                     6.5               5.0 - 9.0 Protein                     30 (A)            NEG mg/dL Glucose                     NEGATIVE          mg/dL Ketone                      NEGATIVE          NEG mg/dL Bilirubin                   SMALL (A)         NEG Blood                       MODERATE (A)      NEG Urobilinogen                2.0 (H)           0.2 - 1.0 EU* Nitrites                    NEGATIVE          NEG Leukocyte Esterase          LARGE (A)         NEG           
     WBC                         >100              0 /hpf        
     RBC                         10-20             0 /hpf Bacteria                    4+ (H)            0 /hpf        
-CK Result                      Value             Ref Range CK                          466 (H)           21 - 215 U/L  ) Tests in the radiology section of CPT®: ordered and reviewed (Xr Chest [facility] Result Date: 4/2/2020 EXAM: Single view chest radiograph. INDICATION: Sepsis. COMPARISON: Chest radiograph dated December 14, 2019. FINDINGS: Single lead cardiac pacer. Retrocardiac region somewhat limited in evaluation on AP projection. Unchanged cardiomegaly. Likely small left pleural effusion. Mild atelectatic change of the right lung base. No focal lung consolidation evident. IMPRESSION: 1. Mild right basilar atelectasis without a focal consolidation to suggest pneumonia. Retrocardiac region somewhat limited in evaluation on AP projection. ) Independent visualization of images, tracings, or specimens: yes Procedures

## 2020-04-02 NOTE — PROGRESS NOTES
Pharmacokinetic Consult to Pharmacist 
 
Kirk Alcristian is a 78 y.o. male being treated for UTI with Vancomycin. Height: 6' 2\" (188 cm)  Weight: 118.8 kg (262 lb) Lab Results Component Value Date/Time BUN 7 (L) 04/02/2020 10:35 AM  
 Creatinine 1.36 04/02/2020 10:35 AM  
 WBC 10.6 04/02/2020 10:35 AM  
 Procalcitonin <0.05 12/03/2019 12:24 PM  
 Lactic acid 6.2 (HH) 04/02/2020 10:35 AM  
 Lactic Acid (POC) 2.43 (H) 12/14/2019 11:18 AM  
  
Estimated Creatinine Clearance: 58.3 mL/min (based on SCr of 1.36 mg/dL). Day 1/5 of vancomycin. Goal trough is 10-20. Patient loaded with 2500 mg in the ED, will initiate 1500 mg q24h for now. Will continue to follow patient. Thank you, Ulises Murphy, PharmD, BCPS Clinical Pharmacy Specialist 
[phone]

## 2020-04-02 NOTE — PROGRESS NOTES
Care Management Interventions PCP Verified by CM: Yes(Dr. Ashley Monique) Mode of Transport at Discharge: Other (see comment)(to be determined based on patinet needs) Transition of Care Consult (CM Consult): Home Hospice, Discharge Planning [facility]: No 
Reason Outside [place]: Patient already serviced by other home care/hospice agency Physical Therapy Consult: No 
Occupational Therapy Consult: No 
Speech Therapy Consult: No 
Current Support Network: Lives with Caregiver(Lives with daughter Kandi Skaggs) Confirm Follow Up Transport: Family The Plan for Transition of Care is Related to the Following Treatment Goals : resume comfort care at home with [facility] Discharge Location Discharge Placement: Home CM spoke with patient daughter Donnie Avila as patient is confused and unable to provide accurate information. Donnie Avila stated patient is currently living with her sister Kandi Skaggs who is patients A [phone]. Donnie Avila also stated patient is current with [facility] and their wishes are for patient to return home and resume care with [facility]. CM reached out to Kandi Skaggs who also stated that is their wishes. Kandi Skaggs stated hospice has provided all the need equipment for home and provides an aide for patient during the week. CM will continue to follow patient during his hospitalization for discharge planning and needs. Please consult CM for new needs.

## 2020-04-02 NOTE — ED NOTES
TRANSFER - OUT REPORT: 
 
Verbal report given to Hillary Almendarez on Tylor Simms  being transferred to 6th floor for routine progression of care Report consisted of patients Situation, Background, Assessment and  
Recommendations(SBAR). Information from the following report(s) SBAR was reviewed with the receiving nurse. Lines:  
Peripheral IV 04/02/20 Right Antecubital (Active) Site Assessment Clean, dry, & intact 4/2/2020 10:30 AM  
Phlebitis Assessment 0 4/2/2020 10:30 AM  
Dressing Status Clean, dry, & intact 4/2/2020 10:30 AM  
Hub Color/Line Status Pink 4/2/2020 10:30 AM  
   
Peripheral IV 04/02/20 Left Wrist (Active) Site Assessment Clean, dry, & intact 4/2/2020 10:30 AM  
Phlebitis Assessment 0 4/2/2020 10:30 AM  
Infiltration Assessment 0 4/2/2020 10:30 AM  
Dressing Status Clean, dry, & intact 4/2/2020 10:30 AM  
Hub Color/Line Status Blue 4/2/2020 10:30 AM  
  
 
Opportunity for questions and clarification was provided. Patient transported with: 
 Monitor

## 2020-04-02 NOTE — PROGRESS NOTES
END OF SHIFT NOTE: 
 
INTAKE/OUTPUT No intake/output data recorded. Voiding: YES-condom cath Catheter: YES-condom cath Color: clear Drain: DIET 
regular Flatus: Patient does have flatus present. Stool:  0 occurrences. Characteristics: 
  
 
Ambulating No-family reports pt does not ambulate Emesis: 0 occurrences. Characteristics: VITAL SIGNS Patient Vitals for the past 12 hrs: 
 Temp Pulse Resp BP SpO2  
04/02/20 1518 98.7 °F (37.1 °C) 77 20 106/59 98 % 04/02/20 1228  81   97 % 04/02/20 1227    145/79   
04/02/20 1029  97 17 139/74 95 % 04/02/20 1026 98.2 °F (36.8 °C) 92 20 139/74 94 % Pain Assessment Pain Intensity 1: 0 (04/02/20 1026) Sadie Navarro RN

## 2020-04-03 LAB
ALBUMIN SERPL-MCNC: 2.3 G/DL (ref 3.2–4.6)
ALBUMIN/GLOB SERPL: 0.6 {RATIO} (ref 1.2–3.5)
ALP SERPL-CCNC: 120 U/L (ref 50–136)
ALT SERPL-CCNC: 35 U/L (ref 12–65)
ANION GAP SERPL CALC-SCNC: 10 MMOL/L (ref 7–16)
AST SERPL-CCNC: 116 U/L (ref 15–37)
BASOPHILS # BLD: 0 K/UL (ref 0–0.2)
BASOPHILS NFR BLD: 0 % (ref 0–2)
BILIRUB SERPL-MCNC: 1.5 MG/DL (ref 0.2–1.1)
BUN SERPL-MCNC: 9 MG/DL (ref 8–23)
CALCIUM SERPL-MCNC: 10.1 MG/DL (ref 8.3–10.4)
CHLORIDE SERPL-SCNC: 112 MMOL/L (ref 98–107)
CK SERPL-CCNC: 2089 U/L (ref 21–215)
CO2 SERPL-SCNC: 22 MMOL/L (ref 21–32)
CREAT SERPL-MCNC: 0.92 MG/DL (ref 0.8–1.5)
DIFFERENTIAL METHOD BLD: ABNORMAL
EOSINOPHIL # BLD: 0 K/UL (ref 0–0.8)
EOSINOPHIL NFR BLD: 0 % (ref 0.5–7.8)
ERYTHROCYTE [DISTWIDTH] IN BLOOD BY AUTOMATED COUNT: 14.9 % (ref 11.9–14.6)
GLOBULIN SER CALC-MCNC: 4.1 G/DL (ref 2.3–3.5)
GLUCOSE SERPL-MCNC: 104 MG/DL (ref 65–100)
HCT VFR BLD AUTO: 41.9 % (ref 41.1–50.3)
HGB BLD-MCNC: 14.7 G/DL (ref 13.6–17.2)
IMM GRANULOCYTES # BLD AUTO: 0.1 K/UL (ref 0–0.5)
IMM GRANULOCYTES NFR BLD AUTO: 1 % (ref 0–5)
LACTATE SERPL-SCNC: 1.5 MMOL/L (ref 0.4–2)
LACTATE SERPL-SCNC: 2.2 MMOL/L (ref 0.4–2)
LYMPHOCYTES # BLD: 1.5 K/UL (ref 0.5–4.6)
LYMPHOCYTES NFR BLD: 16 % (ref 13–44)
MAGNESIUM SERPL-MCNC: 2 MG/DL (ref 1.8–2.4)
MCH RBC QN AUTO: 31.5 PG (ref 26.1–32.9)
MCHC RBC AUTO-ENTMCNC: 35.1 G/DL (ref 31.4–35)
MCV RBC AUTO: 89.7 FL (ref 79.6–97.8)
MONOCYTES # BLD: 0.7 K/UL (ref 0.1–1.3)
MONOCYTES NFR BLD: 7 % (ref 4–12)
NEUTS SEG # BLD: 7.1 K/UL (ref 1.7–8.2)
NEUTS SEG NFR BLD: 76 % (ref 43–78)
NRBC # BLD: 0 K/UL (ref 0–0.2)
PLATELET # BLD AUTO: 178 K/UL (ref 150–450)
PMV BLD AUTO: 10.2 FL (ref 9.4–12.3)
POTASSIUM SERPL-SCNC: 3.3 MMOL/L (ref 3.5–5.1)
PROT SERPL-MCNC: 6.4 G/DL (ref 6.3–8.2)
RBC # BLD AUTO: 4.67 M/UL (ref 4.23–5.6)
SODIUM SERPL-SCNC: 144 MMOL/L (ref 136–145)
WBC # BLD AUTO: 9.3 K/UL (ref 4.3–11.1)

## 2020-04-03 PROCEDURE — 83605 ASSAY OF LACTIC ACID: CPT

## 2020-04-03 PROCEDURE — 74011000258 HC RX REV CODE- 258: Performed by: NURSE PRACTITIONER

## 2020-04-03 PROCEDURE — 82550 ASSAY OF CK (CPK): CPT

## 2020-04-03 PROCEDURE — 65660000000 HC RM CCU STEPDOWN

## 2020-04-03 PROCEDURE — 36415 COLL VENOUS BLD VENIPUNCTURE: CPT

## 2020-04-03 PROCEDURE — 74011250636 HC RX REV CODE- 250/636: Performed by: NURSE PRACTITIONER

## 2020-04-03 PROCEDURE — 85025 COMPLETE CBC W/AUTO DIFF WBC: CPT

## 2020-04-03 PROCEDURE — 74011250637 HC RX REV CODE- 250/637: Performed by: NURSE PRACTITIONER

## 2020-04-03 PROCEDURE — 80053 COMPREHEN METABOLIC PANEL: CPT

## 2020-04-03 PROCEDURE — 83735 ASSAY OF MAGNESIUM: CPT

## 2020-04-03 RX ORDER — VANCOMYCIN HYDROCHLORIDE
1250 EVERY 12 HOURS
Status: DISCONTINUED | OUTPATIENT
Start: 2020-04-03 | End: 2020-04-04

## 2020-04-03 RX ADMIN — Medication 10 ML: at 05:33

## 2020-04-03 RX ADMIN — METOPROLOL TARTRATE 25 MG: 25 TABLET, FILM COATED ORAL at 17:24

## 2020-04-03 RX ADMIN — METOPROLOL TARTRATE 25 MG: 25 TABLET, FILM COATED ORAL at 09:22

## 2020-04-03 RX ADMIN — LACTOBACILLUS TAB 2 TABLET: TAB at 09:22

## 2020-04-03 RX ADMIN — POLYETHYLENE GLYCOL 3350 17 G: 17 POWDER, FOR SOLUTION ORAL at 09:25

## 2020-04-03 RX ADMIN — TAMSULOSIN HYDROCHLORIDE 0.4 MG: 0.4 CAPSULE ORAL at 21:40

## 2020-04-03 RX ADMIN — MULTIPLE VITAMINS W/ MINERALS TAB 1 TABLET: TAB at 09:23

## 2020-04-03 RX ADMIN — VANCOMYCIN HYDROCHLORIDE 1250 MG: 10 INJECTION, POWDER, LYOPHILIZED, FOR SOLUTION INTRAVENOUS at 12:05

## 2020-04-03 RX ADMIN — ENOXAPARIN SODIUM 40 MG: 40 INJECTION SUBCUTANEOUS at 17:25

## 2020-04-03 RX ADMIN — LACTOBACILLUS TAB 2 TABLET: TAB at 17:24

## 2020-04-03 RX ADMIN — Medication 10 ML: at 14:13

## 2020-04-03 RX ADMIN — FAMOTIDINE 20 MG: 20 TABLET, FILM COATED ORAL at 17:24

## 2020-04-03 RX ADMIN — ASPIRIN 81 MG 81 MG: 81 TABLET ORAL at 09:23

## 2020-04-03 RX ADMIN — FAMOTIDINE 20 MG: 20 TABLET, FILM COATED ORAL at 09:22

## 2020-04-03 RX ADMIN — CEFTRIAXONE 1 G: 1 INJECTION, POWDER, FOR SOLUTION INTRAMUSCULAR; INTRAVENOUS at 17:24

## 2020-04-03 RX ADMIN — SENNOSIDES AND DOCUSATE SODIUM 1 TABLET: 8.6; 5 TABLET ORAL at 09:22

## 2020-04-03 RX ADMIN — SODIUM CHLORIDE 75 ML/HR: 900 INJECTION, SOLUTION INTRAVENOUS at 11:56

## 2020-04-03 RX ADMIN — DONEPEZIL HYDROCHLORIDE 5 MG: 5 TABLET, FILM COATED ORAL at 21:40

## 2020-04-03 RX ADMIN — BICALUTAMIDE 50 MG: 50 TABLET ORAL at 09:00

## 2020-04-03 NOTE — PROGRESS NOTES
Hospitalist Progress Note Subjective:  
Daily Progress Note: 4/3/2020 4:29 PM 
 
Patient is a frail, debilitated 78 y.o.  male who presented to ER 4/2 after family found him on floor next to bed. Patient reportedly told them he fell out of bed and counldn't get up. No obvious injury or skin disruption. Patient denies hitting his head or LOC. He was tachypneic and tachycardic on EMS arrival.  Found with asymptomatic UTI. Frequent UTIs with E COLI and MRSA in the past.  He was hospitalized in 2019 x 6, mostly with UTIs. He does not appear septic, however lactic acid on arrival is 6.2, repeat 3.5. CK:  422. On floor unclear length of time, but per daughter no longer than an hour. Patient is minimally verbal and unable to give any history other than his name. CT head negative. Extensive history as noted below. After admitting patient, spoke with daughters Peng Rios and Vaishali Crain [facility] and primary caregiver), with whom patient lives. I am informed he is a bedbound home hospice patient at that time. Spoke with Connie Hollis, representative from [facility] who states he can return to Home hospice on discharge, OK to treat with antibiotic as needed. 4/3:  Lying quietly in bed, continues mostly non verbal. Does not appear septic. Daughter updated by RN. Waiting for culture result, then back home with home hospice. Current Facility-Administered Medications Medication Dose Route Frequency  vancomycin (VANCOCIN) 1250 mg in  ml infusion  1,250 mg IntraVENous Q12H  
 acetaminophen (TYLENOL) tablet 650 mg  650 mg Oral Q6H PRN  
 ARIPiprazole (ABILIFY) tablet 10 mg  10 mg Oral DAILY  aspirin chewable tablet 81 mg  81 mg Oral DAILY  bicalutamide (CASODEX) tablet 50 mg  50 mg Oral DAILY  donepeziL (ARICEPT) tablet 5 mg  5 mg Oral QHS  famotidine (PEPCID) tablet 20 mg  20 mg Oral BID  furosemide (LASIX) tablet 40 mg  40 mg Oral DAILY PRN  
  levomilnacipran (FETZIMA) ER capsule 40 mg (Patient Supplied)  40 mg Oral DAILY  metoprolol tartrate (LOPRESSOR) tablet 25 mg  25 mg Oral BID  multivitamin, tx-iron-ca-min (THERA-M w/ IRON) tablet 1 Tab  1 Tab Oral DAILY  polyethylene glycol (MIRALAX) packet 17 g  17 g Oral DAILY  senna-docusate (PERICOLACE) 8.6-50 mg per tablet 1 Tab  1 Tab Oral DAILY  tamsulosin (FLOMAX) capsule 0.4 mg  0.4 mg Oral QHS  sodium chloride (NS) flush 5-40 mL  5-40 mL IntraVENous Q8H  
 sodium chloride (NS) flush 5-40 mL  5-40 mL IntraVENous PRN  
 0.9% sodium chloride infusion  75 mL/hr IntraVENous CONTINUOUS  
 Lactobacillus Acidoph & Bulgar (FLORANEX) tablet 2 Tab  2 Tab Oral BID  ondansetron (ZOFRAN ODT) tablet 4 mg  4 mg Oral Q6H PRN  
 enoxaparin (LOVENOX) injection 40 mg  40 mg SubCUTAneous Q24H  cefTRIAXone (ROCEPHIN) 1 g in 0.9% sodium chloride (MBP/ADV) 50 mL  1 g IntraVENous Q24H Review of Systems Patient is unable. Objective:  
 
Visit Vitals /66 (BP 1 Location: Left arm, BP Patient Position: At rest) Pulse 65 Temp 98.6 °F (37 °C) Resp 18 Ht 6' 2\" (1.88 m) Wt 118.8 kg (262 lb) SpO2 98% BMI 33.64 kg/m² O2 Device: Room air Temp (24hrs), Av.2 °F (36.8 °C), Min:97.5 °F (36.4 °C), Max:98.6 °F (37 °C) 
 
 1901 -  0700 In: Shirline Greg [P.O.:462; I.V.:1428] Out: 415 [Urine:415] General appearance: Awake and alert, cooperative. Speaking very little and in a whisper. Denies pain. Unclear if confused. Frail. Does not appear septic. Debilitated Head: Normocephalic, without obvious abnormality, atraumatic Eyes: conjunctivae/corneas clear. PERRL Throat: Lips, mucosa, and tongue normal. Teeth and gums normal 
Neck: supple, symmetrical, trachea midline, no adenopathy, and no JVD Lungs: clear to auscultation bilaterally Heart: Irregular rate and rhythm, S1, S2 normal, no murmur, click, rub or gallop Abdomen: soft, non-tender. Bowel sounds normal. No masses,  no organomegaly. Condom cath. Extremities: All extremities normal, atraumatic, no cyanosis or edema Skin: Skin color, texture, turgor normal. No rashes or lesions Neurologic: Grossly normal, no unilateral deficit. Additional comments: Notes,orders, test results, vitals reviewed Data Review Recent Results (from the past 24 hour(s)) LACTIC ACID Collection Time: 04/02/20  5:58 PM  
Result Value Ref Range Lactic acid 3.5 (HH) 0.4 - 2.0 MMOL/L  
LACTIC ACID Collection Time: 04/03/20 12:06 AM  
Result Value Ref Range Lactic acid 2.2 (HH) 0.4 - 2.0 MMOL/L  
CBC WITH AUTOMATED DIFF Collection Time: 04/03/20  5:00 AM  
Result Value Ref Range WBC 9.3 4.3 - 11.1 K/uL  
 RBC 4.67 4.23 - 5.6 M/uL  
 HGB 14.7 13.6 - 17.2 g/dL HCT 41.9 41.1 - 50.3 % MCV 89.7 79.6 - 97.8 FL  
 MCH 31.5 26.1 - 32.9 PG  
 MCHC 35.1 (H) 31.4 - 35.0 g/dL  
 RDW 14.9 (H) 11.9 - 14.6 % PLATELET 750 620 - 664 K/uL MPV 10.2 9.4 - 12.3 FL ABSOLUTE NRBC 0.00 0.0 - 0.2 K/uL  
 DF AUTOMATED NEUTROPHILS 76 43 - 78 % LYMPHOCYTES 16 13 - 44 % MONOCYTES 7 4.0 - 12.0 % EOSINOPHILS 0 (L) 0.5 - 7.8 % BASOPHILS 0 0.0 - 2.0 % IMMATURE GRANULOCYTES 1 0.0 - 5.0 %  
 ABS. NEUTROPHILS 7.1 1.7 - 8.2 K/UL  
 ABS. LYMPHOCYTES 1.5 0.5 - 4.6 K/UL  
 ABS. MONOCYTES 0.7 0.1 - 1.3 K/UL  
 ABS. EOSINOPHILS 0.0 0.0 - 0.8 K/UL  
 ABS. BASOPHILS 0.0 0.0 - 0.2 K/UL  
 ABS. IMM. GRANS. 0.1 0.0 - 0.5 K/UL  
LACTIC ACID Collection Time: 04/03/20  5:00 AM  
Result Value Ref Range Lactic acid 1.5 0.4 - 2.0 MMOL/L  
METABOLIC PANEL, COMPREHENSIVE Collection Time: 04/03/20  5:00 AM  
Result Value Ref Range Sodium 144 136 - 145 mmol/L Potassium 3.3 (L) 3.5 - 5.1 mmol/L Chloride 112 (H) 98 - 107 mmol/L  
 CO2 22 21 - 32 mmol/L Anion gap 10 7 - 16 mmol/L Glucose 104 (H) 65 - 100 mg/dL BUN 9 8 - 23 MG/DL  Creatinine 0.92 0.8 - 1.5 MG/DL  
 GFR est AA >60 >60 ml/min/1.73m2 GFR est non-AA >60 >60 ml/min/1.73m2 Calcium 10.1 8.3 - 10.4 MG/DL Bilirubin, total 1.5 (H) 0.2 - 1.1 MG/DL  
 ALT (SGPT) 35 12 - 65 U/L  
 AST (SGOT) 116 (H) 15 - 37 U/L Alk. phosphatase 120 50 - 136 U/L Protein, total 6.4 6.3 - 8.2 g/dL Albumin 2.3 (L) 3.2 - 4.6 g/dL Globulin 4.1 (H) 2.3 - 3.5 g/dL A-G Ratio 0.6 (L) 1.2 - 3.5 CK Collection Time: 04/03/20  5:00 AM  
Result Value Ref Range CK 2,089 (H) 21 - 215 U/L  
MAGNESIUM Collection Time: 04/03/20  5:00 AM  
Result Value Ref Range Magnesium 2.0 1.8 - 2.4 mg/dL CT HEAD:  No evidence of acute intracranial abnormality. Chronic changes as above. Air-fluid level in the left maxillary sinus with frothy mucosal secretions, possible acute sinusitis. 
  
CXR:  Mild right basilar atelectasis without a focal consolidation to suggest pneumonia. Retrocardiac region somewhat limited in evaluation on AP projection. URINE CULTURE:  >100,000 COLONIES/mL GRAM NEGATIVE RODS IDENTIFICATION AND SUSCEPTIBILITY TO FOLLOW Assessment/Plan:  
Fall from bed, unclear how long on floor CT head negative No apparent injury CK elevated, recheck:  2089 Recheck again tomorrow, follow Lactic acid 6.2 on admission, down to  1.5  
  
Acute UTI:  Culture above Begun on rocephin and vancomycin Monitor culture Blood cultures ordered also  
  
Recurrent UTI:  No less than 6 UTI in 2019 Multiple UTIs with MRSA and E Coli 
  
Elevated LFT:  Acute disease vs other 
  
Lactic acidosis:  Admission LA: 6.2, recheck 1.5 Continue gentle IVF Elevated CK:  466 on admission, 4/3:  2089 Recheck in am  
  
Home hospice patient:  Spoke with Rep: ok to be on antibiotics Return to home hospice on discharge  When culture finalized 
  
 Chronic  Atrial fibrillation previously on pradaxa:  Continue home meds  
  
SSS with pacemaker, ICD turned off  
  
Hypertension:  Home meds  
  
Morbid obesity  
  
Dementia  
  
Prostate cancer with history of prostatectomy DNR 
  
Care Plan discussed with: Patient, CM and Nurse RN spoke with daughter, updated Signed By: Cherelle Tong NP April 3, 2020

## 2020-04-03 NOTE — PROGRESS NOTES
Updated daughter, Luisadivya  in regards to patient plan of care. Answered all questions. Hourly rounds performed this shift. Bed lowered and locked, side rails x2, and call light in reach. All patient needs are met at this time. Bedside shift report will be given to oncoming nurse.

## 2020-04-03 NOTE — PROGRESS NOTES
Patient resting peacefully Will follow up Jourdan Mac, staff [phone], [id number] [facility] Mikey  /   [email or web address]

## 2020-04-03 NOTE — PROGRESS NOTES
Pharmacokinetic Consult to Pharmacist 
 
Erica Costello is a 78 y.o. male being treated for UTI with Vancomycin. Height: 6' 2\" (188 cm)  Weight: 118.8 kg (262 lb) Lab Results Component Value Date/Time BUN 9 04/03/2020 05:00 AM  
 Creatinine 0.92 04/03/2020 05:00 AM  
 WBC 9.3 04/03/2020 05:00 AM  
 Procalcitonin <0.05 12/03/2019 12:24 PM  
 Lactic acid 1.5 04/03/2020 05:00 AM  
 Lactic Acid (POC) 2.43 (H) 12/14/2019 11:18 AM  
  
Estimated Creatinine Clearance: 86.2 mL/min (based on SCr of 0.92 mg/dL). Day 2/5 of vancomycin. Goal trough is 10-20. Scr back to baseline, change dose to 1250 mg every 12 hours. Will continue to follow patient. Thank you, Luis Matson, PharmD, BCPS Clinical Pharmacy Specialist 
[phone]

## 2020-04-04 LAB
ALBUMIN SERPL-MCNC: 2 G/DL (ref 3.2–4.6)
ALBUMIN/GLOB SERPL: 0.5 {RATIO} (ref 1.2–3.5)
ALP SERPL-CCNC: 101 U/L (ref 50–136)
ALT SERPL-CCNC: 35 U/L (ref 12–65)
ANION GAP SERPL CALC-SCNC: 10 MMOL/L (ref 7–16)
AST SERPL-CCNC: 92 U/L (ref 15–37)
BASOPHILS # BLD: 0 K/UL (ref 0–0.2)
BASOPHILS NFR BLD: 0 % (ref 0–2)
BILIRUB SERPL-MCNC: 1.4 MG/DL (ref 0.2–1.1)
BUN SERPL-MCNC: 8 MG/DL (ref 8–23)
CALCIUM SERPL-MCNC: 9.4 MG/DL (ref 8.3–10.4)
CHLORIDE SERPL-SCNC: 115 MMOL/L (ref 98–107)
CK SERPL-CCNC: 1064 U/L (ref 21–215)
CO2 SERPL-SCNC: 23 MMOL/L (ref 21–32)
CREAT SERPL-MCNC: 0.92 MG/DL (ref 0.8–1.5)
DIFFERENTIAL METHOD BLD: ABNORMAL
EOSINOPHIL # BLD: 0.1 K/UL (ref 0–0.8)
EOSINOPHIL NFR BLD: 1 % (ref 0.5–7.8)
ERYTHROCYTE [DISTWIDTH] IN BLOOD BY AUTOMATED COUNT: 15 % (ref 11.9–14.6)
GLOBULIN SER CALC-MCNC: 3.9 G/DL (ref 2.3–3.5)
GLUCOSE SERPL-MCNC: 86 MG/DL (ref 65–100)
HCT VFR BLD AUTO: 38.1 % (ref 41.1–50.3)
HGB BLD-MCNC: 13.2 G/DL (ref 13.6–17.2)
IMM GRANULOCYTES # BLD AUTO: 0 K/UL (ref 0–0.5)
IMM GRANULOCYTES NFR BLD AUTO: 0 % (ref 0–5)
LYMPHOCYTES # BLD: 1.8 K/UL (ref 0.5–4.6)
LYMPHOCYTES NFR BLD: 22 % (ref 13–44)
MAGNESIUM SERPL-MCNC: 2 MG/DL (ref 1.8–2.4)
MCH RBC QN AUTO: 31.5 PG (ref 26.1–32.9)
MCHC RBC AUTO-ENTMCNC: 34.6 G/DL (ref 31.4–35)
MCV RBC AUTO: 90.9 FL (ref 79.6–97.8)
MONOCYTES # BLD: 0.6 K/UL (ref 0.1–1.3)
MONOCYTES NFR BLD: 8 % (ref 4–12)
NEUTS SEG # BLD: 5.3 K/UL (ref 1.7–8.2)
NEUTS SEG NFR BLD: 68 % (ref 43–78)
NRBC # BLD: 0 K/UL (ref 0–0.2)
PLATELET # BLD AUTO: 163 K/UL (ref 150–450)
PMV BLD AUTO: 10.8 FL (ref 9.4–12.3)
POTASSIUM SERPL-SCNC: 3.4 MMOL/L (ref 3.5–5.1)
PROT SERPL-MCNC: 5.9 G/DL (ref 6.3–8.2)
RBC # BLD AUTO: 4.19 M/UL (ref 4.23–5.6)
SODIUM SERPL-SCNC: 148 MMOL/L (ref 136–145)
WBC # BLD AUTO: 7.8 K/UL (ref 4.3–11.1)

## 2020-04-04 PROCEDURE — 74011250637 HC RX REV CODE- 250/637: Performed by: NURSE PRACTITIONER

## 2020-04-04 PROCEDURE — 85025 COMPLETE CBC W/AUTO DIFF WBC: CPT

## 2020-04-04 PROCEDURE — 74011000258 HC RX REV CODE- 258: Performed by: NURSE PRACTITIONER

## 2020-04-04 PROCEDURE — 36415 COLL VENOUS BLD VENIPUNCTURE: CPT

## 2020-04-04 PROCEDURE — 80053 COMPREHEN METABOLIC PANEL: CPT

## 2020-04-04 PROCEDURE — 82550 ASSAY OF CK (CPK): CPT

## 2020-04-04 PROCEDURE — 74011250636 HC RX REV CODE- 250/636: Performed by: NURSE PRACTITIONER

## 2020-04-04 PROCEDURE — 65660000000 HC RM CCU STEPDOWN

## 2020-04-04 PROCEDURE — 83735 ASSAY OF MAGNESIUM: CPT

## 2020-04-04 RX ORDER — VANCOMYCIN HYDROCHLORIDE
1250 EVERY 12 HOURS
Status: DISCONTINUED | OUTPATIENT
Start: 2020-04-04 | End: 2020-04-04

## 2020-04-04 RX ORDER — SODIUM CHLORIDE AND POTASSIUM CHLORIDE .9; .15 G/100ML; G/100ML
SOLUTION INTRAVENOUS CONTINUOUS
Status: DISCONTINUED | OUTPATIENT
Start: 2020-04-04 | End: 2020-04-04

## 2020-04-04 RX ORDER — POTASSIUM CHLORIDE AND SODIUM CHLORIDE 450; 150 MG/100ML; MG/100ML
INJECTION, SOLUTION INTRAVENOUS CONTINUOUS
Status: DISCONTINUED | OUTPATIENT
Start: 2020-04-04 | End: 2020-04-05 | Stop reason: HOSPADM

## 2020-04-04 RX ADMIN — METOPROLOL TARTRATE 25 MG: 25 TABLET, FILM COATED ORAL at 17:05

## 2020-04-04 RX ADMIN — CEFTRIAXONE 1 G: 1 INJECTION, POWDER, FOR SOLUTION INTRAMUSCULAR; INTRAVENOUS at 17:11

## 2020-04-04 RX ADMIN — FAMOTIDINE 20 MG: 20 TABLET, FILM COATED ORAL at 17:05

## 2020-04-04 RX ADMIN — TAMSULOSIN HYDROCHLORIDE 0.4 MG: 0.4 CAPSULE ORAL at 21:51

## 2020-04-04 RX ADMIN — DONEPEZIL HYDROCHLORIDE 5 MG: 5 TABLET, FILM COATED ORAL at 21:51

## 2020-04-04 RX ADMIN — VANCOMYCIN HYDROCHLORIDE 1250 MG: 10 INJECTION, POWDER, LYOPHILIZED, FOR SOLUTION INTRAVENOUS at 00:14

## 2020-04-04 RX ADMIN — LACTOBACILLUS TAB 2 TABLET: TAB at 08:28

## 2020-04-04 RX ADMIN — FAMOTIDINE 20 MG: 20 TABLET, FILM COATED ORAL at 08:29

## 2020-04-04 RX ADMIN — Medication 10 ML: at 21:52

## 2020-04-04 RX ADMIN — MULTIPLE VITAMINS W/ MINERALS TAB 1 TABLET: TAB at 08:29

## 2020-04-04 RX ADMIN — POTASSIUM BICARBONATE 20 MEQ: 782 TABLET, EFFERVESCENT ORAL at 08:29

## 2020-04-04 RX ADMIN — BICALUTAMIDE 50 MG: 50 TABLET ORAL at 11:42

## 2020-04-04 RX ADMIN — SENNOSIDES AND DOCUSATE SODIUM 1 TABLET: 8.6; 5 TABLET ORAL at 08:29

## 2020-04-04 RX ADMIN — POLYETHYLENE GLYCOL 3350 17 G: 17 POWDER, FOR SOLUTION ORAL at 08:29

## 2020-04-04 RX ADMIN — Medication 5 ML: at 14:43

## 2020-04-04 RX ADMIN — METOPROLOL TARTRATE 25 MG: 25 TABLET, FILM COATED ORAL at 08:28

## 2020-04-04 RX ADMIN — ARIPIPRAZOLE 10 MG: 10 TABLET ORAL at 08:28

## 2020-04-04 RX ADMIN — ASPIRIN 81 MG 81 MG: 81 TABLET ORAL at 08:28

## 2020-04-04 RX ADMIN — ENOXAPARIN SODIUM 40 MG: 40 INJECTION SUBCUTANEOUS at 17:13

## 2020-04-04 RX ADMIN — SODIUM CHLORIDE AND POTASSIUM CHLORIDE: 4.5; 1.49 INJECTION, SOLUTION INTRAVENOUS at 14:57

## 2020-04-04 RX ADMIN — POTASSIUM BICARBONATE 20 MEQ: 782 TABLET, EFFERVESCENT ORAL at 14:17

## 2020-04-04 RX ADMIN — SODIUM CHLORIDE AND POTASSIUM CHLORIDE: 9; 1.49 INJECTION, SOLUTION INTRAVENOUS at 10:03

## 2020-04-04 RX ADMIN — SODIUM CHLORIDE 75 ML/HR: 900 INJECTION, SOLUTION INTRAVENOUS at 03:36

## 2020-04-04 RX ADMIN — LACTOBACILLUS TAB 2 TABLET: TAB at 17:05

## 2020-04-04 NOTE — PROGRESS NOTES
Patient has no new complaints. Skin tear to L arm cleaned and covered. Call light within reach. Bed low and locked. Door open. Hourly rounds completed this shift.

## 2020-04-04 NOTE — PROGRESS NOTES
Hospitalist Progress Note Admit Date:  2020 10:24 AM  
Name:  Tammy Box Age:  78 y.o. 
:  1940 MRN:  [id number] PCP:  Karthik Nj MD 
Treatment Team: Attending Provider: Cookie Quinn MD; Hospitalist: Charles Leon NP; Care Manager: Ena Aparicio RN; Utilization Review: Ana María López RN; Nurse Practitioner: Mary Barrett NP Subjective: HPI and or CC: 
78year old AAM PMH Dementia, SSS, recurrent UTI, prostate cancer admitted  after being found on floor of home for unknown amount of time after falling from bed. He was found to have a UTI which he has a history of. Patient has home hospice-[place] [facility]. He lives with daughters. : 
Patient awake, alert sitting in chair at bedside. Remains on Rocephin for UTI. Urine culture growing E Coli-waiting on susceptibility final. Afebrile, no leukocytosis. CK improved today 1064. Sodium 148, changing IVF to 0.45% NS with KCL for potassium 3.4. Update provided to POA-[place]-daughter. Anticipate discharge home with her tomorrow to [facility] through [facility]. Objective:  
 
Patient Vitals for the past 24 hrs: 
 Temp Pulse Resp BP SpO2  
20 1045 98.2 °F (36.8 °C) 60 20 98/62 97 % 20 0726 98 °F (36.7 °C) 74 18 115/74 98 % 20 0355 97.5 °F (36.4 °C) 60 18 128/78 96 % 20 0057 98.5 °F (36.9 °C) 62 18 109/71 98 % 20 1958 98.8 °F (37.1 °C) 69 18 106/72 100 % 20 1457 98.6 °F (37 °C) 65 18 110/66 98 % Oxygen Therapy O2 Sat (%): 97 % (20 1045) Pulse via Oximetry: 81 beats per minute (20 1228) O2 Device: Room air (20 1026) Intake/Output Summary (Last 24 hours) at 2020 1304 Last data filed at 2020 0800 Gross per 24 hour Intake 240 ml Output  Net 240 ml REVIEW OF SYSTEMS: Comprehensive ROS performed and negative except as stated in HPI. Physical Examination: General appearance: Awake and alert, cooperative.  Speaking very little and in a whisper.  Denies pain.  Unclear if confused. Vinton Piano.  Does not appear septic. Debilitated Head: Normocephalic, without obvious abnormality, atraumatic Eyes: conjunctivae/corneas clear. PERRL Throat: Lips, mucosa, and tongue normal. Teeth and gums normal 
Neck: supple, symmetrical, trachea midline, no adenopathy, and no JVD Lungs: clear to auscultation bilaterally Heart: Irregular rate and rhythm, S1, S2 normal, no murmur, click, rub or gallop Abdomen: soft, non-tender. Bowel sounds normal. No masses,  no organomegaly. Condom cath. Extremities: All extremities normal, atraumatic, no cyanosis or edema Skin: Skin color, texture, turgor normal. No rashes or lesions Neurologic: Grossly normal, no unilateral deficit.  
  
 
Data Review: 
I have reviewed all labs, meds, telemetry events, and studies from the last 24 hours. Recent Results (from the past 24 hour(s)) CBC WITH AUTOMATED DIFF Collection Time: 04/04/20  5:08 AM  
Result Value Ref Range WBC 7.8 4.3 - 11.1 K/uL  
 RBC 4.19 (L) 4.23 - 5.6 M/uL  
 HGB 13.2 (L) 13.6 - 17.2 g/dL HCT 38.1 (L) 41.1 - 50.3 % MCV 90.9 79.6 - 97.8 FL  
 MCH 31.5 26.1 - 32.9 PG  
 MCHC 34.6 31.4 - 35.0 g/dL  
 RDW 15.0 (H) 11.9 - 14.6 % PLATELET 628 804 - 221 K/uL MPV 10.8 9.4 - 12.3 FL ABSOLUTE NRBC 0.00 0.0 - 0.2 K/uL  
 DF AUTOMATED NEUTROPHILS 68 43 - 78 % LYMPHOCYTES 22 13 - 44 % MONOCYTES 8 4.0 - 12.0 % EOSINOPHILS 1 0.5 - 7.8 % BASOPHILS 0 0.0 - 2.0 % IMMATURE GRANULOCYTES 0 0.0 - 5.0 %  
 ABS. NEUTROPHILS 5.3 1.7 - 8.2 K/UL  
 ABS. LYMPHOCYTES 1.8 0.5 - 4.6 K/UL  
 ABS. MONOCYTES 0.6 0.1 - 1.3 K/UL  
 ABS. EOSINOPHILS 0.1 0.0 - 0.8 K/UL  
 ABS. BASOPHILS 0.0 0.0 - 0.2 K/UL  
 ABS. IMM. GRANS. 0.0 0.0 - 0.5 K/UL METABOLIC PANEL, COMPREHENSIVE Collection Time: 04/04/20  5:08 AM  
Result Value Ref Range  Sodium 148 (H) 136 - 145 mmol/L  
 Potassium 3.4 (L) 3.5 - 5.1 mmol/L Chloride 115 (H) 98 - 107 mmol/L  
 CO2 23 21 - 32 mmol/L Anion gap 10 7 - 16 mmol/L Glucose 86 65 - 100 mg/dL BUN 8 8 - 23 MG/DL Creatinine 0.92 0.8 - 1.5 MG/DL  
 GFR est AA >60 >60 ml/min/1.73m2 GFR est non-AA >60 >60 ml/min/1.73m2 Calcium 9.4 8.3 - 10.4 MG/DL Bilirubin, total 1.4 (H) 0.2 - 1.1 MG/DL  
 ALT (SGPT) 35 12 - 65 U/L  
 AST (SGOT) 92 (H) 15 - 37 U/L Alk. phosphatase 101 50 - 136 U/L Protein, total 5.9 (L) 6.3 - 8.2 g/dL Albumin 2.0 (L) 3.2 - 4.6 g/dL Globulin 3.9 (H) 2.3 - 3.5 g/dL A-G Ratio 0.5 (L) 1.2 - 3.5 CK Collection Time: 04/04/20  5:08 AM  
Result Value Ref Range CK 1,064 (H) 21 - 215 U/L  
MAGNESIUM Collection Time: 04/04/20  5:08 AM  
Result Value Ref Range Magnesium 2.0 1.8 - 2.4 mg/dL All Micro Results Procedure Component Value Units Date/Time CULTURE, URINE [[id number]]  (Abnormal) Collected:  04/02/20 1050 Order Status:  Completed Specimen:  Urine from Clean catch Updated:  04/04/20 5084 Special Requests: NO SPECIAL REQUESTS Culture result:    
  >100,000 COLONIES/mL ESCHERICHIA COLI  
     
   REPEATING SUSCEPTIBILITY CULTURE, BLOOD [[id number]] Collected:  04/02/20 1035 Order Status:  Completed Specimen:  Blood Updated:  04/04/20 1441 Special Requests: --     
  LEFT 
ARM Culture result: NO GROWTH 2 DAYS     
 CULTURE, BLOOD [[id number]] Collected:  04/02/20 1035 Order Status:  Completed Specimen:  Blood Updated:  04/04/20 5846 Special Requests: --     
  RIGHT Antecubital 
  
  Culture result: NO GROWTH 2 DAYS Current Meds: 
Current Facility-Administered Medications Medication Dose Route Frequency  potassium bicarb-citric acid (EFFER-K) tablet 20 mEq  20 mEq Oral NOW  
 0.45% sodium chloride with KCl 20 mEq/L infusion   IntraVENous CONTINUOUS  
 acetaminophen (TYLENOL) tablet 650 mg  650 mg Oral Q6H PRN  
  ARIPiprazole (ABILIFY) tablet 10 mg  10 mg Oral DAILY  aspirin chewable tablet 81 mg  81 mg Oral DAILY  bicalutamide (CASODEX) tablet 50 mg  50 mg Oral DAILY  donepeziL (ARICEPT) tablet 5 mg  5 mg Oral QHS  famotidine (PEPCID) tablet 20 mg  20 mg Oral BID  furosemide (LASIX) tablet 40 mg  40 mg Oral DAILY PRN  
 levomilnacipran (FETZIMA) ER capsule 40 mg (Patient Supplied)  40 mg Oral DAILY  metoprolol tartrate (LOPRESSOR) tablet 25 mg  25 mg Oral BID  multivitamin, tx-iron-ca-min (THERA-M w/ IRON) tablet 1 Tab  1 Tab Oral DAILY  polyethylene glycol (MIRALAX) packet 17 g  17 g Oral DAILY  senna-docusate (PERICOLACE) 8.6-50 mg per tablet 1 Tab  1 Tab Oral DAILY  tamsulosin (FLOMAX) capsule 0.4 mg  0.4 mg Oral QHS  sodium chloride (NS) flush 5-40 mL  5-40 mL IntraVENous Q8H  
 sodium chloride (NS) flush 5-40 mL  5-40 mL IntraVENous PRN  
 Lactobacillus Acidoph & Bulgar (FLORANEX) tablet 2 Tab  2 Tab Oral BID  ondansetron (ZOFRAN ODT) tablet 4 mg  4 mg Oral Q6H PRN  
 enoxaparin (LOVENOX) injection 40 mg  40 mg SubCUTAneous Q24H  cefTRIAXone (ROCEPHIN) 1 g in 0.9% sodium chloride (MBP/ADV) 50 mL  1 g IntraVENous Q24H Diet: DIET REGULAR Other Studies (last 24 hours): No results found. Assessment and Plan:  
 
Hospital Problems as of 4/4/2020 Date Reviewed: 3/20/2019 Codes Class Noted - Resolved POA * (Principal) Acute UTI ICD-10-CM: N39.0 ICD-9-CM: 599.0  4/2/2020 - Present Unknown Encounter for disabling ICD in hospice patient (Chronic) ICD-10-CM: Z45.09 
ICD-9-CM: V53.39  4/2/2020 - Present Unknown Overview Signed 4/2/2020  6:39 PM by Jolly Gonzalez NP Spoke with hospice . Can be on abx and hospice Prostate cancer ([facility] [address] (Chronic) ICD-10-CM: D33 ICD-9-CM: 185  12/14/2019 - Present Yes Recurrent UTI ICD-10-CM: N39.0 ICD-9-CM: 599.0  12/3/2019 - Present Yes SSS (sick sinus syndrome) (HCC) ICD-10-CM: I49.5 ICD-9-CM: 427.81  6/5/2019 - Present Yes Non-traumatic rhabdomyolysis ICD-10-CM: M62.82 ICD-9-CM: 728.88  6/4/2019 - Present Yes Moderate dementia without behavioral disturbance (HCC) (Chronic) ICD-10-CM: F03.90 ICD-9-CM: 290.0  7/26/2018 - Present Yes Morbid obesity ([address] (Chronic) ICD-10-CM: E66.01 
ICD-9-CM: 278.01  8/28/2012 - Present Yes Atrial fibrillation (HCC) (Chronic) ICD-10-CM: I48.91 
ICD-9-CM: 427.31  1/27/2012 - Present Yes HTN (hypertension) (Chronic) ICD-10-CM: I10 
ICD-9-CM: 401.9  1/27/2012 - Present Yes A/P:   
Fall from bed, unclear how long on floor: CT head negative, CK remains elevated 1064, continue with IVF, trend.  
             
  
Acute UTI with H/O Recurrent UTI:   
            Urine culture E Coli-on Rocephin but waiting for susceptibility.             
            Blood cultures NGTD  
 
  
Elevated LFT:   
Acute disease vs other, no abdominal pain, N/V. Trend for now. 
  
Lactic acidosis:  
Resolved. Rhabdomyolysis: 
CK 1064, improved from yesterday of 2089. Continue IVF, trend. 
  
Home hospice patient:   
Spoke with Rep: ok to be on antibiotics             Return to home hospice on discharge When culture finalized 
  
Chronic  Atrial fibrillation previously on pradaxa:   
Continue home meds  
  
SSS with pacemaker, ICD turned off  
  
Hypertension:  Home meds  
  
Morbid obesity  
  
Dementia  
  
Prostate cancer with history of prostatectomy Signed: 
Willy Brittle, NP

## 2020-04-04 NOTE — PROGRESS NOTES
Problem: Falls - Risk of 
Goal: *Absence of Falls Description: Document Starleen Freeze Fall Risk and appropriate interventions in the flowsheet. Outcome: Progressing Towards Goal 
Note: Fall Risk Interventions: 
  
 
Mentation Interventions: Adequate sleep, hydration, pain control, Bed/chair exit alarm, Door open when patient unattended, Evaluate medications/consider consulting pharmacy, More frequent rounding, Reorient patient, Toileting rounds, Update white board Medication Interventions: Assess postural VS orthostatic hypotension, Bed/chair exit alarm, Evaluate medications/consider consulting pharmacy, Patient to call before getting OOB, Teach patient to arise slowly Elimination Interventions: Bed/chair exit alarm, Call light in reach, Patient to call for help with toileting needs, Stay With Me (per policy), Toilet paper/wipes in reach, Toileting schedule/hourly rounds History of Falls Interventions: Bed/chair exit alarm, Consult care management for discharge planning, Door open when patient unattended, Evaluate medications/consider consulting pharmacy, Investigate reason for fall Problem: Pressure Injury - Risk of 
Goal: *Prevention of pressure injury Description: Document Maxwell Scale and appropriate interventions in the flowsheet. Outcome: Progressing Towards Goal 
Note: Pressure Injury Interventions: 
Sensory Interventions: Assess changes in LOC, Assess need for specialty bed, Avoid rigorous massage over bony prominences, Check visual cues for pain, Keep linens dry and wrinkle-free, Minimize linen layers, Pressure redistribution bed/mattress (bed type) Moisture Interventions: Absorbent underpads, Apply protective barrier, creams and emollients, Assess need for specialty bed, Limit adult briefs, Maintain skin hydration (lotion/cream), Check for incontinence Q2 hours and as needed, Minimize layers, Moisture barrier, Offer toileting Q_hr 
 
 Activity Interventions: Assess need for specialty bed, Increase time out of bed, Pressure redistribution bed/mattress(bed type), PT/OT evaluation Mobility Interventions: Assess need for specialty bed, HOB 30 degrees or less, Pressure redistribution bed/mattress (bed type), PT/OT evaluation Nutrition Interventions: Document food/fluid/supplement intake Friction and Shear Interventions: Apply protective barrier, creams and emollients, HOB 30 degrees or less, Lift sheet, Lift team/patient mobility team, Minimize layers

## 2020-04-05 VITALS
DIASTOLIC BLOOD PRESSURE: 66 MMHG | RESPIRATION RATE: 20 BRPM | SYSTOLIC BLOOD PRESSURE: 111 MMHG | OXYGEN SATURATION: 97 % | HEART RATE: 67 BPM | BODY MASS INDEX: 28.11 KG/M2 | TEMPERATURE: 98.6 F | WEIGHT: 219 LBS | HEIGHT: 74 IN

## 2020-04-05 LAB
ALBUMIN SERPL-MCNC: 1.9 G/DL (ref 3.2–4.6)
ALBUMIN/GLOB SERPL: 0.5 {RATIO} (ref 1.2–3.5)
ALP SERPL-CCNC: 103 U/L (ref 50–136)
ALT SERPL-CCNC: 41 U/L (ref 12–65)
ANION GAP SERPL CALC-SCNC: 10 MMOL/L (ref 7–16)
AST SERPL-CCNC: 89 U/L (ref 15–37)
BACTERIA SPEC CULT: ABNORMAL
BASOPHILS # BLD: 0 K/UL (ref 0–0.2)
BASOPHILS NFR BLD: 1 % (ref 0–2)
BILIRUB SERPL-MCNC: 1.1 MG/DL (ref 0.2–1.1)
BUN SERPL-MCNC: 7 MG/DL (ref 8–23)
CALCIUM SERPL-MCNC: 9.2 MG/DL (ref 8.3–10.4)
CHLORIDE SERPL-SCNC: 115 MMOL/L (ref 98–107)
CK SERPL-CCNC: 770 U/L (ref 21–215)
CO2 SERPL-SCNC: 22 MMOL/L (ref 21–32)
CREAT SERPL-MCNC: 0.88 MG/DL (ref 0.8–1.5)
DIFFERENTIAL METHOD BLD: ABNORMAL
EOSINOPHIL # BLD: 0.1 K/UL (ref 0–0.8)
EOSINOPHIL NFR BLD: 2 % (ref 0.5–7.8)
ERYTHROCYTE [DISTWIDTH] IN BLOOD BY AUTOMATED COUNT: 15.5 % (ref 11.9–14.6)
GLOBULIN SER CALC-MCNC: 4 G/DL (ref 2.3–3.5)
GLUCOSE SERPL-MCNC: 65 MG/DL (ref 65–100)
HCT VFR BLD AUTO: 39.5 % (ref 41.1–50.3)
HGB BLD-MCNC: 13.3 G/DL (ref 13.6–17.2)
IMM GRANULOCYTES # BLD AUTO: 0 K/UL (ref 0–0.5)
IMM GRANULOCYTES NFR BLD AUTO: 1 % (ref 0–5)
LYMPHOCYTES # BLD: 1.8 K/UL (ref 0.5–4.6)
LYMPHOCYTES NFR BLD: 29 % (ref 13–44)
MCH RBC QN AUTO: 31.9 PG (ref 26.1–32.9)
MCHC RBC AUTO-ENTMCNC: 33.7 G/DL (ref 31.4–35)
MCV RBC AUTO: 94.7 FL (ref 79.6–97.8)
MONOCYTES # BLD: 0.5 K/UL (ref 0.1–1.3)
MONOCYTES NFR BLD: 8 % (ref 4–12)
NEUTS SEG # BLD: 3.9 K/UL (ref 1.7–8.2)
NEUTS SEG NFR BLD: 60 % (ref 43–78)
NRBC # BLD: 0 K/UL (ref 0–0.2)
PLATELET # BLD AUTO: 143 K/UL (ref 150–450)
PMV BLD AUTO: 10.8 FL (ref 9.4–12.3)
POTASSIUM SERPL-SCNC: 3.6 MMOL/L (ref 3.5–5.1)
PROT SERPL-MCNC: 5.9 G/DL (ref 6.3–8.2)
RBC # BLD AUTO: 4.17 M/UL (ref 4.23–5.6)
SERVICE CMNT-IMP: ABNORMAL
SODIUM SERPL-SCNC: 147 MMOL/L (ref 136–145)
WBC # BLD AUTO: 6.4 K/UL (ref 4.3–11.1)

## 2020-04-05 PROCEDURE — 74011250636 HC RX REV CODE- 250/636: Performed by: NURSE PRACTITIONER

## 2020-04-05 PROCEDURE — 80053 COMPREHEN METABOLIC PANEL: CPT

## 2020-04-05 PROCEDURE — 36415 COLL VENOUS BLD VENIPUNCTURE: CPT

## 2020-04-05 PROCEDURE — 74011250637 HC RX REV CODE- 250/637: Performed by: NURSE PRACTITIONER

## 2020-04-05 PROCEDURE — 82550 ASSAY OF CK (CPK): CPT

## 2020-04-05 PROCEDURE — 85025 COMPLETE CBC W/AUTO DIFF WBC: CPT

## 2020-04-05 RX ORDER — CEFPODOXIME PROXETIL 100 MG/1
100 TABLET, FILM COATED ORAL 2 TIMES DAILY
Qty: 20 TAB | Refills: 0 | Status: SHIPPED | OUTPATIENT
Start: 2020-04-05 | End: 2020-04-15

## 2020-04-05 RX ORDER — UREA 10 %
2 LOTION (ML) TOPICAL 2 TIMES DAILY
Qty: 30 TAB | Refills: 0 | Status: SHIPPED | OUTPATIENT
Start: 2020-04-05 | End: 2020-04-20

## 2020-04-05 RX ORDER — UREA 10 %
2 LOTION (ML) TOPICAL 2 TIMES DAILY
Qty: 30 TAB | Refills: 0 | Status: SHIPPED | OUTPATIENT
Start: 2020-04-05 | End: 2020-04-05

## 2020-04-05 RX ORDER — CEFPODOXIME PROXETIL 100 MG/1
100 TABLET, FILM COATED ORAL 2 TIMES DAILY
Qty: 20 TAB | Refills: 0 | Status: SHIPPED | OUTPATIENT
Start: 2020-04-05 | End: 2020-04-05 | Stop reason: SDUPTHER

## 2020-04-05 RX ADMIN — ASPIRIN 81 MG 81 MG: 81 TABLET ORAL at 08:28

## 2020-04-05 RX ADMIN — ARIPIPRAZOLE 10 MG: 10 TABLET ORAL at 08:27

## 2020-04-05 RX ADMIN — FAMOTIDINE 20 MG: 20 TABLET, FILM COATED ORAL at 08:28

## 2020-04-05 RX ADMIN — SENNOSIDES AND DOCUSATE SODIUM 1 TABLET: 8.6; 5 TABLET ORAL at 08:27

## 2020-04-05 RX ADMIN — LACTOBACILLUS TAB 2 TABLET: TAB at 08:27

## 2020-04-05 RX ADMIN — METOPROLOL TARTRATE 25 MG: 25 TABLET, FILM COATED ORAL at 08:27

## 2020-04-05 RX ADMIN — POLYETHYLENE GLYCOL 3350 17 G: 17 POWDER, FOR SOLUTION ORAL at 08:28

## 2020-04-05 RX ADMIN — Medication 10 ML: at 05:10

## 2020-04-05 RX ADMIN — SODIUM CHLORIDE AND POTASSIUM CHLORIDE: 4.5; 1.49 INJECTION, SOLUTION INTRAVENOUS at 08:27

## 2020-04-05 RX ADMIN — MULTIPLE VITAMINS W/ MINERALS TAB 1 TABLET: TAB at 08:27

## 2020-04-05 NOTE — DISCHARGE SUMMARY
Hospitalist Discharge Summary Admit Date:  2020 10:24 AM  
Name:  Kirk Dhaliwal Age:  78 y.o. 
:  1940 MRN:  [id number] PCP:  Hina Awad MD 
Treatment Team: Attending Provider: Edmond Engel MD; Hospitalist: Lafonda Phalen, NP; Care Manager: James Keller RN; Utilization Review: Bryce Rodriguez RN; Nurse Practitioner: Cony Kamara NP Problem List for this Hospitalization: 
Hospital Problems as of 2020 Date Reviewed: 3/20/2019 Codes Class Noted - Resolved POA * (Principal) Acute UTI ICD-10-CM: N39.0 ICD-9-CM: 599.0  2020 - Present Unknown Encounter for disabling ICD in hospice patient (Chronic) ICD-10-CM: Z45.09 
ICD-9-CM: V53.39  2020 - Present Unknown Overview Signed 2020  6:39 PM by Lafonda Phalen, NP Spoke with hospice . Can be on abx and hospice Prostate cancer ([facility] (Chronic) ICD-10-CM: L75 ICD-9-CM: 185  2019 - Present Yes Recurrent UTI ICD-10-CM: N39.0 ICD-9-CM: 599.0  12/3/2019 - Present Yes  
   
 SSS (sick sinus syndrome) (HCC) ICD-10-CM: I49.5 ICD-9-CM: 427.81  2019 - Present Yes Non-traumatic rhabdomyolysis ICD-10-CM: M62.82 ICD-9-CM: 728.88  2019 - Present Yes Moderate dementia without behavioral disturbance (HCC) (Chronic) ICD-10-CM: F03.90 ICD-9-CM: 290.0  2018 - Present Yes Morbid obesity ([facility] (Chronic) ICD-10-CM: E66.01 
ICD-9-CM: 278.01  2012 - Present Yes Atrial fibrillation (HCC) (Chronic) ICD-10-CM: I48.91 
ICD-9-CM: 427.31  2012 - Present Yes HTN (hypertension) (Chronic) ICD-10-CM: I10 
ICD-9-CM: 401.9  2012 - Present Yes Admission HPI from 2020:   
78year old AAM PMH Dementia, SSS, recurrent UTI, prostate cancer admitted  after being found on floor of home for unknown amount of time after falling from bed. He was found to have a UTI which he has a history of. Patient has home hospice-[facility]. He lives with daughters. Hospital Course: 
Patient susceptibility final and will discharge home on oral Vantin. He is afebrile, no leukocytosis. Potassium normal today. Cr normal range and has remained normal. Art notified of discharge home with [facility] hospice. Labwork to be rechecked with PCP. CM assisting with discharge. Follow up instructions and discharge meds at bottom of this note. Plan was discussed with patient, daughter-Art. All questions answered. Patient was stable at time of discharge. 10 systems reviewed and negative except as noted in HPI. Diagnostic Imaging/Tests:  
Ct Head Wo Cont Result Date: 4/2/2020 EXAM: CT head without contrast. INDICATION: Fall, unwitnessed. History of dementia. COMPARISON: Head CT dated December 14, 2019. Multiple axial images obtained through the brain without intravenous contrast. Radiation dose reduction techniques were used for this study: All CT scans performed at this facility use one or all of the following: Automated exposure control, adjustment of the mA and/or kVp according to patient's size, iterative reconstruction. FINDINGS: No evidence of intracranial mass, hemorrhage, or large territorial infarct. The ventricles are normal in size and position. The basal cisterns are patent. No extra-axial fluid collection or mass effect. The orbital contents are within normal limits. Air-fluid level within the left maxillary sinus with frothy mucosal secretions. The mastoid air cells and middle ears are clear. No significant osseous or extracranial soft tissue lesions. IMPRESSION: 1. No evidence of acute intracranial abnormality. Chronic changes as above. 2. Air-fluid level in the left maxillary sinus with frothy mucosal secretions, possible acute sinusitis. Xr Chest [facility] Result Date: 4/2/2020 EXAM: Single view chest radiograph. INDICATION: Sepsis. COMPARISON: Chest radiograph dated December 14, 2019. FINDINGS: Single lead cardiac pacer. Retrocardiac region somewhat limited in evaluation on AP projection. Unchanged cardiomegaly. Likely small left pleural effusion. Mild atelectatic change of the right lung base. No focal lung consolidation evident. IMPRESSION: 1. Mild right basilar atelectasis without a focal consolidation to suggest pneumonia. Retrocardiac region somewhat limited in evaluation on AP projection. Echocardiogram results: No results found for this visit on 04/02/20. All Micro Results Procedure Component Value Units Date/Time CULTURE, URINE [[id number]]  (Abnormal)  (Susceptibility) Collected:  04/02/20 1050 Order Status:  Completed Specimen:  Urine from Clean catch Updated:  04/05/20 0239 Special Requests: NO SPECIAL REQUESTS Culture result:    
  >100,000 COLONIES/mL ESCHERICHIA COLI  
     
 CULTURE, BLOOD [[id number]] Collected:  04/02/20 1035 Order Status:  Completed Specimen:  Blood Updated:  04/05/20 5299 Special Requests: --     
  LEFT 
ARM Culture result: NO GROWTH 3 DAYS     
 CULTURE, BLOOD [[id number]] Collected:  04/02/20 1035 Order Status:  Completed Specimen:  Blood Updated:  04/05/20 0358 Special Requests: --     
  RIGHT Antecubital 
  
  Culture result: NO GROWTH 3 DAYS Labs: Results:  
   
BMP, Mg, Phos Recent Labs 04/05/20 0457 04/04/20 
0508 04/03/20 
0500 * 148* 144  
K 3.6 3.4* 3.3*  
* 115* 112* CO2 22 23 22 AGAP 10 10 10 BUN 7* 8 9 CREA 0.88 0.92 0.92  
CA 9.2 9.4 10.1 GLU 65 86 104* MG  --  2.0 2.0  
  
CBC Recent Labs 04/05/20 
0457 04/04/20 
0508 04/03/20 
0500 WBC 6.4 7.8 9.3  
RBC 4.17* 4.19* 4.67 HGB 13.3* 13.2* 14.7 HCT 39.5* 38.1* 41.9 * 163 178 GRANS 60 68 76 LYMPH 29 22 16 EOS 2 1 0* MONOS 8 8 7 BASOS 1 0 0 IG 1 0 1  
 ANEU 3.9 5.3 7.1 ABL 1.8 1.8 1.5 LISA 0.1 0.1 0.0 ABM 0.5 0.6 0.7 ABB 0.0 0.0 0.0 AIG 0.0 0.0 0.1 LFT Recent Labs 04/05/20 
0457 04/04/20 
0508 04/03/20 
0500 SGOT 89* 92* 116* ALT 41 35 35  101 120  
TP 5.9* 5.9* 6.4 ALB 1.9* 2.0* 2.3*  
GLOB 4.0* 3.9* 4.1* AGRAT 0.5* 0.5* 0.6* Cardiac Testing Lab Results Component Value Date/Time BNP 76 (H) 02/04/2019 09:58 PM  
 BNP 44 09/25/2018 05:35 PM  
 BNP 57 12/31/2017 08:07 AM  
 BNP 40 02/11/2016 03:16 PM  
  08/28/2012 06:15 PM  
 CK 1,064 (H) 04/04/2020 05:08 AM  
 CK 2,089 (H) 04/03/2020 05:00 AM  
  (H) 04/02/2020 10:35 AM  
 Troponin-I, Qt. 1.02 () 12/03/2019 12:24 PM  
 Troponin-I, Qt. 0.89 () 10/31/2019 10:58 AM  
 Troponin-I, Qt. 0.89 () 10/30/2019 10:08 PM  
  
Coagulation Tests Lab Results Component Value Date/Time Prothrombin time 12.1 (H) 08/28/2012 06:15 PM  
 Prothrombin time 11.8 01/02/2012 10:13 AM  
 INR 1.2 08/28/2012 06:15 PM  
 INR 1.1 01/02/2012 10:13 AM  
 aPTT 31.0 08/28/2012 06:15 PM  
 aPTT 31.3 01/02/2012 10:13 AM  
  
A1c Lab Results Component Value Date/Time Hemoglobin A1c 5.5 08/28/2012 06:15 PM  
  
Lipid Panel Lab Results Component Value Date/Time Cholesterol, total 142 04/17/2017 11:59 AM  
 HDL Cholesterol 76 04/17/2017 11:59 AM  
 LDL, calculated 52 04/17/2017 11:59 AM  
 VLDL, calculated 14 04/17/2017 11:59 AM  
 Triglyceride 69 04/17/2017 11:59 AM  
 CHOL/HDL Ratio 1.9 08/29/2012 01:43 AM  
  
Thyroid Panel Lab Results Component Value Date/Time TSH 0.587 05/13/2018 01:23 AM  
 TSH 0.588 07/22/2017 10:35 PM  
 T4, Total 7.6 05/12/2016 10:24 AM  
 T4, Total 6.7 02/09/2015 10:30 AM  
 T4, Free 1.4 05/13/2018 01:23 AM  
    
Most Recent UA Lab Results Component Value Date/Time  Color VIRGIL 04/02/2020 10:50 AM  
 Appearance TURBID 04/02/2020 10:50 AM  
 Specific gravity 1.009 04/02/2020 10:50 AM  
 pH (UA) 6.5 04/02/2020 10:50 AM  
 Protein 30 (A) 04/02/2020 10:50 AM  
 Glucose NEGATIVE  04/02/2020 10:50 AM  
 Ketone NEGATIVE  04/02/2020 10:50 AM  
 Bilirubin SMALL (A) 04/02/2020 10:50 AM  
 Blood MODERATE (A) 04/02/2020 10:50 AM  
 Urobilinogen 2.0 (H) 04/02/2020 10:50 AM  
 Nitrites NEGATIVE  04/02/2020 10:50 AM  
 Leukocyte Esterase LARGE (A) 04/02/2020 10:50 AM  
  
 
No Known Allergies Immunization History Administered Date(s) Administered  Influenza Vaccine 09/26/2012  Influenza Vaccine (Quad) Mdck Pf 11/30/2017  Influenza Vaccine (Quad) PF 10/24/2016, 09/28/2018  Pneumococcal Vaccine (Unspecified Type) 06/25/2013  TB Skin Test (PPD) Intradermal 07/23/2017, 05/13/2018, 09/25/2018, 06/04/2019, 07/29/2019, 11/01/2019, 12/03/2019, 12/14/2019  Tdap 05/19/2016 All Labs from Last 24 Hrs: 
Recent Results (from the past 24 hour(s)) CBC WITH AUTOMATED DIFF Collection Time: 04/05/20  4:57 AM  
Result Value Ref Range WBC 6.4 4.3 - 11.1 K/uL  
 RBC 4.17 (L) 4.23 - 5.6 M/uL  
 HGB 13.3 (L) 13.6 - 17.2 g/dL HCT 39.5 (L) 41.1 - 50.3 % MCV 94.7 79.6 - 97.8 FL  
 MCH 31.9 26.1 - 32.9 PG  
 MCHC 33.7 31.4 - 35.0 g/dL  
 RDW 15.5 (H) 11.9 - 14.6 % PLATELET 964 (L) 235 - 450 K/uL MPV 10.8 9.4 - 12.3 FL ABSOLUTE NRBC 0.00 0.0 - 0.2 K/uL  
 DF AUTOMATED NEUTROPHILS 60 43 - 78 % LYMPHOCYTES 29 13 - 44 % MONOCYTES 8 4.0 - 12.0 % EOSINOPHILS 2 0.5 - 7.8 % BASOPHILS 1 0.0 - 2.0 % IMMATURE GRANULOCYTES 1 0.0 - 5.0 %  
 ABS. NEUTROPHILS 3.9 1.7 - 8.2 K/UL  
 ABS. LYMPHOCYTES 1.8 0.5 - 4.6 K/UL  
 ABS. MONOCYTES 0.5 0.1 - 1.3 K/UL  
 ABS. EOSINOPHILS 0.1 0.0 - 0.8 K/UL  
 ABS. BASOPHILS 0.0 0.0 - 0.2 K/UL  
 ABS. IMM. GRANS. 0.0 0.0 - 0.5 K/UL METABOLIC PANEL, COMPREHENSIVE Collection Time: 04/05/20  4:57 AM  
Result Value Ref Range Sodium 147 (H) 136 - 145 mmol/L Potassium 3.6 3.5 - 5.1 mmol/L Chloride 115 (H) 98 - 107 mmol/L  
 CO2 22 21 - 32 mmol/L  Anion gap 10 7 - 16 mmol/L  
 Glucose 65 65 - 100 mg/dL BUN 7 (L) 8 - 23 MG/DL Creatinine 0.88 0.8 - 1.5 MG/DL  
 GFR est AA >60 >60 ml/min/1.73m2 GFR est non-AA >60 >60 ml/min/1.73m2 Calcium 9.2 8.3 - 10.4 MG/DL Bilirubin, total 1.1 0.2 - 1.1 MG/DL  
 ALT (SGPT) 41 12 - 65 U/L  
 AST (SGOT) 89 (H) 15 - 37 U/L Alk. phosphatase 103 50 - 136 U/L Protein, total 5.9 (L) 6.3 - 8.2 g/dL Albumin 1.9 (L) 3.2 - 4.6 g/dL Globulin 4.0 (H) 2.3 - 3.5 g/dL A-G Ratio 0.5 (L) 1.2 - 3.5 Discharge Exam: 
Patient Vitals for the past 24 hrs: 
 Temp Pulse Resp BP SpO2  
04/05/20 0706 98.5 °F (36.9 °C) 61 18 116/73 99 % 04/05/20 0517 98.4 °F (36.9 °C) 65 18 113/63 98 % 04/05/20 0020 98.6 °F (37 °C) 63 18 119/69 99 % 04/04/20 2002 98.8 °F (37.1 °C) 60 19 121/69 98 % 04/04/20 1506 98.6 °F (37 °C) 63 20 106/64 97 % 04/04/20 1045 98.2 °F (36.8 °C) 60 20 98/62 97 % Oxygen Therapy O2 Sat (%): 99 % (04/05/20 0706) Pulse via Oximetry: 81 beats per minute (04/02/20 1228) O2 Device: Room air (04/02/20 1026) Intake/Output Summary (Last 24 hours) at 4/5/2020 6373 Last data filed at 4/4/2020 1749 Gross per 24 hour Intake 480 ml Output  Net 480 ml Physical exam: 
General:    Well nourished. Alert. No distress. Eyes:   Normal sclera. Extraocular movements intact. ENT:  Normocephalic, atraumatic. Moist mucous membranes CV:   Regular rate and rhythm. No murmur, rub, or gallop. Lungs:  Clear to auscultation bilaterally. No wheezing, rhonchi, or rales. Abdomen: Soft, nontender, nondistended. Bowel sounds normal.  
Extremities: Warm and dry. No cyanosis or edema. Neurologic: No focal deficits Skin:     No rashes or jaundice. Psych:  Normal mood and affect. Discharge Info:  
Current Discharge Medication List  
  
START taking these medications Details Lactobacillus Acidpricilla & Rafiq (FLORANEX) 1 million cell tab tablet Take 2 Tabs by mouth two (2) times a day for 15 days. Qty: 30 Tab, Refills: 0  
  
cefpodoxime (VANTIN) 100 mg tablet Take 1 Tab by mouth two (2) times a day for 10 days. Qty: 20 Tab, Refills: 0 CONTINUE these medications which have NOT CHANGED Details  
tamsulosin (FLOMAX) 0.4 mg capsule Take 1 Cap by mouth nightly. Qty: 30 Cap, Refills: 0  
  
bicalutamide (CASODEX) 50 mg tablet TAKE 1 TABLET BY MOUTH EVERY DAY Qty: 90 Tab, Refills: 1 Associated Diagnoses: CA of prostate ([facility] ARIPiprazole (ABILIFY) 10 mg tablet TAKE 1 TABLET BY MOUTH EVERY DAY Qty: 90 Tab, Refills: 1 Associated Diagnoses: Major depressive disorder with single episode, in remission ([facility] donepezil (ARICEPT) 5 mg tablet TAKE 1 TABLET BY MOUTH AT BEDTIME Qty: 90 Tab, Refills: 0 Associated Diagnoses: Mild dementia ([facility] metoprolol tartrate (LOPRESSOR) 25 mg tablet Take 1 Tab by mouth two (2) times a day. Qty: 60 Tab, Refills: 0  
  
furosemide (LASIX) 40 mg tablet Take 40 mg po daily PRN ONLY for evidence of fluid retention 
Qty: 15 Tab, Refills: 0  
  
polyethylene glycol (MIRALAX) 17 gram/dose powder Take 17 g by mouth daily. Indications: constipation  
  
multivitamin, tx-iron-ca-min (THERA-M W/ IRON) 9 mg iron-400 mcg tab tablet Take 1 Tab by mouth daily. levomilnacipran (FETZIMA) 40 mg ER capsule TAKE 1 CAP BY MOUTH DAILY. Qty: 90 Cap, Refills: 3 Associated Diagnoses: Other depression  
  
famotidine (PEPCID) 20 mg tablet Take 1 Tab by mouth two (2) times a day. Qty: 180 Tab, Refills: 3 Associated Diagnoses: Gastroesophageal reflux disease without esophagitis  
  
senna-docusate (SENNA LAXATIVE-STOOL SOFTENER) 8.6-50 mg per tablet Take 1 Tab by mouth daily. aspirin 81 mg chewable tablet Take 1 Tab by mouth daily. Indications: prevention of cerebrovascular accident Qty: 30 Tab, Refills: 0  
  
acetaminophen (TYLENOL ARTHRITIS PAIN) 650 mg CR tablet Take 650 mg by mouth every six (6) hours as needed for Pain. Disposition: home Activity: Activity as tolerated Diet: DIET REGULAR Follow-up Appointments Procedures  FOLLOW UP VISIT Appointment in: 3 - 12 [facility] PCP next week [facility] PCP next week Standing Status:   Standing Number of Occurrences:   1 Order Specific Question:   Appointment in Answer:   3 - 5 Days Follow-up Information Follow up With Specialties Details Why Contact Info Karthik Nj MD Internal Medicine   [facility] [id number] [phone] Time spent in patient discharge planning and coordination 35 minutes.  
 
Signed: 
Checo Vizcarra NP

## 2020-04-05 NOTE — PROGRESS NOTES
Patient will be d/c home today with resuming HOSP [facility]. (NP) Smitha Kristian has made contact with patient daughter and discussed the d/c plan with daughter. Daughter requesting that patient be transported home by [facility]. CM made contact with on-call (RN) Kaitlin Maldonado and informed her that patient was being d/c home today. States that supervisor will give CM a call. Information faxed to [facility]. CM will continue to monitor and be available for any concerns that may occur. Care Management Interventions PCP Verified by CM: Yes(Dr. Joe Pace) Mode of Transport at Discharge: Other (see comment)(to be determined based on patinet needs) Transition of Care Consult (CM Consult): Home Hospice, Discharge Planning [facility] Hospice: No 
Reason Outside [place]: Patient already serviced by other home care/hospice agency Physical Therapy Consult: No 
Occupational Therapy Consult: No 
Speech Therapy Consult: No 
Current Support Network: Lives with Caregiver(Lives with daughter Manuel Molina) Confirm Follow Up Transport: Family The Plan for Transition of Care is Related to the Following Treatment Goals : resume comfort care at home with [facility] Discharge Location Discharge Placement: Home

## 2020-04-05 NOTE — PROGRESS NOTES
Pt requiring new IV this shift. Some increased confusion this am but easily reoriented. Hourly rounds performed through shift, pt denies needs at this time. Bed in low position and call light/ personal items within reach. Will continue to monitor and report to day shift nurse.

## 2020-04-05 NOTE — PROGRESS NOTES
Noted that patient should be going home today on hospice Murray Brunner, staff [address], [id number] [facility] Mikey  /   [email or web address]

## 2020-04-07 ENCOUNTER — PATIENT OUTREACH (OUTPATIENT)
Dept: CASE MANAGEMENT | Age: 80
End: 2020-04-07

## 2020-11-05 NOTE — PROGRESS NOTES
Continues to rest quietly, arousing easily with no c/o. Ensure Enlive, chocolate offered with pt interested, consuming all, following with 1/2 cup water. No distress noted. upper/lower/full

## 2023-01-30 NOTE — PROGRESS NOTES
Pt stood to have brief changed and then returned to chair. No distress noted or c/o voiced. Billing Type: Third-Party Bill

## 2024-03-16 NOTE — ED PROVIDER NOTES
HPI Comments: Patient arrives via EMS to sepsis alert  Given Zosyn and fluids for altered mental status and foul-smelling urine. Patient denies any complaints and is unsure why he is here. He knows he is at [facility] but does not know the date. Patient is a 76 y.o. male presenting with altered mental status. The history is provided by the patient. The history is limited by the condition of the patient. Altered mental status    This is a new problem. The current episode started 6 to 12 hours ago. The problem has been gradually worsening. Associated symptoms include confusion and somnolence. Mental status baseline is mild dementia. His past medical history is significant for dementia. Past Medical History:   Diagnosis Date    Arrhythmia     pt takes pradaxa    Arthritis     Atrial fibrillation ([address] 1/27/2012    CAD (coronary artery disease)     Cancer (HCC)     prostate    GERD (gastroesophageal reflux disease)     controlled with medication    Heart failure ([address]     pt takes lasix as needed, reports SOB with exertion    Hypertension     controlled with medication    Morbid obesity ([address]        Past Surgical History:   Procedure Laterality Date    HX ORTHOPAEDIC  2010    left TKA    HX PACEMAKER  8/30/2012    [employer] pacemaker    HX PROSTATECTOMY           Family History:   Problem Relation Age of Onset    Cancer Father        Social History     Social History    Marital status:      Spouse name: N/A    Number of children: N/A    Years of education: N/A     Occupational History    Not on file. Social History Main Topics    Smoking status: Never Smoker    Smokeless tobacco: Never Used    Alcohol use No    Drug use: No    Sexual activity: No     Other Topics Concern    Not on file     Social History Narrative         ALLERGIES: Review of patient's allergies indicates no known allergies.     Review of Systems   Unable to perform ROS: Mental status change Psychiatric/Behavioral: Positive for confusion. Vitals:    05/12/18 2100   BP: 138/62   Pulse: 73   Temp: 99.6 °F (37.6 °C)   SpO2: 98%   Weight: 112.5 kg (248 lb)   Height: 6' 2\" (1.88 m)            Physical Exam   Constitutional: He appears well-developed and well-nourished. No distress. HENT:   Head: Normocephalic and atraumatic. Neck: Normal range of motion. Neck supple. Cardiovascular: Normal rate. An irregularly irregular rhythm present. Pulmonary/Chest: Effort normal and breath sounds normal. No respiratory distress. He has no wheezes. He has no rales. Abdominal: Soft. He exhibits no distension. There is no tenderness. There is no rebound and no guarding. Musculoskeletal: Normal range of motion. Neurological: He is alert. He is disoriented. No cranial nerve deficit. Skin: Skin is warm and dry. He is not diaphoretic. Nursing note and vitals reviewed. MDM  Number of Diagnoses or Management Options  Diagnosis management comments: Patient sepsis likely secondary to UTI.   Already received Zosyn was given vancomycin and admitted to the hospitalist.       Amount and/or Complexity of Data Reviewed  Clinical lab tests: ordered and reviewed  Review and summarize past medical records: yes  Discuss the patient with other providers: yes  Independent visualization of images, tracings, or specimens: yes (Atrial fib no ST elevation)    Risk of Complications, Morbidity, and/or Mortality  Presenting problems: high  Diagnostic procedures: moderate  Management options: high    Patient Progress  Patient progress: stable        ED Course       Procedures Yes

## 2025-02-17 NOTE — PROGRESS NOTES
Critical lab: WBC=0.5  Time: 0949  Read back and Verified(Lab Staff Name): Juliet NAYLOR Provider Notified: Dr. Argueta    Problem: Self Care Deficits Care Plan (Adult)  Goal: *Acute Goals and Plan of Care (Insert Text)  1. Patient will perform grooming and upper body dressing with modified independence within 7 days with equipment as needed. 2.  Patient will perform lower body dressing with modified independence within 7 days with equipment as needed. 3.  Patient will perform toileting and toilet transfer with modified independence within 7 days with equipment as needed. 4.   Patient will perform bathing with supervision within 7 days with equipment as needed. 5.  Pt and or caregiver to demonstrate and verbalize good understanding of recommendations for increasing safety with functional tasks upon discharge. 6.   Pt will participate in B UE therapeutic exercises for 10 minutes with 3 rest breaks within 7 days. OCCUPATIONAL THERAPY: Initial Assessment and AM 5/14/2018  INPATIENT: Hospital Day: 3  [place] / [place] / Product Type: Medicare /      NAME/AGE/GENDER: Amanda Li is a 76 y.o. male   PRIMARY DIAGNOSIS:  Acute UTI  Altered mental status Acute UTI Acute UTI        ICD-10: Treatment Diagnosis:    · Generalized Muscle Weakness (M62.81)   Precautions/Allergies:     Review of patient's allergies indicates no known allergies. ASSESSMENT:     Mr. Reji Rodriguez admitted with the above diagnosis. Patient A & O x 4, though he appears to be a poor historian. Patient stated that he lives with his daughter, though unsure how much assistance he required with ADLs prior to admit. Patient sat on edge of bed with CGA. Patient able to doff/drake socks with minimal assistance. Patient stood with [employer] using RW, and he transferred to chair using RW with CGA/Min Assistance. Patient to benefit from Occupational Therapy while in the hospital to maximize ADL performance. Cont OT per tx plan.     This section established at most recent assessment   PROBLEM LIST (Impairments causing functional limitations):  1. Decreased Strength  2. Decreased ADL/Functional Activities  3. Decreased Transfer Abilities  4. Decreased Ambulation Ability/Technique  5. Decreased Balance  6. Decreased Activity Tolerance  7. Decreased Work Simplification/Energy Conservation Techniques  8. Increased Fatigue  9. Decreased Cognition   INTERVENTIONS PLANNED: (Benefits and precautions of occupational therapy have been discussed with the patient.)  1. Activities of daily living training  2. Adaptive equipment training  3. Balance training  4. Clothing management  5. Cognitive training  6. Donning&doffing training  7. Group therapy  8. Hygiene training  9. Medication management training  10. Neuromuscular re-eduation  11. Re-evaluation  12. Sensory reintegration training  13. Therapeutic activity  14. Therapeutic exercise     TREATMENT PLAN: Frequency/Duration: Follow patient 3x's/wk to address above goals. Rehabilitation Potential For Stated Goals: Good     RECOMMENDED REHABILITATION/EQUIPMENT: (at time of discharge pending progress): Due to the probability of continued deficits (see above) this patient will not likely need continued skilled occupational therapy after discharge. Equipment:    None at this time              OCCUPATIONAL PROFILE AND HISTORY:   History of Present Injury/Illness (Reason for Referral):  75 y/o male with hx prostate cancer, hx recurrent UTIs, HTN, atrial fibrillation presents to ED accompanied by family for reported fever to 102.7, lethargy, confusion and foul odor to urine. His initial lactic acid was 2.9 and he was given IVF bolus, zosyn and vancomycin. Reportedly his prostate cancer is in remission. He denies any abdominal or suprapubic pain. Is confused so a poor historian but does admit difficulties getting his urine out. UA is positive for UTI. Will admit for further treatment with IV antibiotics.       10 systems reviewed and negative except as noted in HPI.no chest pain, shortness of breath or bone pain. Past Medical History/Comorbidities:   Mr. Reji Rodriguez  has a past medical history of Arrhythmia; Arthritis; Atrial fibrillation ([facility] [address] (1/27/2012); CAD (coronary artery disease); Cancer [facility]); GERD (gastroesophageal reflux disease); Heart failure ([facility] [address] Hypertension; and Morbid obesity ([facility] [address] Mr. Reji Rodriguez  has a past surgical history that includes hx orthopaedic (2010); hx prostatectomy; and hx pacemaker (8/30/2012). Social History/Living Environment:   Home Environment: Private residence  One/Two Story Residence: Two story  Living Alone: No  Support Systems: Child(billy)  Patient Expects to be Discharged to[de-identified] Private residence  Current DME Used/Available at Home: None  Tub or Shower Type: Shower  Prior Level of Function/Work/Activity:  Unsure; patient is a poor historian     Number of Personal Factors/Comorbidities that affect the Plan of Care: Brief history (0):  LOW COMPLEXITY   ASSESSMENT OF OCCUPATIONAL PERFORMANCE[de-identified]   Activities of Daily Living:           Basic ADLs (From Assessment) Complex ADLs (From Assessment)   Feeding: Setup  Oral Facial Hygiene/Grooming: Setup  Bathing: Minimum assistance  Upper Body Dressing: Contact guard assistance  Lower Body Dressing: Minimum assistance  Toileting: Moderate assistance (pt wearing diapar this date; difficult to manage) Instrumental ADL  Meal Preparation: Moderate assistance  Homemaking: Maximum assistance  Medication Management: Moderate assistance   Grooming/Bathing/Dressing Activities of Daily Living     Cognitive Retraining  Safety/Judgement: Awareness of environment; Fall prevention                       Bed/Mat Mobility  Sit to Stand: Minimum assistance  Bed to Chair: Minimum assistance       Most Recent Physical Functioning:   Gross Assessment:                  Posture:     Balance:    Bed Mobility:     Wheelchair Mobility:     Transfers:  Sit to Stand: Minimum assistance  Stand to Sit: Contact guard assistance  Bed to Chair: Minimum assistance Patient Vitals for the past 6 hrs:   BP BP Patient Position SpO2 Pulse   18 0527 124/81 - 99 % 69   18 0750 136/69 At rest 97 % 83   18 1015 124/60 At rest 98 % 83       Mental Status  Neurologic State: Alert  Orientation Level: Oriented X4  Cognition: Follows commands  Perception: Appears intact  Perseveration: No perseveration noted  Safety/Judgement: Awareness of environment, Fall prevention                          Physical Skills Involved:  1. Range of Motion  2. Balance  3. Strength  4. Activity Tolerance  5. Fine Motor Control Cognitive Skills Affected (resulting in the inability to perform in a timely and safe manner):  1. Executive Function  2. Immediate Memory  3. Short Term Recall  4. Divided Attention Psychosocial Skills Affected:  1. Habits/Routines   Number of elements that affect the Plan of Care: 3-5:  MODERATE COMPLEXITY   CLINICAL DECISION MAKIN[address] [place] [address] AM-PAC 6 Clicks   Daily Activity Inpatient Short Form  How much help from another person does the patient currently need. .. Total A Lot A Little None   1. Putting on and taking off regular lower body clothing? [] 1   [] 2   [x] 3   [] 4   2. Bathing (including washing, rinsing, drying)? [] 1   [] 2   [x] 3   [] 4   3. Toileting, which includes using toilet, bedpan or urinal?   [] 1   [x] 2   [] 3   [] 4   4. Putting on and taking off regular upper body clothing? [] 1   [] 2   [x] 3   [] 4   5. Taking care of personal grooming such as brushing teeth? [] 1   [] 2   [x] 3   [] 4   6. Eating meals? [] 1   [] 2   [x] 3   [] 4   © , Trustees of [address] [place] [id number], under license to [employer]. All rights reserved      Score:  Initial: 17  CK Most Recent: X (Date: -- )    Interpretation of Tool:  Represents activities that are increasingly more difficult (i.e. Bed mobility, Transfers, Gait). Score 24 23 22-20 19-15 14-10 9-7 6     Modifier CH CI CJ CK CL CM CN      ?  Self Care:  - CURRENT STATUS: CK - 40%-59% impaired, limited or restricted    - GOAL STATUS: CJ - 20%-39% impaired, limited or restricted    - D/C STATUS:  ---------------To be determined---------------  [place] / Product Type: Medicare /      Medical Necessity:     · Patient is expected to demonstrate progress in strength, balance, coordination and functional technique to decrease assistance required with ADLs. Reason for Services/Other Comments:  · Patient continues to require skilled intervention due to patient's inability to take care of self. Use of outcome tool(s) and clinical judgement create a POC that gives a: LOW COMPLEXITY         TREATMENT:   (In addition to Assessment/Re-Assessment sessions the following treatments were rendered)     Pre-treatment Symptoms/Complaints:    Pain: Initial:   Pain Intensity 1: 0  Post Session:  0     Assessment/Reassessment only, no treatment provided today    Braces/Orthotics/Lines/Etc:   · IV  · O2 Device: Room air  Treatment/Session Assessment:    · Response to Treatment:  positive  · Interdisciplinary Collaboration:   o Registered Nurse  o   o Certified Nursing Assistant/Patient Care Technician  · After treatment position/precautions:   o Up in chair  o Bed/Chair-wheels locked  o Bed in low position  o Call light within reach  o RN notified   · Compliance with Program/Exercises: compliant all of the time. · Recommendations/Intent for next treatment session: \"Next visit will focus on advancements to more challenging activities and reduction in assistance provided\".   Total Treatment Duration:        Matt Holden, OT